# Patient Record
Sex: FEMALE | Race: BLACK OR AFRICAN AMERICAN | NOT HISPANIC OR LATINO | Employment: FULL TIME | URBAN - METROPOLITAN AREA
[De-identification: names, ages, dates, MRNs, and addresses within clinical notes are randomized per-mention and may not be internally consistent; named-entity substitution may affect disease eponyms.]

---

## 2017-02-07 ENCOUNTER — PATIENT MESSAGE (OUTPATIENT)
Dept: PLASTIC SURGERY | Facility: CLINIC | Age: 27
End: 2017-02-07

## 2017-02-07 ENCOUNTER — PATIENT MESSAGE (OUTPATIENT)
Dept: OBSTETRICS AND GYNECOLOGY | Facility: CLINIC | Age: 27
End: 2017-02-07

## 2017-02-08 ENCOUNTER — TELEPHONE (OUTPATIENT)
Dept: PLASTIC SURGERY | Facility: CLINIC | Age: 27
End: 2017-02-08

## 2017-02-08 NOTE — TELEPHONE ENCOUNTER
Called pt to schedule an appt per her patient portal message. No answer. Unable to leave a voice message(mailbox is not set up).

## 2017-02-22 ENCOUNTER — OFFICE VISIT (OUTPATIENT)
Dept: PLASTIC SURGERY | Facility: CLINIC | Age: 27
End: 2017-02-22
Payer: COMMERCIAL

## 2017-02-22 VITALS
WEIGHT: 171.19 LBS | HEIGHT: 62 IN | BODY MASS INDEX: 31.5 KG/M2 | SYSTOLIC BLOOD PRESSURE: 131 MMHG | TEMPERATURE: 98 F | HEART RATE: 82 BPM | DIASTOLIC BLOOD PRESSURE: 71 MMHG

## 2017-02-22 DIAGNOSIS — L91.0 KELOID SCAR: Primary | ICD-10-CM

## 2017-02-22 PROCEDURE — 1160F RVW MEDS BY RX/DR IN RCRD: CPT | Mod: S$GLB,,, | Performed by: SURGERY

## 2017-02-22 PROCEDURE — 99999 PR PBB SHADOW E&M-EST. PATIENT-LVL III: CPT | Mod: PBBFAC,,, | Performed by: SURGERY

## 2017-02-22 PROCEDURE — 99212 OFFICE O/P EST SF 10 MIN: CPT | Mod: S$GLB,,, | Performed by: SURGERY

## 2017-02-22 NOTE — PROGRESS NOTES
Patient underwent bilateral breast reduction in 4/2012. She subsequently developed and abscess of the right breast and this was I&D by Dr. Patterson in the ED.    She has developed thickened keloidal scars of the IMF incision. She states they are painful and unsightly and would like them revised.    On Exam, she has keloidal widened scars of the IMF incision, worse on the right than left.    We will plan for excision and layered closure of the the inframammary fold incision.

## 2017-03-06 ENCOUNTER — TELEPHONE (OUTPATIENT)
Dept: PLASTIC SURGERY | Facility: CLINIC | Age: 27
End: 2017-03-06

## 2017-03-27 ENCOUNTER — HOSPITAL ENCOUNTER (OUTPATIENT)
Dept: RADIOLOGY | Facility: HOSPITAL | Age: 27
Discharge: HOME | End: 2017-03-27
Attending: OBSTETRICS & GYNECOLOGY
Payer: COMMERCIAL

## 2017-03-27 ENCOUNTER — OFFICE VISIT (OUTPATIENT)
Dept: OBSTETRICS AND GYNECOLOGY | Facility: CLINIC | Age: 27
End: 2017-03-27
Payer: COMMERCIAL

## 2017-03-27 VITALS
DIASTOLIC BLOOD PRESSURE: 78 MMHG | BODY MASS INDEX: 29.61 KG/M2 | WEIGHT: 167.13 LBS | SYSTOLIC BLOOD PRESSURE: 122 MMHG | HEIGHT: 63 IN

## 2017-03-27 DIAGNOSIS — N92.3 INTERMENSTRUAL BLEEDING: ICD-10-CM

## 2017-03-27 DIAGNOSIS — Z12.4 ENCOUNTER FOR SCREENING FOR MALIGNANT NEOPLASM OF CERVIX: ICD-10-CM

## 2017-03-27 DIAGNOSIS — Z01.419 WELL WOMAN EXAM WITH ROUTINE GYNECOLOGICAL EXAM: Primary | ICD-10-CM

## 2017-03-27 LAB
B-HCG UR QL: NEGATIVE
C TRACH DNA SPEC QL NAA+PROBE: NOT DETECTED
CTP QC/QA: YES
N GONORRHOEA DNA SPEC QL NAA+PROBE: NOT DETECTED

## 2017-03-27 PROCEDURE — 88175 CYTOPATH C/V AUTO FLUID REDO: CPT

## 2017-03-27 PROCEDURE — 76830 TRANSVAGINAL US NON-OB: CPT | Mod: 26,,, | Performed by: RADIOLOGY

## 2017-03-27 PROCEDURE — 99395 PREV VISIT EST AGE 18-39: CPT | Mod: S$GLB,,, | Performed by: OBSTETRICS & GYNECOLOGY

## 2017-03-27 PROCEDURE — 87591 N.GONORRHOEAE DNA AMP PROB: CPT

## 2017-03-27 PROCEDURE — 76856 US EXAM PELVIC COMPLETE: CPT | Mod: 26,,, | Performed by: RADIOLOGY

## 2017-03-27 PROCEDURE — 81025 URINE PREGNANCY TEST: CPT | Mod: S$GLB,,, | Performed by: OBSTETRICS & GYNECOLOGY

## 2017-03-27 PROCEDURE — 76856 US EXAM PELVIC COMPLETE: CPT | Mod: TC

## 2017-03-27 PROCEDURE — 99999 PR PBB SHADOW E&M-EST. PATIENT-LVL III: CPT | Mod: PBBFAC,,, | Performed by: OBSTETRICS & GYNECOLOGY

## 2017-03-27 NOTE — PROGRESS NOTES
Tiffany Dobbs is a 26 y.o. female  who presents for annual exam.  Patient's last menstrual period was 2017 (exact date).  Menses occur monthly, lasting 5-7 days in duration.  For the past several years, she describes having intermenstrual bleeding.  She is unsure if this spotting is midcycle.  No pelvic pain.  No fever.  She is currently not trying to become or prevent pregnancy.    UPT negative    History reviewed. No pertinent past medical history.    Past Surgical History:   Procedure Laterality Date    BREAST REDUCTION         OB History      Para Term  AB TAB SAB Ectopic Multiple Living    0                   ROS:  GENERAL: Feeling well overall.   SKIN: Denies rash or lesions.   HEAD: Denies head injury or headache.   NODES: Denies enlarged lymph nodes.   CHEST: Denies chest pain or shortness of breath.   CARDIOVASCULAR: Denies palpitations or left sided chest pain.   ABDOMEN: No abdominal pain, nausea, vomiting or rectal bleeding.   URINARY: No dysuria or hematuria.  REPRODUCTIVE: See HPI.   BREASTS: Denies pain, lumps, or nipple discharge.   HEMATOLOGIC: No easy bruisability.   MUSCULOSKELETAL: Denies joint pain or swelling.   NEUROLOGIC: Denies syncope or weakness.   PSYCHIATRIC: Denies depression.    PE:   (chaperone present during entire exam)  APPEARANCE: Well nourished, well developed, in no acute distress.  BREASTS: Symmetrical, S/P bilateral reduction with scarring. No palpable masses, nipple discharge or adenopathy bilaterally.  ABDOMEN: Soft. No tenderness or masses. No hernias. No CVA tenderness.  VULVA: No lesions. Normal female genitalia.  URETHRAL MEATUS: Normal size and location, no lesions, no prolapse.  URETHRA: No masses, tenderness, prolapse or scarring.  VAGINA: Moist and well rugated, no discharge, no significant cystocele or rectocele.  CERVIX: No lesions and discharge. PAP done.  UTERUS: Normal size, regular shape, mobile, non-tender, bladder base  nontender.  ADNEXA: No masses, tenderness or CDS nodularity.  ANUS PERINEUM: Normal.      Diagnosis:  1. Well woman exam with routine gynecological exam    2. Encounter for screening for malignant neoplasm of cervix    3. Intermenstrual bleeding          PLAN:    Orders Placed This Encounter    C. trachomatis/N. gonorrhoeae by AMP DNA Cervix    US Pelvis Comp with Transvag NON-OB (xpd    POCT Urine Pregnancy    Liquid-based pap smear, screening       Patient was counseled today on the need for annual gyn exams.  We discussed her intermenstrual bleeding and the various etiologies.  UPT today is negative.  GC/CT was performed to rule out an infectious etiology.  We will obtain a pelvic sono for further evaluation of her uterus.  She will perform a calendar to determine if the spotting is mid-cycle.  If the spotting seems consistent with ovulatory bleeding, she will begin a trial of OCPs.    Follow-up in 1 year.

## 2017-03-30 ENCOUNTER — PATIENT MESSAGE (OUTPATIENT)
Dept: OBSTETRICS AND GYNECOLOGY | Facility: CLINIC | Age: 27
End: 2017-03-30

## 2017-03-30 NOTE — TELEPHONE ENCOUNTER
Called patient:    Discussed results of pelvic sono:    US PELVIS COMP WITH TRANSVAG NON-OB (XPD).  There are no previous examinations for comparison. Transabdominal and transvaginal images demonstrate a mildly enlarged uterus, 9.5 x 4.2 x 5.5 cm.  The endometrial stripe is upper normal, 13 mm.  There is a 1.4 x 1.5 x 1.6 cm intramural fibroid along the left side of the uterine body.  Both ovaries are normal in size, 5.5 x 3.2 x 3.2 cm on the right and 4.1 x 2.9 x 1.9 cm on the left.  There are normal follicles on both ovaries, with a dominant 1.6 x 1.7 x 1.8 cm follicle in the right ovary.  There are no abnormal adnexal masses or free fluid.  Ovarian Doppler reveals a normal high resistance pattern with resistive index of 0.73 on the right and 0.65 on the left.           Impression                1.  Intramural fibroid  2.  Mildly enlarged uterus with upper normal endometrial stripe      We discussed the fibroid which is very small and not requiring any treatment at this time.  She was about one week prior to her menses when the pelvic ultrasound was performed.    Aware of normal pap and negative GC/CT.    She will monitor her intermenstrual bleeding to see if it is mid-cycle, consistent with ovulatory bleeding.  If so, we discussed a trial of OCPs.    To let us know her progress in 2-3 months.

## 2017-04-04 ENCOUNTER — TELEPHONE (OUTPATIENT)
Dept: PLASTIC SURGERY | Facility: CLINIC | Age: 27
End: 2017-04-04

## 2017-06-05 ENCOUNTER — HOSPITAL ENCOUNTER (OUTPATIENT)
Dept: RADIOLOGY | Facility: HOSPITAL | Age: 27
Discharge: HOME | End: 2017-06-05
Attending: INTERNAL MEDICINE
Payer: COMMERCIAL

## 2017-06-05 ENCOUNTER — OFFICE VISIT (OUTPATIENT)
Dept: INTERNAL MEDICINE | Facility: CLINIC | Age: 27
End: 2017-06-05
Payer: COMMERCIAL

## 2017-06-05 VITALS
WEIGHT: 168.5 LBS | DIASTOLIC BLOOD PRESSURE: 72 MMHG | HEART RATE: 88 BPM | HEIGHT: 63 IN | SYSTOLIC BLOOD PRESSURE: 116 MMHG | BODY MASS INDEX: 29.86 KG/M2

## 2017-06-05 DIAGNOSIS — M25.562 CHRONIC PAIN OF LEFT KNEE: ICD-10-CM

## 2017-06-05 DIAGNOSIS — B35.1 FUNGAL NAIL INFECTION: ICD-10-CM

## 2017-06-05 DIAGNOSIS — G89.29 CHRONIC PAIN OF LEFT KNEE: ICD-10-CM

## 2017-06-05 DIAGNOSIS — N92.1 MENORRHAGIA WITH IRREGULAR CYCLE: ICD-10-CM

## 2017-06-05 DIAGNOSIS — Z00.00 HEALTH CARE MAINTENANCE: Primary | ICD-10-CM

## 2017-06-05 PROCEDURE — 73562 X-RAY EXAM OF KNEE 3: CPT | Mod: 26,50,, | Performed by: RADIOLOGY

## 2017-06-05 PROCEDURE — 87591 N.GONORRHOEAE DNA AMP PROB: CPT

## 2017-06-05 PROCEDURE — 73562 X-RAY EXAM OF KNEE 3: CPT | Mod: TC,50

## 2017-06-05 PROCEDURE — 99385 PREV VISIT NEW AGE 18-39: CPT | Mod: S$GLB,,, | Performed by: INTERNAL MEDICINE

## 2017-06-05 PROCEDURE — 99999 PR PBB SHADOW E&M-EST. PATIENT-LVL III: CPT | Mod: PBBFAC,,, | Performed by: INTERNAL MEDICINE

## 2017-06-05 RX ORDER — NAPROXEN 500 MG/1
500 TABLET ORAL 2 TIMES DAILY WITH MEALS
Qty: 30 TABLET | Refills: 1 | Status: SHIPPED | OUTPATIENT
Start: 2017-06-05 | End: 2017-06-29 | Stop reason: SDUPTHER

## 2017-06-05 NOTE — PROGRESS NOTES
"Subjective:       Patient ID: Tiffany Dobbs is a 26 y.o. female.    Chief Complaint: Annual Exam and Establish Care    HPI   27 yo F here to establish care and have yearly preventative healthcare visit.     Knee pain intermittently for several years. Used to be only left but both bother her intermittently. Did have an injury when running when 12yo with twisting while running, lots of swelling, no medical care.   Left bothers her worse. Worse with rainy weather. No swelling recently. Hears crunching. Worse with squats but not regular exercise. Worse with heels. Never bad enough to take OTC meds.     Heavy menstrual cycles. She is currently on cycle day 9. Irregular. She saw ob/gyn on 3/27/17. Recommended ocp but did not want. Requests pain meds for cramps.     Toenails "weird color" since child.       Review of Systems   Constitutional: Negative for fever.   HENT: Negative.    Eyes: Negative.    Respiratory: Negative for shortness of breath.    Cardiovascular: Negative for chest pain and leg swelling.   Gastrointestinal: Negative for abdominal pain, diarrhea, nausea and vomiting.   Genitourinary: Negative.    Musculoskeletal: Negative for arthralgias.   Skin: Negative for rash.   Psychiatric/Behavioral: Negative.        Objective:   /72   Pulse 88   Ht 5' 3" (1.6 m)   Wt 76.4 kg (168 lb 8 oz)   LMP 05/30/2017   BMI 29.85 kg/m²      Physical Exam   Constitutional: She is oriented to person, place, and time. She appears well-developed and well-nourished.   HENT:   Head: Normocephalic and atraumatic.   Eyes: Conjunctivae and EOM are normal. Pupils are equal, round, and reactive to light.   Neck: Neck supple. No thyromegaly present.   Cardiovascular: Normal rate, regular rhythm and normal heart sounds.    No murmur heard.  Pulmonary/Chest: Effort normal and breath sounds normal. No respiratory distress. She has no wheezes.   Abdominal: Soft. Bowel sounds are normal. She exhibits no distension. There is no " tenderness.   Musculoskeletal: Normal range of motion.   Neurological: She is alert and oriented to person, place, and time.   Skin: Skin is warm and dry. No rash noted.   Psychiatric: She has a normal mood and affect. Judgment and thought content normal.   Vitals reviewed.      Crepitus left knee, slighlt  Dark discoloration distal toe nails    Assessment:       1. Health care maintenance    2. Chronic pain of left knee    3. Menorrhagia with irregular cycle    4. Fungal nail infection        Plan:       Tiffany HERNANDEZ was seen today for annual exam and establish care.    Diagnoses and all orders for this visit:    Health care maintenance  -     Comprehensive metabolic panel; Future  -     CBC auto differential; Future  -     TSH; Future  -     C. trachomatis/N. gonorrhoeae by AMP DNA Urine  -     HIV-1 and HIV-2 antibodies; Future  -     RPR; Future    Chronic pain of left knee  -     naproxen (NAPROSYN) 500 MG tablet; Take 1 tablet (500 mg total) by mouth 2 (two) times daily with meals. As needed knee pain or menstrual cramps  -     X-ray Knee Ortho Bilateral; Future    Menorrhagia with irregular cycle  -     naproxen (NAPROSYN) 500 MG tablet; Take 1 tablet (500 mg total) by mouth 2 (two) times daily with meals. As needed knee pain or menstrual cramps    Fungal nail infection   Will start terbinafine if cmp okay

## 2017-06-06 ENCOUNTER — PROCEDURE VISIT (OUTPATIENT)
Dept: PLASTIC SURGERY | Facility: CLINIC | Age: 27
End: 2017-06-06
Payer: COMMERCIAL

## 2017-06-06 VITALS — HEART RATE: 79 BPM | DIASTOLIC BLOOD PRESSURE: 62 MMHG | SYSTOLIC BLOOD PRESSURE: 118 MMHG

## 2017-06-06 DIAGNOSIS — Z98.890 S/P BILATERAL BREAST REDUCTION: Primary | ICD-10-CM

## 2017-06-06 LAB
C TRACH DNA SPEC QL NAA+PROBE: NOT DETECTED
N GONORRHOEA DNA SPEC QL NAA+PROBE: NOT DETECTED

## 2017-06-07 ENCOUNTER — PATIENT MESSAGE (OUTPATIENT)
Dept: INTERNAL MEDICINE | Facility: CLINIC | Age: 27
End: 2017-06-07

## 2017-06-07 DIAGNOSIS — B35.1 FUNGAL NAIL INFECTION: Primary | ICD-10-CM

## 2017-06-07 DIAGNOSIS — D50.9 MICROCYTIC ANEMIA: ICD-10-CM

## 2017-06-09 ENCOUNTER — TELEPHONE (OUTPATIENT)
Dept: PLASTIC SURGERY | Facility: CLINIC | Age: 27
End: 2017-06-09

## 2017-06-09 DIAGNOSIS — L91.0 KELOID SCAR: Primary | ICD-10-CM

## 2017-06-29 DIAGNOSIS — M25.562 CHRONIC PAIN OF LEFT KNEE: ICD-10-CM

## 2017-06-29 DIAGNOSIS — N92.1 MENORRHAGIA WITH IRREGULAR CYCLE: ICD-10-CM

## 2017-06-29 DIAGNOSIS — G89.29 CHRONIC PAIN OF LEFT KNEE: ICD-10-CM

## 2017-06-29 RX ORDER — NAPROXEN 500 MG/1
TABLET ORAL
Qty: 30 TABLET | Refills: 0 | Status: ON HOLD | OUTPATIENT
Start: 2017-06-29 | End: 2017-07-13 | Stop reason: HOSPADM

## 2017-07-03 ENCOUNTER — ANESTHESIA EVENT (OUTPATIENT)
Dept: SURGERY | Facility: HOSPITAL | Age: 27
End: 2017-07-03
Payer: COMMERCIAL

## 2017-07-03 NOTE — ANESTHESIA PREPROCEDURE EVALUATION
Pre Admission Screening  Katie Schulz RN      []Hide copied text  []Hover for attribution information  Anesthesia Assessment: Preoperative EQUATION     Planned Procedure: Procedure(s) (LRB):  EXCISION-KELOID Rt & Lt IMF (Bilateral)  Requested Anesthesia Type:General  Surgeon: Rom Patterson MD  Service: Plastics  Known or anticipated Date of Surgery:7/13/2017     Surgeon notes: reviewed     Electronic QUestionnaire Assessment completed via nurse interview with patient.         NO AQ        Triage considerations:      The patient has no apparent active cardiac condition (No unstable coronary Syndrome such as severe unstable angina or recent [<1 month] myocardial infarction, decompensated CHF, severe valvular   disease or significant arrhythmia)     Previous anesthesia records:GETA, No problems and Not available     Last PCP note: within 1 month , within [facility]  6/5/17  Subspecialty notes: n/a     Other important co-morbidities: mild anemia   Tests already available:  Available tests,  within 1 month . 6/5/17/CBC and CMP                                                Instructions given. (See in Nurse's note)     Optimization:  Anesthesia Preop Clinic Assessment  Indicated-not required for this procedure                          Plan:              Testing:  none                                               Patient  has previously scheduled Medical Appointment:none     Navigation:                                   Straight Line to surgery.                                    No tests, anesthesia preop clinic visit, or consult required.                                                                                    Electronically signed by Katie Schulz RN at 7/3/2017 10:41 AM        Pre-admit on 7/13/2017            Detailed Report                                                                                                                   07/03/2017  Pre-operative evaluation for Procedure(s)  (LRB):  EXCISION-KELOID Rt & Lt IMF (Bilateral)    Tiffany Dobbs is a 26 y.o. female with PMH of breast reduction surgery presenting for procedures above for bilateral keloid scar after breast reduction surgery.    LDA:   n/a    Prev airway:   Not in Epic    Drips:   n/a     Ob Status:  Urine pregnancy test ordered    Patient Active Problem List   Diagnosis    Surgical wound dehiscence    Chronic pain of left knee    Menorrhagia with irregular cycle    Microcytic anemia       Review of patient's allergies indicates:   Allergen Reactions    Bactrim [sulfamethoxazole-trimethoprim] Itching and Rash       Past Surgical History:   Procedure Laterality Date    BREAST REDUCTION  2012       Social History     Social History    Marital status: Single     Spouse name: N/A    Number of children: N/A    Years of education: N/A     Occupational History    Not on file.     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      No current outpatient prescriptions on file prior to encounter.       Vital Signs Range (Last 24H):         Labs:  CBC:   No results for input(s): WBC, RBC, HGB, HCT, PLT, MCV, MCH, MCHC in the last 72 hours.    CMP:  No results for input(s): NA, K, CL, CO2, BUN, CREATININE, GLU, MG, PHOS, CALCIUM, ALBUMIN, PROT, ALKPHOS, ALT, AST, BILITOT in the last 72 hours.    Coagulation:  No results for input(s): INR, PROTIME, APTT in the last 72 hours.    Diagnostic Studies:  n/a    EKG:  None prior    2D Echo:  None prior      Anesthesia Evaluation    I have reviewed the Patient Summary Reports.    I have  reviewed the Nursing Notes.   I have reviewed the Medications.     Review of Systems  Anesthesia Hx:  No problems with previous Anesthesia  History of prior surgery of interest to airway management or planning: Previous anesthesia: General breast reduction with general anesthesia.  Denies Family Hx of Anesthesia complications.   Denies Personal Hx of Anesthesia complications.   Social:  Non-Smoker, Social Alcohol Use    Hematology/Oncology:         -- Anemia: Iron Deficiency Anemia Hematology Comments: Pt started MVI with iron last month for mild anemia H/H 33.7 & 10.1   EENT/Dental:EENT/Dental Normal   Cardiovascular:  Cardiovascular Normal Exercise tolerance: good   Denies Angina.    Pulmonary:  Pulmonary Normal  Denies Shortness of breath.  Denies Recent URI.    Dermatological:   Keloid scar after breast reduction surgery       Physical Exam  General:  Well nourished    Airway/Jaw/Neck:  Airway Findings: Mouth Opening: Normal Tongue: Normal  General Airway Assessment: Adult  Mallampati: II  Improves to I with phonation.  TM Distance: Normal, at least 6 cm  Jaw/Neck Findings:  Neck ROM: Normal ROM     Eyes/Ears/Nose:  EYES/EARS/NOSE FINDINGS: Normal   Dental:  Dental Findings: In tact   Chest/Lungs:  Chest/Lungs Clear    Heart/Vascular:  Heart Findings: Normal Heart murmur: negative       Mental Status:  Mental Status Findings: Normal        Anesthesia Plan  Type of Anesthesia, risks & benefits discussed:  Anesthesia Type:  general  Patient's Preference:   Intra-op Monitoring Plan: standard ASA monitors  Intra-op Monitoring Plan Comments:   Post Op Pain Control Plan: IV/PO Opioids PRN and multimodal analgesia  Post Op Pain Control Plan Comments:   Induction:   IV  Beta Blocker:  Patient is not currently on a Beta-Blocker (No further documentation required).       Informed Consent: Patient understands risks and agrees with Anesthesia plan.  Questions answered. Anesthesia consent signed with patient.  ASA Score: 1      Day of Surgery Review of History & Physical: I have interviewed and examined the patient. I have reviewed the patient's H&P dated:  There are no significant changes.          Ready For Surgery From Anesthesia Perspective.

## 2017-07-03 NOTE — PRE ADMISSION SCREENING
Anesthesia Assessment: Preoperative EQUATION    Planned Procedure: Procedure(s) (LRB):  EXCISION-KELOID Rt & Lt IMF (Bilateral)  Requested Anesthesia Type:General  Surgeon: Rom Patterson MD  Service: Plastics  Known or anticipated Date of Surgery:7/13/2017    Surgeon notes: reviewed    Electronic QUestionnaire Assessment completed via nurse interview with patient.        NO AQ      Triage considerations:     The patient has no apparent active cardiac condition (No unstable coronary Syndrome such as severe unstable angina or recent [<1 month] myocardial infarction, decompensated CHF, severe valvular   disease or significant arrhythmia)    Previous anesthesia records:GETA, No problems and Not available    [facility]5/17  Subspecialty notes: n/a    Other important co-morbidities: mild anemia   Tests already available:  Available tests,  within 1 month . 6/5/17/CBC and CMP            Instructions given. (See in Nurse's note)    Optimization:  Anesthesia Preop Clinic Assessment  Indicated-not required for this procedure      Plan:    Testing:  none     Patient  has previously scheduled Medical Appointment:none    Navigation:               Straight Line to surgery.               No tests, anesthesia preop clinic visit, or consult required.

## 2017-07-12 ENCOUNTER — TELEPHONE (OUTPATIENT)
Dept: PLASTIC SURGERY | Facility: CLINIC | Age: 27
End: 2017-07-12

## 2017-07-13 ENCOUNTER — ANESTHESIA (OUTPATIENT)
Dept: SURGERY | Facility: HOSPITAL | Age: 27
End: 2017-07-13
Payer: COMMERCIAL

## 2017-07-13 ENCOUNTER — HOSPITAL ENCOUNTER (OUTPATIENT)
Facility: HOSPITAL | Age: 27
Discharge: HOME | End: 2017-07-13
Attending: SURGERY | Admitting: SURGERY
Payer: COMMERCIAL

## 2017-07-13 DIAGNOSIS — L91.0 KELOID: Primary | ICD-10-CM

## 2017-07-13 DIAGNOSIS — M25.562 CHRONIC PAIN OF LEFT KNEE: ICD-10-CM

## 2017-07-13 DIAGNOSIS — G89.29 CHRONIC PAIN OF LEFT KNEE: ICD-10-CM

## 2017-07-13 DIAGNOSIS — N92.1 MENORRHAGIA WITH IRREGULAR CYCLE: ICD-10-CM

## 2017-07-13 PROCEDURE — 25000003 PHARM REV CODE 250: Performed by: STUDENT IN AN ORGANIZED HEALTH CARE EDUCATION/TRAINING PROGRAM

## 2017-07-13 PROCEDURE — 71000039 HC RECOVERY, EACH ADD'L HOUR: Performed by: SURGERY

## 2017-07-13 PROCEDURE — 88304 TISSUE EXAM BY PATHOLOGIST: CPT | Mod: 26,,, | Performed by: PATHOLOGY

## 2017-07-13 PROCEDURE — 25000003 PHARM REV CODE 250: Performed by: PHYSICIAN ASSISTANT

## 2017-07-13 PROCEDURE — 36000707: Performed by: SURGERY

## 2017-07-13 PROCEDURE — 71000015 HC POSTOP RECOV 1ST HR: Performed by: SURGERY

## 2017-07-13 PROCEDURE — 37000009 HC ANESTHESIA EA ADD 15 MINS: Performed by: SURGERY

## 2017-07-13 PROCEDURE — D9220A PRA ANESTHESIA: Mod: ,,, | Performed by: ANESTHESIOLOGY

## 2017-07-13 PROCEDURE — 63600175 PHARM REV CODE 636 W HCPCS: Performed by: STUDENT IN AN ORGANIZED HEALTH CARE EDUCATION/TRAINING PROGRAM

## 2017-07-13 PROCEDURE — 14301 TIS TRNFR ANY 30.1-60 SQ CM: CPT | Mod: RT,,, | Performed by: SURGERY

## 2017-07-13 PROCEDURE — 37000008 HC ANESTHESIA 1ST 15 MINUTES: Performed by: SURGERY

## 2017-07-13 PROCEDURE — 36000706: Performed by: SURGERY

## 2017-07-13 PROCEDURE — 88304 TISSUE EXAM BY PATHOLOGIST: CPT | Performed by: PATHOLOGY

## 2017-07-13 PROCEDURE — 71000033 HC RECOVERY, INTIAL HOUR: Performed by: SURGERY

## 2017-07-13 RX ORDER — LIDOCAINE HCL/PF 100 MG/5ML
SYRINGE (ML) INTRAVENOUS
Status: DISCONTINUED | OUTPATIENT
Start: 2017-07-13 | End: 2017-07-13

## 2017-07-13 RX ORDER — ONDANSETRON 2 MG/ML
INJECTION INTRAMUSCULAR; INTRAVENOUS
Status: DISCONTINUED | OUTPATIENT
Start: 2017-07-13 | End: 2017-07-13

## 2017-07-13 RX ORDER — ACETAMINOPHEN 10 MG/ML
INJECTION, SOLUTION INTRAVENOUS
Status: DISCONTINUED | OUTPATIENT
Start: 2017-07-13 | End: 2017-07-13

## 2017-07-13 RX ORDER — LIDOCAINE HYDROCHLORIDE 10 MG/ML
1 INJECTION, SOLUTION EPIDURAL; INFILTRATION; INTRACAUDAL; PERINEURAL ONCE
Status: DISCONTINUED | OUTPATIENT
Start: 2017-07-13 | End: 2017-07-13 | Stop reason: HOSPADM

## 2017-07-13 RX ORDER — HYDROCODONE BITARTRATE AND ACETAMINOPHEN 5; 325 MG/1; MG/1
1 TABLET ORAL EVERY 4 HOURS PRN
Status: DISCONTINUED | OUTPATIENT
Start: 2017-07-13 | End: 2017-07-13 | Stop reason: HOSPADM

## 2017-07-13 RX ORDER — PROPOFOL 10 MG/ML
VIAL (ML) INTRAVENOUS
Status: DISCONTINUED | OUTPATIENT
Start: 2017-07-13 | End: 2017-07-13

## 2017-07-13 RX ORDER — SUCCINYLCHOLINE CHLORIDE 20 MG/ML
INJECTION INTRAMUSCULAR; INTRAVENOUS
Status: DISCONTINUED | OUTPATIENT
Start: 2017-07-13 | End: 2017-07-13

## 2017-07-13 RX ORDER — KETAMINE HCL IN 0.9 % NACL 50 MG/5 ML
SYRINGE (ML) INTRAVENOUS
Status: DISCONTINUED | OUTPATIENT
Start: 2017-07-13 | End: 2017-07-13

## 2017-07-13 RX ORDER — ROCURONIUM BROMIDE 10 MG/ML
INJECTION, SOLUTION INTRAVENOUS
Status: DISCONTINUED | OUTPATIENT
Start: 2017-07-13 | End: 2017-07-13

## 2017-07-13 RX ORDER — HYDROCODONE BITARTRATE AND ACETAMINOPHEN 10; 325 MG/1; MG/1
1 TABLET ORAL EVERY 4 HOURS PRN
Status: DISCONTINUED | OUTPATIENT
Start: 2017-07-13 | End: 2017-07-13 | Stop reason: HOSPADM

## 2017-07-13 RX ORDER — SODIUM CHLORIDE 9 MG/ML
INJECTION, SOLUTION INTRAVENOUS CONTINUOUS PRN
Status: DISCONTINUED | OUTPATIENT
Start: 2017-07-13 | End: 2017-07-13

## 2017-07-13 RX ORDER — ONDANSETRON 8 MG/1
8 TABLET, ORALLY DISINTEGRATING ORAL EVERY 8 HOURS PRN
Status: DISCONTINUED | OUTPATIENT
Start: 2017-07-13 | End: 2017-07-13 | Stop reason: HOSPADM

## 2017-07-13 RX ORDER — FENTANYL CITRATE 50 UG/ML
INJECTION, SOLUTION INTRAMUSCULAR; INTRAVENOUS
Status: DISCONTINUED | OUTPATIENT
Start: 2017-07-13 | End: 2017-07-13

## 2017-07-13 RX ORDER — CEPHALEXIN 500 MG/1
500 CAPSULE ORAL EVERY 6 HOURS
Qty: 28 CAPSULE | Refills: 0 | Status: SHIPPED | OUTPATIENT
Start: 2017-07-13 | End: 2017-07-20

## 2017-07-13 RX ORDER — HYDROCODONE BITARTRATE AND ACETAMINOPHEN 10; 325 MG/1; MG/1
TABLET ORAL
Status: DISCONTINUED
Start: 2017-07-13 | End: 2017-07-13 | Stop reason: HOSPADM

## 2017-07-13 RX ORDER — ONDANSETRON 2 MG/ML
4 INJECTION INTRAMUSCULAR; INTRAVENOUS EVERY 12 HOURS PRN
Status: DISCONTINUED | OUTPATIENT
Start: 2017-07-13 | End: 2017-07-13 | Stop reason: HOSPADM

## 2017-07-13 RX ORDER — SODIUM CHLORIDE 0.9 % (FLUSH) 0.9 %
3 SYRINGE (ML) INJECTION
Status: DISCONTINUED | OUTPATIENT
Start: 2017-07-13 | End: 2017-07-13 | Stop reason: HOSPADM

## 2017-07-13 RX ORDER — MIDAZOLAM HYDROCHLORIDE 5 MG/ML
INJECTION INTRAMUSCULAR; INTRAVENOUS
Status: DISCONTINUED | OUTPATIENT
Start: 2017-07-13 | End: 2017-07-13

## 2017-07-13 RX ORDER — HYDROMORPHONE HYDROCHLORIDE 1 MG/ML
0.2 INJECTION, SOLUTION INTRAMUSCULAR; INTRAVENOUS; SUBCUTANEOUS EVERY 5 MIN PRN
Status: DISCONTINUED | OUTPATIENT
Start: 2017-07-13 | End: 2017-07-13 | Stop reason: HOSPADM

## 2017-07-13 RX ORDER — DEXAMETHASONE SODIUM PHOSPHATE 4 MG/ML
INJECTION, SOLUTION INTRA-ARTICULAR; INTRALESIONAL; INTRAMUSCULAR; INTRAVENOUS; SOFT TISSUE
Status: DISCONTINUED | OUTPATIENT
Start: 2017-07-13 | End: 2017-07-13

## 2017-07-13 RX ORDER — HYDROCODONE BITARTRATE AND ACETAMINOPHEN 5; 325 MG/1; MG/1
1 TABLET ORAL EVERY 6 HOURS PRN
Qty: 31 TABLET | Refills: 0 | Status: SHIPPED | OUTPATIENT
Start: 2017-07-13 | End: 2018-06-13

## 2017-07-13 RX ADMIN — HYDROCODONE BITARTRATE AND ACETAMINOPHEN 1 TABLET: 10; 325 TABLET ORAL at 10:07

## 2017-07-13 RX ADMIN — HYDROMORPHONE HYDROCHLORIDE 0.2 MG: 1 INJECTION, SOLUTION INTRAMUSCULAR; INTRAVENOUS; SUBCUTANEOUS at 10:07

## 2017-07-13 RX ADMIN — SUCCINYLCHOLINE CHLORIDE 120 MG: 20 INJECTION, SOLUTION INTRAMUSCULAR; INTRAVENOUS at 08:07

## 2017-07-13 RX ADMIN — Medication 20 MG: at 08:07

## 2017-07-13 RX ADMIN — ACETAMINOPHEN 1000 MG: 10 INJECTION, SOLUTION INTRAVENOUS at 09:07

## 2017-07-13 RX ADMIN — DEXAMETHASONE SODIUM PHOSPHATE 4 MG: 4 INJECTION, SOLUTION INTRAMUSCULAR; INTRAVENOUS at 09:07

## 2017-07-13 RX ADMIN — ONDANSETRON 4 MG: 2 INJECTION INTRAMUSCULAR; INTRAVENOUS at 09:07

## 2017-07-13 RX ADMIN — HYDROMORPHONE HYDROCHLORIDE 0.2 MG: 1 INJECTION, SOLUTION INTRAMUSCULAR; INTRAVENOUS; SUBCUTANEOUS at 11:07

## 2017-07-13 RX ADMIN — FENTANYL CITRATE 50 MCG: 50 INJECTION, SOLUTION INTRAMUSCULAR; INTRAVENOUS at 08:07

## 2017-07-13 RX ADMIN — SODIUM CHLORIDE: 0.9 INJECTION, SOLUTION INTRAVENOUS at 08:07

## 2017-07-13 RX ADMIN — Medication 2 G: at 08:07

## 2017-07-13 RX ADMIN — PROPOFOL 50 MG: 10 INJECTION, EMULSION INTRAVENOUS at 08:07

## 2017-07-13 RX ADMIN — PROPOFOL 150 MG: 10 INJECTION, EMULSION INTRAVENOUS at 08:07

## 2017-07-13 RX ADMIN — ROCURONIUM BROMIDE 4 MG: 10 INJECTION, SOLUTION INTRAVENOUS at 08:07

## 2017-07-13 RX ADMIN — SODIUM CHLORIDE, SODIUM GLUCONATE, SODIUM ACETATE, POTASSIUM CHLORIDE, MAGNESIUM CHLORIDE, SODIUM PHOSPHATE, DIBASIC, AND POTASSIUM PHOSPHATE: .53; .5; .37; .037; .03; .012; .00082 INJECTION, SOLUTION INTRAVENOUS at 09:07

## 2017-07-13 RX ADMIN — FENTANYL CITRATE 100 MCG: 50 INJECTION, SOLUTION INTRAMUSCULAR; INTRAVENOUS at 09:07

## 2017-07-13 RX ADMIN — LIDOCAINE HYDROCHLORIDE 50 MG: 20 INJECTION, SOLUTION INTRAVENOUS at 08:07

## 2017-07-13 RX ADMIN — MIDAZOLAM 2 MG: 5 INJECTION INTRAMUSCULAR; INTRAVENOUS at 08:07

## 2017-07-13 NOTE — OP NOTE
"DATE OF PROCEDURE:  07/13/2017    PREOPERATIVE DIAGNOSIS:  Hypertrophic scars of the right and left inframammary   fold status post bilateral breast reduction.    POSTOPERATIVE DIAGNOSIS:  Hypertrophic scars of the right and left inframammary   fold status post bilateral breast reduction.    PROCEDURES PERFORMED:  1.  Excision of hypertrophic scar of the right breast measuring approximately 25   cm x 2.5 cm with advancement flap closure, advancement flaps 25 cm x 2.5 cm.  2.  Excision of hypertrophic scar of the left breast approximately 25 cm x 2.5   cm with advancement flap closure; advancement flap 25 cm x 3.5 cm.    SURGEON:  Rom Patterson M.D., [facility]    ANESTHESIA:  General.    DESCRIPTION OF PROCEDURE:  The patient was placed in the supine position, and   after adequate general anesthesia, was prepped and draped in normal sterile   fashion.  The hypertrophic scars were along the inframammary fold incision on   both breasts.  They were the full length of the bilateral breast reduction   incisions.  They were then marked.  There were areas that were wide especially   at the "T".  The hypertrophic scars were then completely excised on both sides   using a #15 blade.  Hemostasis was achieved using the Bovie.  The flaps were   then elevated superiorly and then advanced down to the inframammary fold and   closed in layers using interrupted 3-0 Monocryl followed by running 4-0 Monocryl   subcuticular suture.  A similar advancement and closure was performed on the   opposite side.  Again, it was closed using interrupted 3-0 Monocryl followed by   running 4-0 Monocryl subcuticular suture.  There were no complications with the   procedure.  Blood loss was minimal.  Specimens were sent; right breast and left   breast.  The patient was transported to the Recovery Room in stable condition.      JOSELIN/ALEJANDRA  dd: 07/13/2017 17:39:05 (CDT)  td: 07/15/2017 02:26:05 (CDT)  Doc ID   #[id number]  Job ID #[id number]    CC:       "

## 2017-07-13 NOTE — DISCHARGE SUMMARY
[facility]  Discharge Note  Short Stay    Admit Date: 7/13/2017    Discharge Date and Time: No discharge date for patient encounter.     Attending Physician: Rom Patterson, *     Discharge Provider: Chapis Smith    Diagnoses:  Active Hospital Problems    Diagnosis  POA    Keloid [L91.0]  Yes      Resolved Hospital Problems    Diagnosis Date Resolved POA    Keloid [L91.0] 07/13/2017 Yes       Discharged Condition: good    Hospital Course: Patient was admitted for an outpatient procedure and tolerated the procedure well with no complications.    Final Diagnoses: Same as principal problem.    Disposition: Home or Self Care    Follow up/Patient Instructions:    Medications:  Reconciled Home Medications:   Current Discharge Medication List      START taking these medications    Details   cephALEXin (KEFLEX) 500 MG capsule Take 1 capsule (500 mg total) by mouth every 6 (six) hours.  Qty: 28 capsule, Refills: 0      hydrocodone-acetaminophen 5-325mg (NORCO) 5-325 mg per tablet Take 1 tablet by mouth every 6 (six) hours as needed for Pain.  Qty: 31 tablet, Refills: 0         CONTINUE these medications which have NOT CHANGED    Details   multivitamin with minerals tablet Take 1 tablet by mouth once daily.         STOP taking these medications       naproxen (NAPROSYN) 500 MG tablet Comments:   Reason for Stopping:               Discharge Procedure Orders  Diet general     Activity as tolerated   Order Comments: No sweating x 2 weeks     Lifting restrictions   Order Comments: No lifting >5 pounds x 2 weeks     Call MD for:  extreme fatigue     Call MD for:  hives     Call MD for:  redness, tenderness, or signs of infection (pain, swelling, redness, odor or green/yellow discharge around incision site)     Call MD for:  persistent dizziness or light-headedness     Call MD for:  severe uncontrolled pain     Call MD for:  persistent nausea and vomiting     Call MD for:  temperature >100.4     Call MD for:   difficulty breathing, headache or visual disturbances     No dressing needed   Order Comments: Patient with incisional DermaBond tape, this stays on and does not need to be removed/changed. Okay to wear Abd pads for comfort       Follow-up Information     Rom Patterson MD In 1 week.    Specialty:  Plastic Surgery  Contact information:  [address]  [facility] [address]  [phone]                   Discharge Procedure Orders (must include Diet, Follow-up, Activity):    Discharge Procedure Orders (must include Diet, Follow-up, Activity)  Diet general     Activity as tolerated   Order Comments: No sweating x 2 weeks     Lifting restrictions   Order Comments: No lifting >5 pounds x 2 weeks     Call MD for:  extreme fatigue     Call MD for:  hives     Call MD for:  redness, tenderness, or signs of infection (pain, swelling, redness, odor or green/yellow discharge around incision site)     Call MD for:  persistent dizziness or light-headedness     Call MD for:  severe uncontrolled pain     Call MD for:  persistent nausea and vomiting     Call MD for:  temperature >100.4     Call MD for:  difficulty breathing, headache or visual disturbances     No dressing needed   Order Comments: Patient with incisional DermaBond tape, this stays on and does not need to be removed/changed. Okay to wear Abd pads for comfort

## 2017-07-13 NOTE — H&P
CC: IMF scar contracture    HPI: 27yo woman s/p bilateral breast reduction now with IMF scars/keloids.  She would like these removed.    History reviewed. No pertinent past medical history.    Past Surgical History:   Procedure Laterality Date    BREAST REDUCTION  2012     No current facility-administered medications on file prior to encounter.      No current outpatient prescriptions on file prior to encounter.     Review of patient's allergies indicates:   Allergen Reactions    Bactrim [sulfamethoxazole-trimethoprim] Itching and Rash     Family History   Problem Relation Age of Onset    Breast cancer Maternal Aunt     Breast cancer Paternal Aunt     Ovarian cancer Maternal Aunt     Gestational diabetes Mother     Heart disease Father      arrythmia, has device - ICD?    Diabetes Maternal Grandmother     Stomach cancer Maternal Grandfather     Diabetes Maternal Aunt     Colon cancer Neg Hx     Heart attack Neg Hx      Social History     Social History    Marital status: Single     Spouse name: N/A    Number of children: N/A    Years of education: N/A     Occupational History    Not on file.     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 Patterson.

## 2017-07-13 NOTE — TRANSFER OF CARE
"Anesthesia Transfer of Care Note    Patient: Tiffany Dobbs    Procedure(s) Performed: Procedure(s) (LRB):  EXCISION-KELOID Rt & Lt IMF (Bilateral)    Patient location: PACU    Anesthesia Type: general    Transport from OR: Transported from OR on 6-10 L/min O2 by face mask with adequate spontaneous ventilation    Post pain: adequate analgesia    Post assessment: no apparent anesthetic complications    Post vital signs: stable    Level of consciousness: awake and alert    Nausea/Vomiting: no nausea/vomiting    Complications: none    Transfer of care protocol was followed      Last vitals:   Visit Vitals  /73 (BP Location: Right arm, Patient Position: Lying, BP Method: Automatic)   Pulse 94   Temp 36.8 °C (98.3 °F) (Oral)   Resp 16   Ht 5' 3" (1.6 m)   Wt 77.6 kg (171 lb)   SpO2 100%   Breastfeeding? No   BMI 30.29 kg/m²     "

## 2017-07-13 NOTE — DISCHARGE INSTRUCTIONS
Discharge Instructions: Caring for Your Incision  You are going home with stitches (sutures), surgical staples, special strips of surgical tape called Steri-Strips, or surgical skin glue. One of these items was used to close your incision, help stop bleeding, and speed healing. Follow the tips on this sheet to help your incision heal.  Home care  · Always wash your hands before touching your incision.  · Keep your incision clean and dry.  · Avoid doing things that could cause dirt or sweat to get on your incision.  · Dont pick at scabs. They help protect the wound.  · Keep your incision out of water.  · Take a sponge bath to avoid getting your incision wet, unless your healthcare provider tells you otherwise.  · Ask your provider when can you take a shower or bathe.  · Ask your provider about the best way to keep your incision dry when bathing or showering.  · Pat sutures dry if they get wet. Dont rub.  · Leave the dressing (bandage) in place until you are told to remove it or change it. Change it only as directed, using clean hands.  · After the first 12 hours, change your dressing every 24 hours, or as directed by your healthcare provider.  · Change your dressing if it gets wet or soiled.  Care for specific closures  Follow these guidelines unless your child's healthcare provider tells you otherwise:  · Sutures or staples. Once you no longer need to keep these dry, clean the incision or wound daily. First remove the bandage using clean hands. Then wash the area gently with soap and warm water. Use a wet cotton swab to loosen and remove any blood or crust that forms. After cleaning, put a thin layer of antibiotic ointment on. Then put on a new bandage.  · Skin glue. Dont put liquid, ointment, or cream on your incision or wound while the glue is in place. Avoid activities that cause heavy sweating. Protect the incision or wound from sunlight. Do not scratch, rub, or pick at the glue. Do not put tape directly  over the glue. The glue should peel off within 5 to 10 days.  · Surgical tape. Keep your incision or wound dry. If it gets wet, blot the area dry with a clean towel. Surgical tape usually falls off within 7 to 10 days. If it has not fallen off after 10 days, contact your healthcare provider before taking it off yourself. If you are told to remove the tape, put mineral oil or petroleum jelly on a cotton ball. Gently rub the tape until it is removed.  Follow-up care  Follow up with your healthcare provider to ask how long sutures or staples should be left in place. Be sure to return for suture or staple removal as directed. If dissolving stitches were used in your mouth, these will not need to be removed. They should fall out or dissolve on their own.  If tape closures were used, remove them yourself when your provider recommends if they have not fallen off on their own. If skin glue was used, the glue will wear off by itself.      When to seek medical care  Call your healthcare provider right away if you have any of the following:  · More pain, redness, swelling, bleeding, or foul-smelling discharge around the incision area  · Fever of 100.4°F (38°C) or higher, or as directed by your healthcare provider  · Shaking chills  · Vomiting or nausea that doesnt go away  · Numbness, coldness, or tingling around the incision area, or changes in skin color  · Opening of the sutures or wound  · Stitches or staples come apart or fall out or surgical tape falls off before 7 days, or as directed by your provider   Date Last Reviewed: 10/22/2014  © 4636-1449 [employer]. [address] [place] [address]. All rights reserved. This information is not intended as a substitute for professional medical care. Always follow your healthcare professional's instructions.      Procedural Sedation (Adult)  You have been given medicine by vein to make you sleep during your surgery. This may have included both a pain  medicine and sleeping medicine. Most of the effects have worn off. But you may still have some drowsiness for the next 6 to 8 hours.  Home care  Follow these guidelines when you get home:  · For the next 8 hours, you should be watched by a responsible adult. This person should make sure your condition is not getting worse.  · Don't take any medicine by mouth for pain or for sleep during the next 4 hours. These might react with the medicines you were given in the hospital. This could cause a much stronger response than usual.  · Don't drink any alcohol for the next 24 hours.  · Don't drive, operate dangerous machinery, or make important business or personal decisions during the next 24 hours.  Follow-up care  Follow up with your healthcare provider if you are not alert and back to your usual level of activity within 12 hours.  When to seek medical advice  Call your healthcare provider right away if any of these occur:  · Drowsiness gets worse  · Weakness or dizziness gets worse  · Repeated vomiting  · You cannot be awakened   Date Last Reviewed: 10/18/2016  © 1677-9871 [employer]. [address] [place] [address], [place] [address]. All rights reserved. This information is not intended as a substitute for professional medical care. Always follow your healthcare professional's instructions.

## 2017-07-13 NOTE — ANESTHESIA POSTPROCEDURE EVALUATION
"Anesthesia Post Evaluation    Patient: Tiffany Dobbs    Procedure(s) Performed: Procedure(s) (LRB):  EXCISION-KELOID Rt & Lt IMF (Bilateral)    Final Anesthesia Type: general  Patient location during evaluation: PACU  Patient participation: Yes- Able to Participate  Level of consciousness: awake and alert and oriented  Post-procedure vital signs: reviewed and stable  Pain management: adequate  Airway patency: patent  PONV status at discharge: No PONV  Anesthetic complications: no      Cardiovascular status: hemodynamically stable  Respiratory status: unassisted  Hydration status: euvolemic  Follow-up not needed.        Visit Vitals  /70   Pulse 72   Temp 36.8 °C (98.3 °F) (Skin)   Resp 20   Ht 5' 3" (1.6 m)   Wt 77.6 kg (171 lb)   SpO2 100%   Breastfeeding? No   BMI 30.29 kg/m²       Pain/Austyn Score: Pain Assessment Performed: Yes (7/13/2017 12:15 PM)  Presence of Pain: denies (7/13/2017 12:15 PM)  Pain Rating Prior to Med Admin: 6 (7/13/2017 11:05 AM)  Pain Rating Post Med Admin: 3 (7/13/2017 11:15 AM)  Austyn Score: 10 (7/13/2017 11:40 AM)      "

## 2017-07-13 NOTE — BRIEF OP NOTE
[facility]  Brief Operative Note     SUMMARY     Surgery Date: 7/13/2017     Surgeon(s) and Role:     * Rom Patterson MD - Primary    Assisting Surgeon: None    Pre-op Diagnosis:  Keloid scar [L91.0]    Post-op Diagnosis:  Post-Op Diagnosis Codes:     * Keloid scar [L91.0]    Procedure(s) (LRB):  EXCISION-KELOID Rt & Lt IMF (Bilateral)    Anesthesia: General    Description of the findings of the procedure: Excision of hypertrophic scars of B breast s/p bilateral breast reduction      Findings/Key Components: Procedure as planned., no complications. Please see Op note for procedure in detail    Estimated Blood Loss: 50cc total  Specimens:   Specimen (12h ago through future)    Start     Ordered    07/13/17 0926  Specimen to Pathology - Surgery  Once     Comments:  1) keloid left breast  For perm in formalin in refrigerator2) keloid right breast  For perm in formalin  In refrigerator      07/13/17 0926          Discharge Note    SUMMARY     Admit Date: 7/13/2017    Discharge Date and Time:  07/13/2017 10:06 AM    Hospital Course (synopsis of major diagnoses, care, treatment, and services provided during the course of the hospital stay): Admitted for planned outpatient surgery. Tolerated procedure well. Transferred to PACU in stable condition with plans to D/C home once anesthesia criteria met.      Final Diagnosis: Post-Op Diagnosis Codes:     * Keloid scar [L91.0]    Disposition: Home or Self Care    Follow Up/Patient Instructions:     Medications:  Reconciled Home Medications:   Current Discharge Medication List      START taking these medications    Details   cephALEXin (KEFLEX) 500 MG capsule Take 1 capsule (500 mg total) by mouth every 6 (six) hours.  Qty: 28 capsule, Refills: 0      hydrocodone-acetaminophen 5-325mg (NORCO) 5-325 mg per tablet Take 1 tablet by mouth every 6 (six) hours as needed for Pain.  Qty: 31 tablet, Refills: 0         CONTINUE these medications which have NOT  CHANGED    Details   multivitamin with minerals tablet Take 1 tablet by mouth once daily.         STOP taking these medications       naproxen (NAPROSYN) 500 MG tablet Comments:   Reason for Stopping:               Discharge Procedure Orders  Diet general     Activity as tolerated   Order Comments: No sweating x 2 weeks     Lifting restrictions   Order Comments: No lifting >5 pounds x 2 weeks     Call MD for:  extreme fatigue     Call MD for:  hives     Call MD for:  redness, tenderness, or signs of infection (pain, swelling, redness, odor or green/yellow discharge around incision site)     Call MD for:  persistent dizziness or light-headedness     Call MD for:  severe uncontrolled pain     Call MD for:  persistent nausea and vomiting     Call MD for:  temperature >100.4     Call MD for:  difficulty breathing, headache or visual disturbances     No dressing needed   Order Comments: Patient with incisional DermaBond tape, this stays on and does not need to be removed/changed. Okay to wear Abd pads for comfort       Follow-up Information     Rom Patterson MD In 1 week.    Specialty:  Plastic Surgery  Contact information:  [facility] [address]  [facility] [id number] [phone]

## 2017-07-13 NOTE — PLAN OF CARE
Patient and mother state they are ready to be discharged. Instructions and prescriptions given to patient and family. Both verbalize understanding. Patient tolerating po liquids with no difficulty. Patient states pain is at a tolerable level for them. Anesthesia consent and surgical consent in chart upon patient's discharge from [facility].

## 2017-07-14 VITALS
DIASTOLIC BLOOD PRESSURE: 70 MMHG | RESPIRATION RATE: 20 BRPM | TEMPERATURE: 98 F | HEART RATE: 72 BPM | BODY MASS INDEX: 30.3 KG/M2 | HEIGHT: 63 IN | SYSTOLIC BLOOD PRESSURE: 115 MMHG | WEIGHT: 171 LBS | OXYGEN SATURATION: 100 %

## 2017-07-17 ENCOUNTER — PATIENT MESSAGE (OUTPATIENT)
Dept: PLASTIC SURGERY | Facility: CLINIC | Age: 27
End: 2017-07-17

## 2017-07-19 ENCOUNTER — OFFICE VISIT (OUTPATIENT)
Dept: PLASTIC SURGERY | Facility: CLINIC | Age: 27
End: 2017-07-19
Payer: COMMERCIAL

## 2017-07-19 VITALS
BODY MASS INDEX: 30.03 KG/M2 | TEMPERATURE: 98 F | WEIGHT: 169.56 LBS | DIASTOLIC BLOOD PRESSURE: 70 MMHG | SYSTOLIC BLOOD PRESSURE: 109 MMHG | HEART RATE: 64 BPM

## 2017-07-19 DIAGNOSIS — Z09 SURGERY FOLLOW-UP EXAMINATION: Primary | ICD-10-CM

## 2017-07-19 PROCEDURE — 99024 POSTOP FOLLOW-UP VISIT: CPT | Mod: S$GLB,,, | Performed by: PHYSICIAN ASSISTANT

## 2017-07-19 PROCEDURE — 99999 PR PBB SHADOW E&M-EST. PATIENT-LVL III: CPT | Mod: PBBFAC,,, | Performed by: PHYSICIAN ASSISTANT

## 2017-07-19 NOTE — PROGRESS NOTES
Tiffany Dobbs presents to [facility] on 7/19/2017 for a follow up visit status post excision of hypertrophic scars of B breast on 07/13/2017. Doing well today with no issues    Review of patient's allergies indicates:   Allergen Reactions    Bactrim [sulfamethoxazole-trimethoprim] Itching and Rash     Current Outpatient Prescriptions on File Prior to Visit   Medication Sig Dispense Refill    cephALEXin (KEFLEX) 500 MG capsule Take 1 capsule (500 mg total) by mouth every 6 (six) hours. 28 capsule 0    hydrocodone-acetaminophen 5-325mg (NORCO) 5-325 mg per tablet Take 1 tablet by mouth every 6 (six) hours as needed for Pain. 31 tablet 0    multivitamin with minerals tablet Take 1 tablet by mouth once daily.       No current facility-administered medications on file prior to visit.      Patient Active Problem List   Diagnosis    Surgical wound dehiscence    Chronic pain of left knee    Menorrhagia with irregular cycle    Microcytic anemia    Keloid     Past Surgical History:   Procedure Laterality Date    BREAST REDUCTION  2012       DATE OF PROCEDURE:  07/13/2017     PREOPERATIVE DIAGNOSIS:  Hypertrophic scars of the right and left inframammary   fold status post bilateral breast reduction.     POSTOPERATIVE DIAGNOSIS:  Hypertrophic scars of the right and left inframammary   fold status post bilateral breast reduction.     PROCEDURES PERFORMED:  1.  Excision of hypertrophic scar of the right breast measuring approximately 25   cm x 2.5 cm with advancement flap closure, advancement flaps 25 cm x 2.5 cm.  2.  Excision of hypertrophic scar of the left breast approximately 25 cm x 2.5   cm with advancement flap closure; advancement flap 25 cm x 3.5 cm.     SURGEON:  Rom Patterson M.D., [facility]     ANESTHESIA:  General.    PHYSICAL EXAMINATION    Vitals:    07/19/17 1201   BP: 109/70   Pulse: 64   Temp: 98.2 °F (36.8 °C)     WD WN NAD  VSS  Lungs - normal effort  Breast - incisional tape intact,  no erythema or drainage  Skin - warm/dry, no rash    ASSESSMENT/PLAN    26 y.o. F s/p excision of hypertrophic scars  - doing well, no issues  - incisional tape intact, to be left on for 2-3 weeks  - RTC on 2 weeks, appt scheduled    All questions were answered. The patient was advised to call the clinic with any questions or concerns prior to their next visit.       ASSESSMENT/PLAN

## 2017-07-28 ENCOUNTER — PATIENT MESSAGE (OUTPATIENT)
Dept: PLASTIC SURGERY | Facility: CLINIC | Age: 27
End: 2017-07-28

## 2017-08-02 ENCOUNTER — OFFICE VISIT (OUTPATIENT)
Dept: PLASTIC SURGERY | Facility: CLINIC | Age: 27
End: 2017-08-02
Payer: COMMERCIAL

## 2017-08-02 VITALS
TEMPERATURE: 98 F | BODY MASS INDEX: 30.28 KG/M2 | HEART RATE: 69 BPM | WEIGHT: 170.88 LBS | HEIGHT: 63 IN | SYSTOLIC BLOOD PRESSURE: 107 MMHG | DIASTOLIC BLOOD PRESSURE: 70 MMHG

## 2017-08-02 DIAGNOSIS — Z09 SURGERY FOLLOW-UP EXAMINATION: Primary | ICD-10-CM

## 2017-08-02 PROCEDURE — 99999 PR PBB SHADOW E&M-EST. PATIENT-LVL III: CPT | Mod: PBBFAC,,, | Performed by: SURGERY

## 2017-08-02 PROCEDURE — 99024 POSTOP FOLLOW-UP VISIT: CPT | Mod: S$GLB,,, | Performed by: SURGERY

## 2017-08-02 NOTE — PROGRESS NOTES
S/p bilateral breast keloid excision on 7-13-17    Doing well without complaint    Vitals:    08/02/17 0836   BP: 107/70   Pulse: 69   Temp: 98.4 °F (36.9 °C)     Incisions well healed without evidence of infection    A/P: s/p above  Tape removed  Suture loops cut  F/u in 4 weeks

## 2017-08-12 ENCOUNTER — PATIENT MESSAGE (OUTPATIENT)
Dept: OBSTETRICS AND GYNECOLOGY | Facility: CLINIC | Age: 27
End: 2017-08-12

## 2017-10-19 ENCOUNTER — PATIENT MESSAGE (OUTPATIENT)
Dept: PLASTIC SURGERY | Facility: CLINIC | Age: 27
End: 2017-10-19

## 2017-11-29 ENCOUNTER — OFFICE VISIT (OUTPATIENT)
Dept: INTERNAL MEDICINE | Facility: CLINIC | Age: 27
End: 2017-11-29
Payer: COMMERCIAL

## 2017-11-29 VITALS
SYSTOLIC BLOOD PRESSURE: 118 MMHG | WEIGHT: 170 LBS | HEIGHT: 63 IN | OXYGEN SATURATION: 97 % | BODY MASS INDEX: 30.12 KG/M2 | HEART RATE: 77 BPM | DIASTOLIC BLOOD PRESSURE: 78 MMHG

## 2017-11-29 DIAGNOSIS — B37.31 YEAST VAGINITIS: Primary | ICD-10-CM

## 2017-11-29 LAB
B-HCG UR QL: NEGATIVE
CTP QC/QA: YES

## 2017-11-29 PROCEDURE — 81003 URINALYSIS AUTO W/O SCOPE: CPT

## 2017-11-29 PROCEDURE — 87147 CULTURE TYPE IMMUNOLOGIC: CPT

## 2017-11-29 PROCEDURE — 81025 URINE PREGNANCY TEST: CPT | Mod: S$GLB,,, | Performed by: INTERNAL MEDICINE

## 2017-11-29 PROCEDURE — 87088 URINE BACTERIA CULTURE: CPT

## 2017-11-29 PROCEDURE — 87086 URINE CULTURE/COLONY COUNT: CPT

## 2017-11-29 PROCEDURE — 99999 PR PBB SHADOW E&M-EST. PATIENT-LVL IV: CPT | Mod: PBBFAC,,, | Performed by: INTERNAL MEDICINE

## 2017-11-29 PROCEDURE — 99214 OFFICE O/P EST MOD 30 MIN: CPT | Mod: S$GLB,,, | Performed by: INTERNAL MEDICINE

## 2017-11-29 RX ORDER — FLUCONAZOLE 150 MG/1
150 TABLET ORAL DAILY
Qty: 3 TABLET | Refills: 0 | Status: SHIPPED | OUTPATIENT
Start: 2017-11-29 | End: 2017-12-02

## 2017-11-29 NOTE — PROGRESS NOTES
"Subjective:       Patient ID: Tiffany Dobbs is a 27 y.o. female.    Chief Complaint: Vaginitis (pt complains of having reoccuring yeast infections. no blood in stool, no discoloration in discharge/ burning while urinating.  )    HPI   28 yo F here for evaluation of recurrent vaginal yeast infections and white tongue.     Over last 3 months she has had 3 yeast infections. Treated with [facility] brand monistat suppository 1 day. Gone for close to a month before recurring.  Recurred right before period each month. Slight itching currently. She used tea tree oil and it helped some.   Tongue also appeared white at one point.   Irregular periods.   Review of Systems   Constitutional: Negative for fever.   HENT: Negative.    Eyes: Negative.    Respiratory: Negative for shortness of breath.    Cardiovascular: Negative for chest pain and leg swelling.   Gastrointestinal: Negative for abdominal pain, diarrhea, nausea and vomiting.   Genitourinary: Positive for vaginal discharge.   Musculoskeletal: Negative for arthralgias.   Skin: Negative for rash.   Psychiatric/Behavioral: Negative.        Objective:   /78 (BP Location: Left arm, Patient Position: Sitting, BP Method: Medium (Manual))   Pulse 77   Ht 5' 3" (1.6 m)   Wt 77.1 kg (170 lb)   LMP  (LMP Unknown)   SpO2 97%   BMI 30.11 kg/m²      Physical Exam   Constitutional: She is oriented to person, place, and time. She appears well-developed and well-nourished. No distress.   HENT:   Head: Normocephalic and atraumatic.   No overt thrush   Cardiovascular: Normal rate and regular rhythm.    Pulmonary/Chest: Effort normal. No respiratory distress. She has no wheezes. She has no rales.   Neurological: She is alert and oriented to person, place, and time.   Skin: Skin is warm and dry. She is not diaphoretic.   Psychiatric: She has a normal mood and affect. Her behavior is normal.       Assessment:       1. Yeast vaginitis        Plan:       Tiffany HERNANDEZ was seen today for " vaginitis.    Diagnoses and all orders for this visit:    Yeast vaginitis  -     Comprehensive metabolic panel; Future  -     Hemoglobin A1c; Future  -     fluconazole (DIFLUCAN) 150 MG Tab; Take 1 tablet (150 mg total) by mouth once daily. Extended course due to recurrence  -     POCT Urinalysis  -     POCT Urine Pregnancy negative  -     Urinalysis  -     Urine culture

## 2017-11-30 LAB
BACTERIA UR CULT: NORMAL
BILIRUB UR QL STRIP: NEGATIVE
CLARITY UR REFRACT.AUTO: CLEAR
COLOR UR AUTO: YELLOW
GLUCOSE UR QL STRIP: NEGATIVE
HGB UR QL STRIP: NEGATIVE
KETONES UR QL STRIP: NEGATIVE
LEUKOCYTE ESTERASE UR QL STRIP: NEGATIVE
NITRITE UR QL STRIP: NEGATIVE
PH UR STRIP: 6 [PH] (ref 5–8)
PROT UR QL STRIP: NEGATIVE
SP GR UR STRIP: 1.02 (ref 1–1.03)
URN SPEC COLLECT METH UR: NORMAL
UROBILINOGEN UR STRIP-ACNC: NEGATIVE EU/DL

## 2018-06-13 ENCOUNTER — OFFICE VISIT (OUTPATIENT)
Dept: OBSTETRICS AND GYNECOLOGY | Facility: CLINIC | Age: 28
End: 2018-06-13
Attending: OBSTETRICS & GYNECOLOGY
Payer: COMMERCIAL

## 2018-06-13 VITALS
BODY MASS INDEX: 30.31 KG/M2 | SYSTOLIC BLOOD PRESSURE: 120 MMHG | WEIGHT: 171.06 LBS | HEIGHT: 63 IN | DIASTOLIC BLOOD PRESSURE: 82 MMHG

## 2018-06-13 DIAGNOSIS — Z01.419 WELL WOMAN EXAM WITH ROUTINE GYNECOLOGICAL EXAM: Primary | ICD-10-CM

## 2018-06-13 PROCEDURE — 99999 PR PBB SHADOW E&M-EST. PATIENT-LVL III: CPT | Mod: PBBFAC,,, | Performed by: OBSTETRICS & GYNECOLOGY

## 2018-06-13 PROCEDURE — 99395 PREV VISIT EST AGE 18-39: CPT | Mod: S$GLB,,, | Performed by: OBSTETRICS & GYNECOLOGY

## 2018-06-13 NOTE — PROGRESS NOTES
Tiffany Dobbs is a 27 y.o. female  who presents for annual exam.  Patient's last menstrual period was 2018 (exact date).  Menses occur monthly, lasting 5-7 days in duration.  She has been tracking her period and notes that she has mild spotting midcycle at the time of ovulation.  She had been evaluated last year with pelvic ultrasound that revealed a small (1.6 cm fibroid).  Cervical culture was negative.  Denies changes in her medical / surgical history.  Denies GYN complaints.    Pap 3/27/17: Negative    Pelvic sono 3/27/17:  US PELVIS COMP WITH TRANSVAG NON-OB (XPD).  There are no previous examinations for comparison. Transabdominal and transvaginal images demonstrate a mildly enlarged uterus, 9.5 x 4.2 x 5.5 cm.  The endometrial stripe is upper normal, 13 mm.  There is a 1.4 x 1.5 x 1.6 cm intramural fibroid along the left side of the uterine body.  Both ovaries are normal in size, 5.5 x 3.2 x 3.2 cm on the right and 4.1 x 2.9 x 1.9 cm on the left.  There are normal follicles on both ovaries, with a dominant 1.6 x 1.7 x 1.8 cm follicle in the right ovary.  There are no abnormal adnexal masses or free fluid.  Ovarian Doppler reveals a normal high resistance pattern with resistive index of 0.73 on the right and 0.65 on the left.   Impression    1.  Intramural fibroid  2.  Mildly enlarged uterus with upper normal endometrial stripe     GC/CT 17: Negative    History reviewed. No pertinent past medical history.    Past Surgical History:   Procedure Laterality Date    BREAST REDUCTION         OB History      Para Term  AB Living    0              SAB TAB Ectopic Multiple Live Births                       ROS:  GENERAL: Feeling well overall.   SKIN: Denies rash or lesions.   HEAD: Denies head injury or headache.   NODES: Denies enlarged lymph nodes.   CHEST: Denies chest pain or shortness of breath.   CARDIOVASCULAR: Denies palpitations or left sided chest pain.   ABDOMEN: No abdominal  pain, nausea, vomiting or rectal bleeding.   URINARY: No dysuria or hematuria.  REPRODUCTIVE: See HPI.   BREASTS: Denies pain, lumps, or nipple discharge.   HEMATOLOGIC: No easy bruisability or excessive bleeding.   MUSCULOSKELETAL: Denies joint pain or swelling.   NEUROLOGIC: Denies syncope or weakness.   PSYCHIATRIC: Denies depression.    PE:   (chaperone present during entire exam)  APPEARANCE: Well nourished, well developed, in no acute distress.  BREASTS: Symmetrical.  S/P bilateral reduction.  No palpable masses, nipple discharge or adenopathy bilaterally.  ABDOMEN: Soft. No tenderness or masses. No hernias. No CVA tenderness.  VULVA: No lesions. Normal female genitalia.  URETHRAL MEATUS: Normal size and location, no lesions, no prolapse.  URETHRA: No masses, tenderness, prolapse or scarring.  VAGINA: Moist and well rugated, no discharge, no significant cystocele or rectocele.  CERVIX: No lesions and discharge. No CMT.  UTERUS: Normal size, regular shape, mobile, non-tender, bladder base nontender.  ADNEXA: No masses, tenderness or CDS nodularity.  ANUS PERINEUM: Normal.      Diagnosis:  1. Well woman exam with routine gynecological exam          PLAN:         Patient was counseled today on the need for annual gyn exams.  We discussed her ovulatory spotting.  At this time, contraception is not an issue and she declines OCPs / hormone manipulation to prevent ovulation.    Follow-up in 1 year.

## 2018-06-14 ENCOUNTER — PATIENT MESSAGE (OUTPATIENT)
Dept: OBSTETRICS AND GYNECOLOGY | Facility: CLINIC | Age: 28
End: 2018-06-14

## 2018-07-30 ENCOUNTER — PATIENT MESSAGE (OUTPATIENT)
Dept: INTERNAL MEDICINE | Facility: CLINIC | Age: 28
End: 2018-07-30

## 2018-08-03 ENCOUNTER — OFFICE VISIT (OUTPATIENT)
Dept: INTERNAL MEDICINE | Facility: CLINIC | Age: 28
End: 2018-08-03
Payer: COMMERCIAL

## 2018-08-03 VITALS
WEIGHT: 177 LBS | DIASTOLIC BLOOD PRESSURE: 68 MMHG | SYSTOLIC BLOOD PRESSURE: 120 MMHG | OXYGEN SATURATION: 98 % | BODY MASS INDEX: 31.36 KG/M2 | HEART RATE: 68 BPM | HEIGHT: 63 IN

## 2018-08-03 DIAGNOSIS — M54.50 CHRONIC BILATERAL LOW BACK PAIN WITHOUT SCIATICA: Primary | ICD-10-CM

## 2018-08-03 DIAGNOSIS — G89.29 CHRONIC PAIN OF BOTH KNEES: ICD-10-CM

## 2018-08-03 DIAGNOSIS — M25.562 CHRONIC PAIN OF BOTH KNEES: ICD-10-CM

## 2018-08-03 DIAGNOSIS — M25.561 CHRONIC PAIN OF BOTH KNEES: ICD-10-CM

## 2018-08-03 DIAGNOSIS — G89.29 CHRONIC BILATERAL LOW BACK PAIN WITHOUT SCIATICA: Primary | ICD-10-CM

## 2018-08-03 PROCEDURE — 3008F BODY MASS INDEX DOCD: CPT | Mod: CPTII,S$GLB,, | Performed by: INTERNAL MEDICINE

## 2018-08-03 PROCEDURE — 99214 OFFICE O/P EST MOD 30 MIN: CPT | Mod: S$GLB,,, | Performed by: INTERNAL MEDICINE

## 2018-08-03 PROCEDURE — 99999 PR PBB SHADOW E&M-EST. PATIENT-LVL IV: CPT | Mod: PBBFAC,,, | Performed by: INTERNAL MEDICINE

## 2018-08-03 NOTE — PROGRESS NOTES
Subjective:       Patient ID: Tiffany Dobbs is a 28 y.o. female.    Chief Complaint: Low-back Pain (lower back pain, reoccurring at times, radiated around MACKENZIE hips. ) and Knee Pain (pt complains of constant knee pain, pt had xray done previously and mentioned that joints were narrowing.)    Low-back Pain   Pertinent negatives include no abdominal pain, chest pain, fever, headaches, numbness or weakness.   Knee Pain    Pertinent negatives include no numbness or tingling.   Back Pain   This is a recurrent problem. The current episode started more than 1 year ago. The problem occurs 2 to 4 times per day. The problem has been gradually worsening since onset. The pain is present in the sacro-iliac. The quality of the pain is described as aching. The pain radiates to the left knee and right knee. The pain is at a severity of 7/10. The pain is moderate. The pain is worse during the day. The symptoms are aggravated by position and sitting. Stiffness is present in the morning. Associated symptoms include leg pain. Pertinent negatives include no abdominal pain, bladder incontinence, bowel incontinence, chest pain, dysuria, fever, headaches, numbness, paresis, paresthesias, pelvic pain, perianal numbness, tingling, weakness or weight loss. She has tried home exercises and walking for the symptoms. The treatment provided mild relief.      28 you F here for urgent eval of low back pain radiating to bilateral hips and knee pain.     Knee xray bilateral  6/5/17  Minimal medial compartment narrowing and sclerosis bilaterally    US pelvis 3/2017  Intramural fibroid    Back has been bothering her every day. Really started bothering her last few days. Radiates to hip. No heavy lifting. Has been standing for  work. No pain down her legs.   Knees still bothering her. Left knee and hip are worse.  Not taking anything.   Wears flats or chunky heels at work.     Review of Systems   Constitutional: Negative for fever and weight  "loss.   Cardiovascular: Negative for chest pain.   Gastrointestinal: Negative for abdominal pain and bowel incontinence.   Genitourinary: Negative for bladder incontinence, dysuria, hematuria and pelvic pain.   Musculoskeletal: Positive for back pain.   Neurological: Negative for tingling, weakness, numbness, headaches and paresthesias.       Objective:   /68 (BP Location: Left arm, Patient Position: Sitting, BP Method: Large (Manual))   Pulse 68   Ht 5' 3" (1.6 m)   Wt 80.3 kg (177 lb)   SpO2 98%   BMI 31.35 kg/m²      Physical Exam   Constitutional: She is oriented to person, place, and time. She appears well-developed and well-nourished. No distress.   HENT:   Head: Normocephalic and atraumatic.   Cardiovascular: Normal rate.    Pulmonary/Chest: Effort normal. No respiratory distress.   Neurological: She is alert and oriented to person, place, and time.   Skin: Skin is warm and dry. She is not diaphoretic.   Psychiatric: She has a normal mood and affect. Her behavior is normal.     intact ROM bilateral hips and knees, nl external rotation, 5/5 bilateral lower ext strength, no ttp over lumbar spine, no joint swelling  Assessment:       1. Chronic bilateral low back pain without sciatica    2. Chronic pain of both knees        Plan:       Tiffany HERNANDEZ was seen today for low-back pain and knee pain.    Diagnoses and all orders for this visit:    Chronic bilateral low back pain without sciatica  Chronic pain of both knees  -     Ambulatory consult to [facility]   Discussed footwear - try [employer] clogs for work        "

## 2018-08-03 NOTE — LETTER
August 3, 2018    Tiffany Dobbs  [address]  [place] [address]             [facility]  [address]  [place] [address]  Phone: [phone]  Fax: [phone] To Whom It May Concern:    Tiffany Dobbs was seen by me today 8/3/18. Please excuse her from work due to this appointment.         Sincerely,            Chitra Chen MD

## 2018-08-08 ENCOUNTER — TELEPHONE (OUTPATIENT)
Dept: INTERNAL MEDICINE | Facility: CLINIC | Age: 28
End: 2018-08-08

## 2019-04-07 NOTE — PROGRESS NOTES
Chief Complaint   Patient presents with    STD CHECK       HPI:  Tiffany Dobbs is a 28 y.o. female patient  who presents today requesting screening for STDs.  Reports having unprotected IC yesterday - plans to use Plan B.  Denies abnormal discharge or pelvic pain.  Menses occur monthly.    Patient's last menstrual period was 2019 (exact date).     Pap 3/27/17: Negative    Pelvic sono 3/27/17:  US PELVIS COMP WITH TRANSVAG NON-OB (XPD).  There are no previous examinations for comparison. Transabdominal and transvaginal images demonstrate a mildly enlarged uterus, 9.5 x 4.2 x 5.5 cm.  The endometrial stripe is upper normal, 13 mm.  There is a 1.4 x 1.5 x 1.6 cm intramural fibroid along the left side of the uterine body.  Both ovaries are normal in size, 5.5 x 3.2 x 3.2 cm on the right and 4.1 x 2.9 x 1.9 cm on the left.  There are normal follicles on both ovaries, with a dominant 1.6 x 1.7 x 1.8 cm follicle in the right ovary.  There are no abnormal adnexal masses or free fluid.  Ovarian Doppler reveals a normal high resistance pattern with resistive index of 0.73 on the right and 0.65 on the left.   Impression    1.  Intramural fibroid  2.  Mildly enlarged uterus with upper normal endometrial stripe         History reviewed. No pertinent past medical history.    Past Surgical History:   Procedure Laterality Date    BREAST REDUCTION      EXCISION-KELOID Rt & Lt IMF Bilateral 2017    Performed by Rom Patterson MD at [facility] OR [address] [place] [address]         ROS:  GENERAL: Feeling well overall.   SKIN: Denies rash or lesions.   HEAD: Denies head injury or headache.   NODES: Denies enlarged lymph nodes.   CHEST: Denies chest pain or shortness of breath.   CARDIOVASCULAR: Denies palpitations or left sided chest pain.   ABDOMEN: No abdominal pain, nausea, vomiting or rectal bleeding.   URINARY: No dysuria or hematuria.  REPRODUCTIVE: See HPI.   BREASTS: Denies pain, lumps, or nipple discharge.   HEMATOLOGIC:  No easy bruisability or excessive bleeding.   MUSCULOSKELETAL: Denies joint pain or swelling.   NEUROLOGIC: Denies syncope or weakness.   PSYCHIATRIC: Denies depression.    PE:   (chaperone present during entire exam)  APPEARANCE: Well nourished, well developed, in no acute distress.  ABDOMEN: Soft. No tenderness or masses. No CVA tenderness.  VULVA: No lesions. Normal female genitalia.  URETHRAL MEATUS: Normal size and location, no lesions, no prolapse.  VAGINA: Moist and well rugated, no abnormal discharge.  CERVIX: No lesions and discharge.       Diagnosis:  1. Screen for STD (sexually transmitted disease)          PLAN:    Orders Placed This Encounter    C. trachomatis/N. gonorrhoeae by AMP DNA    HIV 1/2 Ag/Ab (4th Gen)    RPR    Hepatitis panel, acute       Patient was counseled today on STD screening and prevention.  We discussed contraceptive options and she declines at this time.  She plans to use Plan B secondary to her unprotected IC yesterday.    Follow-up for annual exam    Total time of visit: 15 minutes (counseling >50% of time)

## 2019-04-08 ENCOUNTER — CLINICAL SUPPORT (OUTPATIENT)
Dept: INTERNAL MEDICINE | Facility: CLINIC | Age: 29
End: 2019-04-08
Attending: OBSTETRICS & GYNECOLOGY
Payer: COMMERCIAL

## 2019-04-08 ENCOUNTER — OFFICE VISIT (OUTPATIENT)
Dept: OBSTETRICS AND GYNECOLOGY | Facility: CLINIC | Age: 29
End: 2019-04-08
Attending: OBSTETRICS & GYNECOLOGY
Payer: COMMERCIAL

## 2019-04-08 VITALS
SYSTOLIC BLOOD PRESSURE: 132 MMHG | HEIGHT: 63 IN | BODY MASS INDEX: 31.45 KG/M2 | DIASTOLIC BLOOD PRESSURE: 78 MMHG | WEIGHT: 177.5 LBS

## 2019-04-08 DIAGNOSIS — Z11.3 SCREEN FOR STD (SEXUALLY TRANSMITTED DISEASE): Primary | ICD-10-CM

## 2019-04-08 DIAGNOSIS — Z11.3 SCREEN FOR STD (SEXUALLY TRANSMITTED DISEASE): ICD-10-CM

## 2019-04-08 LAB
HAV IGM SERPL QL IA: NEGATIVE
HBV CORE IGM SERPL QL IA: NEGATIVE
HBV SURFACE AG SERPL QL IA: NEGATIVE
HCV AB SERPL QL IA: NEGATIVE
HIV 1+2 AB+HIV1 P24 AG SERPL QL IA: NEGATIVE

## 2019-04-08 PROCEDURE — 80074 ACUTE HEPATITIS PANEL: CPT

## 2019-04-08 PROCEDURE — 99213 PR OFFICE/OUTPT VISIT, EST, LEVL III, 20-29 MIN: ICD-10-PCS | Mod: S$GLB,,, | Performed by: OBSTETRICS & GYNECOLOGY

## 2019-04-08 PROCEDURE — 99999 PR PBB SHADOW E&M-EST. PATIENT-LVL III: CPT | Mod: PBBFAC,,, | Performed by: OBSTETRICS & GYNECOLOGY

## 2019-04-08 PROCEDURE — 3008F PR BODY MASS INDEX (BMI) DOCUMENTED: ICD-10-PCS | Mod: CPTII,S$GLB,, | Performed by: OBSTETRICS & GYNECOLOGY

## 2019-04-08 PROCEDURE — 3008F BODY MASS INDEX DOCD: CPT | Mod: CPTII,S$GLB,, | Performed by: OBSTETRICS & GYNECOLOGY

## 2019-04-08 PROCEDURE — 87491 CHLMYD TRACH DNA AMP PROBE: CPT

## 2019-04-08 PROCEDURE — 99999 PR PBB SHADOW E&M-EST. PATIENT-LVL III: ICD-10-PCS | Mod: PBBFAC,,, | Performed by: OBSTETRICS & GYNECOLOGY

## 2019-04-08 PROCEDURE — 86703 HIV-1/HIV-2 1 RESULT ANTBDY: CPT

## 2019-04-08 PROCEDURE — 86592 SYPHILIS TEST NON-TREP QUAL: CPT

## 2019-04-08 PROCEDURE — 99213 OFFICE O/P EST LOW 20 MIN: CPT | Mod: S$GLB,,, | Performed by: OBSTETRICS & GYNECOLOGY

## 2019-04-09 ENCOUNTER — PATIENT MESSAGE (OUTPATIENT)
Dept: OBSTETRICS AND GYNECOLOGY | Facility: CLINIC | Age: 29
End: 2019-04-09

## 2019-04-09 LAB — RPR SER QL: NORMAL

## 2019-04-10 ENCOUNTER — PATIENT MESSAGE (OUTPATIENT)
Dept: OBSTETRICS AND GYNECOLOGY | Facility: CLINIC | Age: 29
End: 2019-04-10

## 2019-04-10 LAB
C TRACH DNA SPEC QL NAA+PROBE: NOT DETECTED
N GONORRHOEA DNA SPEC QL NAA+PROBE: NOT DETECTED

## 2019-04-11 ENCOUNTER — PATIENT MESSAGE (OUTPATIENT)
Dept: OBSTETRICS AND GYNECOLOGY | Facility: CLINIC | Age: 29
End: 2019-04-11

## 2019-04-12 ENCOUNTER — LAB VISIT (OUTPATIENT)
Dept: LAB | Facility: HOSPITAL | Age: 29
End: 2019-04-12
Attending: INTERNAL MEDICINE
Payer: COMMERCIAL

## 2019-04-12 ENCOUNTER — OFFICE VISIT (OUTPATIENT)
Dept: INTERNAL MEDICINE | Facility: CLINIC | Age: 29
End: 2019-04-12
Payer: COMMERCIAL

## 2019-04-12 VITALS
WEIGHT: 178 LBS | HEIGHT: 63 IN | SYSTOLIC BLOOD PRESSURE: 126 MMHG | BODY MASS INDEX: 31.54 KG/M2 | DIASTOLIC BLOOD PRESSURE: 82 MMHG

## 2019-04-12 DIAGNOSIS — D50.9 MICROCYTIC ANEMIA: ICD-10-CM

## 2019-04-12 DIAGNOSIS — M25.561 BILATERAL CHRONIC KNEE PAIN: ICD-10-CM

## 2019-04-12 DIAGNOSIS — M25.552 CHRONIC HIP PAIN, BILATERAL: ICD-10-CM

## 2019-04-12 DIAGNOSIS — G89.29 CHRONIC HIP PAIN, BILATERAL: ICD-10-CM

## 2019-04-12 DIAGNOSIS — M25.551 CHRONIC HIP PAIN, BILATERAL: ICD-10-CM

## 2019-04-12 DIAGNOSIS — K29.50 CHRONIC GASTRITIS WITHOUT BLEEDING, UNSPECIFIED GASTRITIS TYPE: ICD-10-CM

## 2019-04-12 DIAGNOSIS — G89.29 BILATERAL CHRONIC KNEE PAIN: ICD-10-CM

## 2019-04-12 DIAGNOSIS — M25.562 BILATERAL CHRONIC KNEE PAIN: ICD-10-CM

## 2019-04-12 DIAGNOSIS — B37.31 YEAST VAGINITIS: ICD-10-CM

## 2019-04-12 DIAGNOSIS — K21.9 GASTROESOPHAGEAL REFLUX DISEASE, ESOPHAGITIS PRESENCE NOT SPECIFIED: ICD-10-CM

## 2019-04-12 DIAGNOSIS — Z00.00 HEALTH CARE MAINTENANCE: Primary | ICD-10-CM

## 2019-04-12 DIAGNOSIS — B35.1 FUNGAL NAIL INFECTION: ICD-10-CM

## 2019-04-12 LAB
ALBUMIN SERPL BCP-MCNC: 3.9 G/DL (ref 3.5–5.2)
ALBUMIN SERPL BCP-MCNC: 3.9 G/DL (ref 3.5–5.2)
ALP SERPL-CCNC: 73 U/L (ref 55–135)
ALP SERPL-CCNC: 73 U/L (ref 55–135)
ALT SERPL W/O P-5'-P-CCNC: 12 U/L (ref 10–44)
ALT SERPL W/O P-5'-P-CCNC: 12 U/L (ref 10–44)
ANION GAP SERPL CALC-SCNC: 7 MMOL/L (ref 8–16)
ANION GAP SERPL CALC-SCNC: 7 MMOL/L (ref 8–16)
AST SERPL-CCNC: 17 U/L (ref 10–40)
AST SERPL-CCNC: 17 U/L (ref 10–40)
BASOPHILS # BLD AUTO: 0.05 K/UL (ref 0–0.2)
BASOPHILS NFR BLD: 0.7 % (ref 0–1.9)
BILIRUB SERPL-MCNC: 0.5 MG/DL (ref 0.1–1)
BILIRUB SERPL-MCNC: 0.5 MG/DL (ref 0.1–1)
BUN SERPL-MCNC: 7 MG/DL (ref 6–20)
BUN SERPL-MCNC: 7 MG/DL (ref 6–20)
CALCIUM SERPL-MCNC: 9.7 MG/DL (ref 8.7–10.5)
CALCIUM SERPL-MCNC: 9.7 MG/DL (ref 8.7–10.5)
CHLORIDE SERPL-SCNC: 108 MMOL/L (ref 95–110)
CHLORIDE SERPL-SCNC: 108 MMOL/L (ref 95–110)
CO2 SERPL-SCNC: 23 MMOL/L (ref 23–29)
CO2 SERPL-SCNC: 23 MMOL/L (ref 23–29)
CREAT SERPL-MCNC: 0.8 MG/DL (ref 0.5–1.4)
CREAT SERPL-MCNC: 0.8 MG/DL (ref 0.5–1.4)
DIFFERENTIAL METHOD: ABNORMAL
EOSINOPHIL # BLD AUTO: 0.5 K/UL (ref 0–0.5)
EOSINOPHIL NFR BLD: 7 % (ref 0–8)
ERYTHROCYTE [DISTWIDTH] IN BLOOD BY AUTOMATED COUNT: 15.8 % (ref 11.5–14.5)
EST. GFR  (AFRICAN AMERICAN): >60 ML/MIN/1.73 M^2
EST. GFR  (AFRICAN AMERICAN): >60 ML/MIN/1.73 M^2
EST. GFR  (NON AFRICAN AMERICAN): >60 ML/MIN/1.73 M^2
EST. GFR  (NON AFRICAN AMERICAN): >60 ML/MIN/1.73 M^2
ESTIMATED AVG GLUCOSE: 94 MG/DL (ref 68–131)
GLUCOSE SERPL-MCNC: 80 MG/DL (ref 70–110)
GLUCOSE SERPL-MCNC: 80 MG/DL (ref 70–110)
HBA1C MFR BLD HPLC: 4.9 % (ref 4–5.6)
HCT VFR BLD AUTO: 35.2 % (ref 37–48.5)
HGB BLD-MCNC: 11 G/DL (ref 12–16)
IRON SERPL-MCNC: 20 UG/DL (ref 30–160)
LYMPHOCYTES # BLD AUTO: 2.7 K/UL (ref 1–4.8)
LYMPHOCYTES NFR BLD: 38 % (ref 18–48)
MCH RBC QN AUTO: 25.2 PG (ref 27–31)
MCHC RBC AUTO-ENTMCNC: 31.3 G/DL (ref 32–36)
MCV RBC AUTO: 81 FL (ref 82–98)
MONOCYTES # BLD AUTO: 0.5 K/UL (ref 0.3–1)
MONOCYTES NFR BLD: 7.7 % (ref 4–15)
NEUTROPHILS # BLD AUTO: 3.3 K/UL (ref 1.8–7.7)
NEUTROPHILS NFR BLD: 46.5 % (ref 38–73)
PLATELET # BLD AUTO: 370 K/UL (ref 150–350)
PMV BLD AUTO: 10.9 FL (ref 9.2–12.9)
POTASSIUM SERPL-SCNC: 3.9 MMOL/L (ref 3.5–5.1)
POTASSIUM SERPL-SCNC: 3.9 MMOL/L (ref 3.5–5.1)
PROT SERPL-MCNC: 8 G/DL (ref 6–8.4)
PROT SERPL-MCNC: 8 G/DL (ref 6–8.4)
RBC # BLD AUTO: 4.37 M/UL (ref 4–5.4)
SATURATED IRON: 4 % (ref 20–50)
SODIUM SERPL-SCNC: 138 MMOL/L (ref 136–145)
SODIUM SERPL-SCNC: 138 MMOL/L (ref 136–145)
TOTAL IRON BINDING CAPACITY: 500 UG/DL (ref 250–450)
TRANSFERRIN SERPL-MCNC: 338 MG/DL (ref 200–375)
WBC # BLD AUTO: 7.05 K/UL (ref 3.9–12.7)

## 2019-04-12 PROCEDURE — 80053 COMPREHEN METABOLIC PANEL: CPT

## 2019-04-12 PROCEDURE — 3008F PR BODY MASS INDEX (BMI) DOCUMENTED: ICD-10-PCS | Mod: CPTII,S$GLB,, | Performed by: INTERNAL MEDICINE

## 2019-04-12 PROCEDURE — 99999 PR PBB SHADOW E&M-EST. PATIENT-LVL IV: ICD-10-PCS | Mod: PBBFAC,,, | Performed by: INTERNAL MEDICINE

## 2019-04-12 PROCEDURE — 99214 OFFICE O/P EST MOD 30 MIN: CPT | Mod: S$GLB,,, | Performed by: INTERNAL MEDICINE

## 2019-04-12 PROCEDURE — 82728 ASSAY OF FERRITIN: CPT

## 2019-04-12 PROCEDURE — 36415 COLL VENOUS BLD VENIPUNCTURE: CPT

## 2019-04-12 PROCEDURE — 83540 ASSAY OF IRON: CPT

## 2019-04-12 PROCEDURE — 99999 PR PBB SHADOW E&M-EST. PATIENT-LVL IV: CPT | Mod: PBBFAC,,, | Performed by: INTERNAL MEDICINE

## 2019-04-12 PROCEDURE — 85025 COMPLETE CBC W/AUTO DIFF WBC: CPT

## 2019-04-12 PROCEDURE — 83036 HEMOGLOBIN GLYCOSYLATED A1C: CPT

## 2019-04-12 PROCEDURE — 99214 PR OFFICE/OUTPT VISIT, EST, LEVL IV, 30-39 MIN: ICD-10-PCS | Mod: S$GLB,,, | Performed by: INTERNAL MEDICINE

## 2019-04-12 PROCEDURE — 3008F BODY MASS INDEX DOCD: CPT | Mod: CPTII,S$GLB,, | Performed by: INTERNAL MEDICINE

## 2019-04-12 RX ORDER — HYDROGEN PEROXIDE 3 %
20 SOLUTION, NON-ORAL MISCELLANEOUS
Qty: 30 CAPSULE | Refills: 1 | Status: SHIPPED | OUTPATIENT
Start: 2019-04-12 | End: 2019-07-24 | Stop reason: ALTCHOICE

## 2019-04-12 NOTE — PATIENT INSTRUCTIONS
Tips to Control Acid Reflux    To control acid reflux, youll need to make some basic diet and lifestyle changes. The simple steps outlined below may be all youll need to ease discomfort.  Watch what you eat  · Avoid fatty foods and spicy foods.  · Eat fewer acidic foods, such as citrus and tomato-based foods. These can increase symptoms.  · Limit drinking alcohol, caffeine, and fizzy beverages. All increase acid reflux.  · Try limiting chocolate, peppermint, and spearmint. These can worsen acid reflux in some people.  Watch when you eat  · Avoid lying down for 3 hours after eating.  · Do not snack before going to bed.  Raise your head  Raising your head and upper body by 4 to 6 inches helps limit reflux when youre lying down. Put blocks under the head of your bed frame to raise it.  Other changes  · Lose weight, if you need to  · Dont exercise near bedtime  · Avoid tight-fitting clothes  · Limit aspirin and ibuprofen  · Stop smoking   Date Last Reviewed: 7/1/2016 © 2000-2017 [employer]. [address] [place] [address]. All rights reserved. This information is not intended as a substitute for professional medical care. Always follow your healthcare professional's instructions.        Treating Gastritis     Take your medicines as directed, even if your stomach pain goes away.    A medical evaluation will be done to find out the cause of your symptoms. The evaluation may include your health history, a physical exam, and some tests. Once your evaluation is done, treatment can begin. It may include taking certain medicines and making some lifestyle changes. Follow your healthcare providers advice.  Taking medicines  Your healthcare providers may prescribe some medicines to neutralize or reduce excess stomach acids. If tests show that H. pylori are in your stomach lining, antibiotics may be prescribed. H.pylori are a type of bacteria that can cause gastritis.  Avoiding certain things  Be  sure to avoid:  · Aspirin. Avoid taking aspirin and other anti-inflammatory medicines, such as ibuprofen. They can irritate your stomach lining. Also, check with your healthcare provider before taking or stopping any medicines.  · Spicy foods and caffeine. Stay away from foods prepared with spices, especially black pepper. Caffeine can also make your symptoms worse. So, avoid coffee, tea, cola drinks, and chocolate. Be sure to tell your healthcare provider about any other foods or liquids that bother your stomach.  · Tobacco and alcohol. Dont use tobacco or drink alcohol. Tobacco and alcohol can increase stomach acids and worsen your gastritis symptoms.  Reducing your stress  Stress may make your gastritis symptoms worse. Whenever you can, reduce the stress in your life. One way to do this is to start an exercise program--talk to your healthcare provider first. Also, try to get enough sleep, at least 8 hours a night.  Date Last Reviewed: 7/1/2016  © 2625-0491 [employer]. [address] [place] [address], [place] [address]. All rights reserved. This information is not intended as a substitute for professional medical care. Always follow your healthcare professional's instructions.

## 2019-04-12 NOTE — PROGRESS NOTES
Subjective:       Patient ID: Tiffany Dobbs is a 28 y.o. female.    Chief Complaint: Annual Exam (general check up.) and Abdominal Pain (burning sensations in the abdomen after eating certain foods along with lots of bloating, possible acid reflux. )    Abdominal Pain   This is a chronic problem. The current episode started more than 1 month ago. The onset quality is gradual. The problem occurs every several days. The most recent episode lasted 3 hours. The problem has been gradually worsening. The pain is located in the generalized abdominal region. The pain is at a severity of 4/10. The pain is moderate. The quality of the pain is burning, dull and a sensation of fullness. Associated symptoms include arthralgias and headaches. Pertinent negatives include no anorexia, belching, constipation, diarrhea, dysuria, fever, flatus, frequency, hematochezia, hematuria, melena, myalgias, nausea, vomiting or weight loss. The pain is aggravated by eating. The pain is relieved by nothing. She has tried nothing for the symptoms. There is no history of abdominal surgery, colon cancer, Crohn's disease, gallstones, GERD, irritable bowel syndrome, pancreatitis, PUD or ulcerative colitis. Patient's medical history includes UTI. Patient's medical history does not include kidney stones.      Tiffany Dobbs is a 28 y.o. female here for a yearly preventative healthcare visit.     Burning dull sensation while eating and bloating.   Within 3 hours of eating.   Seems to be bread or greasy foods.   Last episode fried crab claws, hushpuppies and fried shrimp.    Now mild sx - omelet, smoked turkey, cheese, toast.    No n/v. No acid brash. Occurring at almost every meal.     She takes OTC iron on occasion.   Review of Systems   Constitutional: Negative for fever and weight loss.   Gastrointestinal: Positive for abdominal pain. Negative for anorexia, constipation, diarrhea, flatus, hematochezia, melena, nausea and vomiting.   Genitourinary:  "Negative for dysuria, frequency and hematuria.   Musculoskeletal: Positive for arthralgias. Negative for myalgias.   Neurological: Positive for headaches.       Objective:   /82 (BP Location: Left arm, Patient Position: Sitting, BP Method: Large (Manual))   Ht 5' 3" (1.6 m)   Wt 80.7 kg (178 lb)   LMP 03/27/2019 (Exact Date)   BMI 31.53 kg/m²      Physical Exam   Constitutional: She is oriented to person, place, and time. She appears well-developed and well-nourished.   HENT:   Head: Normocephalic and atraumatic.   Eyes: Pupils are equal, round, and reactive to light. Conjunctivae and EOM are normal.   Neck: Neck supple. No thyromegaly present.   Cardiovascular: Normal rate, regular rhythm and normal heart sounds.   No murmur heard.  Pulmonary/Chest: Effort normal and breath sounds normal. No respiratory distress. She has no wheezes.   Abdominal: Soft. Bowel sounds are normal. She exhibits no distension. There is no tenderness.   Musculoskeletal: Normal range of motion.   Neurological: She is alert and oriented to person, place, and time.   Skin: Skin is warm and dry. No rash noted.   Psychiatric: She has a normal mood and affect. Judgment and thought content normal.   Vitals reviewed.      Assessment:       1. Health care maintenance    2. Chronic gastritis without bleeding, unspecified gastritis type    3. Gastroesophageal reflux disease, esophagitis presence not specified    4. Bilateral chronic knee pain    5. Chronic hip pain, bilateral        Plan:       Tiffany HERNANDEZ was seen today for annual exam and abdominal pain.    Diagnoses and all orders for this visit:    Health care maintenance  -     Ambulatory referral to Optometry    Chronic gastritis; Gastroesophageal reflux disease  -     H. pylori antigen, stool; Future  -     esomeprazole (NEXIUM) 20 MG capsule; Take 1 capsule (20 mg total) by mouth before breakfast. 8 week course    Bilateral chronic knee pain  Chronic hip pain, bilateral  -     " Ambulatory consult to Physical Therapy

## 2019-04-13 LAB — FERRITIN SERPL-MCNC: 5 NG/ML (ref 20–300)

## 2019-04-22 ENCOUNTER — PATIENT MESSAGE (OUTPATIENT)
Dept: INTERNAL MEDICINE | Facility: CLINIC | Age: 29
End: 2019-04-22

## 2019-04-22 ENCOUNTER — PATIENT MESSAGE (OUTPATIENT)
Dept: OPTOMETRY | Facility: CLINIC | Age: 29
End: 2019-04-22

## 2019-05-08 ENCOUNTER — CLINICAL SUPPORT (OUTPATIENT)
Dept: OPHTHALMOLOGY | Facility: CLINIC | Age: 29
End: 2019-05-08
Payer: COMMERCIAL

## 2019-05-08 ENCOUNTER — OFFICE VISIT (OUTPATIENT)
Dept: OPTOMETRY | Facility: CLINIC | Age: 29
End: 2019-05-08
Payer: COMMERCIAL

## 2019-05-08 DIAGNOSIS — H40.013 OAG (OPEN ANGLE GLAUCOMA) SUSPECT, LOW RISK, BILATERAL: Primary | ICD-10-CM

## 2019-05-08 DIAGNOSIS — H40.013 OAG (OPEN ANGLE GLAUCOMA) SUSPECT, LOW RISK, BILATERAL: ICD-10-CM

## 2019-05-08 PROCEDURE — 99999 PR PBB SHADOW E&M-EST. PATIENT-LVL I: CPT | Mod: PBBFAC,,,

## 2019-05-08 PROCEDURE — 92083 HUMPHREY VISUAL FIELD - OU - BOTH EYES: ICD-10-PCS | Mod: S$GLB,,, | Performed by: OPTOMETRIST

## 2019-05-08 PROCEDURE — 99999 PR PBB SHADOW E&M-EST. PATIENT-LVL I: ICD-10-PCS | Mod: PBBFAC,,,

## 2019-05-08 PROCEDURE — 99999 PR PBB SHADOW E&M-EST. PATIENT-LVL III: ICD-10-PCS | Mod: PBBFAC,,, | Performed by: OPTOMETRIST

## 2019-05-08 PROCEDURE — 92004 PR EYE EXAM, NEW PATIENT,COMPREHESV: ICD-10-PCS | Mod: S$GLB,,, | Performed by: OPTOMETRIST

## 2019-05-08 PROCEDURE — 92133 OCT, OPTIC NERVE - OU - BOTH EYES: ICD-10-PCS | Mod: S$GLB,,, | Performed by: OPTOMETRIST

## 2019-05-08 PROCEDURE — 92083 EXTENDED VISUAL FIELD XM: CPT | Mod: S$GLB,,, | Performed by: OPTOMETRIST

## 2019-05-08 PROCEDURE — 92133 CPTRZD OPH DX IMG PST SGM ON: CPT | Mod: S$GLB,,, | Performed by: OPTOMETRIST

## 2019-05-08 PROCEDURE — 92004 COMPRE OPH EXAM NEW PT 1/>: CPT | Mod: S$GLB,,, | Performed by: OPTOMETRIST

## 2019-05-08 PROCEDURE — 99999 PR PBB SHADOW E&M-EST. PATIENT-LVL III: CPT | Mod: PBBFAC,,, | Performed by: OPTOMETRIST

## 2019-05-08 NOTE — LETTER
May 8, 2019      Chitra Chen MD  [address]  [facility] [address]           [facility]  [address]  [facility] [address]  Phone: [phone]  Fax: [phone]          Patient: Tiffany Dobbs   MR Number: [id number]   YOB: 1990   Date of Visit: 5/8/2019       Dear Dr. Chitra Chen:    Thank you for referring Tiffany Dobbs to me for evaluation. Attached you will find relevant portions of my assessment and plan of care.    If you have questions, please do not hesitate to call me. I look forward to following Tiffany Dobbs along with you.    Sincerely,    Rodolfo Bolanos, OD    Enclosure  CC:  No Recipients    If you would like to receive this communication electronically, please contact [email or web address] or [phone] to request more information on [employer] access.    For providers and/or their staff who would like to refer a patient to [facility], please contact us through our one-stop-shop provider referral line, [facility], at [phone].    If you feel you have received this communication in error or would no longer like to receive these types of communications, please e-mail [email or web address]

## 2019-05-08 NOTE — PROGRESS NOTES
HPI     Ref by lens crafters increased iop, and large optic nerves fhx glaucoma,   mother and aunt Wears contact lenses not interested today  Last eye exam 7/2017  No flashes or floaters  No itching burning or tearing  No drops      Last edited by Fany Long on 5/8/2019  8:27 AM. (History)            Assessment /Plan     For exam results, see Encounter Report.    OAG (open angle glaucoma) suspect, low risk, bilateral  -     OCT, Optic Nerve - OU - Both Eyes  -Physio enlarged CD's w/o GLC  -FHx Mom/Aunt  -OCT, HVF WNL    RTC annual CL fit primary optometrist

## 2019-05-15 ENCOUNTER — CLINICAL SUPPORT (OUTPATIENT)
Dept: REHABILITATION | Facility: HOSPITAL | Age: 29
End: 2019-05-15
Attending: INTERNAL MEDICINE
Payer: COMMERCIAL

## 2019-05-15 DIAGNOSIS — M22.2X1 PATELLOFEMORAL PAIN SYNDROME OF BOTH KNEES: ICD-10-CM

## 2019-05-15 DIAGNOSIS — M25.551 CHRONIC HIP PAIN, BILATERAL: ICD-10-CM

## 2019-05-15 DIAGNOSIS — M22.2X2 PATELLOFEMORAL PAIN SYNDROME OF BOTH KNEES: ICD-10-CM

## 2019-05-15 DIAGNOSIS — M54.50 ACUTE MIDLINE LOW BACK PAIN WITHOUT SCIATICA: ICD-10-CM

## 2019-05-15 DIAGNOSIS — G89.29 CHRONIC HIP PAIN, BILATERAL: ICD-10-CM

## 2019-05-15 DIAGNOSIS — M25.552 CHRONIC HIP PAIN, BILATERAL: ICD-10-CM

## 2019-05-15 PROCEDURE — 97110 THERAPEUTIC EXERCISES: CPT | Mod: PO

## 2019-05-15 PROCEDURE — 97161 PT EVAL LOW COMPLEX 20 MIN: CPT | Mod: PO

## 2019-05-15 NOTE — PROGRESS NOTES
"                                                    Physical Therapy Initial Evaluation     Name: Tiffany Dobbs  Clinic Number: [id number]    Diagnosis:   Encounter Diagnoses   Name Primary?    Patellofemoral pain syndrome of both knees     Chronic hip pain, bilateral     Acute midline low back pain without sciatica      Physician: Chitra Chen MD  Treatment Orders: PT Eval and Treat  No past medical history on file.   Current Outpatient Medications   Medication Sig    esomeprazole (NEXIUM) 20 MG capsule Take 1 capsule (20 mg total) by mouth before breakfast. 8 week course    multivitamin with minerals tablet Take 1 tablet by mouth once daily.     No current facility-administered medications for this visit.      Review of patient's allergies indicates:   Allergen Reactions    Bactrim [sulfamethoxazole-trimethoprim] Itching and Rash       Evaluation Date: 5-15-19  Visit # authorized: 20  Authorization period: 4-12-19 to 4-11-20  Plan of care Expiration: 7-15-19   Evaluation Time in/Out: see POC    Subjective     History of condition/Onset Date:  pt presenting with complaints of B knee, hip and now back pain interfering with her everyday life.   Knee pain started about 5 yrs ago, insidious onset but knows that she had a L knee injury when she was a teenager. Describes running with her friend, cutting and making sharp turns, one time her knee swelled up a lot and it really hurt to walk. She didn't go to the doctor and it healed on its own.  Describes current knee pain as achy and "tight like pressure". Notes having B knee tissue crepitus. Had xrays which showed "joint narrowing."  Started noticing B hip pain about 2 years ago. Reports this is worse when wearing heels. Describes pain as achy. Notes a couple weeks ago she tried to sit down real quick and cross her legs and she ended up having really sharp quick pain in her R hip area. It hasnt happened again and did not last for more than a few " mins.    PRECAUTIONS: none  Primary complaint: B knee pain and B hip pain  Pain Scale 0-10:  Current: 2/10 knees. 4-5/10 for hips     Best: 3/10 Knees.      Worst: 6-7/10 for knees. 8-9/10 for B hips    Current Functional Limitations: squatting, sit<>stand, wearing, wakes up with stiff knees in the morning, avoids stairs.  Prior Functional Status: unlimited for all ADLs being painfree.  Easing factors:  massage  Prior Treatment: none  Home Environment (Steps/Adaptations): n/a  Occupation:  . Sitting all day at work                       Pts goals:  Relieve knee pain    Objective     Observation/Posture: slouched, able to self correct    GAIT: B valgus knees, B toeing out R>L    Range of Motion:   Knee Left active Left Passive Right Active R passive   Flexion WNL  WNL    Extension WNL  WNL    Ankle DF 13  25    Hip extension in S/L 6  6    Hip rotation WFL  Limited in ER      Lower Extremity Strength  Right LE  Left LE    Knee extension: 4/5 Knee extension: 4/5   Knee flexion: 4-/5 Knee flexion: 4-/5   Hip flexion: 4-/5 Hip flexion: 4-/5   Hip extension:  3+/5 Hip extension: 3+/5   Hip abduction: 4-/5 Hip abduction: 3+/5   Hip adduction: 4-/5 Hip adduction 4-/5   Ankle dorsiflexion: 4/5 Ankle dorsiflexion: 4/5   Ankle plantarflexion: 4/5 Ankle plantarflexion: 4/5     Special Tests:   Left Right   Valgus Stress Test pn pn   Varus Stress test pn with pressure to medial knee Same as L   Lachman's test     Posterior Lachman     Janette's Test     Thessaly's Test     Patellar Grind Test pos pos     Function:    - Squat: valgus knees, B pronation, increased back hip and knee pain with increased reps   - Sit <--> Stand:12 in 30 seconds, B valgus knees, does not need to use armrests    Joint Mobility: hypomobility of B hips  B knees are WNL  Palpation: TTP of B VMO, piriformis, ITB    Sensation: WNL    Flexibility:    SLR: R= 51, L=58    Pt/family was provided educational information, including: role of PT,  goals for PT, scheduling - pt verbalized understanding. Discussed insurance limitations with pt.     History  Co-morbidities and personal factors that may impact the plan of care Co-morbidities:   difficulty sleeping, high BMI and young age    Personal Factors:   no deficits     low   Examination  Body Structures and Functions, activity limitations and participation restrictions that may impact the plan of care Body Regions:   back  lower extremities    Body Systems:    gross symmetry  ROM  strength  gross coordinated movement  gait    Participation Restrictions:   none    Activity limitations:   Learning and applying knowledge  no deficits    General Tasks and Commands  no deficits    Communication  no deficits    Mobility  walking    Self care  no deficits     Domestic Life  no deficits    Interactions/Relationships  no deficits    Life Areas  no deficits    Community and Social Life  no deficits         low   Clinical Presentation stable and uncomplicated low   Decision Making/ Complexity Score: low         TREATMENT     Time In: 0830  Time Out: 0900    PT Evaluation Completed? Yes  Educated pt on treatment goals and plan of care. Pt demo good understanding along with good return demonstration of home exercise program.      Tiffany HERNANDEZ received 15 minutes of therapeutic exercise & instruction to improve LOF per flowsheet.   HEP completed and pt educated regarding rationale behind PT POC.    NEXT VISIT:  - progress B LE alignment strength training    Tiffany HERNANDEZ received 0 minutes of manual therapy including:   - implement FR to ITB, ant thighs , TPR to piriformis as indicated    Written Home Exercises Provided: Yes  Access Code: [email or web address]   Seated Piriformis Stretch - 3 reps - 30 hold - 2x daily  Seated Hamstring Stretch - 3 reps - 30 hold - 1x daily  Clamshell - 10 reps - 2 sets - 1x daily   Tiffany HERNANDEZ verbalized good understanding of the education provided.   Patient demo good return demo with skill during  exercises.    Assessment     Patient presenting with generalized hip tightness and complaints of knee pain without specific aggravating activities. Pt is sedentary and sits for work all day, denies specific trauma and her pain was not reproducible at evaluation. Tolerated HEP given today and performed with good posture and technique. Will progress at next session as able.  Pt prognosis is Excellent.  Pt will benefit from skilled outpatient physical therapy to address the above stated deficits, provide pt/family education and to maximize pt's level of independence.     Medical necessity is demonstrated by the following IMPAIRMENTS/PROBLEMS:  - Increased Pain  - Decreased Segmental Mobility & Decreased ROM  - Decreased Core & BLE strength  - Decreased Flexibility BLE  - Decreased Tolerance to Functional Activities  - Fall Risk  - Continued inability to participate in vocational pursuits  - Pain limits function of effected part for some activities  - Requires skilled supervision to complete and progress HEP    Pt's spiritual, cultural and educational needs considered and pt agreeable to plan of care and goals as stated below:     Anticipated Barriers for physical therapy: none    Short Term GOALS:  1. Pt to report average pain <4/10 with regular ADLs.   2. Pt to display >65 deg SLR, >12 deg hip ext in sidelying  3. Pt to begin HEP including specific stretching and strengthening as per their diagnosis to decrease pain and improve flexibility.  4. Pt to display good awareness and maintenance of proper alignment throughout basic exercises and fxl transfers.     Long Term GOALS:   1. Pt to report <1/10, no longer limiting functional activities.  2. Pt to report return to >75% of regular ADLs, work and recreational tasks.   3. Pt to be compliant in advanced HEP to maintain progress gained in therapy thus far.   4. Pt to display >4+/5 gross MMT for B LE in order to support pelvis and spine throughout ADLs.       PLAN      Outpatient physical therapy 1-2 times per week x 8 weeks to include: patient education, therapeutic exercises, neuromuscular re-education/ balance exercises, joint mobilizations, modalities prn, and regular update of pt's home exercise program.  Pt may be seen by PTA as part of the rehabilitation team.     Kathi Martinez DPT

## 2019-05-15 NOTE — PLAN OF CARE
"                                                    Physical Therapy Initial Evaluation     Name: Tiffany Dobbs  Clinic Number: [id number]    Diagnosis:   Encounter Diagnoses   Name Primary?    Patellofemoral pain syndrome of both knees     Chronic hip pain, bilateral     Acute midline low back pain without sciatica      Physician: Chitra Chen MD  Treatment Orders: PT Eval and Treat  No past medical history on file.   Current Outpatient Medications   Medication Sig    esomeprazole (NEXIUM) 20 MG capsule Take 1 capsule (20 mg total) by mouth before breakfast. 8 week course    multivitamin with minerals tablet Take 1 tablet by mouth once daily.     No current facility-administered medications for this visit.      Review of patient's allergies indicates:   Allergen Reactions    Bactrim [sulfamethoxazole-trimethoprim] Itching and Rash       Evaluation Date: 5-15-19  Visit # authorized: 20  Authorization period: 4-12-19 to 4-11-20  Plan of care Expiration: 7-15-19   Evaluation Time in/Out: see POC    Subjective     History of condition/Onset Date:  pt presenting with complaints of B knee, hip and now back pain interfering with her everyday life.   Knee pain started about 5 yrs ago, insidious onset but knows that she had a L knee injury when she was a teenager. Describes running with her friend, cutting and making sharp turns, one time her knee swelled up a lot and it really hurt to walk. She didn't go to the doctor and it healed on its own.  Describes current knee pain as achy and "tight like pressure". Notes having B knee tissue crepitus. Had xrays which showed "joint narrowing."  Started noticing B hip pain about 2 years ago. Reports this is worse when wearing heels. Describes pain as achy. Notes a couple weeks ago she tried to sit down real quick and cross her legs and she ended up having really sharp quick pain in her R hip area. It hasnt happened again and did not last for more than a few " mins.    PRECAUTIONS: none  Primary complaint: B knee pain and B hip pain  Pain Scale 0-10:  Current: 2/10 knees. 4-5/10 for hips     Best: 3/10 Knees.      Worst: 6-7/10 for knees. 8-9/10 for B hips    Current Functional Limitations: squatting, sit<>stand, wearing, wakes up with stiff knees in the morning, avoids stairs.  Prior Functional Status: unlimited for all ADLs being painfree.  Easing factors:  massage  Prior Treatment: none  Home Environment (Steps/Adaptations): n/a  Occupation:  . Sitting all day at work                       Pts goals:  Relieve knee pain    Objective     Observation/Posture: slouched, able to self correct    GAIT: B valgus knees, B toeing out R>L    Range of Motion:   Knee Left active Left Passive Right Active R passive   Flexion WNL  WNL    Extension WNL  WNL    Ankle DF 13  25    Hip extension in S/L 6  6    Hip rotation WFL  Limited in ER      Lower Extremity Strength  Right LE  Left LE    Knee extension: 4/5 Knee extension: 4/5   Knee flexion: 4-/5 Knee flexion: 4-/5   Hip flexion: 4-/5 Hip flexion: 4-/5   Hip extension:  3+/5 Hip extension: 3+/5   Hip abduction: 4-/5 Hip abduction: 3+/5   Hip adduction: 4-/5 Hip adduction 4-/5   Ankle dorsiflexion: 4/5 Ankle dorsiflexion: 4/5   Ankle plantarflexion: 4/5 Ankle plantarflexion: 4/5     Special Tests:   Left Right   Valgus Stress Test pn pn   Varus Stress test pn with pressure to medial knee Same as L   Lachman's test     Posterior Lachman     Janette's Test     Thessaly's Test     Patellar Grind Test pos pos     Function:    - Squat: valgus knees, B pronation, increased back hip and knee pain with increased reps   - Sit <--> Stand:12 in 30 seconds, B valgus knees, does not need to use armrests    Joint Mobility: hypomobility of B hips  B knees are WNL  Palpation: TTP of B VMO, piriformis, ITB    Sensation: WNL    Flexibility:    SLR: R= 51, L=58    Pt/family was provided educational information, including: role of PT,  goals for PT, scheduling - pt verbalized understanding. Discussed insurance limitations with pt.     History  Co-morbidities and personal factors that may impact the plan of care Co-morbidities:   difficulty sleeping, high BMI and young age    Personal Factors:   no deficits     low   Examination  Body Structures and Functions, activity limitations and participation restrictions that may impact the plan of care Body Regions:   back  lower extremities    Body Systems:    gross symmetry  ROM  strength  gross coordinated movement  gait    Participation Restrictions:   none    Activity limitations:   Learning and applying knowledge  no deficits    General Tasks and Commands  no deficits    Communication  no deficits    Mobility  walking    Self care  no deficits     Domestic Life  no deficits    Interactions/Relationships  no deficits    Life Areas  no deficits    Community and Social Life  no deficits         low   Clinical Presentation stable and uncomplicated low   Decision Making/ Complexity Score: low         TREATMENT     Time In: 0830  Time Out: 0900    PT Evaluation Completed? Yes  Educated pt on treatment goals and plan of care. Pt demo good understanding along with good return demonstration of home exercise program.      Tiffany HERNANDEZ received 15 minutes of therapeutic exercise & instruction to improve LOF per flowsheet.   HEP completed and pt educated regarding rationale behind PT POC.    NEXT VISIT:  - progress B LE alignment strength training    Tiffany HERNANDEZ received 0 minutes of manual therapy including:   - implement FR to ITB, ant thighs , TPR to piriformis as indicated    Written Home Exercises Provided: Yes  Access Code: [email or web address]   Seated Piriformis Stretch - 3 reps - 30 hold - 2x daily  Seated Hamstring Stretch - 3 reps - 30 hold - 1x daily  Clamshell - 10 reps - 2 sets - 1x daily   Tiffany HERNANDEZ verbalized good understanding of the education provided.   Patient demo good return demo with skill during  exercises.    Assessment     Patient presenting with generalized hip tightness and complaints of knee pain without specific aggravating activities. Pt is sedentary and sits for work all day, denies specific trauma and her pain was not reproducible at evaluation. Tolerated HEP given today and performed with good posture and technique. Will progress at next session as able.  Pt prognosis is Excellent.  Pt will benefit from skilled outpatient physical therapy to address the above stated deficits, provide pt/family education and to maximize pt's level of independence.     Medical necessity is demonstrated by the following IMPAIRMENTS/PROBLEMS:  - Increased Pain  - Decreased Segmental Mobility & Decreased ROM  - Decreased Core & BLE strength  - Decreased Flexibility BLE  - Decreased Tolerance to Functional Activities  - Fall Risk  - Continued inability to participate in vocational pursuits  - Pain limits function of effected part for some activities  - Requires skilled supervision to complete and progress HEP    Pt's spiritual, cultural and educational needs considered and pt agreeable to plan of care and goals as stated below:     Anticipated Barriers for physical therapy: none    Short Term GOALS:  1. Pt to report average pain <4/10 with regular ADLs.   2. Pt to display >65 deg SLR, >12 deg hip ext in sidelying  3. Pt to begin HEP including specific stretching and strengthening as per their diagnosis to decrease pain and improve flexibility.  4. Pt to display good awareness and maintenance of proper alignment throughout basic exercises and fxl transfers.     Long Term GOALS:   1. Pt to report <1/10, no longer limiting functional activities.  2. Pt to report return to >75% of regular ADLs, work and recreational tasks.   3. Pt to be compliant in advanced HEP to maintain progress gained in therapy thus far.   4. Pt to display >4+/5 gross MMT for B LE in order to support pelvis and spine throughout ADLs.       PLAN      Outpatient physical therapy 1-2 times per week x 8 weeks to include: patient education, therapeutic exercises, neuromuscular re-education/ balance exercises, joint mobilizations, modalities prn, and regular update of pt's home exercise program.  Pt may be seen by PTA as part of the rehabilitation team.     Kathi Martinez DPT

## 2019-05-27 ENCOUNTER — HOSPITAL ENCOUNTER (EMERGENCY)
Facility: HOSPITAL | Age: 29
Discharge: HOME | End: 2019-05-27
Attending: EMERGENCY MEDICINE
Payer: COMMERCIAL

## 2019-05-27 VITALS
SYSTOLIC BLOOD PRESSURE: 129 MMHG | HEIGHT: 63 IN | DIASTOLIC BLOOD PRESSURE: 76 MMHG | RESPIRATION RATE: 18 BRPM | OXYGEN SATURATION: 100 % | BODY MASS INDEX: 31.36 KG/M2 | WEIGHT: 177 LBS | HEART RATE: 100 BPM

## 2019-05-27 DIAGNOSIS — L03.317 CELLULITIS OF BUTTOCK: ICD-10-CM

## 2019-05-27 DIAGNOSIS — N76.0 ABSCESS, VAGINA: Primary | ICD-10-CM

## 2019-05-27 LAB
B-HCG UR QL: NEGATIVE
CTP QC/QA: YES

## 2019-05-27 PROCEDURE — 25000003 PHARM REV CODE 250

## 2019-05-27 PROCEDURE — 10061 I&D ABSCESS COMP/MULTIPLE: CPT

## 2019-05-27 PROCEDURE — 10061 I&D ABSCESS COMP/MULTIPLE: CPT | Mod: ,,, | Performed by: EMERGENCY MEDICINE

## 2019-05-27 PROCEDURE — 99284 EMERGENCY DEPT VISIT MOD MDM: CPT | Mod: 25

## 2019-05-27 PROCEDURE — 25000003 PHARM REV CODE 250: Performed by: PHYSICIAN ASSISTANT

## 2019-05-27 PROCEDURE — 99284 PR EMERGENCY DEPT VISIT,LEVEL IV: ICD-10-PCS | Mod: 25,,, | Performed by: EMERGENCY MEDICINE

## 2019-05-27 PROCEDURE — 56405 I&D VULVA/PERINEAL ABSCESS: CPT

## 2019-05-27 PROCEDURE — 81025 URINE PREGNANCY TEST: CPT | Performed by: PHYSICIAN ASSISTANT

## 2019-05-27 PROCEDURE — 10061 PR DRAIN SKIN ABSCESS COMPLIC: ICD-10-PCS | Mod: ,,, | Performed by: EMERGENCY MEDICINE

## 2019-05-27 PROCEDURE — 99284 EMERGENCY DEPT VISIT MOD MDM: CPT | Mod: 25,,, | Performed by: EMERGENCY MEDICINE

## 2019-05-27 RX ORDER — IBUPROFEN 600 MG/1
600 TABLET ORAL EVERY 6 HOURS PRN
Qty: 15 TABLET | Refills: 0 | Status: SHIPPED | OUTPATIENT
Start: 2019-05-27 | End: 2023-04-17

## 2019-05-27 RX ORDER — CLINDAMYCIN HYDROCHLORIDE 150 MG/1
300 CAPSULE ORAL
Status: COMPLETED | OUTPATIENT
Start: 2019-05-27 | End: 2019-05-27

## 2019-05-27 RX ORDER — CLINDAMYCIN HYDROCHLORIDE 150 MG/1
300 CAPSULE ORAL EVERY 6 HOURS
Qty: 56 CAPSULE | Refills: 0 | Status: SHIPPED | OUTPATIENT
Start: 2019-05-27 | End: 2019-06-03

## 2019-05-27 RX ORDER — LIDOCAINE HYDROCHLORIDE 10 MG/ML
10 INJECTION INFILTRATION; PERINEURAL
Status: COMPLETED | OUTPATIENT
Start: 2019-05-27 | End: 2019-05-27

## 2019-05-27 RX ORDER — IBUPROFEN 600 MG/1
600 TABLET ORAL
Status: COMPLETED | OUTPATIENT
Start: 2019-05-27 | End: 2019-05-27

## 2019-05-27 RX ORDER — LIDOCAINE HYDROCHLORIDE 10 MG/ML
INJECTION INFILTRATION; PERINEURAL
Status: COMPLETED
Start: 2019-05-27 | End: 2019-05-27

## 2019-05-27 RX ADMIN — LIDOCAINE HYDROCHLORIDE 200 MG: 10 INJECTION, SOLUTION INFILTRATION; PERINEURAL at 01:05

## 2019-05-27 RX ADMIN — LIDOCAINE HYDROCHLORIDE 200 MG: 10 INJECTION INFILTRATION; PERINEURAL at 01:05

## 2019-05-27 RX ADMIN — CLINDAMYCIN HYDROCHLORIDE 300 MG: 150 CAPSULE ORAL at 02:05

## 2019-05-27 RX ADMIN — IBUPROFEN 600 MG: 600 TABLET, FILM COATED ORAL at 02:05

## 2019-05-27 NOTE — ED NOTES
Tiffany Dobbs, a 28 y.o. female presents to the ED via pmv with CC abscess       Patient identifiers verified verbally with armband and correct for Tiffany Dobbs.    LOC/ APPEARANCE: The patient is AAOx4. Pt is speaking appropriately, no slurred speech. Pt changed into hospital gown. Continuous cardiac monitor, cont pulse ox, and auto BP cuff applied to patient. Pt is clean and well groomed. No JVD visible. Pt reports pain level of 0. Pt updated on POC. Bed low and locked with side rails up x2, call bell in pt reach.  SKIN: Skin is warm dry and intact, and color is consistent with ethnicity. Capillary refill <3 seconds. No breakdown or brusing visible. Mucus membranes moist, acyanotic.  RESPIRATORY: Airway is open and patent. Respirations-spontaneous, unlabored, regular rate, equal bilaterally on inspiration and expiration. No accessory muscle use noted. Lungs clear to auscultation in all fields bilaterally anterior and posterior.   CARDIAC: Patient has regular heart rate.  No peripheral edema noted, and patient has no c/o chest pain. Peripheral pulses present equal and strong throughout.  ABDOMEN: Soft and non-tender to palpation with no distention noted. Normoactive bowel sounds x4 quadrants. Pt has no complaints of abnormal bowel movements, denies blood in stool. Pt reports normal appetite.   NEUROLOGIC: Eyes open spontaneously and facial expression symmetrical. Pt behavior appropriate to situation, and pt follows commands. Pt reports sensation present in all extremities when touched with a finger, denies any numbness or tingling. PERRLA  MUSCULOSKELETAL: Spontaneous movement noted to all extremities. Hand  equal and leg strength strong +5 bilaterally.   : No complaints of frequency, burning, urgency or blood in the urine. No complaints of incontinence.

## 2019-05-27 NOTE — ED PROVIDER NOTES
Encounter Date: 5/27/2019       History     Chief Complaint   Patient presents with    Abscess     bet vagina and rectum,      28-year-old female presents to the ER with chief complaint of abscess to the right vaginal area.  Patient says she noticed an ingrown hair in this area 1 week ago.  She reports increased swelling and pain over the last 2-3 days.  She rates her pain 7/10, but her pain is increased to 10/10 with sitting.  She has not taken any medications for pain or recent antibiotics.  She reports subjective fever.  She denies nausea, vomiting, abdominal pain, back pain, or additional complaints at this time.        Review of patient's allergies indicates:   Allergen Reactions    Bactrim [sulfamethoxazole-trimethoprim] Itching and Rash     No past medical history on file.  Past Surgical History:   Procedure Laterality Date    BREAST REDUCTION  2012    EXCISION-KELOID Rt & Lt IMF Bilateral 7/13/2017    Performed by Rom Patterson MD at [facility] OR [address] [place] [address]     Family History   Problem Relation Age of Onset    Breast cancer Maternal Aunt     Glaucoma Maternal Aunt     Breast cancer Paternal Aunt     Ovarian cancer Maternal Aunt     Gestational diabetes Mother     Glaucoma Mother     Heart disease Father         arrythmia, has device - ICD?    Diverticulitis Father     Diabetes Maternal Grandmother     Dementia Maternal Grandmother     Arthritis Maternal Grandmother         spinal    Hydrocephalus Maternal Grandmother     Stomach cancer Maternal Grandfather     Diabetes Maternal Aunt     Colon cancer Neg Hx     Heart attack Neg Hx      Social History     Tobacco Use    Smoking status: Never Smoker    Smokeless tobacco: Never Used   Substance Use Topics    Alcohol use: Yes     Frequency: Monthly or less     Drinks per session: 1 or 2     Comment: socially, twice per month    Drug use: No     Review of Systems   Constitutional: Negative for chills and fever.   Respiratory: Negative for  shortness of breath.    Cardiovascular: Negative for chest pain.   Gastrointestinal: Negative for nausea and vomiting.   Genitourinary: Negative for dysuria.   Musculoskeletal: Negative for back pain.   Skin: Positive for color change. Negative for rash.        Positive for abscess     Neurological: Negative for weakness.   Hematological: Does not bruise/bleed easily.       Physical Exam     Initial Vitals [05/27/19 1316]   BP Pulse Resp Temp SpO2   129/76 100 18 -- 100 %      MAP       --         Physical Exam    Nursing note and vitals reviewed.  Constitutional: She appears well-developed and well-nourished.   HENT:   Head: Atraumatic.   Mouth/Throat: Oropharynx is clear and moist.   Eyes: Conjunctivae and EOM are normal. Pupils are equal, round, and reactive to light.   Neck: Normal range of motion. Neck supple.   Cardiovascular: Normal rate, regular rhythm and intact distal pulses.   Pulmonary/Chest: Breath sounds normal. No respiratory distress. She has no wheezes. She has no rhonchi. She has no rales.   Abdominal: Soft. Bowel sounds are normal. There is no tenderness.   Genitourinary:         Genitourinary Comments: No perirectal abscess, no induration or cellulitis of perineal area.      Neurological: She is alert and oriented to person, place, and time. She has normal strength.   Skin: Capillary refill takes less than 2 seconds. No rash noted.   Psychiatric: She has a normal mood and affect.         ED Course   I & D - Incision and Drainage  Date/Time: 5/27/2019 2:30 PM  Performed by: JAMAL Pascual  Authorized by: Kimberly Connor MD   Type: abscess  Body area: anogenital  Location details: vulva  Anesthesia: local infiltration    Anesthesia:  Local Anesthetic: lidocaine 1% without epinephrine  Risk factor: underlying major vessel  Scalpel size: 11  Incision type: single straight  Complexity: complex  Drainage: purulent  Drainage amount: copious  Wound treatment: incision,  drainage,  deloculation,   expression of material and  wound packed (wound irrigated with saline)  Packing material: 1/4 in gauze  Patient tolerance: Patient tolerated the procedure well with no immediate complications        Labs Reviewed   POCT URINE PREGNANCY          Imaging Results    None                APC / Resident Notes:   Patient presents to the ER with chief complaint of abscess in the vaginal area.  On exam she has swelling, tenderness and fluctuance just below the right outer labia.  Location of abscess does not appear consistent with a Bartholin's gland cyst.  Exam is not concerning for perianal abscess.    The patient's abscess has been incised and drained with good results.  The patient will be placed on antibiotics and is given first dose of clindamycin in the ER.  She has no signs of systemic symptoms or significant cellulitis to warrant admission at this time.  The patient has been instructed to follow up with her OB/GYN in 2  days for repeat exam and packing removal.  I've given the patient specific return precautions.    I discussed the care of this pt with my supervising MD, and the patient was also seen by her.             Attending Attestation:     Physician Attestation Statement for NP/PA:   I have conducted a face to face encounter with this patient in addition to the NP/PA, due to Medical Complexity    Other NP/PA Attestation Additions:    History of Present Illness: 28F with several days of swelling over right vaginal labia, after ingrown hair 1 wk ago, endorses subjective fever and mild nausea.   Physical Exam: On exam well-appearing, VSS, abd s/nt/nd, large fluctuant skin abscess over lower right labia without perineal involvement, no vaginal discharge   Medical Decision Making: Pt with large skin abscess over right lower labia, no DM, immunocompromise, or other risk factors for Eusebio's. Plan I&D with abx and Gyn f/u.     Attending Note:  Physician Attestation Statement: I have personally seen and examined  this patient. As the supervising MD I agree with the above history. As the supervising MD I agree with the above PE. As the supervising MD I agree with the above treatment, course, plan, and disposition.                       Clinical Impression:       ICD-10-CM ICD-9-CM   1. Abscess, vagina N76.0 616.10   2. Cellulitis of buttock L03.317 682.5                                JAMAL Pascual  05/27/19 1707       Kimberly Connor MD  05/29/19 1045

## 2019-05-27 NOTE — ED TRIAGE NOTES
Abscess (bet vagina and rectum. First noticed one week ago. Pt denies bleeding or puss coming from the abscess. Pt stated she has has an abscess in the same area 7 years ago.  Pain level when sitting 8/10

## 2019-05-29 ENCOUNTER — OFFICE VISIT (OUTPATIENT)
Dept: OBSTETRICS AND GYNECOLOGY | Facility: CLINIC | Age: 29
End: 2019-05-29
Attending: OBSTETRICS & GYNECOLOGY
Payer: COMMERCIAL

## 2019-05-29 VITALS
BODY MASS INDEX: 31.92 KG/M2 | DIASTOLIC BLOOD PRESSURE: 58 MMHG | WEIGHT: 180.13 LBS | SYSTOLIC BLOOD PRESSURE: 106 MMHG | HEIGHT: 63 IN

## 2019-05-29 DIAGNOSIS — L02.215 PERINEAL ABSCESS: Primary | ICD-10-CM

## 2019-05-29 PROCEDURE — 99999 PR PBB SHADOW E&M-EST. PATIENT-LVL III: CPT | Mod: PBBFAC,,, | Performed by: OBSTETRICS & GYNECOLOGY

## 2019-05-29 PROCEDURE — 3008F PR BODY MASS INDEX (BMI) DOCUMENTED: ICD-10-PCS | Mod: CPTII,S$GLB,, | Performed by: OBSTETRICS & GYNECOLOGY

## 2019-05-29 PROCEDURE — 3008F BODY MASS INDEX DOCD: CPT | Mod: CPTII,S$GLB,, | Performed by: OBSTETRICS & GYNECOLOGY

## 2019-05-29 PROCEDURE — 99213 OFFICE O/P EST LOW 20 MIN: CPT | Mod: S$GLB,,, | Performed by: OBSTETRICS & GYNECOLOGY

## 2019-05-29 PROCEDURE — 99213 PR OFFICE/OUTPT VISIT, EST, LEVL III, 20-29 MIN: ICD-10-PCS | Mod: S$GLB,,, | Performed by: OBSTETRICS & GYNECOLOGY

## 2019-05-29 PROCEDURE — 99999 PR PBB SHADOW E&M-EST. PATIENT-LVL III: ICD-10-PCS | Mod: PBBFAC,,, | Performed by: OBSTETRICS & GYNECOLOGY

## 2019-05-29 NOTE — PROGRESS NOTES
Chief Complaint   Patient presents with    Follow-up       HPI:  Tiffany Dobbs is a 28 y.o. female patient  who presents today for follow-up from an ER visit.  She describes the development of mild perineal discomfort on 2019 which progressively worsened.  Several days later, she describes having difficulty walking because of severe pain.  She was seen in the ER on 19 at which time a large perineal abscess was drained and packed.  She was placed on clindamycin.  Several days later the packing fell out.  Today, she feels that she is 90% better with only mild soreness.  No fever.  Patient's last menstrual period was 2019.    Past Medical History:   Diagnosis Date    Anemia        Past Surgical History:   Procedure Laterality Date    BREAST REDUCTION      EXCISION-KELOID Rt & Lt IMF Bilateral 2017    Performed by Rom Patterson MD at [facility] OR [address] [place] [address]         ROS:  GENERAL: Feeling well overall.   SKIN: Reports perineal abscess has significantly improved.   HEAD: Denies head injury or headache.   NODES: Denies enlarged lymph nodes.   CHEST: Denies chest pain or shortness of breath.   CARDIOVASCULAR: Denies palpitations or left sided chest pain.   ABDOMEN: No abdominal pain, nausea, vomiting or rectal bleeding.   URINARY: No dysuria or hematuria.  REPRODUCTIVE: See HPI.   BREASTS: Denies pain, lumps, or nipple discharge.   HEMATOLOGIC: No easy bruisability or excessive bleeding.   MUSCULOSKELETAL: Denies joint pain or swelling.   NEUROLOGIC: Denies syncope or weakness.   PSYCHIATRIC: Denies depression.    PE:   (chaperone present during entire exam)  APPEARANCE: Well nourished, well developed, in no acute distress.  ABDOMEN: Soft. No tenderness or masses.   VULVA: S/P I&D of abscess on patients right perineum.  Mild surrounding induration.  No erythema.  Nothing requiring additional drainage.    Diagnosis:  1. Perineal abscess          PLAN:         Patient was counseled today  on her recent perineal abscess which was drained in the emergency room 2 days ago.  She is now considerably better with much less discomfort.  On exam, there are no areas requiring additional drainage. She will continue on antibiotics and follow up in the office in 1-2 weeks for re-evaluation.  Precautions and warning signs were reviewed.    Follow-up 1-2 weeks for recheck    Total time of visit: 20 minutes (counseling >75% of time)

## 2019-05-30 ENCOUNTER — TELEPHONE (OUTPATIENT)
Dept: OBSTETRICS AND GYNECOLOGY | Facility: CLINIC | Age: 29
End: 2019-05-30

## 2019-05-30 NOTE — TELEPHONE ENCOUNTER
----- Message from Josiane Alfaro sent at 5/30/2019  8:30 AM CDT -----  Contact: Smartsy request  Message     Appointment Request From: Tiffany Dobbs    With Provider: Farshad Chung MD [[facility]]    Preferred Date Range: 6/12/2019 - 6/13/2019    Preferred Times: Any time    Reason for visit: Follow up/abscess/ER    Comments:  Abscess follow up

## 2019-06-06 NOTE — PROGRESS NOTES
"  Physical Therapy Daily Treatment Note     Name: Tiffany Dobbs  Clinic Number: [id number]    Therapy Diagnosis:   Encounter Diagnoses   Name Primary?    Patellofemoral pain syndrome of both knees     Chronic hip pain, bilateral     Acute midline low back pain without sciatica      Physician: Chitra Chen MD    Visit Date: 6/7/2019  Medical Diagnosis: B PFPS  Evaluation Date: 5-15-19  [place] due: 6-15-19  Authorization Period Expiration: 4-11-20  Plan of Care Certification Period: 7-15-19      Visit #/Visits authorized: 2/      Time In: 0700  Time Out: 0800  Total Billable Time: 30 minutes  TE-2  Precautions: Standard    Subjective     Pt reports: she is feeling better than at the eval, had to miss [facility] appts bc she was out of town.  She was compliant with home exercise program.    Patient reports their pain to be 0/10 on a 0-10 scale with 0 being no pain and 10 being the worst pain imaginable.    Functional change: none mentioned except HEP feels good      Objective     TIFFANY received therapeutic exercises per flowsheet to develop strength, endurance, ROM, flexibility and posture for 30 minutes.    Upright bike warm up x10 mins L2  Stool scoots 2x20ft  Resisted side steps OTB loop at ankles 2x20ft  Leg press 20# 3x10  slantboard 3x30"    Supine:  Ball squeeze hip ADD 10" x30  HS/hip ADD stretch using strap 3x30"  SLR 3x10  Bridges 3x10        NEXT VISIT:  Wall slide squats  - progress B LE alignment strength training          Written Home Exercises Provided: Yes  Access Code: [email or web address]       Seated Piriformis Stretch - 3 reps - 30 hold - 2x daily  Seated Hamstring Stretch - 3 reps - 30 hold - 1x daily  Clamshell - 10 reps - 2 sets - 1x daily     TIFFANY received the following manual therapy techniques: ____ were applied to the: B LE for 0 minutes, including:  Not performed d/t pt not having any pain today:  - implement FR to ITB, ant thighs , TPR to piriformis as indicated      Written Home Exercises " Provided: Patient instructed to cont prior HEP.  Exercises were reviewed and YANG was able to demonstrate them prior to the end of the session.  YANG demonstrated good  understanding of the education provided.       Assessment     Pt tolerated exercise prescription well today. She reported fatigue in legs and hips but no pain.  YANG is progressing well towards her goals.   Pt prognosis is Excellent.     Pt will continue to benefit from skilled outpatient physical therapy to address the deficits listed in the problem list box on initial evaluation, provide pt/family education and to maximize pt's level of independence in the home and community environment.     Pt's spiritual, cultural and educational needs considered and pt agreeable to plan of care and goals.    Anticipated barriers to physical therapy: none  Goals:   Short Term GOALS:  1. Pt to report average pain <4/10 with regular ADLs.   2. Pt to display >65 deg SLR, >12 deg hip ext in sidelying  3. Pt to begin HEP including specific stretching and strengthening as per their diagnosis to decrease pain and improve flexibility.  4. Pt to display good awareness and maintenance of proper alignment throughout basic exercises and fxl transfers.      Long Term GOALS:   1. Pt to report <1/10, no longer limiting functional activities.  2. Pt to report return to >75% of regular ADLs, work and recreational tasks.   3. Pt to be compliant in advanced HEP to maintain progress gained in therapy thus far.   4. Pt to display >4+/5 gross MMT for B LE in order to support pelvis and spine throughout ADLs.     Plan   Cont per POC.    Kathi Martinez, PT

## 2019-06-07 ENCOUNTER — CLINICAL SUPPORT (OUTPATIENT)
Dept: REHABILITATION | Facility: HOSPITAL | Age: 29
End: 2019-06-07
Attending: INTERNAL MEDICINE
Payer: COMMERCIAL

## 2019-06-07 DIAGNOSIS — M22.2X1 PATELLOFEMORAL PAIN SYNDROME OF BOTH KNEES: ICD-10-CM

## 2019-06-07 DIAGNOSIS — M22.2X2 PATELLOFEMORAL PAIN SYNDROME OF BOTH KNEES: ICD-10-CM

## 2019-06-07 DIAGNOSIS — M54.50 ACUTE MIDLINE LOW BACK PAIN WITHOUT SCIATICA: ICD-10-CM

## 2019-06-07 DIAGNOSIS — G89.29 CHRONIC HIP PAIN, BILATERAL: ICD-10-CM

## 2019-06-07 DIAGNOSIS — M25.552 CHRONIC HIP PAIN, BILATERAL: ICD-10-CM

## 2019-06-07 DIAGNOSIS — M25.551 CHRONIC HIP PAIN, BILATERAL: ICD-10-CM

## 2019-06-07 PROCEDURE — 97110 THERAPEUTIC EXERCISES: CPT | Mod: PO

## 2019-06-11 ENCOUNTER — CLINICAL SUPPORT (OUTPATIENT)
Dept: REHABILITATION | Facility: HOSPITAL | Age: 29
End: 2019-06-11
Attending: INTERNAL MEDICINE
Payer: COMMERCIAL

## 2019-06-11 DIAGNOSIS — M22.2X2 PATELLOFEMORAL PAIN SYNDROME OF BOTH KNEES: ICD-10-CM

## 2019-06-11 DIAGNOSIS — M54.50 ACUTE MIDLINE LOW BACK PAIN WITHOUT SCIATICA: ICD-10-CM

## 2019-06-11 DIAGNOSIS — M25.552 CHRONIC HIP PAIN, BILATERAL: ICD-10-CM

## 2019-06-11 DIAGNOSIS — M25.551 CHRONIC HIP PAIN, BILATERAL: ICD-10-CM

## 2019-06-11 DIAGNOSIS — M22.2X1 PATELLOFEMORAL PAIN SYNDROME OF BOTH KNEES: ICD-10-CM

## 2019-06-11 DIAGNOSIS — G89.29 CHRONIC HIP PAIN, BILATERAL: ICD-10-CM

## 2019-06-11 PROCEDURE — 97110 THERAPEUTIC EXERCISES: CPT | Mod: PO

## 2019-06-11 NOTE — PROGRESS NOTES
"  Physical Therapy Daily Treatment Note     Name: Tiffany Dobbs  Clinic Number: [id number]    Therapy Diagnosis:   Encounter Diagnoses   Name Primary?    Patellofemoral pain syndrome of both knees     Chronic hip pain, bilateral     Acute midline low back pain without sciatica      Physician: Chitra Chen MD    Visit Date: 6/11/2019  Medical Diagnosis: B PFPS  Evaluation Date: 5-15-19  MS due: 6-15-19  Authorization Period Expiration: 12/31/2019  Plan of Care Certification Period: 7-15-19      Visit #/Visits authorized: 3/ 20    Time In: 0700  Time Out: 0752  Total Billable Time: 30 minutes  TE-2  Precautions: Standard    Subjective     Pt reports: she is feeling pretty good. Reports just mild stiffness.   She was compliant with home exercise program.    Patient reports their pain to be 2/10 on a 0-10 scale with 0 being no pain and 10 being the worst pain imaginable.    Functional change: none mentioned except HEP feels good      Objective     TIFFANY received therapeutic exercises per flowsheet to develop strength, endurance, ROM, flexibility and posture for 30 minutes.    Upright bike warm up x10 mins L2 - pain 3-4/10   Stool scoots 2x20ft  Resisted side steps OTB loop at ankles 2x20ft  Leg press 20# 3x10  slantboard 3x30"  Wall ball squats x 10     Supine:  Ball squeeze hip ADD 10" x30  HS/hip ADD stretch using strap 3x30"  SLR 3x10  Bridges 3x10     Written Home Exercises Provided: Yes  Access Code: [email or web address]       Seated Piriformis Stretch - 3 reps - 30 hold - 2x daily  Seated Hamstring Stretch - 3 reps - 30 hold - 1x daily  Clamshell - 10 reps - 2 sets - 1x daily     TIFFANY received the following manual therapy techniques: ____ were applied to the: B LE for 0 minutes, including:    - implement FR to ITB, ant thighs , TPR to piriformis as indicated      Written Home Exercises Provided: Patient instructed to cont prior HEP.  Exercises were reviewed and TIFFANY was able to demonstrate them prior to the end of " the session.  YANG demonstrated good  understanding of the education provided.       Assessment     Pt with good tolerance to today's treatment. Reports pain on the bike today and with no other reports of pain throughout treatment. Patient tolerated additional wall ball squats well today with no onset of adverse effects.   YANG is progressing well towards her goals.   Pt prognosis is Excellent.     Pt will continue to benefit from skilled outpatient physical therapy to address the deficits listed in the problem list box on initial evaluation, provide pt/family education and to maximize pt's level of independence in the home and community environment.     Pt's spiritual, cultural and educational needs considered and pt agreeable to plan of care and goals.    Anticipated barriers to physical therapy: none  Goals:   Short Term GOALS:  1. Pt to report average pain <4/10 with regular ADLs.   2. Pt to display >65 deg SLR, >12 deg hip ext in sidelying  3. Pt to begin HEP including specific stretching and strengthening as per their diagnosis to decrease pain and improve flexibility.  4. Pt to display good awareness and maintenance of proper alignment throughout basic exercises and fxl transfers.      Long Term GOALS:   1. Pt to report <1/10, no longer limiting functional activities.  2. Pt to report return to >75% of regular ADLs, work and recreational tasks.   3. Pt to be compliant in advanced HEP to maintain progress gained in therapy thus far.   4. Pt to display >4+/5 gross MMT for B LE in order to support pelvis and spine throughout ADLs.     Plan   Cont per POC.    Aida Barber, PTA

## 2019-06-13 NOTE — PROGRESS NOTES
"  Physical Therapy Daily Treatment Note     Name: Tiffany Dobbs  Clinic Number: [id number]    Therapy Diagnosis:   Encounter Diagnoses   Name Primary?    Patellofemoral pain syndrome of both knees     Chronic hip pain, bilateral     Acute midline low back pain without sciatica      Physician: Chitra Chen MD    Visit Date: 6/14/2019  Medical Diagnosis: B PFPS  Evaluation Date: 5-15-19  [place] due: 6-15-19  Authorization Period Expiration: 12/31/2019  Plan of Care Certification Period: 7-15-19      Visit #/Visits authorized: 4/ 20    Time In: 0700  Time Out: 0800  Total Billable Time: 30 minutes  TE-2  Precautions: Standard    Subjective     Pt reports: she is feeling a little more stiff bc she did the clams twice yesterday she thinks.  She was compliant with home exercise program.    Patient reports their pain to be 3 (soreness)/10 on a 0-10 scale with 0 being no pain and 10 being the worst pain imaginable.    Functional change: none mentioned      Objective     TIFFANY received therapeutic exercises per flowsheet to develop strength, endurance, ROM, flexibility and posture for 30 minutes.    Recumbent bike warm up x10 mins L2  Stool scoots 3x20ft  Resisted side steps OTB loop at ankles 2x20ft-- given OTB loop for home use  --> LINSEY ABD 25# 3x10  Leg press 20# 3x10  slantboard 3x30"  Wall orange ball squats 3x 10     Supine:  Ball squeeze hip ADD 10" x30  HS/hip ADD stretch using strap 3x30"  SLR 3x10  NP- Bridges 3x10     Written Home Exercises Provided: Yes  Access Code: [email or web address]       Seated Piriformis Stretch - 3 reps - 30 hold - 2x daily  Seated Hamstring Stretch - 3 reps - 30 hold - 1x daily  Clamshell - 10 reps - 2 sets - 1x daily     Written Home Exercises Provided: Patient instructed to cont prior HEP.  Exercises were reviewed and TIFFANY was able to demonstrate them prior to the end of the session.  TIFFANY demonstrated good  understanding of the education provided.       Assessment     Pt with good " tolerance to today's treatment however she continues to require cueing to perform exercises with proper knee/LE alignment. She Is motivated to cont with PT POC.   YANG is progressing well towards her goals.   Pt prognosis is Excellent.     Pt will continue to benefit from skilled outpatient physical therapy to address the deficits listed in the problem list box on initial evaluation, provide pt/family education and to maximize pt's level of independence in the home and community environment.     Pt's spiritual, cultural and educational needs considered and pt agreeable to plan of care and goals.    Anticipated barriers to physical therapy: none  Goals:   Short Term GOALS:  1. Pt to report average pain <4/10 with regular ADLs.   2. Pt to display >65 deg SLR, >12 deg hip ext in sidelying  3. Pt to begin HEP including specific stretching and strengthening as per their diagnosis to decrease pain and improve flexibility.  4. Pt to display good awareness and maintenance of proper alignment throughout basic exercises and fxl transfers.      Long Term GOALS:   1. Pt to report <1/10, no longer limiting functional activities.  2. Pt to report return to >75% of regular ADLs, work and recreational tasks.   3. Pt to be compliant in advanced HEP to maintain progress gained in therapy thus far.   4. Pt to display >4+/5 gross MMT for B LE in order to support pelvis and spine throughout ADLs.     Plan   Cont per POC.    Kathi Martinez, PT

## 2019-06-14 ENCOUNTER — CLINICAL SUPPORT (OUTPATIENT)
Dept: REHABILITATION | Facility: HOSPITAL | Age: 29
End: 2019-06-14
Attending: INTERNAL MEDICINE
Payer: COMMERCIAL

## 2019-06-14 DIAGNOSIS — M54.50 ACUTE MIDLINE LOW BACK PAIN WITHOUT SCIATICA: ICD-10-CM

## 2019-06-14 DIAGNOSIS — M25.552 CHRONIC HIP PAIN, BILATERAL: ICD-10-CM

## 2019-06-14 DIAGNOSIS — G89.29 CHRONIC HIP PAIN, BILATERAL: ICD-10-CM

## 2019-06-14 DIAGNOSIS — M25.551 CHRONIC HIP PAIN, BILATERAL: ICD-10-CM

## 2019-06-14 DIAGNOSIS — M22.2X2 PATELLOFEMORAL PAIN SYNDROME OF BOTH KNEES: ICD-10-CM

## 2019-06-14 DIAGNOSIS — M22.2X1 PATELLOFEMORAL PAIN SYNDROME OF BOTH KNEES: ICD-10-CM

## 2019-06-14 PROCEDURE — 97110 THERAPEUTIC EXERCISES: CPT | Mod: PO

## 2019-06-20 NOTE — PROGRESS NOTES
Physical Therapy Daily Treatment Note/PROGRESS REPORT     Name: Tiffany Dobbs  Clinic Number: [id number]    Therapy Diagnosis:   Encounter Diagnoses   Name Primary?    Patellofemoral pain syndrome of both knees     Chronic hip pain, bilateral     Acute midline low back pain without sciatica      Physician: Chitra Chen MD    Visit Date: 6/21/2019  Medical Diagnosis: B PFPS  Evaluation Date: 5-15-19  [place] due: 6-15-19. Completed on 6-21-19  Authorization Period Expiration: 12/31/2019  Plan of Care Certification Period: 7-15-19      Visit #/Visits authorized: 5/20    Time In: 0700  Time Out: 0800  Total Billable Time: 30 minutes  TE-2  Precautions: Standard    Subjective     Pt reports: her knees feel fine today but yesterday she was having a lot of pain. .  She was not as compliant with home exercise program as he thinks she should be.    Patient reports their pain to be 0/10 on a 0-10 scale with 0 being no pain and 10 being the worst pain imaginable.  Pain at worst: 7/10 yesterday, says this was in her back and knees when she first woke up. Says this isnt usual and she doesn't know if it was just the weather.     Functional change: stairs are easier, HEP feels good when she does it.  Objective   GAIT: B valgus knees, B toeing out R>L     Range of Motion:   Knee Left active Left Passive Right Active R passive   Flexion WNL   WNL     Extension WNL   WNL     Ankle DF 13     23   25    25     Hip extension in S/L 6   13   6   6     Hip rotation WFL   Limited in ER, WNL        Function:   - Squat: valgus knees, B pronation, increased back hip and knee pain with increased reps. This has gotten slightly better since starting PT    - Sit <--> Stand:12 in 30 seconds, B valgus knees, does not need to use armrests. Completed 15 today, better alignment for her knees.      Joint Mobility: hypomobility of B hips. Improved as far as exercise performance and tolerance in clinic  Palpation: TTP of B VMO, piriformis, ITB.  "same     Flexibility:               SLR: R= 74, L=72    YANG received therapeutic exercises per flowsheet to develop strength, endurance, ROM, flexibility and posture for 30 minutes.    Recumbent bike warm up x10 mins L2  Stool scoots 3x20ft  NP- Resisted side steps OTB loop at ankles 2x20ft-- given OTB loop for home use  --> LINSEY ABD 25# 3x10  Leg press 20# 3x10  slantboard 3x30"  NP- Wall orange ball squats 3x10     Supine:  Ball squeeze hip ADD 10" x30  HS/hip ADD stretch using strap 3x30"  SLR 3x10  - Bridges 3x10     Written Home Exercises Provided: Yes.  Added hip flexor lunge stretch this date. Pt given new printoff.   Access Code: [email or web address]       Seated Piriformis Stretch - 3 reps - 30 hold - 2x daily  Seated Hamstring Stretch - 3 reps - 30 hold - 1x daily  Clamshell - 10 reps - 2 sets - 1x daily     Written Home Exercises Provided: Patient instructed to cont prior HEP.  Exercises were reviewed and YANG was able to demonstrate them prior to the end of the session.  YANG demonstrated good  understanding of the education provided.       Assessment     Pt with good tolerance to today's treatment however she continues to require cueing to perform exercises with proper knee/LE alignment. She Is motivated to cont with PT POC.   YANG is progressing well towards her goals.   Pt prognosis is Excellent.     Pt will continue to benefit from skilled outpatient physical therapy to address the deficits listed in the problem list box on initial evaluation, provide pt/family education and to maximize pt's level of independence in the home and community environment.     Pt's spiritual, cultural and educational needs considered and pt agreeable to plan of care and goals.    Anticipated barriers to physical therapy: none  Goals:   Short Term GOALS:  1. Pt to report average pain <4/10 with regular ADLs. Met 6-21-19  2. Pt to display >65 deg SLR, >12 deg hip ext in sidelying partially Met 6-21-19  3. Pt to begin HEP including " specific stretching and strengthening as per their diagnosis to decrease pain and improve flexibility. Met 6-21-19  4. Pt to display good awareness and maintenance of proper alignment throughout basic exercises and fxl transfers. Met 6-21-19     Long Term GOALS:   1. Pt to report <1/10, no longer limiting functional activities.  2. Pt to report return to >75% of regular ADLs, work and recreational tasks.   3. Pt to be compliant in advanced HEP to maintain progress gained in therapy thus far.   4. Pt to display >4+/5 gross MMT for B LE in order to support pelvis and spine throughout ADLs.     Plan   Cont per POC.    Kathi Martinez, PT

## 2019-06-21 ENCOUNTER — CLINICAL SUPPORT (OUTPATIENT)
Dept: REHABILITATION | Facility: HOSPITAL | Age: 29
End: 2019-06-21
Attending: INTERNAL MEDICINE
Payer: COMMERCIAL

## 2019-06-21 DIAGNOSIS — M22.2X1 PATELLOFEMORAL PAIN SYNDROME OF BOTH KNEES: ICD-10-CM

## 2019-06-21 DIAGNOSIS — M25.552 CHRONIC HIP PAIN, BILATERAL: ICD-10-CM

## 2019-06-21 DIAGNOSIS — M25.551 CHRONIC HIP PAIN, BILATERAL: ICD-10-CM

## 2019-06-21 DIAGNOSIS — G89.29 CHRONIC HIP PAIN, BILATERAL: ICD-10-CM

## 2019-06-21 DIAGNOSIS — M54.50 ACUTE MIDLINE LOW BACK PAIN WITHOUT SCIATICA: ICD-10-CM

## 2019-06-21 DIAGNOSIS — M22.2X2 PATELLOFEMORAL PAIN SYNDROME OF BOTH KNEES: ICD-10-CM

## 2019-06-21 PROCEDURE — 97110 THERAPEUTIC EXERCISES: CPT | Mod: PO

## 2019-06-21 NOTE — PLAN OF CARE
Physical Therapy Daily Treatment Note/PROGRESS REPORT     Name: Tiffany Dobbs  Clinic Number: [id number]    Therapy Diagnosis:   Encounter Diagnoses   Name Primary?    Patellofemoral pain syndrome of both knees     Chronic hip pain, bilateral     Acute midline low back pain without sciatica      Physician: Chitra Chen MD    Visit Date: 6/21/2019  Medical Diagnosis: B PFPS  Evaluation Date: 5-15-19  [place] due: 6-15-19. Completed on 6-21-19  Authorization Period Expiration: 12/31/2019  Plan of Care Certification Period: 7-15-19      Visit #/Visits authorized: 5/20    Time In: 0700  Time Out: 0800  Total Billable Time: 30 minutes  TE-2  Precautions: Standard    Subjective     Pt reports: her knees feel fine today but yesterday she was having a lot of pain. .  She was not as compliant with home exercise program as he thinks she should be.    Patient reports their pain to be 0/10 on a 0-10 scale with 0 being no pain and 10 being the worst pain imaginable.  Pain at worst: 7/10 yesterday, says this was in her back and knees when she first woke up. Says this isnt usual and she doesn't know if it was just the weather.     Functional change: stairs are easier, HEP feels good when she does it.  Objective   GAIT: B valgus knees, B toeing out R>L     Range of Motion:   Knee Left active Left Passive Right Active R passive   Flexion WNL   WNL     Extension WNL   WNL     Ankle DF 13     23   25    25     Hip extension in S/L 6   13   6   6     Hip rotation WFL   Limited in ER, WNL        Function:   - Squat: valgus knees, B pronation, increased back hip and knee pain with increased reps. This has gotten slightly better since starting PT    - Sit <--> Stand:12 in 30 seconds, B valgus knees, does not need to use armrests. Completed 15 today, better alignment for her knees.      Joint Mobility: hypomobility of B hips. Improved as far as exercise performance and tolerance in clinic  Palpation: TTP of B VMO, piriformis, ITB.  "same     Flexibility:               SLR: R= 74, L=72    YANG received therapeutic exercises per flowsheet to develop strength, endurance, ROM, flexibility and posture for 30 minutes.    Recumbent bike warm up x10 mins L2  Stool scoots 3x20ft  NP- Resisted side steps OTB loop at ankles 2x20ft-- given OTB loop for home use  --> LINSEY ABD 25# 3x10  Leg press 20# 3x10  slantboard 3x30"  NP- Wall orange ball squats 3x10     Supine:  Ball squeeze hip ADD 10" x30  HS/hip ADD stretch using strap 3x30"  SLR 3x10  - Bridges 3x10     Written Home Exercises Provided: Yes.  Added hip flexor lunge stretch this date. Pt given new printoff.   Access Code: [email or web address]       Seated Piriformis Stretch - 3 reps - 30 hold - 2x daily  Seated Hamstring Stretch - 3 reps - 30 hold - 1x daily  Clamshell - 10 reps - 2 sets - 1x daily     Written Home Exercises Provided: Patient instructed to cont prior HEP.  Exercises were reviewed and YANG was able to demonstrate them prior to the end of the session.  YANG demonstrated good  understanding of the education provided.       Assessment     Pt with good tolerance to today's treatment however she continues to require cueing to perform exercises with proper knee/LE alignment. She Is motivated to cont with PT POC.   YANG is progressing well towards her goals.   Pt prognosis is Excellent.     Pt will continue to benefit from skilled outpatient physical therapy to address the deficits listed in the problem list box on initial evaluation, provide pt/family education and to maximize pt's level of independence in the home and community environment.     Pt's spiritual, cultural and educational needs considered and pt agreeable to plan of care and goals.    Anticipated barriers to physical therapy: none  Goals:   Short Term GOALS:  1. Pt to report average pain <4/10 with regular ADLs. Met 6-21-19  2. Pt to display >65 deg SLR, >12 deg hip ext in sidelying partially Met 6-21-19  3. Pt to begin HEP including " specific stretching and strengthening as per their diagnosis to decrease pain and improve flexibility. Met 6-21-19  4. Pt to display good awareness and maintenance of proper alignment throughout basic exercises and fxl transfers. Met 6-21-19     Long Term GOALS:   1. Pt to report <1/10, no longer limiting functional activities.  2. Pt to report return to >75% of regular ADLs, work and recreational tasks.   3. Pt to be compliant in advanced HEP to maintain progress gained in therapy thus far.   4. Pt to display >4+/5 gross MMT for B LE in order to support pelvis and spine throughout ADLs.     Plan   Cont per POC.    Kathi Martinez, PT

## 2019-07-24 ENCOUNTER — OFFICE VISIT (OUTPATIENT)
Dept: INTERNAL MEDICINE | Facility: CLINIC | Age: 29
End: 2019-07-24
Payer: COMMERCIAL

## 2019-07-24 VITALS
OXYGEN SATURATION: 99 % | DIASTOLIC BLOOD PRESSURE: 78 MMHG | HEART RATE: 67 BPM | HEIGHT: 63 IN | WEIGHT: 176.56 LBS | SYSTOLIC BLOOD PRESSURE: 118 MMHG | BODY MASS INDEX: 31.29 KG/M2

## 2019-07-24 DIAGNOSIS — D50.9 MICROCYTIC ANEMIA: ICD-10-CM

## 2019-07-24 DIAGNOSIS — Z00.00 ENCOUNTER FOR PREVENTIVE HEALTH EXAMINATION: Primary | ICD-10-CM

## 2019-07-24 PROCEDURE — 99999 PR PBB SHADOW E&M-EST. PATIENT-LVL III: CPT | Mod: PBBFAC,,, | Performed by: NURSE PRACTITIONER

## 2019-07-24 PROCEDURE — G0439 PR MEDICARE ANNUAL WELLNESS SUBSEQUENT VISIT: ICD-10-PCS | Mod: S$GLB,,, | Performed by: NURSE PRACTITIONER

## 2019-07-24 PROCEDURE — 99999 PR PBB SHADOW E&M-EST. PATIENT-LVL III: ICD-10-PCS | Mod: PBBFAC,,, | Performed by: NURSE PRACTITIONER

## 2019-07-24 PROCEDURE — G0439 PPPS, SUBSEQ VISIT: HCPCS | Mod: S$GLB,,, | Performed by: NURSE PRACTITIONER

## 2019-07-24 NOTE — PATIENT INSTRUCTIONS
Counseling and Referral of Other Preventative  (Italic type indicates deductible and co-insurance are waived)    Patient Name: Tiffany Dobbs  Today's Date: 7/24/2019    Health Maintenance       Date Due Completion Date    Influenza Vaccine 09/01/2019 (Originally 8/1/2019) ---    Pap Smear with HPV Cotest 03/27/2020 3/27/2017    TETANUS VACCINE 06/04/2023 6/4/2013        No orders of the defined types were placed in this encounter.    The following information is provided to all patients.  This information is to help you find resources for any of the problems found today that may be affecting your health:                Living healthy guide: www.[facility].[place].[facility]      Understanding Diabetes: www.diabetes.org      Eating healthy: www.cdc.gov/healthyweight      [facility] home safety checklist: www.cdc.gov/steadi/patient.html      Agency on Aging: www.goea.[place].[facility]      Alcoholics anonymous (AA): www.aa.org      Physical Activity: www.roberto.nih.gov/rt7gtwa      Tobacco use: www.quitwithusla.org

## 2019-07-25 NOTE — PROGRESS NOTES
"Tiffany Dobbs presented for a  Medicare AWV and comprehensive Health Risk Assessment today. The following components were reviewed and updated:    · Medical history  · Family History  · Social history  · Allergies and Current Medications  · Health Risk Assessment  · Health Maintenance  · Care Team     ** See Completed Assessments for Annual Wellness Visit within the encounter summary.**       The following assessments were completed:  · Living Situation  · CAGE  · Depression Screening  · Timed Get Up and Go  · Whisper Test  · Cognitive Function Screening  ·   ·   · Nutrition Screening  · ADL Screening  · PAQ Screening    Vitals:    07/24/19 1114   BP: 118/78   Pulse: 67   SpO2: 99%   Weight: 80.1 kg (176 lb 9.4 oz)   Height: 5' 3" (1.6 m)     Body mass index is 31.28 kg/m².  Physical Exam   Constitutional: She is oriented to person, place, and time. She appears well-developed and well-nourished.   HENT:   Head: Normocephalic and atraumatic.   Nose: Nose normal.   Eyes: Conjunctivae and EOM are normal.   Cardiovascular: Normal rate, regular rhythm, normal heart sounds and intact distal pulses.   No murmur heard.  Pulmonary/Chest: Effort normal and breath sounds normal.   Musculoskeletal: Normal range of motion.   Neurological: She is alert and oriented to person, place, and time.   Skin: Skin is warm and dry.   Psychiatric: She has a normal mood and affect. Her behavior is normal. Judgment and thought content normal.   Nursing note and vitals reviewed.        Diagnoses and health risks identified today and associated recommendations/orders:    1. Encounter for preventive health examination  Assessment performed. Health maintenance updated. Chart review completed.    2. Microcytic anemia  Chronic. Stable with supplement. Followed by PCP.      Provided Tiffany HERNANDEZ with a 5-10 year written screening schedule and personal prevention plan. Recommendations were developed using the USPSTF age appropriate recommendations. " Education, counseling, and referrals were provided as needed. After Visit Summary printed and given to patient which includes a list of additional screenings\tests needed.    Follow up for Annual Wellness Visit in 1 year, follow up with PCP as instructed, ;sooner if problems.    NEVILLE French

## 2019-08-06 ENCOUNTER — OFFICE VISIT (OUTPATIENT)
Dept: OBSTETRICS AND GYNECOLOGY | Facility: CLINIC | Age: 29
End: 2019-08-06
Attending: OBSTETRICS & GYNECOLOGY
Payer: COMMERCIAL

## 2019-08-06 VITALS — BODY MASS INDEX: 34.01 KG/M2 | SYSTOLIC BLOOD PRESSURE: 122 MMHG | DIASTOLIC BLOOD PRESSURE: 60 MMHG | WEIGHT: 192 LBS

## 2019-08-06 DIAGNOSIS — Z12.4 PAP SMEAR FOR CERVICAL CANCER SCREENING: ICD-10-CM

## 2019-08-06 DIAGNOSIS — N76.0 ACUTE VAGINITIS: ICD-10-CM

## 2019-08-06 DIAGNOSIS — Z01.419 WELL WOMAN EXAM WITH ROUTINE GYNECOLOGICAL EXAM: Primary | ICD-10-CM

## 2019-08-06 DIAGNOSIS — Z11.3 SCREEN FOR STD (SEXUALLY TRANSMITTED DISEASE): ICD-10-CM

## 2019-08-06 PROCEDURE — 87801 DETECT AGNT MULT DNA AMPLI: CPT

## 2019-08-06 PROCEDURE — 99999 PR PBB SHADOW E&M-EST. PATIENT-LVL III: CPT | Mod: PBBFAC,,, | Performed by: OBSTETRICS & GYNECOLOGY

## 2019-08-06 PROCEDURE — 99999 PR PBB SHADOW E&M-EST. PATIENT-LVL III: ICD-10-PCS | Mod: PBBFAC,,, | Performed by: OBSTETRICS & GYNECOLOGY

## 2019-08-06 PROCEDURE — 88175 CYTOPATH C/V AUTO FLUID REDO: CPT

## 2019-08-06 PROCEDURE — 87481 CANDIDA DNA AMP PROBE: CPT | Mod: 59

## 2019-08-06 PROCEDURE — 87624 HPV HI-RISK TYP POOLED RSLT: CPT

## 2019-08-06 PROCEDURE — 99395 PR PREVENTIVE VISIT,EST,18-39: ICD-10-PCS | Mod: S$GLB,,, | Performed by: OBSTETRICS & GYNECOLOGY

## 2019-08-06 PROCEDURE — 99395 PREV VISIT EST AGE 18-39: CPT | Mod: S$GLB,,, | Performed by: OBSTETRICS & GYNECOLOGY

## 2019-08-06 PROCEDURE — 87491 CHLMYD TRACH DNA AMP PROBE: CPT

## 2019-08-06 NOTE — PROGRESS NOTES
Tiffany Dobbs is a 29 y.o. female  who presents for annual exam.  Menses occur monthly, lasting 7 days in duration.  She describes occasionally having midcycle / ovulatory spotting.  Last week, she reported having vulvar / vaginal itching and irritation for which she used boric acid suppositories with some improvement.  Requests STD screening.  She had been seen 2019 for a perineal abscess which resolved with I&D and antibiotics.  Patient's last menstrual period was 2019 (approximate).     UPT: Negative    Past Medical History:   Diagnosis Date    Anemia        Past Surgical History:   Procedure Laterality Date    BREAST REDUCTION      EXCISION-KELOID Rt & Lt IMF Bilateral 2017    Performed by Rom Patterson MD at [facility] OR [address] [place] [address]       OB History        0    Para        Term                AB        Living           SAB        TAB        Ectopic        Multiple        Live Births                     ROS:  GENERAL: Feeling well overall.   SKIN: Reports some vulva itching.   HEAD: Denies head injury or headache.   NODES: Denies enlarged lymph nodes.   CHEST: Denies chest pain or shortness of breath.   CARDIOVASCULAR: Denies palpitations or left sided chest pain.   ABDOMEN: No abdominal pain, nausea, vomiting or rectal bleeding.   URINARY: No dysuria or hematuria.  REPRODUCTIVE: See HPI.   BREASTS: Denies pain, lumps, or nipple discharge.   HEMATOLOGIC: No easy bruisability or excessive bleeding.   MUSCULOSKELETAL: Denies joint pain or swelling.   NEUROLOGIC: Denies syncope or weakness.   PSYCHIATRIC: Denies depression.    PE:   (chaperone present during entire exam)  APPEARANCE: Well nourished, well developed, in no acute distress.  BREASTS: Symmetrical.  S/P bilateral reduction. No palpable masses, nipple discharge or adenopathy bilaterally.  ABDOMEN: Soft. No tenderness or masses. No hernias. No CVA tenderness.  VULVA: No lesions. Normal female genitalia.  URETHRAL  MEATUS: Normal size and location, no lesions, no prolapse.  URETHRA: No masses, tenderness, prolapse or scarring.  VAGINA: Moist and well rugated, mild amount of white discharge, no significant cystocele or rectocele.  CERVIX: No lesions and discharge. No CMT.  PAP done.  UTERUS: Normal size, regular shape, mobile, non-tender, bladder base nontender.  ADNEXA: No masses, tenderness or CDS nodularity.  ANUS PERINEUM: Normal.      Diagnosis:  1. Well woman exam with routine gynecological exam    2. Pap smear for cervical cancer screening    3. Acute vaginitis    4. Screen for STD (sexually transmitted disease)          PLAN:    Orders Placed This Encounter    Vaginosis Screen by DNA Probe    C. trachomatis/N. gonorrhoeae by AMP DNA [facility]; Cervix    HPV High Risk Genotypes, PCR    Liquid-based pap smear, screening       Patient was counseled today on the need for annual gyn exams.  We discusse discussed vaginitis: etiologies, diagnosis, and treatment.  We will contact her in several days for results of the Affirm and GC/CT.      Follow-up in 1 year.

## 2019-08-07 LAB
C TRACH DNA SPEC QL NAA+PROBE: NOT DETECTED
N GONORRHOEA DNA SPEC QL NAA+PROBE: NOT DETECTED

## 2019-08-08 ENCOUNTER — TELEPHONE (OUTPATIENT)
Dept: OBSTETRICS AND GYNECOLOGY | Facility: CLINIC | Age: 29
End: 2019-08-08

## 2019-08-08 LAB
BACTERIAL VAGINOSIS DNA: POSITIVE
CANDIDA GLABRATA DNA: NEGATIVE
CANDIDA KRUSEI DNA: NEGATIVE
CANDIDA RRNA VAG QL PROBE: NEGATIVE
T VAGINALIS RRNA GENITAL QL PROBE: NEGATIVE

## 2019-08-08 RX ORDER — METRONIDAZOLE 7.5 MG/G
1 GEL VAGINAL DAILY
Qty: 70 G | Refills: 1 | Status: SHIPPED | OUTPATIENT
Start: 2019-08-08 | End: 2019-08-13

## 2019-08-08 NOTE — TELEPHONE ENCOUNTER
Called patient:    Discussed results of Affirm: +BV.    Rx Metrogel sent to pharmacy.    To let us know if not resolved.

## 2019-08-12 LAB
HPV HR 12 DNA CVX QL NAA+PROBE: NEGATIVE
HPV16 AG SPEC QL: NEGATIVE
HPV18 DNA SPEC QL NAA+PROBE: NEGATIVE

## 2019-08-15 ENCOUNTER — PATIENT MESSAGE (OUTPATIENT)
Dept: OBSTETRICS AND GYNECOLOGY | Facility: CLINIC | Age: 29
End: 2019-08-15

## 2019-11-06 NOTE — TELEPHONE ENCOUNTER
Just excused for 8/3/18 the day of appointment.  
Spoke with Kennedy with [employer] in regards to pt. He would like clarification on when pt can return back to work?    Please adv.  
Spoke with pt, notified of excused day  
[id number] Detailed

## 2020-01-13 ENCOUNTER — HOSPITAL ENCOUNTER (OUTPATIENT)
Dept: RADIOLOGY | Facility: HOSPITAL | Age: 30
Discharge: HOME | End: 2020-01-13
Attending: INTERNAL MEDICINE
Payer: MEDICAID

## 2020-01-13 DIAGNOSIS — R10.11 RIGHT UPPER QUADRANT PAIN: ICD-10-CM

## 2020-01-13 PROCEDURE — 76705 US ABDOMEN LIMITED: ICD-10-PCS | Mod: 26,,, | Performed by: RADIOLOGY

## 2020-01-13 PROCEDURE — 76705 ECHO EXAM OF ABDOMEN: CPT | Mod: TC

## 2020-01-13 PROCEDURE — 76705 ECHO EXAM OF ABDOMEN: CPT | Mod: 26,,, | Performed by: RADIOLOGY

## 2021-02-24 ENCOUNTER — LAB VISIT (OUTPATIENT)
Dept: INTERNAL MEDICINE | Facility: CLINIC | Age: 31
End: 2021-02-24
Attending: OBSTETRICS & GYNECOLOGY
Payer: MEDICAID

## 2021-02-24 ENCOUNTER — OFFICE VISIT (OUTPATIENT)
Dept: OBSTETRICS AND GYNECOLOGY | Facility: CLINIC | Age: 31
End: 2021-02-24
Attending: OBSTETRICS & GYNECOLOGY
Payer: MEDICAID

## 2021-02-24 VITALS
DIASTOLIC BLOOD PRESSURE: 74 MMHG | BODY MASS INDEX: 34.06 KG/M2 | SYSTOLIC BLOOD PRESSURE: 120 MMHG | HEIGHT: 63 IN | WEIGHT: 192.25 LBS

## 2021-02-24 DIAGNOSIS — Z11.3 SCREEN FOR STD (SEXUALLY TRANSMITTED DISEASE): Primary | ICD-10-CM

## 2021-02-24 DIAGNOSIS — Z11.3 SCREEN FOR STD (SEXUALLY TRANSMITTED DISEASE): ICD-10-CM

## 2021-02-24 PROCEDURE — 86592 SYPHILIS TEST NON-TREP QUAL: CPT

## 2021-02-24 PROCEDURE — 99999 PR PBB SHADOW E&M-EST. PATIENT-LVL III: ICD-10-PCS | Mod: PBBFAC,,, | Performed by: OBSTETRICS & GYNECOLOGY

## 2021-02-24 PROCEDURE — 99213 OFFICE O/P EST LOW 20 MIN: CPT | Mod: PBBFAC,PN | Performed by: OBSTETRICS & GYNECOLOGY

## 2021-02-24 PROCEDURE — 99999 PR PBB SHADOW E&M-EST. PATIENT-LVL III: CPT | Mod: PBBFAC,,, | Performed by: OBSTETRICS & GYNECOLOGY

## 2021-02-24 PROCEDURE — 99212 OFFICE O/P EST SF 10 MIN: CPT | Mod: S$PBB,,, | Performed by: OBSTETRICS & GYNECOLOGY

## 2021-02-24 PROCEDURE — 99212 PR OFFICE/OUTPT VISIT, EST, LEVL II, 10-19 MIN: ICD-10-PCS | Mod: S$PBB,,, | Performed by: OBSTETRICS & GYNECOLOGY

## 2021-02-24 PROCEDURE — 86703 HIV-1/HIV-2 1 RESULT ANTBDY: CPT

## 2021-02-24 PROCEDURE — 80074 ACUTE HEPATITIS PANEL: CPT

## 2021-02-25 ENCOUNTER — PATIENT MESSAGE (OUTPATIENT)
Dept: OBSTETRICS AND GYNECOLOGY | Facility: CLINIC | Age: 31
End: 2021-02-25

## 2021-02-25 LAB
HAV IGM SERPL QL IA: NEGATIVE
HBV CORE IGM SERPL QL IA: NEGATIVE
HBV SURFACE AG SERPL QL IA: NEGATIVE
HCV AB SERPL QL IA: NEGATIVE
HIV 1+2 AB+HIV1 P24 AG SERPL QL IA: NEGATIVE
RPR SER QL: NORMAL

## 2021-02-26 ENCOUNTER — PATIENT MESSAGE (OUTPATIENT)
Dept: OBSTETRICS AND GYNECOLOGY | Facility: CLINIC | Age: 31
End: 2021-02-26

## 2021-02-26 LAB
C TRACH RRNA SPEC QL NAA+PROBE: NEGATIVE
N GONORRHOEA RRNA SPEC QL NAA+PROBE: NEGATIVE

## 2021-03-22 ENCOUNTER — TELEPHONE (OUTPATIENT)
Dept: RESEARCH | Facility: CLINIC | Age: 31
End: 2021-03-22

## 2021-04-07 ENCOUNTER — TELEPHONE (OUTPATIENT)
Dept: OBSTETRICS AND GYNECOLOGY | Facility: CLINIC | Age: 31
End: 2021-04-07

## 2021-04-07 ENCOUNTER — HOSPITAL ENCOUNTER (OUTPATIENT)
Dept: RADIOLOGY | Facility: HOSPITAL | Age: 31
Discharge: HOME | End: 2021-04-07
Attending: OBSTETRICS & GYNECOLOGY
Payer: MEDICAID

## 2021-04-07 ENCOUNTER — OFFICE VISIT (OUTPATIENT)
Dept: OBSTETRICS AND GYNECOLOGY | Facility: CLINIC | Age: 31
End: 2021-04-07
Attending: OBSTETRICS & GYNECOLOGY
Payer: MEDICAID

## 2021-04-07 VITALS
SYSTOLIC BLOOD PRESSURE: 116 MMHG | HEIGHT: 63 IN | WEIGHT: 188.63 LBS | DIASTOLIC BLOOD PRESSURE: 70 MMHG | BODY MASS INDEX: 33.42 KG/M2

## 2021-04-07 DIAGNOSIS — N64.4 BREAST PAIN, RIGHT: ICD-10-CM

## 2021-04-07 DIAGNOSIS — N64.52 DISCHARGE FROM RIGHT NIPPLE: Primary | ICD-10-CM

## 2021-04-07 DIAGNOSIS — N64.52 DISCHARGE FROM RIGHT NIPPLE: ICD-10-CM

## 2021-04-07 PROCEDURE — 77066 DX MAMMO INCL CAD BI: CPT | Mod: TC

## 2021-04-07 PROCEDURE — 99999 PR PBB SHADOW E&M-EST. PATIENT-LVL III: CPT | Mod: PBBFAC,,, | Performed by: OBSTETRICS & GYNECOLOGY

## 2021-04-07 PROCEDURE — 99999 PR PBB SHADOW E&M-EST. PATIENT-LVL III: ICD-10-PCS | Mod: PBBFAC,,, | Performed by: OBSTETRICS & GYNECOLOGY

## 2021-04-07 PROCEDURE — 76642 US BREAST RIGHT LIMITED: ICD-10-PCS | Mod: 26,RT,, | Performed by: RADIOLOGY

## 2021-04-07 PROCEDURE — 99213 OFFICE O/P EST LOW 20 MIN: CPT | Mod: PBBFAC,PN,25 | Performed by: OBSTETRICS & GYNECOLOGY

## 2021-04-07 PROCEDURE — 77066 DX MAMMO INCL CAD BI: CPT | Mod: 26,,, | Performed by: RADIOLOGY

## 2021-04-07 PROCEDURE — 77062 BREAST TOMOSYNTHESIS BI: CPT | Mod: 26,,, | Performed by: RADIOLOGY

## 2021-04-07 PROCEDURE — 76642 ULTRASOUND BREAST LIMITED: CPT | Mod: 26,RT,, | Performed by: RADIOLOGY

## 2021-04-07 PROCEDURE — 99213 OFFICE O/P EST LOW 20 MIN: CPT | Mod: S$PBB,,, | Performed by: OBSTETRICS & GYNECOLOGY

## 2021-04-07 PROCEDURE — 77062 MAMMO DIGITAL DIAGNOSTIC BILAT WITH TOMO: ICD-10-PCS | Mod: 26,,, | Performed by: RADIOLOGY

## 2021-04-07 PROCEDURE — 99213 PR OFFICE/OUTPT VISIT, EST, LEVL III, 20-29 MIN: ICD-10-PCS | Mod: S$PBB,,, | Performed by: OBSTETRICS & GYNECOLOGY

## 2021-04-07 PROCEDURE — 76642 ULTRASOUND BREAST LIMITED: CPT | Mod: TC,RT

## 2021-04-07 PROCEDURE — 77066 MAMMO DIGITAL DIAGNOSTIC BILAT WITH TOMO: ICD-10-PCS | Mod: 26,,, | Performed by: RADIOLOGY

## 2021-04-09 ENCOUNTER — PATIENT MESSAGE (OUTPATIENT)
Dept: OBSTETRICS AND GYNECOLOGY | Facility: CLINIC | Age: 31
End: 2021-04-09

## 2021-04-16 ENCOUNTER — PATIENT MESSAGE (OUTPATIENT)
Dept: RESEARCH | Facility: HOSPITAL | Age: 31
End: 2021-04-16

## 2021-05-03 ENCOUNTER — LAB VISIT (OUTPATIENT)
Dept: LAB | Facility: HOSPITAL | Age: 31
End: 2021-05-03
Attending: INTERNAL MEDICINE
Payer: MEDICAID

## 2021-05-03 DIAGNOSIS — E78.2 MIXED HYPERLIPIDEMIA: ICD-10-CM

## 2021-05-03 DIAGNOSIS — D64.9 ANEMIA, UNSPECIFIED: Primary | ICD-10-CM

## 2021-05-03 DIAGNOSIS — Z11.59 SCREENING EXAMINATION FOR POLIOMYELITIS: ICD-10-CM

## 2021-05-03 DIAGNOSIS — R73.9 BLOOD GLUCOSE ELEVATED: ICD-10-CM

## 2021-05-03 LAB
BASOPHILS # BLD AUTO: 0.05 K/UL (ref 0–0.2)
BASOPHILS NFR BLD: 0.7 % (ref 0–1.9)
DIFFERENTIAL METHOD: ABNORMAL
EOSINOPHIL # BLD AUTO: 0.3 K/UL (ref 0–0.5)
EOSINOPHIL NFR BLD: 4.3 % (ref 0–8)
ERYTHROCYTE [DISTWIDTH] IN BLOOD BY AUTOMATED COUNT: 13.9 % (ref 11.5–14.5)
ESTIMATED AVG GLUCOSE: 94 MG/DL (ref 68–131)
FERRITIN SERPL-MCNC: 8 NG/ML (ref 20–300)
HBA1C MFR BLD: 4.9 % (ref 4–5.6)
HCT VFR BLD AUTO: 35.6 % (ref 37–48.5)
HGB BLD-MCNC: 11 G/DL (ref 12–16)
IMM GRANULOCYTES # BLD AUTO: 0.02 K/UL (ref 0–0.04)
IMM GRANULOCYTES NFR BLD AUTO: 0.3 % (ref 0–0.5)
LYMPHOCYTES # BLD AUTO: 2.1 K/UL (ref 1–4.8)
LYMPHOCYTES NFR BLD: 31.1 % (ref 18–48)
MCH RBC QN AUTO: 25.7 PG (ref 27–31)
MCHC RBC AUTO-ENTMCNC: 30.9 G/DL (ref 32–36)
MCV RBC AUTO: 83 FL (ref 82–98)
MONOCYTES # BLD AUTO: 0.5 K/UL (ref 0.3–1)
MONOCYTES NFR BLD: 7 % (ref 4–15)
NEUTROPHILS # BLD AUTO: 3.8 K/UL (ref 1.8–7.7)
NEUTROPHILS NFR BLD: 56.6 % (ref 38–73)
NRBC BLD-RTO: 0 /100 WBC
PLATELET # BLD AUTO: 367 K/UL (ref 150–450)
PMV BLD AUTO: 10.8 FL (ref 9.2–12.9)
RBC # BLD AUTO: 4.28 M/UL (ref 4–5.4)
T4 FREE SERPL-MCNC: 0.85 NG/DL (ref 0.71–1.51)
TSH SERPL DL<=0.005 MIU/L-ACNC: 2.6 UIU/ML (ref 0.4–4)
VIT B12 SERPL-MCNC: 338 PG/ML (ref 210–950)
WBC # BLD AUTO: 6.76 K/UL (ref 3.9–12.7)

## 2021-05-03 PROCEDURE — 85025 COMPLETE CBC W/AUTO DIFF WBC: CPT | Performed by: INTERNAL MEDICINE

## 2021-05-03 PROCEDURE — 87340 HEPATITIS B SURFACE AG IA: CPT | Performed by: INTERNAL MEDICINE

## 2021-05-03 PROCEDURE — 84439 ASSAY OF FREE THYROXINE: CPT | Performed by: INTERNAL MEDICINE

## 2021-05-03 PROCEDURE — 83036 HEMOGLOBIN GLYCOSYLATED A1C: CPT | Performed by: INTERNAL MEDICINE

## 2021-05-03 PROCEDURE — 82607 VITAMIN B-12: CPT | Performed by: INTERNAL MEDICINE

## 2021-05-03 PROCEDURE — 84443 ASSAY THYROID STIM HORMONE: CPT | Performed by: INTERNAL MEDICINE

## 2021-05-03 PROCEDURE — 83020 HEMOGLOBIN ELECTROPHORESIS: CPT | Performed by: INTERNAL MEDICINE

## 2021-05-03 PROCEDURE — 36415 COLL VENOUS BLD VENIPUNCTURE: CPT | Performed by: INTERNAL MEDICINE

## 2021-05-03 PROCEDURE — 86803 HEPATITIS C AB TEST: CPT | Performed by: INTERNAL MEDICINE

## 2021-05-03 PROCEDURE — 82728 ASSAY OF FERRITIN: CPT | Performed by: INTERNAL MEDICINE

## 2021-05-04 LAB
HBV SURFACE AG SERPL QL IA: NEGATIVE
HCV AB SERPL QL IA: NEGATIVE

## 2021-05-07 LAB
HGB FRACT BLD ELPH-IMP: NORMAL
HGB S MFR BLD ELPH: 0 %

## 2021-08-06 ENCOUNTER — LAB VISIT (OUTPATIENT)
Dept: FAMILY MEDICINE | Facility: CLINIC | Age: 31
End: 2021-08-06
Payer: MEDICAID

## 2021-08-06 DIAGNOSIS — R05.9 COUGH: ICD-10-CM

## 2021-08-06 PROCEDURE — U0005 INFEC AGEN DETEC AMPLI PROBE: HCPCS | Performed by: INTERNAL MEDICINE

## 2021-08-06 PROCEDURE — U0003 INFECTIOUS AGENT DETECTION BY NUCLEIC ACID (DNA OR RNA); SEVERE ACUTE RESPIRATORY SYNDROME CORONAVIRUS 2 (SARS-COV-2) (CORONAVIRUS DISEASE [COVID-19]), AMPLIFIED PROBE TECHNIQUE, MAKING USE OF HIGH THROUGHPUT TECHNOLOGIES AS DESCRIBED BY CMS-2020-01-R: HCPCS | Performed by: INTERNAL MEDICINE

## 2021-08-07 DIAGNOSIS — U07.1 COVID-19 VIRUS DETECTED: ICD-10-CM

## 2021-08-07 LAB
SARS-COV-2 RNA RESP QL NAA+PROBE: DETECTED
SARS-COV-2- CYCLE NUMBER: 14.78

## 2021-08-09 ENCOUNTER — PATIENT MESSAGE (OUTPATIENT)
Dept: UROLOGY | Facility: CLINIC | Age: 31
End: 2021-08-09

## 2021-08-09 ENCOUNTER — TELEPHONE (OUTPATIENT)
Dept: UROLOGY | Facility: CLINIC | Age: 31
End: 2021-08-09

## 2021-08-09 DIAGNOSIS — U07.1 COVID-19: Primary | ICD-10-CM

## 2021-08-10 ENCOUNTER — NURSE TRIAGE (OUTPATIENT)
Dept: ADMINISTRATIVE | Facility: CLINIC | Age: 31
End: 2021-08-10

## 2021-08-11 ENCOUNTER — NURSE TRIAGE (OUTPATIENT)
Dept: ADMINISTRATIVE | Facility: CLINIC | Age: 31
End: 2021-08-11

## 2022-01-14 ENCOUNTER — IMMUNIZATION (OUTPATIENT)
Dept: INTERNAL MEDICINE | Facility: CLINIC | Age: 32
End: 2022-01-14
Payer: MEDICAID

## 2022-01-14 DIAGNOSIS — Z23 NEED FOR VACCINATION: Primary | ICD-10-CM

## 2022-01-14 PROCEDURE — 0002A COVID-19, MRNA, LNP-S, PF, 30 MCG/0.3 ML DOSE VACCINE: CPT | Mod: PBBFAC,CV19

## 2022-03-15 DIAGNOSIS — H47.393 OPTIC NERVE CUPPING OF BOTH EYES: Primary | ICD-10-CM

## 2022-03-15 NOTE — PROGRESS NOTES
Assessment /Plan     For exam results, see Encounter Report.    Optic nerve cupping of both eyes  -     Posterior Segment OCT Optic Nerve- Both eyes; Future  -     Drake Visual Field - OU - Extended - Both Eyes; Future

## 2022-04-18 ENCOUNTER — OFFICE VISIT (OUTPATIENT)
Dept: OBSTETRICS AND GYNECOLOGY | Facility: CLINIC | Age: 32
End: 2022-04-18
Payer: MEDICAID

## 2022-04-18 VITALS
BODY MASS INDEX: 35.66 KG/M2 | WEIGHT: 201.25 LBS | DIASTOLIC BLOOD PRESSURE: 74 MMHG | SYSTOLIC BLOOD PRESSURE: 116 MMHG

## 2022-04-18 DIAGNOSIS — Z01.419 ENCOUNTER FOR GYNECOLOGICAL EXAMINATION WITHOUT ABNORMAL FINDING: Primary | ICD-10-CM

## 2022-04-18 PROCEDURE — 87481 CANDIDA DNA AMP PROBE: CPT | Mod: 59 | Performed by: OBSTETRICS & GYNECOLOGY

## 2022-04-18 PROCEDURE — 3074F SYST BP LT 130 MM HG: CPT | Mod: CPTII,,, | Performed by: OBSTETRICS & GYNECOLOGY

## 2022-04-18 PROCEDURE — 99395 PREV VISIT EST AGE 18-39: CPT | Mod: S$PBB,,, | Performed by: OBSTETRICS & GYNECOLOGY

## 2022-04-18 PROCEDURE — 88142 CYTOPATH C/V THIN LAYER: CPT | Performed by: OBSTETRICS & GYNECOLOGY

## 2022-04-18 PROCEDURE — 99212 OFFICE O/P EST SF 10 MIN: CPT | Mod: PBBFAC,PN | Performed by: OBSTETRICS & GYNECOLOGY

## 2022-04-18 PROCEDURE — 3074F PR MOST RECENT SYSTOLIC BLOOD PRESSURE < 130 MM HG: ICD-10-PCS | Mod: CPTII,,, | Performed by: OBSTETRICS & GYNECOLOGY

## 2022-04-18 PROCEDURE — 3008F BODY MASS INDEX DOCD: CPT | Mod: CPTII,,, | Performed by: OBSTETRICS & GYNECOLOGY

## 2022-04-18 PROCEDURE — 87591 N.GONORRHOEAE DNA AMP PROB: CPT | Performed by: OBSTETRICS & GYNECOLOGY

## 2022-04-18 PROCEDURE — 1160F PR REVIEW ALL MEDS BY PRESCRIBER/CLIN PHARMACIST DOCUMENTED: ICD-10-PCS | Mod: CPTII,,, | Performed by: OBSTETRICS & GYNECOLOGY

## 2022-04-18 PROCEDURE — 1159F PR MEDICATION LIST DOCUMENTED IN MEDICAL RECORD: ICD-10-PCS | Mod: CPTII,,, | Performed by: OBSTETRICS & GYNECOLOGY

## 2022-04-18 PROCEDURE — 87491 CHLMYD TRACH DNA AMP PROBE: CPT | Performed by: OBSTETRICS & GYNECOLOGY

## 2022-04-18 PROCEDURE — 99395 PR PREVENTIVE VISIT,EST,18-39: ICD-10-PCS | Mod: S$PBB,,, | Performed by: OBSTETRICS & GYNECOLOGY

## 2022-04-18 PROCEDURE — 1159F MED LIST DOCD IN RCRD: CPT | Mod: CPTII,,, | Performed by: OBSTETRICS & GYNECOLOGY

## 2022-04-18 PROCEDURE — 87624 HPV HI-RISK TYP POOLED RSLT: CPT | Performed by: OBSTETRICS & GYNECOLOGY

## 2022-04-18 PROCEDURE — 1160F RVW MEDS BY RX/DR IN RCRD: CPT | Mod: CPTII,,, | Performed by: OBSTETRICS & GYNECOLOGY

## 2022-04-18 PROCEDURE — 3008F PR BODY MASS INDEX (BMI) DOCUMENTED: ICD-10-PCS | Mod: CPTII,,, | Performed by: OBSTETRICS & GYNECOLOGY

## 2022-04-18 PROCEDURE — 3078F DIAST BP <80 MM HG: CPT | Mod: CPTII,,, | Performed by: OBSTETRICS & GYNECOLOGY

## 2022-04-18 PROCEDURE — 99999 PR PBB SHADOW E&M-EST. PATIENT-LVL II: CPT | Mod: PBBFAC,,, | Performed by: OBSTETRICS & GYNECOLOGY

## 2022-04-18 PROCEDURE — 3078F PR MOST RECENT DIASTOLIC BLOOD PRESSURE < 80 MM HG: ICD-10-PCS | Mod: CPTII,,, | Performed by: OBSTETRICS & GYNECOLOGY

## 2022-04-18 PROCEDURE — 99999 PR PBB SHADOW E&M-EST. PATIENT-LVL II: ICD-10-PCS | Mod: PBBFAC,,, | Performed by: OBSTETRICS & GYNECOLOGY

## 2022-04-18 NOTE — PROGRESS NOTES
HISTORY OF PRESENT ILLNESS:    Tiffany Dobbs is a 31 y.o. female, , Patient's last menstrual period was 2022.,  presents for a routine exam and has no complaints.    Past Medical History:   Diagnosis Date    Anemia     PCOS (polycystic ovarian syndrome) 2020       Past Surgical History:   Procedure Laterality Date    BREAST REDUCTION      TOTAL REDUCTION MAMMOPLASTY         MEDICATIONS AND ALLERGIES:      Current Outpatient Medications:     ibuprofen (ADVIL,MOTRIN) 600 MG tablet, Take 1 tablet (600 mg total) by mouth every 6 (six) hours as needed for Pain. (Patient not taking: Reported on 2021), Disp: 15 tablet, Rfl: 0    NON FORMULARY MEDICATION, once daily. Blood builder Multivitamin with iron supplement, Disp: , Rfl:     Review of patient's allergies indicates:   Allergen Reactions    Bactrim [sulfamethoxazole-trimethoprim] Itching and Rash       Family History   Problem Relation Age of Onset    Breast cancer Maternal Aunt     Glaucoma Maternal Aunt     Breast cancer Paternal Aunt     Ovarian cancer Maternal Aunt     Gestational diabetes Mother     Glaucoma Mother     Heart disease Father         arrythmia, has device - ICD?    Diverticulitis Father     Diabetes Maternal Grandmother     Dementia Maternal Grandmother     Arthritis Maternal Grandmother         spinal    Hydrocephalus Maternal Grandmother     Stomach cancer Maternal Grandfather     Diabetes Maternal Aunt     No Known Problems Brother     Colon cancer Neg Hx     Heart attack Neg Hx        Social History     Socioeconomic History    Marital status: Single   Tobacco Use    Smoking status: Never Smoker    Smokeless tobacco: Never Used   Substance and Sexual Activity    Alcohol use: Yes     Comment: socially, twice per month    Drug use: No    Sexual activity: Yes     Partners: Male     Birth control/protection: Condom   Social History Narrative    Admin coordinator at [employer]  GYN HISTORY:  PAP History: Denies abnormal Paps.  Infection History: Denies STDs. Denies PID.  Benign History: Denies uterine fibroids. Denies ovarian cysts. Denies endometriosis. Denies other conditions.  Cancer History: Denies cervical cancer. Denies uterine cancer or hyperplasia. Denies ovarian cancer. Denies vulvar cancer or pre-cancer. Denies vaginal cancer or pre-cancer. Denies breast cancer. Denies colon cancer.  Sexual Activity History: Reports currently being sexually active  Menstrual History: Monthly. Mod then light flow.   Dysmenorrhea History: Reports mild dysmenorrhea.       ROS:  GENERAL: No weight changes. No swelling. No fatigue. No fever.  CARDIOVASCULAR: No chest pain. No shortness of breath. No leg cramps.   NEUROLOGICAL: No headaches. No vision changes.  BREASTS: No pain. No lumps. No discharge.  ABDOMEN: No pain. No nausea. No vomiting. No diarrhea. No constipation.  REPRODUCTIVE: No abnormal bleeding.   VULVA: No pain. No lesions. No itching.  VAGINA: No relaxation. No itching. No odor. No discharge. No lesions.  URINARY: No incontinence. No nocturia. No frequency. No dysuria.    /74   Wt 91.3 kg (201 lb 4.5 oz)   LMP 04/02/2022   BMI 35.66 kg/m²     PE:  APPEARANCE: Well nourished, well developed, in no acute distress.  AFFECT: WNL, alert and oriented x 3.  SKIN: No acne or hirsutism.  NECK: Neck symmetric, without masses or thyromegaly.  NODES: No inguinal, cervical, axillary or femoral lymph node enlargement.  CHEST: Good respiratory effort.   ABDOMEN: Soft. No tenderness or masses. No hepatosplenomegaly. No hernias.  BREASTS: Symmetrical, no skin changes, visible lesions, palpable masses or nipple discharge bilaterally.  PELVIC: External female genitalia without lesions.  Female hair distribution. Adequate perineal body, Normal urethral meatus. Vagina moist and well rugated without lesions or discharge.  No significant cystocele or rectocele present. Cervix pink without lesions,  discharge or tenderness. Uterus is 4-6 week size, regular, mobile and nontender. Adnexa without masses or tenderness.  EXTREMITIES: No edema    DIAGNOSIS:  1. Encounter for gynecological examination without abnormal finding        PLAN:    Orders Placed This Encounter    Vaginosis Screen by DNA Probe    C. trachomatis/N. gonorrhoeae by AMP DNA    HPV High Risk Genotypes, PCR    Liquid-Based Pap Smear, Screening       COUNSELING:  The patient was counseled today on:  -A.C.S. Pap and pelvic exam guidelines (pap every 3 years), recomendations for yearly mammogram;  -to follow up with her PCP for other health maintenance.    FOLLOW-UP with me annually.

## 2022-04-20 LAB
C TRACH DNA SPEC QL NAA+PROBE: NOT DETECTED
N GONORRHOEA DNA SPEC QL NAA+PROBE: NOT DETECTED

## 2022-04-25 LAB
FINAL PATHOLOGIC DIAGNOSIS: NORMAL
HPV HR 12 DNA SPEC QL NAA+PROBE: NEGATIVE
HPV16 AG SPEC QL: NEGATIVE
HPV18 DNA SPEC QL NAA+PROBE: NEGATIVE
Lab: NORMAL

## 2022-05-02 ENCOUNTER — TELEPHONE (OUTPATIENT)
Dept: OBSTETRICS AND GYNECOLOGY | Facility: CLINIC | Age: 32
End: 2022-05-02
Payer: MEDICAID

## 2022-05-02 DIAGNOSIS — N76.0 BV (BACTERIAL VAGINOSIS): Primary | ICD-10-CM

## 2022-05-02 DIAGNOSIS — B96.89 BV (BACTERIAL VAGINOSIS): Primary | ICD-10-CM

## 2022-05-02 RX ORDER — METRONIDAZOLE 500 MG/1
500 TABLET ORAL 2 TIMES DAILY
Qty: 14 TABLET | Refills: 0 | Status: SHIPPED | OUTPATIENT
Start: 2022-05-02 | End: 2022-05-09

## 2022-05-02 NOTE — TELEPHONE ENCOUNTER
Patient has Bacterial vaginosis.  Plan:  Flagyl     Please  patient on vaginitis prevention including :  a. avoiding feminine products such as deoderant soaps, body wash, bubble bath, douches, scented toilet paper, deoderant tampons or pads, feminine wipes, chronic pad use, etc.  b. avoiding other vulvovaginal irritants such as long hot baths, humidity, tight, synthetic clothing, chlorine and sitting around in wet bathing suits  c. wearing cotton underwear, avoiding thong underwear and no underwear to bed  d. taking showers instead of baths and use a hair dryer on cool setting afterwards to dry  e. wearing cotton to exercise and shower immediately after exercise and change clothes  f. using polyurethane condoms without spermicide if sexually active and symptoms are triggered by intercourse

## 2022-05-13 ENCOUNTER — TELEPHONE (OUTPATIENT)
Dept: OBSTETRICS AND GYNECOLOGY | Facility: CLINIC | Age: 32
End: 2022-05-13
Payer: MEDICAID

## 2022-05-13 DIAGNOSIS — Z12.31 VISIT FOR SCREENING MAMMOGRAM: Primary | ICD-10-CM

## 2022-05-13 NOTE — TELEPHONE ENCOUNTER
----- Message from Clover Mchugh sent at 5/13/2022 12:41 PM CDT -----    Please send order for patients mammogram  Please notify patient when order is sent so she can then call and get scheduled    Patient can be contacted @# [phone]

## 2022-07-03 ENCOUNTER — HOSPITAL ENCOUNTER (EMERGENCY)
Facility: HOSPITAL | Age: 32
Discharge: HOME | End: 2022-07-03
Attending: EMERGENCY MEDICINE
Payer: MEDICAID

## 2022-07-03 VITALS
HEART RATE: 76 BPM | WEIGHT: 200 LBS | OXYGEN SATURATION: 98 % | BODY MASS INDEX: 35.44 KG/M2 | HEIGHT: 63 IN | RESPIRATION RATE: 18 BRPM | DIASTOLIC BLOOD PRESSURE: 86 MMHG | TEMPERATURE: 98 F | SYSTOLIC BLOOD PRESSURE: 124 MMHG

## 2022-07-03 DIAGNOSIS — R51.9 FACIAL PAIN: ICD-10-CM

## 2022-07-03 DIAGNOSIS — S89.90XA KNEE INJURY, INITIAL ENCOUNTER: Primary | ICD-10-CM
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
## 2022-07-03 DIAGNOSIS — S83.511A RUPTURE OF ANTERIOR CRUCIATE LIGAMENT OF RIGHT KNEE, INITIAL ENCOUNTER: ICD-10-CM

## 2022-07-03 DIAGNOSIS — T14.90XA TRAUMA: ICD-10-CM

## 2022-07-03 LAB
B-HCG UR QL: NEGATIVE
CTP QC/QA: YES

## 2022-07-03 PROCEDURE — 99284 EMERGENCY DEPT VISIT MOD MDM: CPT | Mod: ,,, | Performed by: EMERGENCY MEDICINE

## 2022-07-03 PROCEDURE — 99285 EMERGENCY DEPT VISIT HI MDM: CPT | Mod: 25

## 2022-07-03 PROCEDURE — 99284 PR EMERGENCY DEPT VISIT,LEVEL IV: ICD-10-PCS | Mod: ,,, | Performed by: EMERGENCY MEDICINE

## 2022-07-03 PROCEDURE — 29505 APPLICATION LONG LEG SPLINT: CPT | Mod: RT

## 2022-07-03 PROCEDURE — 96372 THER/PROPH/DIAG INJ SC/IM: CPT | Mod: 59 | Performed by: STUDENT IN AN ORGANIZED HEALTH CARE EDUCATION/TRAINING PROGRAM

## 2022-07-03 PROCEDURE — 81025 URINE PREGNANCY TEST: CPT | Performed by: STUDENT IN AN ORGANIZED HEALTH CARE EDUCATION/TRAINING PROGRAM

## 2022-07-03 PROCEDURE — 63600175 PHARM REV CODE 636 W HCPCS: Performed by: STUDENT IN AN ORGANIZED HEALTH CARE EDUCATION/TRAINING PROGRAM

## 2022-07-03 RX ORDER — KETOROLAC TROMETHAMINE 30 MG/ML
10 INJECTION, SOLUTION INTRAMUSCULAR; INTRAVENOUS
Status: COMPLETED | OUTPATIENT
Start: 2022-07-03 | End: 2022-07-03

## 2022-07-03 RX ORDER — HYDROCODONE BITARTRATE AND ACETAMINOPHEN 5; 325 MG/1; MG/1
1 TABLET ORAL EVERY 6 HOURS PRN
Qty: 18 TABLET | Refills: 0 | Status: SHIPPED | OUTPATIENT
Start: 2022-07-03 | End: 2022-07-13

## 2022-07-03 RX ADMIN — KETOROLAC TROMETHAMINE 10 MG: 30 INJECTION, SOLUTION INTRAMUSCULAR; INTRAVENOUS at 01:07

## 2022-07-03 NOTE — PROGRESS NOTES
Pt signed out to my care pending MRI knee to evaluation for avulsion fracture of the tibial plateau. Ortho has been consulted.     MRI with complete ACL tear. Discussed with ortho who said patient is ok for outpatient f/u. She can be dc home with knee immobilizer and crutches. Non weight bearing until f/u. [place] for breakthrough pain. Discussed results and plan for f/u. Return precautions reviewed.     Imaging Results           MRI Knee Without Contrast Right (Final result)  Result time 07/03/22 15:56:47    Final result by Alan Reese MD (07/03/22 15:56:47)                 Impression:      1. Complete ACL tear.  2. Posterolateral corner injury as follows:  *Grade 1 fibular collateral ligament.  Intact conjoined insertion of the fibular collateral ligament and distal biceps femoris tendon.  *Grade 3/complete tear popliteofibular ligament.  *Grade 3/complete tear arcuate ligament.  3.  Anterolateral ligament sprain with distal avulsion fracture (Segond fracture).    4.  Grade 1 sprain MCL/posteromedial capsule sprain.    5.  Grade 1/2 sprain posterior capsule/oblique popliteal ligament.    6.  Grade 2 strain distal fibers adductor anna tendon.    7.  Disrupted popliteomeniscal fascicles of the posterior horn of the lateral meniscus.  No displaced meniscal tear.    8.  Impacted fracture of the posterior lateral tibial plateau.  Nondisplaced subchondral fracture of the posterior medial tibial plateau.    9.  Low-grade strain lateral aspect of the proximal soleus.    10. Joint effusion.    11: Fluid and in between the vastus intermedius and conjoined tendon of the vastus medialis/lateralis, possibly a small hematoma or related to the adjacent joint effusion.  No distal quadriceps tendon tear.    This report was flagged in Epic as abnormal.      Electronically signed by: Alan Reese MD  Date:    07/03/2022  Time:    15:56             Narrative:    EXAMINATION:  MRI KNEE WITHOUT CONTRAST RIGHT    CLINICAL  HISTORY:  Knee trauma, occult fracture suspected, xray done;    TECHNIQUE:  Routine MRI evaluation of the right knee without contrast using the following sequences: Coronal PDFS, PD; sagittal T2FS, T1; axial PDFS.    COMPARISON:  Radiograph 07/03/2022.    FINDINGS:  Menisci: There is nonvisualization of the popliteomeniscal fascicles of the posterior horn of the lateral meniscus.  Medial meniscus is intact.  Anterior and posterior root ligaments are normal.    Ligaments: The following ligamentous/tendinous injuries are identified:    *Complete tear at the junction of the proximal and middle thirds of the ACL.  *Edema and fluid surrounding the superficial MCL and posteromedial capsule/posterior oblique ligament without associated partial or full-thickness disruption.  *Edema surrounding the fibular collateral ligament without associated partial or full-thickness disruption.  Intact conjoined tendon of the fibular collateral ligament and biceps femoris tendon.  *Complete tear of the popliteofibular ligament with prominent surrounding edema/hemorrhage.  *Prominent edema/hemorrhage at the expected location of the arcuate ligament concerning for partial tear.  *Edema throughout the posterior capsule/oblique popliteal ligament with possible partial tear.  *Thickened and edematous anterolateral ligament with a distal avulsion fracture.  *Thickening and intrasubstance edema of the distal fibers of the adductor anna tendon.  Popliteus tendon intact.  PCL is unremarkable.    Extensor Mechanism: There is fluid signal intensity tracking in between the distal fibers of the vastus intermedius and conjoined tendon of the vastus medialis/lateralis, incompletely evaluated on this exam.  Patellar tendon is intact.  Patellofemoral alignment is maintained.  MPFL and medial and lateral retinacula are normal.    Cartilage:    *Patellofemoral: Intact without partial or full-thickness defects.  No subchondral edema.  *Medial  tibiofemoral: Intact without partial or full-thickness defects.  No subchondral edema.  *Lateral tibiofemoral: Intact without partial or full-thickness defects.  No subchondral edema.  Bones: There is an avulsion fracture at the tibial insertion of the anterolateral ligament corresponding with abnormality identified on same date radiograph.  There is an impaction fracture of the posterior cortical margin of the lateral tibial plateau.  There is a nondisplaced subchondral fracture of the posterior aspect of the medial tibial plateau.    Miscellaneous: There is a moderate joint effusion.  No popliteal cyst.  There is edema within the proximal muscle belly of the lateral aspect of the soleus consistent with a low-grade strain.  Distal iliotibial band is intact.  Neurovascular structures demonstrate no significant abnormalities.                                X-Ray Knee 3 View Right (Final result)  Result time 07/03/22 13:26:54   Procedure changed from X-Ray Knee 3 View Left     Final result by Giovani Rodriguez MD (07/03/22 13:26:54)                 Impression:      New small ossific density adjacent to the lateral tibial plateau suspicious for an avulsion fracture (Segond fracture).  Fracture can be associated with other ligamentous and meniscal injury.  Further evaluation with MRI if/when warranted clinically.    This report was flagged in Epic as abnormal.      Electronically signed by: Giovani Rodriguez MD  Date:    07/03/2022  Time:    13:26             Narrative:    EXAMINATION:  XR KNEE 3 VIEW RIGHT; XR TIBIA FIBULA 2 VIEW RIGHT    CLINICAL HISTORY:  trauma;; fall; Injury, unspecified, initial encounter; Unspecified fall, initial encounter    TECHNIQUE:  Three views of the right knee.    AP and lateral radiographs of the right tibia and fibula.    COMPARISON:  Knee radiographs 06/05/2017    FINDINGS:  0.8 cm ossific density just lateral to the to the lateral tibial plateau, which appears new from the prior  examination.  Parents suspicious for an avulsion type fracture    No additional fracture identified elsewhere.  Remainder of osseous structures appear intact.  No evidence of a dislocation.    No advanced joint space narrowing. Question small suprapatellar joint effusion.                                X-Ray Tibia Fibula 2 View Right (Final result)  Result time 07/03/22 13:26:54   Procedure changed from X-Ray Tibia Fibula 2 View Left     Final result by Giovani Rodriguez MD (07/03/22 13:26:54)                 Impression:      New small ossific density adjacent to the lateral tibial plateau suspicious for an avulsion fracture (Segond fracture).  Fracture can be associated with other ligamentous and meniscal injury.  Further evaluation with MRI if/when warranted clinically.    This report was flagged in Epic as abnormal.      Electronically signed by: Giovani Rodriguez MD  Date:    07/03/2022  Time:    13:26             Narrative:    EXAMINATION:  XR KNEE 3 VIEW RIGHT; XR TIBIA FIBULA 2 VIEW RIGHT    CLINICAL HISTORY:  trauma;; fall; Injury, unspecified, initial encounter; Unspecified fall, initial encounter    TECHNIQUE:  Three views of the right knee.    AP and lateral radiographs of the right tibia and fibula.    COMPARISON:  Knee radiographs 06/05/2017    FINDINGS:  0.8 cm ossific density just lateral to the to the lateral tibial plateau, which appears new from the prior examination.  Parents suspicious for an avulsion type fracture    No additional fracture identified elsewhere.  Remainder of osseous structures appear intact.  No evidence of a dislocation.    No advanced joint space narrowing. Question small suprapatellar joint effusion.                               X-Ray Hand 3 View Left (Final result)  Result time 07/03/22 13:17:42   Procedure changed from X-Ray Hand 3 view Right     Final result by Tano Allen MD (07/03/22 13:17:42)                 Impression:      No acute displaced fracture-dislocation  identified.      Electronically signed by: Tano Allen MD  Date:    07/03/2022  Time:    13:17             Narrative:    EXAMINATION:  XR HAND COMPLETE 3 VIEW LEFT    CLINICAL HISTORY:  Trauma;.    TECHNIQUE:  PA, lateral, and oblique views of the left hand were performed.    COMPARISON:  None    FINDINGS:  Bones are well mineralized. Overall alignment is within normal limits. No displaced fracture, dislocation or destructive osseous process. Joint spaces appear relatively maintained. No subcutaneous emphysema or radiodense retained foreign body.                               CT Maxillofacial Without Contrast (Final result)  Result time 07/03/22 12:45:12    Final result by Amor Conner MD (07/03/22 12:45:12)                 Impression:      No acute facial bone fractures.      Electronically signed by: Amor Conner  Date:    07/03/2022  Time:    12:45             Narrative:    EXAMINATION:  CT MAXILLOFACIAL WITHOUT CONTRAST    CLINICAL HISTORY:  Facial trauma, blunt;    TECHNIQUE:  Low dose axial images, sagittal and coronal reformations were obtained through the face.  Contrast was not administered.    COMPARISON:  None    FINDINGS:  Acute facial fractures: There are no acute facial bone fractures.    Pterygoid plates, Zygomatic arches, Sphenotemporal buttresses: Intact.    Nasal Bones: No acute nasal bone fracture.    Mandible: The mandible is intact.    Dentition: No tooth fracture. No periapical lucencies.    Sinuses: There are no fluid levels in the sinuses.    Imaged mastoids and middle ears: The imaged mastoid air cells and middle ear cavities are clear.    Imaged upper cervical spine: Relatively minor degenerative change.    Facial soft tissues: No discrete facial hematoma or foreign body is identified.    Orbits: The bony orbits and orbital contents are atraumatic.    Imaged intracranial structures and upper aerodigestive tract: Unremarkable.
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
## 2022-07-03 NOTE — Clinical Note
"Tiffany LEMONTARAN Dobbs was seen and treated in our emergency department on 7/3/2022.  She may return to work on 07/06/2022.       If you have any questions or concerns, please don't hesitate to call.      Sonia Serrato MD"

## 2022-07-03 NOTE — DISCHARGE INSTRUCTIONS
Use the crutches and avoid bearing any weight until follow up with orthopedics.     You have a tear in your ACL and a bone in your knee is chipped or broken.   Take ibuprofen (also called Advil, Motrin) for your pain. This medicine is available over-the-counter in 200 mg tablets.  - You may take 600 mg every 6 hours, or 800 mg every 8 hours as needed   - Do not take more than this amount, as it can cause kidney problems, bleeding in your stomach, and other serious problems.   - Do not also take naproxen (Aleve) at the same time or on the same day  - If you have heart problems or uncontrolled high blood pressure, you should not take ibuprofen for more than 3 days without discussing with your doctor    If your pain is not controlled with ibuprofen, you may also take Norco (acetaminophen-hydrocodone).  - Take this medication only when needed for breakthrough pain.   - Do not take more than the prescribed amount to control your pain.  - Do not operate machinery, drive, exercise, perform caregiving, make important decisions or perform important tasks while taking Norco. It can make you drowsy or forgetful.  - Norco is addictive in as little as 3 days, so take only as needed.  - Norco can dangerously slow or stop your breathing if you take too much, or if you combine it with alcohol or sedating medicines (including Xanex, Ativan) or illegal drugs.   - Norco can make you constipated (hard to poop), so take fiber supplements and a stool softener while taking this medicine.   - This medication contains acetaminophen (Tylenol). Each pill has 325 mg of acetaminophen. Do not take more than 4,000 mg of acetaminophen within 24 hours as this may lead to liver damage.
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
## 2022-07-05 ENCOUNTER — TELEPHONE (OUTPATIENT)
Dept: ORTHOPEDICS | Facility: CLINIC | Age: 32
End: 2022-07-05
Payer: MEDICAID

## 2022-07-05 NOTE — TELEPHONE ENCOUNTER
----- Message from Elizabeth Ramirez sent at 7/5/2022  2:43 PM CDT -----  Regarding: call back  Contact: [phone]  Type:  Sooner Apoointment Request    Caller is requesting a sooner appointment.  Caller declined first available appointment listed below.  Caller will not accept being placed on the waitlist and is requesting a message be sent to doctor.  Name of Caller: PT   When is the first available appointment? Medicaid asked for me to send a message   Symptoms: Rupture of anterior cruciate ligament of right knee, initial encounter  Would the patient rather a call back or a response via [facility]? Call back or send message  Best Call Back Number:   Additional Information: patient was seen in the ER on 7/3/22 patient also has a referral

## 2022-07-06 ENCOUNTER — OFFICE VISIT (OUTPATIENT)
Dept: ORTHOPEDICS | Facility: CLINIC | Age: 32
End: 2022-07-06
Payer: MEDICAID

## 2022-07-06 ENCOUNTER — TELEPHONE (OUTPATIENT)
Dept: ORTHOPEDICS | Facility: CLINIC | Age: 32
End: 2022-07-06
Payer: MEDICAID

## 2022-07-06 VITALS
SYSTOLIC BLOOD PRESSURE: 103 MMHG | DIASTOLIC BLOOD PRESSURE: 71 MMHG | HEART RATE: 91 BPM | HEIGHT: 63 IN | BODY MASS INDEX: 35.43 KG/M2 | WEIGHT: 199.94 LBS

## 2022-07-06 DIAGNOSIS — S83.411A SPRAIN OF MEDIAL COLLATERAL LIGAMENT OF RIGHT KNEE, INITIAL ENCOUNTER: ICD-10-CM

## 2022-07-06 DIAGNOSIS — M25.561 RIGHT KNEE PAIN, UNSPECIFIED CHRONICITY: Primary | ICD-10-CM

## 2022-07-06 DIAGNOSIS — S83.511A RUPTURE OF ANTERIOR CRUCIATE LIGAMENT OF RIGHT KNEE, INITIAL ENCOUNTER: Primary | ICD-10-CM

## 2022-07-06 DIAGNOSIS — Z01.818 PRE-OP TESTING: ICD-10-CM

## 2022-07-06 DIAGNOSIS — S89.91XA INJURY OF POSTEROLATERAL CORNER OF KNEE, RIGHT, INITIAL ENCOUNTER: ICD-10-CM

## 2022-07-06 PROCEDURE — 99215 OFFICE O/P EST HI 40 MIN: CPT | Mod: PBBFAC,PN | Performed by: ORTHOPAEDIC SURGERY

## 2022-07-06 PROCEDURE — 1159F PR MEDICATION LIST DOCUMENTED IN MEDICAL RECORD: ICD-10-PCS | Mod: CPTII,,, | Performed by: ORTHOPAEDIC SURGERY

## 2022-07-06 PROCEDURE — 1159F MED LIST DOCD IN RCRD: CPT | Mod: CPTII,,, | Performed by: ORTHOPAEDIC SURGERY

## 2022-07-06 PROCEDURE — 99204 PR OFFICE/OUTPT VISIT, NEW, LEVL IV, 45-59 MIN: ICD-10-PCS | Mod: S$PBB,,, | Performed by: ORTHOPAEDIC SURGERY

## 2022-07-06 PROCEDURE — 3078F PR MOST RECENT DIASTOLIC BLOOD PRESSURE < 80 MM HG: ICD-10-PCS | Mod: CPTII,,, | Performed by: ORTHOPAEDIC SURGERY

## 2022-07-06 PROCEDURE — 99204 OFFICE O/P NEW MOD 45 MIN: CPT | Mod: S$PBB,,, | Performed by: ORTHOPAEDIC SURGERY

## 2022-07-06 PROCEDURE — 99999 PR PBB SHADOW E&M-EST. PATIENT-LVL V: ICD-10-PCS | Mod: PBBFAC,,, | Performed by: ORTHOPAEDIC SURGERY

## 2022-07-06 PROCEDURE — 3008F PR BODY MASS INDEX (BMI) DOCUMENTED: ICD-10-PCS | Mod: CPTII,,, | Performed by: ORTHOPAEDIC SURGERY

## 2022-07-06 PROCEDURE — 3074F PR MOST RECENT SYSTOLIC BLOOD PRESSURE < 130 MM HG: ICD-10-PCS | Mod: CPTII,,, | Performed by: ORTHOPAEDIC SURGERY

## 2022-07-06 PROCEDURE — 3078F DIAST BP <80 MM HG: CPT | Mod: CPTII,,, | Performed by: ORTHOPAEDIC SURGERY

## 2022-07-06 PROCEDURE — 99999 PR PBB SHADOW E&M-EST. PATIENT-LVL V: CPT | Mod: PBBFAC,,, | Performed by: ORTHOPAEDIC SURGERY

## 2022-07-06 PROCEDURE — 3008F BODY MASS INDEX DOCD: CPT | Mod: CPTII,,, | Performed by: ORTHOPAEDIC SURGERY

## 2022-07-06 PROCEDURE — 3074F SYST BP LT 130 MM HG: CPT | Mod: CPTII,,, | Performed by: ORTHOPAEDIC SURGERY

## 2022-07-06 RX ORDER — ERGOCALCIFEROL 1.25 MG/1
50000 CAPSULE ORAL
COMMUNITY
End: 2023-04-17

## 2022-07-06 NOTE — PROGRESS NOTES
[facility], Orthopedics and Sports Medicine  [facility]    New Patient Knee Office Visit  07/06/2022       Subjective:      Tiffany Dobbs is a 31 y.o. female referred by Dr. Sonia Serrato for evaluation and treatment of right knee pain. This is evaluated as a personal injury.  The patient injured her right knee while twisting it walking outside.  She felt a pop in the knee. Date of injury 7/3/2022.  She has pain in the knee.      The patient is a student.    Outside reports reviewed: historical medical records.    Past Medical History:   Diagnosis Date    Anemia     PCOS (polycystic ovarian syndrome) 04/2020       Patient Active Problem List   Diagnosis    Surgical wound dehiscence    Chronic pain of left knee    Menorrhagia with irregular cycle    Microcytic anemia    Keloid    Patellofemoral pain syndrome of both knees    Chronic hip pain, bilateral    Acute midline low back pain without sciatica    Rupture of anterior cruciate ligament of right knee       Past Surgical History:   Procedure Laterality Date    BREAST REDUCTION  2012    TOTAL REDUCTION MAMMOPLASTY          Current Outpatient Medications   Medication Instructions    ergocalciferol (VITAMIN D2) 50,000 Units, Oral, Every 7 days    HYDROcodone-acetaminophen (NORCO) 5-325 mg per tablet 1 tablet, Oral, Every 6 hours PRN    ibuprofen (ADVIL,MOTRIN) 600 mg, Oral, Every 6 hours PRN    NON FORMULARY MEDICATION Daily, Blood builderMultivitamin with iron supplement         Review of patient's allergies indicates:   Allergen Reactions    Bactrim [sulfamethoxazole-trimethoprim] Itching and Rash       Social History     Socioeconomic History    Marital status: Single   Tobacco Use    Smoking status: Never Smoker    Smokeless tobacco: Never Used   Substance and Sexual Activity    Alcohol use: Yes     Comment: socially, twice per month    Drug use: No    Sexual activity: Yes     Partners: Male     Birth  control/protection: Condom   Social History Narrative    Admin coordinator at [employer]       Family History   Problem Relation Age of Onset    Breast cancer Maternal Aunt     Glaucoma Maternal Aunt     Breast cancer Paternal Aunt     Ovarian cancer Maternal Aunt     Gestational diabetes Mother     Glaucoma Mother     Heart disease Father         arrythmia, has device - ICD?    Diverticulitis Father     Diabetes Maternal Grandmother     Dementia Maternal Grandmother     Arthritis Maternal Grandmother         spinal    Hydrocephalus Maternal Grandmother     Stomach cancer Maternal Grandfather     Diabetes Maternal Aunt     No Known Problems Brother     Colon cancer Neg Hx     Heart attack Neg Hx          Review of Systems   Constitutional: Negative for chills and fever.   HENT: Negative for hearing loss.    Eyes: Negative for blurred vision.   Cardiovascular: Negative for chest pain.   Respiratory: Negative for shortness of breath.    Gastrointestinal: Negative for abdominal pain.   Neurological: Negative for light-headedness.            Objective:      General    Constitutional: She is oriented to person, place, and time. She appears well-developed and well-nourished.   HENT:   Head: Normocephalic and atraumatic.   Eyes: EOM are normal.   Cardiovascular: Normal rate.    Pulmonary/Chest: Effort normal.   Neurological: She is alert and oriented to person, place, and time.   Psychiatric: She has a normal mood and affect.           Right Knee Exam     Inspection   Erythema: absent  Swelling: absent  Effusion: present    Tenderness   The patient is tender to palpation of the lateral joint line and lateral retinaculum.    Range of Motion   Extension: 5   Flexion: 80     Tests   Meniscus   Janette:  Medial - negative Lateral - positive  Ligament Examination Lachman: abnormal PCL-Posterior Drawer: normal (0 to 2mm)     MCL - Valgus: normal (0 to 2mm)  LCL - Varus: abnormal  Posterolateral Corner:  stable  Patella   Patellar apprehension: negative    Other   Sensation: normal    Comments:  Dial test equivocal     Left Knee Exam     Inspection   Erythema: absent  Swelling: absent  Effusion: absent    Tenderness   The patient is experiencing no tenderness.     Range of Motion   Extension: 0   Flexion: 130     Tests   Meniscus   Janette:  Medial - negative Lateral - negative  Stability Lachman: normal (-1 to 2mm) PCL-Posterior Drawer: normal (0 to 2mm)  MCL - Valgus: normal (0 to 2mm)  LCL - Varus: normal (0 to 2mm)  Patella   Patellar apprehension: negative    Other   Sensation: normal    Muscle Strength   Right Lower Extremity   Quadriceps:  5/5   Hamstrin/5   Left Lower Extremity   Quadriceps:  5/5   Hamstrin/5     Vascular Exam     Right Pulses    Posterior Tibial:      2+        Left Pulses    Posterior Tibial:      2+            Imaging:  Radiographs of the right knee taken taken 2022 were personally reviewed from the [facility] EMR.  Multiple views of the knee are available today for review, including a standing AP, a standing notch view, lateral view, and a merchant view.  The tibiofemoral compartment demonstrates minimal degenerative changes.  The patellofemoral compartment demonstrates minimal degenerative changes.  There is a proximal lateral tibial avulsion fracture noted.    MRI of the right knee taken taken 7/3/2022 was personally reviewed from the [facility] EMR.  Multiple T1 and T2 sequences including axial, coronal, and sagittal views were reviewed.  No acute fractures or dislocations are noted in these images.  There is a large effusion noted.  There is a tear of the ACL.  There is edema surrounding the medial collateral ligament at the superficial proximal attachment.  There is disruption of the posteriorlateral corner structures.  There is bone bruising of the posterolateral proximal tibia.    Procedures          Assessment:       Tiffany Dobbs is a 31 y.o. female seen in  the office today. The primary encounter diagnosis was Rupture of anterior cruciate ligament of right knee, initial encounter. Diagnoses of Injury of posterolateral corner of knee, right, initial encounter and Sprain of medial collateral ligament of right knee, initial encounter were also pertinent to this visit.  Operative treatment with right knee surgery is recommended at this time.  The patient essentially had a knee dislocation with multiple torn or injured structures inside and around the knee.  I discussed the nature of the injury with the patient and her mother today.  I discussed the recommended operation.  We will perform an ACL reconstruction with Achilles allograft.  I will need to perform an exam under anesthesia to fully assess the posterior lateral corner as today she was guarding.  If we do need to proceed with the posterior lateral corner reconstruction with allograft tendon then we will also perform during this operation.  MCL rupture will be treated nonsurgically given its proximal location.. The natural history and expected course discussed with patient. Various treatment options were discussed, including their risks and benefits. All of the patient's questions were answered.     Plan:      1. Tylenol 650mg TID, PRN pain.  2. Surgical treatment right ACL reconstruction, possible meniscus surgery, possible PLC repair/reconstruction.  3. Surgical consent for surgery obtained during office visit.  4. Expected date of surgery: 8/2/2022.  5. Patient will NOT require preoperative medical evaluation prior to surgery. Preoperative labs/chest xray ordered.  6. Post operative physical therapy ordered.   7. Patient placed in a hinged knee brace to be worn when ambulating at all times.         Tevin Tubbs IV, MD   of Clinical Orthopedics  Department of Orthopedic Surgery  [facility]  Office: [phone]  Website: [email or web address]      Orders Placed This  Encounter    KNEE BRACE FOR HOME USE    X-Ray Chest 1 View Pre-OP    CBC Auto Differential    Basic Metabolic Panel    Protime-INR    APTT    Ambulatory referral/consult to Physical/Occupational Therapy    Case Request Operating Room: RECONSTRUCTION, KNEE, ACL, USING Allograft Achilles GRAFT, possible meniscus repair, possible PLC repair, knee exam under anesthesia

## 2022-07-20 ENCOUNTER — OFFICE VISIT (OUTPATIENT)
Dept: FAMILY MEDICINE | Facility: HOSPITAL | Age: 32
End: 2022-07-20
Attending: FAMILY MEDICINE
Payer: MEDICAID

## 2022-07-20 VITALS
SYSTOLIC BLOOD PRESSURE: 118 MMHG | HEART RATE: 80 BPM | BODY MASS INDEX: 35.66 KG/M2 | DIASTOLIC BLOOD PRESSURE: 75 MMHG | HEIGHT: 63 IN | WEIGHT: 201.25 LBS

## 2022-07-20 DIAGNOSIS — Z01.818 PRE-OP EVALUATION: Primary | ICD-10-CM

## 2022-07-20 PROCEDURE — 99213 OFFICE O/P EST LOW 20 MIN: CPT

## 2022-07-20 RX ORDER — TURMERIC 400 MG
CAPSULE ORAL DAILY
COMMUNITY
End: 2023-04-17

## 2022-07-20 NOTE — PROGRESS NOTES
"Progress Note  \[facility]\"" Family Medicine      Subjective:     Tiffany Dobbs is a 31 y.o. year old female with PMHx of mild anemia who presented to clinic today for a pre-op evaluation. Patient is scheduled to undergo Right knee reconstruction with Dr. Tubbs on 8/2/22.    Patient stated she has been able to care for herself after injury. Patient states it was "freak accident" which caused her injury to occur but she is ready to have repair completed and work towards her recovery.     Patient does not take any medications. Patient states she is healthy and was at her baseline prior to her injury. Patient denies recent fevers, pain out of proportion, or increased erythema.     Review of Systems   Constitutional: Negative for chills, diaphoresis, fever and weight loss.   HENT: Negative for ear discharge, ear pain and sinus pain.    Eyes: Negative for blurred vision and double vision.   Respiratory: Negative for cough, sputum production, shortness of breath and wheezing.    Cardiovascular: Negative for chest pain, palpitations and leg swelling.   Gastrointestinal: Negative for abdominal pain, constipation, diarrhea, nausea and vomiting.   Genitourinary: Negative for dysuria and flank pain.   Musculoskeletal: Negative for back pain and myalgias.   Skin: Negative for rash.   Neurological: Negative for dizziness, sensory change, speech change, weakness and headaches.   Psychiatric/Behavioral: Negative.        Objective:     Vitals:    07/20/22 1008   BP: 118/75   Pulse: 80       Wt Readings from Last 1 Encounters:   07/20/22 91.3 kg (201 lb 4.5 oz)       Physical Exam  Constitutional:       General: She is not in acute distress.     Appearance: Normal appearance.   HENT:      Head: Normocephalic and atraumatic.      Right Ear: External ear normal.      Left Ear: External ear normal.      Mouth/Throat:      Mouth: Mucous membranes are moist.      Pharynx: No oropharyngeal exudate or posterior oropharyngeal erythema. "   Cardiovascular:      Rate and Rhythm: Normal rate.      Pulses: Normal pulses.      Heart sounds: Normal heart sounds. No murmur heard.  Pulmonary:      Effort: Pulmonary effort is normal. No respiratory distress.      Breath sounds: No wheezing.   Abdominal:      General: Abdomen is flat. There is no distension.      Palpations: Abdomen is soft.      Tenderness: There is no abdominal tenderness.   Musculoskeletal:      Cervical back: Normal range of motion and neck supple.   Skin:     General: Skin is warm.      Capillary Refill: Capillary refill takes less than 2 seconds.   Neurological:      General: No focal deficit present.      Mental Status: She is alert.   Psychiatric:         Mood and Affect: Mood normal.         Assessment/Plan:     Pre-operative evaluation with risk assessment              -           Scheduled for R knee construction on 8/2/22 with Dr. Tubbs  -           DASI score: w/ Functional Capacity in METS: 9.8 (>4) with a revised cardiac risk index of 3.9%.    - she  is not on antiplatelets/anticoagulation.   - she does not have a history of blood dyscrasias or previous reaction to anesthesia   - she  is a NEVER SMOKER.  - she does not have a prior h/o HTN. BP: 118/75.   - she does not have a prior h/o HLD/CAD w/ either MI or CVA.  - she does not have a prior h/o DM.  - she does not have a prior h/o thyroid disease.  - she does not have a prior h/o COPD/Asthma/STEPHANIE/OHS. does not use CPAP. does not have changes from baseline function.   - she does not have a prior h/o HF.  - BMI: Body mass index is 35.66 kg/m².               -           The patient is medically optimized with a low to moderate risk to proceed with (per ACC/AHA sandy-operative guidelines) a moderate risk surgery.   - Please call our office for any additional questions or concerns.    The ASCVD Risk score ([place] ALISA Jr., et al., 2013) failed to calculate for the following reasons:    The 2013 ASCVD risk score is only valid for ages  40 to 79  Lab Results   Component Value Date    HGBA1C 4.9 05/03/2021      Lab Results   Component Value Date    TSH 2.600 05/03/2021              Case discussed with staff: Dr. Pauline Elizabeth MD PGY-2  \[facility]\"" Family Medicine   07/20/2022 11:10 AM    This note was partially created using [employer] Voice Recognition software. Typographical and content errors may occur with this process. While efforts are made to detect and correct such errors, in some cases errors will persist. For this reason, wording in this document should be considered in the proper context and not strictly verbatim.

## 2022-07-25 ENCOUNTER — HOSPITAL ENCOUNTER (OUTPATIENT)
Dept: RADIOLOGY | Facility: HOSPITAL | Age: 32
Discharge: HOME | End: 2022-07-25
Attending: ORTHOPAEDIC SURGERY
Payer: MEDICAID

## 2022-07-25 ENCOUNTER — HOSPITAL ENCOUNTER (OUTPATIENT)
Dept: PREADMISSION TESTING | Facility: HOSPITAL | Age: 32
Discharge: HOME | End: 2022-07-25
Attending: ORTHOPAEDIC SURGERY
Payer: MEDICAID

## 2022-07-25 ENCOUNTER — ANESTHESIA EVENT (OUTPATIENT)
Dept: SURGERY | Facility: HOSPITAL | Age: 32
End: 2022-07-25
Payer: MEDICAID

## 2022-07-25 VITALS
HEIGHT: 63 IN | SYSTOLIC BLOOD PRESSURE: 123 MMHG | WEIGHT: 201.25 LBS | DIASTOLIC BLOOD PRESSURE: 71 MMHG | OXYGEN SATURATION: 99 % | TEMPERATURE: 98 F | BODY MASS INDEX: 35.66 KG/M2 | RESPIRATION RATE: 16 BRPM | HEART RATE: 85 BPM

## 2022-07-25 DIAGNOSIS — S83.511A RUPTURE OF ANTERIOR CRUCIATE LIGAMENT OF RIGHT KNEE, INITIAL ENCOUNTER: ICD-10-CM

## 2022-07-25 PROCEDURE — 71045 XR CHEST 1 VIEW PRE-OP: ICD-10-PCS | Mod: 26,,, | Performed by: RADIOLOGY

## 2022-07-25 PROCEDURE — 71045 X-RAY EXAM CHEST 1 VIEW: CPT | Mod: TC,FY

## 2022-07-25 PROCEDURE — 71045 X-RAY EXAM CHEST 1 VIEW: CPT | Mod: 26,,, | Performed by: RADIOLOGY

## 2022-07-25 RX ORDER — LIDOCAINE HYDROCHLORIDE 10 MG/ML
1 INJECTION, SOLUTION EPIDURAL; INFILTRATION; INTRACAUDAL; PERINEURAL ONCE
Status: CANCELLED | OUTPATIENT
Start: 2022-07-25 | End: 2022-07-25

## 2022-07-25 RX ORDER — SCOLOPAMINE TRANSDERMAL SYSTEM 1 MG/1
1 PATCH, EXTENDED RELEASE TRANSDERMAL
Status: CANCELLED | OUTPATIENT
Start: 2022-07-25

## 2022-07-25 RX ORDER — SODIUM CHLORIDE, SODIUM LACTATE, POTASSIUM CHLORIDE, CALCIUM CHLORIDE 600; 310; 30; 20 MG/100ML; MG/100ML; MG/100ML; MG/100ML
INJECTION, SOLUTION INTRAVENOUS CONTINUOUS
Status: CANCELLED | OUTPATIENT
Start: 2022-07-25

## 2022-07-25 NOTE — ANESTHESIA PREPROCEDURE EVALUATION
"                                                                                                             07/25/2022  Tiffany Dobbs is a 31 y.o., female scheduled for RECONSTRUCTION, KNEE, ACL, USING Allograft Achilles GRAFT, possible meniscus repair, possible PLC repair, knee exam under anesthesia (Right ) 8/2/22.    Per family medicine Dr. Elizabeth, " .    Past Medical History:   Diagnosis Date    Anemia     PCOS (polycystic ovarian syndrome) 04/2020     Past Surgical History:   Procedure Laterality Date    BREAST REDUCTION  2012    TOTAL REDUCTION MAMMOPLASTY         Pre-op Assessment    I have reviewed the Patient Summary Reports.     I have reviewed the Nursing Notes. I have reviewed the NPO Status.   I have reviewed the Medications.     Review of Systems  Anesthesia Hx:  No problems with previous Anesthesia Denies Hx of Anesthetic complications  History of prior surgery of interest to airway management or planning:  Denies Personal Hx of Anesthesia complications.   Social:  Non-Smoker, No Alcohol Use    Hematology/Oncology:         -- Anemia:   Cardiovascular:  Cardiovascular Normal Exercise tolerance: good     Pulmonary:   Denies Shortness of breath.    Renal/:  Renal/ Normal     Hepatic/GI:  Hepatic/GI Normal    Neurological:   Denies TIA. Denies CVA. Denies Seizures.   Chronic Pain Syndrome   Endocrine:  Endocrine Normal    Psych:  Psychiatric Normal           Physical Exam  General: Well nourished and Cooperative    Airway:  Mallampati: II   Mouth Opening: Normal  TM Distance: Normal  Tongue: Normal  Neck ROM: Normal ROM    Dental:  Intact    Chest/Lungs:  Clear to auscultation, Normal Respiratory Rate    Heart:  Rate: Normal  Rhythm: Regular Rhythm  Sounds: Normal        Anesthesia Plan  Type of Anesthesia, risks & benefits discussed:    Anesthesia Type: Regional, Gen ETT  Intra-op Monitoring Plan: Standard ASA Monitors  Post Op Pain Control Plan: multimodal analgesia  Induction:  IV  Airway " Plan: Direct  Informed Consent: Informed consent signed with the Patient and all parties understand the risks and agree with anesthesia plan.  All questions answered.   ASA Score: 2  Anesthesia Plan Notes: Anesthesia consent to be signed prior to surgery 8/2/22      Ready For Surgery From Anesthesia Perspective.     .

## 2022-07-25 NOTE — DISCHARGE INSTRUCTIONS
Your surgery is scheduled for 8/2/22.    Please report to [facility] on the 1st Floor at 0700 a.m.    THIS TIME IS SUBJECT TO CHANGE.  YOU WILL RECEIVE A PHONE CALL THE DAY BEFORE SURGERY BY 3:30 PM TO CONFIRM YOUR TIME OF ARRIVAL.  IF YOU HAVE NOT RECEIVED A PHONE CALL BY 3:30 PM THE DAY BEFORE YOUR SURGERY PLEASE CALL [phone]     INSTRUCTIONS IMPORTANT!!!  ¨ Do not eat or drink after 12 midnight-including water, candy, gum, & mints. OK to brush teeth.      ____  Proceed to [facility] on *** for additional testing.        ____  Do not wear makeup, including mascara.  ____  No powder, lotions or creams to surgical area.  ____  Please remove all jewelry, including piercings and leave at home.  ____  No money or valuables needed. Please leave at home.  ____  Please bring any documents given by your doctor.  ____  If going home the same day, arrange for a ride home. You will not be able to             drive if Anesthesia was used.  ____  Children under 18 years require a parent / guardian present the entire time             they are in surgery / recovery.  ____  Wear loose fitting clothing. Allow for dressings, bandages.  ____  Stop Aspirin, Ibuprofen, Motrin, Aleve, Goody's/BC powders, Excedrine and Naproxen (NSAIDS) at least 3-5 days before surgery, unless otherwise instructed by your doctor, or the nurse.   You MAY use Tylenol/acetaminophen until day of surgery.  ____  Wash the surgical area with Hibiclens the night before surgery, and again the             morning of surgery.  Be sure to rinse hibiclens off completely (if instructed by   nurse).  ____  If you take diabetic medication, do not take am of surgery unless instructed by Doctor.  ____  Call MD for temperature above 101 degrees or any other signs of infection such as Urinary (bladder) infection, Upper respiratory infection, skin boils, etc.  ____ Stop taking any Fish Oil supplement or any Vitamins that contain Vitamin E at  least 5 days prior to surgery.  ____ Do Not wear your contact lenses the day of your procedure.  You may wear your glasses.      ____Do not shave surgical site for 3 days prior to surgery.  ____ Practice Good hand washing before, during, and after procedure.      I have read or had read and explained to me, and understand the above information.  Additional comments or instructions:  For additional questions call [phone]      ANESTHESIA SIDE EFFECTS  -For the first 24 hours after surgery:  Do not drive, use heavy equipment, make important decisions, or drink alcohol  -It is normal to feel sleepy for several hours.  Rest until you are more awake.  -Have someone stay with you, if needed.  They can watch for problems and help keep you safe.  -Some possible post anesthesia side effects include: nausea and vomiting, sore throat and hoarseness, sleepiness, and dizziness.        Pre-Op Bathing Instructions    Before surgery, you can play an important role in your own health.    Because skin is not sterile, we need to be sure that your skin is as free of germs as possible. By following the instructions below, you can reduce the number of germs on your skin before surgery.    IMPORTANT: You will need to shower with a special soap called Hibiclens*, available at any pharmacy.  If you are allergic to Chlorhexidine (the antiseptic in Hibiclens), use an antibacterial soap such as Dial Soap for your preoperative shower.  You will shower with Hibiclens both the night before your surgery and the morning of your surgery.  Do not use Hibiclens on the head, face or genitals to avoid injury to those areas.    STEP #1: THE NIGHT BEFORE YOUR SURGERY     Do not shave the area of your body where your surgery will be performed.  Shower and wash your hair and body as usual with your normal soap and shampoo.  Rinse your hair and body thoroughly after you shower to remove all soap residue.  With your hand, apply one packet of Hibiclens soap to  the surgical site.   Wash the site gently for five (5) minutes. Do not scrub your skin too hard.   Do not wash with your regular soap after Hibiclens is used.  Rinse your body thoroughly.  Pat yourself dry with a clean, soft towel.  Do not use lotion, cream, or powder.  Wear clean clothes.    STEP #2: THE MORNING OF YOUR SURGERY     Repeat Step #1.    * Not to be used by people allergic to Chlorhexidine.

## 2022-07-25 NOTE — PRE-PROCEDURE INSTRUCTIONS
Toya Velasquez [phone]    Allergies, medical, surgical, family and psychosocial histories reviewed with patient. Periop plan of care reviewed. Preop instructions given, including medications to take and to hold. Hibiclens soap and instructions on use given. Time allotted for questions to be addressed.  Patient verbalized understanding.\

## 2022-07-26 ENCOUNTER — CLINICAL SUPPORT (OUTPATIENT)
Dept: REHABILITATION | Facility: HOSPITAL | Age: 32
End: 2022-07-26
Payer: MEDICAID

## 2022-07-26 DIAGNOSIS — S89.91XA INJURY OF POSTEROLATERAL CORNER OF KNEE, RIGHT, INITIAL ENCOUNTER: ICD-10-CM

## 2022-07-26 DIAGNOSIS — M62.81 MUSCLE WEAKNESS OF LOWER EXTREMITY: ICD-10-CM

## 2022-07-26 DIAGNOSIS — S83.411A SPRAIN OF MEDIAL COLLATERAL LIGAMENT OF RIGHT KNEE, INITIAL ENCOUNTER: ICD-10-CM

## 2022-07-26 DIAGNOSIS — M25.60 RANGE OF MOTION DEFICIT: ICD-10-CM

## 2022-07-26 DIAGNOSIS — S83.511A RUPTURE OF ANTERIOR CRUCIATE LIGAMENT OF RIGHT KNEE, INITIAL ENCOUNTER: ICD-10-CM

## 2022-07-26 PROCEDURE — 97161 PT EVAL LOW COMPLEX 20 MIN: CPT | Mod: PN | Performed by: PHYSICAL THERAPIST

## 2022-07-26 PROCEDURE — 97110 THERAPEUTIC EXERCISES: CPT | Mod: PN | Performed by: PHYSICAL THERAPIST

## 2022-07-26 NOTE — PLAN OF CARE
[facility]   Physical Therapy Initial Evaluation     Date: 7/26/2022   Name: Tiffnay Dobbs  Clinic Number: [id number]    Therapy Diagnosis:   Encounter Diagnoses   Name Primary?    Range of motion deficit     Muscle weakness of lower extremity Yes     Referring Provider: Tevin Tubbs IV, MD    Referring Provider Orders: PT eval and treat  Medical Diagnosis from Referral: Rupture of anterior cruciate ligament of right knee, initial encounter [S83.511A], Injury of posterolateral corner of knee, right, initial encounter [S89.91XA], Sprain of medial collateral ligament of right knee, initial encounter [S83.411A]  Evaluation Date: 7/26/2022  Authorization Period Expiration: 7/6/2023  Plan of Care Expiration: 10/26/2022  Visit # / Visits authorized: 1/pending   FOTO: 1/3 (7/26/2022)    Precautions: standard    Time In: 10:15  Time Out: 11:00  Total Appointment Time (timed & untimed codes): 45 minutes    SUBJECTIVE     Date of onset: 7/3/22  History of current condition - TIFFANY reports R knee pain and R leg weakness after injuring her R ACL and MCL in a fall earlier this month. She currently has pain/difficulty with walking, stair negotiation, and transfers. She is scheduled for surgery on 8/2/22.    Falls: 1    Imaging: MRI reveals -  1. Complete ACL tear.  2. Posterolateral corner injury as follows:  *Grade 1 fibular collateral ligament.  Intact conjoined insertion of the fibular collateral ligament and distal biceps femoris tendon.  *Grade 3/complete tear popliteofibular ligament.  *Grade 3/complete tear arcuate ligament.  3.  Anterolateral ligament sprain with distal avulsion fracture (Segond fracture).  4.  Grade 1 sprain MCL/posteromedial capsule sprain.  5.  Grade 1/2 sprain posterior capsule/oblique popliteal ligament.  6.  Grade 2 strain distal fibers adductor anna tendon.  7.  Disrupted popliteomeniscal fascicles of the posterior horn of the lateral meniscus.  No displaced  meniscal tear.  8.  Impacted fracture of the posterior lateral tibial plateau.  Nondisplaced subchondral fracture of the posterior medial tibial plateau.  9.  Low-grade strain lateral aspect of the proximal soleus.  10. Joint effusion.  11: Fluid and in between the vastus intermedius and conjoined tendon of the vastus medialis/lateralis, possibly a small hematoma or related to the adjacent joint effusion.  No distal quadriceps tendon tear.    Prior Therapy: PT for knee pain several years ago  Social History: lives in an apartment with an elevator, alone  Occupation: student (political philosophy)  Prior Level of Function: no pain or difficulty with ADLs  Current Level of Function: moderate pain and difficulty with ADLs due to R knee pain    Pain  Current: 2/10, worst: 5/10, best: 0/10   Location: R knee  Description: achy, intermittently sharp  Aggravating Factors: prolonged activity, prolonged positioning  Easing Factors: rest    Patients goals: improve strength, prepare for surgery     Medical History:   Past Medical History:   Diagnosis Date    Anemia     PCOS (polycystic ovarian syndrome) 04/2020       Surgical History:   Tiffany Dobbs  has a past surgical history that includes BREAST REDUCTION (2012) and Total Reduction Mammoplasty.    Medications:   Tiffany HERNANDEZ has a current medication list which includes the following prescription(s): ergocalciferol, ibuprofen, lactobacillus rhamnosus gg, NON FORMULARY MEDICATION, and turmeric.    Allergies:   Review of patient's allergies indicates:   Allergen Reactions    Bactrim [sulfamethoxazole-trimethoprim] Itching and Rash        OBJECTIVE     Palpation: Tenderness to palpation of R quads, adductors, patellar tendon, hamstrings  Transfers: Decreased weight-bearing through R leg    Knee ROM 7/26/22   R extension lacking 3°   R flexion 112°   L extension 0°   L flexion 123°       Lower Extremity Strength 7/26/22   R hip flexion 4/5   R hip external rotation 4/5   R hip  abduction 4/5   R hip adduction 4/5   R knee flexion 4/5   R knee extension 4/5   L hip flexion 4+/5   L hip external rotation 4+/5   L hip abduction 4+/5   L hip adduction 4+/5   L knee flexion 5/5   L knee extension 5/5       Limitation/Restriction for FOTO Knee Survey    Therapist reviewed FOTO scores for Tiffany Dobbs on 7/26/2022.   FOTO documents entered into EPIC - see Media section.    Limitation Score: 74% impairment       TREATMENT     Total Treatment time (time-based codes) separate from Evaluation: 24 minutes   TIFFANY received the treatments listed below:      Therapeutic exercises to develop strength, endurance and core stabilization for 24 minutes -  [x] R quad sets, x20 with 5-second hold  [x] Bridging, x15  [x] Side-lying clam (R)  [x] SciFit stepper with bilateral arms and legs, 10 minutes at level 5  [x] Prone hamstring curls (2#, R), x20  [x] Hooklying hip adduction isometric with ball, x20 with 3-second hold  [x] Supine heel slides (R), x20  [x] HEP review, HEP building    PATIENT EDUCATION AND HOME EXERCISES     Education provided: knee anatomy, ACL reconstruction rehab protocol, pt prognosis, PT plan of care, swelling management    Written Home Exercises Provided: yes. Exercises were reviewed and TIFFANY was able to demonstrate them prior to the end of the session.  TIFFANY demonstrated good  understanding of the education provided. See EMR under Patient Instructions for exercises provided during therapy sessions.    ASSESSMENT     Tiffany HERNANDEZ is a 31 y.o. female referred to outpatient physical therapy with a medical diagnosis of Rupture of anterior cruciate ligament of right knee, initial encounter [S83.511A], Injury of posterolateral corner of knee, right, initial encounter [S89.91XA], Sprain of medial collateral ligament of right knee, initial encounter [S83.411A]  . Patient presents with deficits to R knee range of motion, R leg strength, balance, gait quality, and transfer ability. Symptoms are  consistent with recent injury to ligamentous structures of the knee. Pt would benefit from strengthening and range of motion exercises for the next week to prepare for surgery.    Patient prognosis is Good.   Patient will benefit from skilled outpatient physical therapy to address the deficits stated above and in the chart below, provide patient /family education, and to maximize patient's level of independence.     Plan of care discussed with patient: yes  Patient's spiritual, cultural and educational needs considered and patient is agreeable to the plan of care and goals as stated below:     Anticipated Barriers for therapy: none    Medical Necessity is demonstrated by the following  History  Co-morbidities and personal factors that may impact the plan of care Co-morbidities:   Anemia, PCOS    Personal Factors:   no deficits     low   Examination  Body Structures and Functions, activity limitations and participation restrictions that may impact the plan of care Body Regions:   lower extremities    Body Systems:    ROM  strength  balance  gait  transfers    Participation Restrictions:   Walking, standing, stairs, transfers, recreational exercise    Activity limitations:   Learning and applying knowledge  no deficits    General Tasks and Commands  no deficits    Communication  no deficits    Mobility  lifting and carrying objects  walking    Self care  no deficits    Domestic Life  doing house work (cleaning house, washing dishes, laundry)    Interactions/Relationships  no deficits    Life Areas  no deficits    Community and Social Life  Community life, recreation         low   Clinical Presentation stable and uncomplicated low   Decision Making/ Complexity Score: low       Short Term Goals (1 week)  Pt will be independent with home exercise program  Pt will demonstrate R knee extension range of motion of 0 degrees  Pt will demonstrate R knee flexion range of motion of at least 118 degrees    PLAN     Plan of Care  Certification: 7/26/2022 to 8/2/2022    Outpatient Physical Therapy: 2 times weekly for 1 week to include the following interventions - Therapeutic Exercise, Manual Therapy, Therapeutic Activity, Neuromuscular Re-education, Electrical Stimulation (Unattended)      Chapis Hayward, PT, DPT

## 2022-07-26 NOTE — PROGRESS NOTES
[facility]   Physical Therapy Treatment Note & Discharge Summary    Name: Tiffany Dobbs  Clinic Number: [id number]    Therapy Diagnosis:   Encounter Diagnoses   Name Primary?    Muscle weakness of lower extremity Yes    Range of motion deficit      Referring Provider: Tevin Tubbs IV, MD    Visit Date: 7/28/2022    Referring Provider Orders: PT eval and treat  Medical Diagnosis from Referral: Rupture of anterior cruciate ligament of right knee, initial encounter [S83.511A], Injury of posterolateral corner of knee, right, initial encounter [S89.91XA], Sprain of medial collateral ligament of right knee, initial encounter [S83.411A]  Evaluation Date: 7/26/2022  Authorization Period Expiration: 7/6/2023  Plan of Care Expiration: 10/26/2022  Visit # / Visits authorized: 2  FOTO: 1/3 (7/26/2022)      Time In: 8:45  Time Out: 9:30  Total Billable Time: 45 minutes    SUBJECTIVE     Tiffany had some leg soreness after last visit, but it was mild.    She was compliant with home exercise program.  Response to previous treatment: mild soreness, resolved quickly  Functional change: none    Pain: 2/10  Location: R knee    OBJECTIVE     Objective Measures updated at progress report unless specified.     Treatment     TIFFANY received the treatments listed below:      Therapeutic exercises to develop strength, endurance and core stabilization for 38 minutes -  [x]  R quad sets, x10 with 5-second hold and towel roll under knee - more comfortable with towel roll  []? Bridging, x15  [x]? Side-lying clam (R)  [x]? SciFit stepper with bilateral arms and legs, 10 minutes at level 6  [x]? Prone hamstring curls (2#, R), x20  [x]? Hooklying hip adduction isometric with ball, x20 with 5-second hold  []? Supine heel slides (R), x20  [x] Short-arc quads (2#, R), x25  [x] Standing heel raises (B), 2x15  [x] Standing marches (R), 2x15  [x] Standing hip abduction (R), 2x15  [x] Standing calf stretch, 1 minute  [x] HEP  review      Patient Education and Home Exercises     Education provided: PT plan of care, importance of HEP compliance, ACL reconstruction rehab protocol    Written Home Exercises Provided: Patient instructed to cont prior HEP. Exercises were reviewed and YANG was able to demonstrate them prior to the end of the session.  YANG demonstrated good  understanding of the education provided. See EMR under Patient Instructions for exercises provided during therapy sessions    ASSESSMENT     YANG Dobbs tolerated treatment session well, with minimal/no increased symptoms with exercise and improving exercise technique within session. Pt requires verbal and tactile cuing for correct exercise performance. Pt's functional mobility and ability to perform ADLs still limited by pain and weakness. She is capable of continuing to build leg muscle strength/coordination with HEP prior to upcoming surgery. She did not meet range of motion goals set at the initial evaluation, but she should be able to progress towards them with HEP compliance.    YANG is progressing well towards her goals.   Pt prognosis is Good.   Pt's spiritual, cultural and educational needs considered, and pt agreeable to plan of care and goals.  Anticipated barriers to physical therapy: none    Short Term Goals (1 week)  Pt will be independent with home exercise program - MET  Pt will demonstrate R knee extension range of motion of 0 degrees - NOT MET  Pt will demonstrate R knee flexion range of motion of at least 118 degrees - NOT MET      PLAN     Discharge from therapy after today's visit. Pt may resume after surgery.      Chapis Hayward, PT, DPT

## 2022-07-28 ENCOUNTER — CLINICAL SUPPORT (OUTPATIENT)
Dept: REHABILITATION | Facility: HOSPITAL | Age: 32
End: 2022-07-28
Payer: MEDICAID

## 2022-07-28 DIAGNOSIS — M25.60 RANGE OF MOTION DEFICIT: ICD-10-CM

## 2022-07-28 DIAGNOSIS — M62.81 MUSCLE WEAKNESS OF LOWER EXTREMITY: Primary | ICD-10-CM

## 2022-07-28 PROCEDURE — 97110 THERAPEUTIC EXERCISES: CPT | Mod: PN | Performed by: PHYSICAL THERAPIST

## 2022-07-28 NOTE — PROGRESS NOTES
I assume primary medical responsibility for this patient. I have reviewed the history, physical, and assessement & treatment plan with the resident and agree that the care is reasonable and necessary. This service has been performed by a resident without the presence of a teaching physician under the primary care exception. If necessary, an addendum of additional findings or evaluation beyond the resident documentation will be noted below.     Frida Carpenter MD

## 2022-08-02 ENCOUNTER — ANESTHESIA (OUTPATIENT)
Dept: SURGERY | Facility: HOSPITAL | Age: 32
End: 2022-08-02
Payer: MEDICAID

## 2022-08-02 ENCOUNTER — HOSPITAL ENCOUNTER (OUTPATIENT)
Facility: HOSPITAL | Age: 32
Discharge: HOME | End: 2022-08-03
Attending: ORTHOPAEDIC SURGERY | Admitting: ORTHOPAEDIC SURGERY
Payer: MEDICAID

## 2022-08-02 DIAGNOSIS — S89.91XA INJURY OF POSTEROLATERAL CORNER OF KNEE, RIGHT, INITIAL ENCOUNTER: ICD-10-CM

## 2022-08-02 DIAGNOSIS — S83.511A RUPTURE OF ANTERIOR CRUCIATE LIGAMENT OF RIGHT KNEE, INITIAL ENCOUNTER: Primary | ICD-10-CM

## 2022-08-02 DIAGNOSIS — S83.511A RIGHT ACL TEAR: ICD-10-CM

## 2022-08-02 LAB
B-HCG UR QL: NEGATIVE
CTP QC/QA: YES

## 2022-08-02 PROCEDURE — 27427 RECONSTRUCTION KNEE: CPT | Mod: 59,51,RT, | Performed by: ORTHOPAEDIC SURGERY

## 2022-08-02 PROCEDURE — 25000003 PHARM REV CODE 250: Performed by: STUDENT IN AN ORGANIZED HEALTH CARE EDUCATION/TRAINING PROGRAM

## 2022-08-02 PROCEDURE — 63600175 PHARM REV CODE 636 W HCPCS: Performed by: STUDENT IN AN ORGANIZED HEALTH CARE EDUCATION/TRAINING PROGRAM

## 2022-08-02 PROCEDURE — 29888 PR KNEE SCOPE,AID ANT CRUCIATE REPAIR: ICD-10-PCS | Mod: RT,,, | Performed by: ORTHOPAEDIC SURGERY

## 2022-08-02 PROCEDURE — C1713 ANCHOR/SCREW BN/BN,TIS/BN: HCPCS | Performed by: ORTHOPAEDIC SURGERY

## 2022-08-02 PROCEDURE — 37000009 HC ANESTHESIA EA ADD 15 MINS: Performed by: ORTHOPAEDIC SURGERY

## 2022-08-02 PROCEDURE — 01400 ANES OPN/ARTHRS KNEE JT NOS: CPT | Performed by: ORTHOPAEDIC SURGERY

## 2022-08-02 PROCEDURE — 63600175 PHARM REV CODE 636 W HCPCS: Performed by: NURSE PRACTITIONER

## 2022-08-02 PROCEDURE — 97161 PT EVAL LOW COMPLEX 20 MIN: CPT

## 2022-08-02 PROCEDURE — 27201423 OPTIME MED/SURG SUP & DEVICES STERILE SUPPLY: Performed by: ORTHOPAEDIC SURGERY

## 2022-08-02 PROCEDURE — 99900035 HC TECH TIME PER 15 MIN (STAT)

## 2022-08-02 PROCEDURE — 71000033 HC RECOVERY, INTIAL HOUR: Performed by: ORTHOPAEDIC SURGERY

## 2022-08-02 PROCEDURE — C1762 CONN TISS, HUMAN(INC FASCIA): HCPCS | Performed by: ORTHOPAEDIC SURGERY

## 2022-08-02 PROCEDURE — 37000008 HC ANESTHESIA 1ST 15 MINUTES: Performed by: ORTHOPAEDIC SURGERY

## 2022-08-02 PROCEDURE — C1889 IMPLANT/INSERT DEVICE, NOC: HCPCS | Performed by: ORTHOPAEDIC SURGERY

## 2022-08-02 PROCEDURE — 25000003 PHARM REV CODE 250: Performed by: NURSE PRACTITIONER

## 2022-08-02 PROCEDURE — 94761 N-INVAS EAR/PLS OXIMETRY MLT: CPT

## 2022-08-02 PROCEDURE — 25000003 PHARM REV CODE 250: Performed by: NURSE ANESTHETIST, CERTIFIED REGISTERED

## 2022-08-02 PROCEDURE — 71000039 HC RECOVERY, EACH ADD'L HOUR: Performed by: ORTHOPAEDIC SURGERY

## 2022-08-02 PROCEDURE — 63600175 PHARM REV CODE 636 W HCPCS: Performed by: NURSE ANESTHETIST, CERTIFIED REGISTERED

## 2022-08-02 PROCEDURE — 36000711: Performed by: ORTHOPAEDIC SURGERY

## 2022-08-02 PROCEDURE — 36000710: Performed by: ORTHOPAEDIC SURGERY

## 2022-08-02 PROCEDURE — 27427 PR LIGMT REVISION,KNEE,EXTRA-ARTIC: ICD-10-PCS | Mod: 59,51,RT, | Performed by: ORTHOPAEDIC SURGERY

## 2022-08-02 PROCEDURE — 97530 THERAPEUTIC ACTIVITIES: CPT

## 2022-08-02 PROCEDURE — 63600175 PHARM REV CODE 636 W HCPCS: Performed by: ORTHOPAEDIC SURGERY

## 2022-08-02 PROCEDURE — 29888 ARTHRS AID ACL RPR/AGMNTJ: CPT | Mod: RT,,, | Performed by: ORTHOPAEDIC SURGERY

## 2022-08-02 DEVICE — SYS SWIVELOCK ANCH 4.75X19.1MM: Type: IMPLANTABLE DEVICE | Site: KNEE | Status: FUNCTIONAL

## 2022-08-02 DEVICE — IMPLANTABLE DEVICE: Type: IMPLANTABLE DEVICE | Site: KNEE | Status: FUNCTIONAL

## 2022-08-02 DEVICE — SCREW FASTTHREAD INTRF 7X20MM: Type: IMPLANTABLE DEVICE | Site: KNEE | Status: FUNCTIONAL

## 2022-08-02 DEVICE — ANCHOR SUT 6.25X19.1MM: Type: IMPLANTABLE DEVICE | Site: KNEE | Status: FUNCTIONAL

## 2022-08-02 DEVICE — SCREW FASTTHREAD INTRF 8X20MM: Type: IMPLANTABLE DEVICE | Site: KNEE | Status: FUNCTIONAL

## 2022-08-02 DEVICE — SCREW CANNULATED 7MM X 20MM: Type: IMPLANTABLE DEVICE | Site: KNEE | Status: FUNCTIONAL

## 2022-08-02 DEVICE — SCREW FASTTHREAD INTFR 10X30MM: Type: IMPLANTABLE DEVICE | Site: KNEE | Status: FUNCTIONAL

## 2022-08-02 RX ORDER — PREGABALIN 75 MG/1
300 CAPSULE ORAL ONCE
Status: COMPLETED | OUTPATIENT
Start: 2022-08-02 | End: 2022-08-02

## 2022-08-02 RX ORDER — SODIUM CHLORIDE 0.9 % (FLUSH) 0.9 %
10 SYRINGE (ML) INJECTION
Status: DISCONTINUED | OUTPATIENT
Start: 2022-08-02 | End: 2022-08-03 | Stop reason: HOSPADM

## 2022-08-02 RX ORDER — ACETAMINOPHEN 500 MG
1000 TABLET ORAL EVERY 8 HOURS
Status: DISCONTINUED | OUTPATIENT
Start: 2022-08-02 | End: 2022-08-03 | Stop reason: HOSPADM

## 2022-08-02 RX ORDER — HYDROMORPHONE HYDROCHLORIDE 2 MG/ML
0.2 INJECTION, SOLUTION INTRAMUSCULAR; INTRAVENOUS; SUBCUTANEOUS EVERY 5 MIN PRN
Status: DISCONTINUED | OUTPATIENT
Start: 2022-08-02 | End: 2022-08-02 | Stop reason: HOSPADM

## 2022-08-02 RX ORDER — DEXAMETHASONE SODIUM PHOSPHATE 4 MG/ML
INJECTION, SOLUTION INTRA-ARTICULAR; INTRALESIONAL; INTRAMUSCULAR; INTRAVENOUS; SOFT TISSUE
Status: DISCONTINUED | OUTPATIENT
Start: 2022-08-02 | End: 2022-08-02

## 2022-08-02 RX ORDER — CELECOXIB 100 MG/1
200 CAPSULE ORAL 2 TIMES DAILY
Status: DISCONTINUED | OUTPATIENT
Start: 2022-08-02 | End: 2022-08-03 | Stop reason: HOSPADM

## 2022-08-02 RX ORDER — LIDOCAINE HYDROCHLORIDE 10 MG/ML
1 INJECTION, SOLUTION EPIDURAL; INFILTRATION; INTRACAUDAL; PERINEURAL ONCE
Status: DISCONTINUED | OUTPATIENT
Start: 2022-08-02 | End: 2022-08-02 | Stop reason: HOSPADM

## 2022-08-02 RX ORDER — ASPIRIN 81 MG/1
81 TABLET ORAL 2 TIMES DAILY
Status: DISCONTINUED | OUTPATIENT
Start: 2022-08-02 | End: 2022-08-03 | Stop reason: HOSPADM

## 2022-08-02 RX ORDER — AMOXICILLIN 250 MG
1 CAPSULE ORAL 2 TIMES DAILY
Status: DISCONTINUED | OUTPATIENT
Start: 2022-08-02 | End: 2022-08-03 | Stop reason: HOSPADM

## 2022-08-02 RX ORDER — ROCURONIUM BROMIDE 10 MG/ML
INJECTION, SOLUTION INTRAVENOUS
Status: DISCONTINUED | OUTPATIENT
Start: 2022-08-02 | End: 2022-08-02

## 2022-08-02 RX ORDER — GABAPENTIN 300 MG/1
300 CAPSULE ORAL NIGHTLY
Status: DISCONTINUED | OUTPATIENT
Start: 2022-08-02 | End: 2022-08-03 | Stop reason: HOSPADM

## 2022-08-02 RX ORDER — OXYCODONE HYDROCHLORIDE 5 MG/1
5 TABLET ORAL EVERY 6 HOURS PRN
Qty: 30 TABLET | Refills: 0 | Status: SHIPPED | OUTPATIENT
Start: 2022-08-02 | End: 2023-04-17

## 2022-08-02 RX ORDER — FAMOTIDINE 20 MG/1
20 TABLET, FILM COATED ORAL 2 TIMES DAILY
Status: DISCONTINUED | OUTPATIENT
Start: 2022-08-02 | End: 2022-08-03 | Stop reason: HOSPADM

## 2022-08-02 RX ORDER — ONDANSETRON 2 MG/ML
INJECTION INTRAMUSCULAR; INTRAVENOUS
Status: DISCONTINUED | OUTPATIENT
Start: 2022-08-02 | End: 2022-08-02

## 2022-08-02 RX ORDER — OXYCODONE HYDROCHLORIDE 5 MG/1
5 TABLET ORAL
Status: DISCONTINUED | OUTPATIENT
Start: 2022-08-02 | End: 2022-08-02

## 2022-08-02 RX ORDER — CEFAZOLIN SODIUM 2 G/50ML
2 SOLUTION INTRAVENOUS
Status: COMPLETED | OUTPATIENT
Start: 2022-08-03 | End: 2022-08-03

## 2022-08-02 RX ORDER — EPINEPHRINE 1 MG/ML
INJECTION, SOLUTION INTRACARDIAC; INTRAMUSCULAR; INTRAVENOUS; SUBCUTANEOUS
Status: DISCONTINUED | OUTPATIENT
Start: 2022-08-02 | End: 2022-08-02 | Stop reason: HOSPADM

## 2022-08-02 RX ORDER — PROPOFOL 10 MG/ML
VIAL (ML) INTRAVENOUS
Status: DISCONTINUED | OUTPATIENT
Start: 2022-08-02 | End: 2022-08-02

## 2022-08-02 RX ORDER — ASPIRIN 81 MG/1
81 TABLET ORAL DAILY
Qty: 30 TABLET | Refills: 0 | Status: SHIPPED | OUTPATIENT
Start: 2022-08-02 | End: 2023-04-17

## 2022-08-02 RX ORDER — OXYCODONE HYDROCHLORIDE 5 MG/1
5 TABLET ORAL EVERY 4 HOURS PRN
Status: DISCONTINUED | OUTPATIENT
Start: 2022-08-02 | End: 2022-08-03 | Stop reason: HOSPADM

## 2022-08-02 RX ORDER — NEOSTIGMINE METHYLSULFATE 1 MG/ML
INJECTION, SOLUTION INTRAVENOUS
Status: DISCONTINUED | OUTPATIENT
Start: 2022-08-02 | End: 2022-08-02

## 2022-08-02 RX ORDER — ACETAMINOPHEN 325 MG/1
325 TABLET ORAL EVERY 8 HOURS
Qty: 120 TABLET | Refills: 1 | Status: SHIPPED | OUTPATIENT
Start: 2022-08-02 | End: 2023-04-17

## 2022-08-02 RX ORDER — MIDAZOLAM HYDROCHLORIDE 1 MG/ML
INJECTION, SOLUTION INTRAMUSCULAR; INTRAVENOUS
Status: DISCONTINUED | OUTPATIENT
Start: 2022-08-02 | End: 2022-08-02

## 2022-08-02 RX ORDER — PHENYLEPHRINE HYDROCHLORIDE 10 MG/ML
INJECTION INTRAVENOUS
Status: DISCONTINUED | OUTPATIENT
Start: 2022-08-02 | End: 2022-08-02

## 2022-08-02 RX ORDER — ACETAMINOPHEN 500 MG
1000 TABLET ORAL ONCE
Status: COMPLETED | OUTPATIENT
Start: 2022-08-02 | End: 2022-08-02

## 2022-08-02 RX ORDER — KETAMINE HCL IN 0.9 % NACL 50 MG/5 ML
SYRINGE (ML) INTRAVENOUS
Status: DISCONTINUED | OUTPATIENT
Start: 2022-08-02 | End: 2022-08-02

## 2022-08-02 RX ORDER — CELECOXIB 200 MG/1
200 CAPSULE ORAL 2 TIMES DAILY
Qty: 60 CAPSULE | Refills: 0 | Status: SHIPPED | OUTPATIENT
Start: 2022-08-02 | End: 2023-04-17

## 2022-08-02 RX ORDER — ONDANSETRON 2 MG/ML
4 INJECTION INTRAMUSCULAR; INTRAVENOUS EVERY 12 HOURS PRN
Status: DISCONTINUED | OUTPATIENT
Start: 2022-08-02 | End: 2022-08-03 | Stop reason: HOSPADM

## 2022-08-02 RX ORDER — SODIUM CHLORIDE, SODIUM LACTATE, POTASSIUM CHLORIDE, CALCIUM CHLORIDE 600; 310; 30; 20 MG/100ML; MG/100ML; MG/100ML; MG/100ML
INJECTION, SOLUTION INTRAVENOUS CONTINUOUS
Status: DISCONTINUED | OUTPATIENT
Start: 2022-08-02 | End: 2022-08-03 | Stop reason: HOSPADM

## 2022-08-02 RX ORDER — CEFAZOLIN SODIUM 2 G/50ML
2 SOLUTION INTRAVENOUS ONCE
Status: COMPLETED | OUTPATIENT
Start: 2022-08-02 | End: 2022-08-02

## 2022-08-02 RX ORDER — GABAPENTIN 300 MG/1
300 CAPSULE ORAL NIGHTLY
Qty: 30 CAPSULE | Refills: 0 | Status: SHIPPED | OUTPATIENT
Start: 2022-08-02 | End: 2023-04-17

## 2022-08-02 RX ORDER — SCOLOPAMINE TRANSDERMAL SYSTEM 1 MG/1
1 PATCH, EXTENDED RELEASE TRANSDERMAL
Status: DISCONTINUED | OUTPATIENT
Start: 2022-08-02 | End: 2022-08-02 | Stop reason: HOSPADM

## 2022-08-02 RX ORDER — ONDANSETRON 2 MG/ML
4 INJECTION INTRAMUSCULAR; INTRAVENOUS DAILY PRN
Status: DISCONTINUED | OUTPATIENT
Start: 2022-08-02 | End: 2022-08-02 | Stop reason: HOSPADM

## 2022-08-02 RX ORDER — BISACODYL 10 MG
10 SUPPOSITORY, RECTAL RECTAL DAILY PRN
Status: DISCONTINUED | OUTPATIENT
Start: 2022-08-02 | End: 2022-08-03 | Stop reason: HOSPADM

## 2022-08-02 RX ORDER — FENTANYL CITRATE 50 UG/ML
INJECTION, SOLUTION INTRAMUSCULAR; INTRAVENOUS
Status: DISCONTINUED | OUTPATIENT
Start: 2022-08-02 | End: 2022-08-02

## 2022-08-02 RX ORDER — CELECOXIB 100 MG/1
200 CAPSULE ORAL ONCE
Status: COMPLETED | OUTPATIENT
Start: 2022-08-02 | End: 2022-08-02

## 2022-08-02 RX ORDER — LIDOCAINE HYDROCHLORIDE 20 MG/ML
INJECTION, SOLUTION EPIDURAL; INFILTRATION; INTRACAUDAL; PERINEURAL
Status: DISCONTINUED | OUTPATIENT
Start: 2022-08-02 | End: 2022-08-02

## 2022-08-02 RX ADMIN — FENTANYL CITRATE 50 MCG: 50 INJECTION, SOLUTION INTRAMUSCULAR; INTRAVENOUS at 03:08

## 2022-08-02 RX ADMIN — SCOPALAMINE 1 PATCH: 1 PATCH, EXTENDED RELEASE TRANSDERMAL at 06:08

## 2022-08-02 RX ADMIN — ESMOLOL HYDROCHLORIDE 4 MG: 10 INJECTION INTRAVENOUS at 01:08

## 2022-08-02 RX ADMIN — SODIUM CHLORIDE, SODIUM LACTATE, POTASSIUM CHLORIDE, AND CALCIUM CHLORIDE: .6; .31; .03; .02 INJECTION, SOLUTION INTRAVENOUS at 01:08

## 2022-08-02 RX ADMIN — Medication 10 MG: at 05:08

## 2022-08-02 RX ADMIN — PROPOFOL 130 MG: 10 INJECTION, EMULSION INTRAVENOUS at 01:08

## 2022-08-02 RX ADMIN — DEXAMETHASONE SODIUM PHOSPHATE 8 MG: 4 INJECTION, SOLUTION INTRA-ARTICULAR; INTRALESIONAL; INTRAMUSCULAR; INTRAVENOUS; SOFT TISSUE at 02:08

## 2022-08-02 RX ADMIN — SODIUM CHLORIDE, SODIUM LACTATE, POTASSIUM CHLORIDE, AND CALCIUM CHLORIDE: .6; .31; .03; .02 INJECTION, SOLUTION INTRAVENOUS at 02:08

## 2022-08-02 RX ADMIN — ROCURONIUM BROMIDE 20 MG: 10 INJECTION, SOLUTION INTRAVENOUS at 02:08

## 2022-08-02 RX ADMIN — DOCUSATE SODIUM AND SENNOSIDES 1 TABLET: 8.6; 5 TABLET, FILM COATED ORAL at 08:08

## 2022-08-02 RX ADMIN — MIDAZOLAM 2 MG: 1 INJECTION INTRAMUSCULAR; INTRAVENOUS at 01:08

## 2022-08-02 RX ADMIN — ONDANSETRON 8 MG: 2 INJECTION, SOLUTION INTRAMUSCULAR; INTRAVENOUS at 05:08

## 2022-08-02 RX ADMIN — NEOSTIGMINE METHYLSULFATE 4 MG: 1 INJECTION INTRAVENOUS at 05:08

## 2022-08-02 RX ADMIN — CELECOXIB 200 MG: 100 CAPSULE ORAL at 06:08

## 2022-08-02 RX ADMIN — OXYCODONE HYDROCHLORIDE 5 MG: 5 TABLET ORAL at 08:08

## 2022-08-02 RX ADMIN — PHENYLEPHRINE HYDROCHLORIDE 20 MCG/MIN: 10 INJECTION INTRAVENOUS at 01:08

## 2022-08-02 RX ADMIN — GABAPENTIN 300 MG: 300 CAPSULE ORAL at 08:08

## 2022-08-02 RX ADMIN — Medication 10 MG: at 04:08

## 2022-08-02 RX ADMIN — CEFAZOLIN SODIUM 2 G: 2 SOLUTION INTRAVENOUS at 05:08

## 2022-08-02 RX ADMIN — CEFAZOLIN SODIUM 2 G: 2 SOLUTION INTRAVENOUS at 01:08

## 2022-08-02 RX ADMIN — FENTANYL CITRATE 50 MCG: 50 INJECTION, SOLUTION INTRAMUSCULAR; INTRAVENOUS at 06:08

## 2022-08-02 RX ADMIN — FAMOTIDINE 20 MG: 20 TABLET ORAL at 08:08

## 2022-08-02 RX ADMIN — PREGABALIN 300 MG: 75 CAPSULE ORAL at 06:08

## 2022-08-02 RX ADMIN — GLYCOPYRROLATE 0.6 MG: 0.2 INJECTION, SOLUTION INTRAMUSCULAR; INTRAVITREAL at 05:08

## 2022-08-02 RX ADMIN — ACETAMINOPHEN 1000 MG: 500 TABLET ORAL at 06:08

## 2022-08-02 RX ADMIN — CELECOXIB 200 MG: 100 CAPSULE ORAL at 08:08

## 2022-08-02 RX ADMIN — ASPIRIN 81 MG: 81 TABLET, COATED ORAL at 08:08

## 2022-08-02 RX ADMIN — ROCURONIUM BROMIDE 40 MG: 10 INJECTION, SOLUTION INTRAVENOUS at 01:08

## 2022-08-02 RX ADMIN — FENTANYL CITRATE 25 MCG: 50 INJECTION, SOLUTION INTRAMUSCULAR; INTRAVENOUS at 02:08

## 2022-08-02 RX ADMIN — FENTANYL CITRATE 100 MCG: 50 INJECTION, SOLUTION INTRAMUSCULAR; INTRAVENOUS at 01:08

## 2022-08-02 RX ADMIN — LIDOCAINE HYDROCHLORIDE 80 MG: 20 INJECTION, SOLUTION EPIDURAL; INFILTRATION; INTRACAUDAL; PERINEURAL at 01:08

## 2022-08-02 RX ADMIN — HYDROMORPHONE HYDROCHLORIDE 0.2 MG: 2 INJECTION, SOLUTION INTRAMUSCULAR; INTRAVENOUS; SUBCUTANEOUS at 07:08

## 2022-08-02 RX ADMIN — FENTANYL CITRATE 75 MCG: 50 INJECTION, SOLUTION INTRAMUSCULAR; INTRAVENOUS at 02:08

## 2022-08-02 RX ADMIN — PHENYLEPHRINE HYDROCHLORIDE 100 MCG: 10 INJECTION INTRAVENOUS at 01:08

## 2022-08-02 NOTE — H&P
The patient has been examined and the H&P has been reviewed:     I concur with the findings and no changes have occurred since H&P was written.     Surgery risks, benefits and alternative options discussed and understood by patient/family.    Plan to proceed with Surgical treatment right ACL reconstruction, possible meniscus surgery, possible PLC repair/reconstruction.    Mike Strauss MD    [facility], Orthopedics and Sports Medicine  [facility]     Subjective:   Tiffany Dobbs is a 31 y.o. female referred by Dr. Sonia Serrato for evaluation and treatment of right knee pain. This is evaluated as a personal injury.  The patient injured her right knee while twisting it walking outside.  She felt a pop in the knee. Date of injury 7/3/2022.  She has pain in the knee.       The patient is a student.     Outside reports reviewed: historical medical records.          Past Medical History:   Diagnosis Date    Anemia      PCOS (polycystic ovarian syndrome) 04/2020             Patient Active Problem List   Diagnosis    Surgical wound dehiscence    Chronic pain of left knee    Menorrhagia with irregular cycle    Microcytic anemia    Keloid    Patellofemoral pain syndrome of both knees    Chronic hip pain, bilateral    Acute midline low back pain without sciatica    Rupture of anterior cruciate ligament of right knee               Past Surgical History:   Procedure Laterality Date    BREAST REDUCTION   2012    TOTAL REDUCTION MAMMOPLASTY                  Current Outpatient Medications   Medication Instructions    ergocalciferol (VITAMIN D2) 50,000 Units, Oral, Every 7 days    HYDROcodone-acetaminophen (NORCO) 5-325 mg per tablet 1 tablet, Oral, Every 6 hours PRN    ibuprofen (ADVIL,MOTRIN) 600 mg, Oral, Every 6 hours PRN    NON FORMULARY MEDICATION Daily, Blood builderMultivitamin with iron supplement               Review of patient's allergies indicates:   Allergen Reactions     Bactrim [sulfamethoxazole-trimethoprim] Itching and Rash         Social History               Socioeconomic History    Marital status: Single   Tobacco Use    Smoking status: Never Smoker    Smokeless tobacco: Never Used   Substance and Sexual Activity    Alcohol use: Yes       Comment: socially, twice per month    Drug use: No    Sexual activity: Yes       Partners: Male       Birth control/protection: Condom   Social History Narrative     Admin coordinator at [employer]                  Family History   Problem Relation Age of Onset    Breast cancer Maternal Aunt      Glaucoma Maternal Aunt      Breast cancer Paternal Aunt      Ovarian cancer Maternal Aunt      Gestational diabetes Mother      Glaucoma Mother      Heart disease Father           arrythmia, has device - ICD?    Diverticulitis Father      Diabetes Maternal Grandmother      Dementia Maternal Grandmother      Arthritis Maternal Grandmother           spinal    Hydrocephalus Maternal Grandmother      Stomach cancer Maternal Grandfather      Diabetes Maternal Aunt      No Known Problems Brother      Colon cancer Neg Hx      Heart attack Neg Hx            Review of Systems   Constitutional: Negative for chills and fever.   HENT: Negative for hearing loss.    Eyes: Negative for blurred vision.   Cardiovascular: Negative for chest pain.   Respiratory: Negative for shortness of breath.    Gastrointestinal: Negative for abdominal pain.   Neurological: Negative for light-headedness.            Objective:   General     Constitutional: She is oriented to person, place, and time. She appears well-developed and well-nourished.   HENT:   Head: Normocephalic and atraumatic.   Eyes: EOM are normal.   Cardiovascular: Normal rate.    Pulmonary/Chest: Effort normal.   Neurological: She is alert and oriented to person, place, and time.   Psychiatric: She has a normal mood and affect.               Right Knee Exam      Inspection   Erythema:  absent  Swelling: absent  Effusion: present     Tenderness   The patient is tender to palpation of the lateral joint line and lateral retinaculum.     Range of Motion   Extension: 5   Flexion: 80      Tests   Meniscus   Janette:  Medial - negative Lateral - positive  Ligament Examination Lachman: abnormal PCL-Posterior Drawer: normal (0 to 2mm)     MCL - Valgus: normal (0 to 2mm)  LCL - Varus: abnormal  Posterolateral Corner: stable  Patella   Patellar apprehension: negative     Other   Sensation: normal     Comments:  Dial test equivocal      Left Knee Exam      Inspection   Erythema: absent  Swelling: absent  Effusion: absent     Tenderness   The patient is experiencing no tenderness.      Range of Motion   Extension: 0   Flexion: 130      Tests   Meniscus   Janette:  Medial - negative Lateral - negative  Stability Lachman: normal (-1 to 2mm) PCL-Posterior Drawer: normal (0 to 2mm)  MCL - Valgus: normal (0 to 2mm)  LCL - Varus: normal (0 to 2mm)  Patella   Patellar apprehension: negative     Other   Sensation: normal     Muscle Strength   Right Lower Extremity   Quadriceps:  5/5   Hamstrin/5   Left Lower Extremity   Quadriceps:  5/5   Hamstrin/5      Vascular Exam      Right Pulses     Posterior Tibial:      2+           Left Pulses     Posterior Tibial:      2+                 Imaging:  Radiographs of the right knee taken taken 2022 were personally reviewed from the [facility].  Multiple views of the knee are available today for review, including a standing AP, a standing notch view, lateral view, and a merchant view.  The tibiofemoral compartment demonstrates minimal degenerative changes.  The patellofemoral compartment demonstrates minimal degenerative changes.  There is a proximal lateral tibial avulsion fracture noted.     MRI of the right knee taken taken 7/3/2022 was personally reviewed from the [facility].  Multiple T1 and T2 sequences including axial, coronal, and sagittal  views were reviewed.  No acute fractures or dislocations are noted in these images.  There is a large effusion noted.  There is a tear of the ACL.  There is edema surrounding the medial collateral ligament at the superficial proximal attachment.  There is disruption of the posteriorlateral corner structures.  There is bone bruising of the posterolateral proximal tibia.     Procedures            Assessment:       Tiffany Dobbs is a 31 y.o. female seen in the office today. The primary encounter diagnosis was Rupture of anterior cruciate ligament of right knee, initial encounter. Diagnoses of Injury of posterolateral corner of knee, right, initial encounter and Sprain of medial collateral ligament of right knee, initial encounter were also pertinent to this visit.  Operative treatment with right knee surgery is recommended at this time.  The patient essentially had a knee dislocation with multiple torn or injured structures inside and around the knee.  I discussed the nature of the injury with the patient and her mother today.  I discussed the recommended operation.  We will perform an ACL reconstruction with Achilles allograft.  I will need to perform an exam under anesthesia to fully assess the posterior lateral corner as today she was guarding.  If we do need to proceed with the posterior lateral corner reconstruction with allograft tendon then we will also perform during this operation.  MCL rupture will be treated nonsurgically given its proximal location.. The natural history and expected course discussed with patient. Various treatment options were discussed, including their risks and benefits. All of the patient's questions were answered.  Plan:   1. Tylenol 650mg TID, PRN pain.  2. Surgical treatment right ACL reconstruction, possible meniscus surgery, possible PLC repair/reconstruction.  3. Surgical consent for surgery obtained during office visit.  4. Expected date of surgery: 8/2/2022.  5. Patient will NOT  require preoperative medical evaluation prior to surgery. Preoperative labs/chest xray ordered.  6. Post operative physical therapy ordered.   7. Patient placed in a hinged knee brace to be worn when ambulating at all times.        Tevin Tubbs IV, MD   of Clinical Orthopedics  Department of Orthopedic Surgery  [facility]  Office: [phone]  Website: [email or web address]            Orders Placed This Encounter    KNEE BRACE FOR HOME USE    X-Ray Chest 1 View Pre-OP    CBC Auto Differential    Basic Metabolic Panel    Protime-INR    APTT    Ambulatory referral/consult to Physical/Occupational Therapy    Case Request Operating Room: RECONSTRUCTION, KNEE, ACL, USING Allograft Achilles GRAFT, possible meniscus repair, possible PLC repair, knee exam under anesthesia

## 2022-08-02 NOTE — PLAN OF CARE
Spoke with lashell in PACU re Dr rob wanting pt to stay overnight, lashell stated she would place order of overnight stay, also informed HOUSE sup of change.

## 2022-08-02 NOTE — PT/OT/SLP PROGRESS
Physical Therapy Crutch Evaluation/Training    Tiffany Dobbs   MRN: [id number]   Admitting Diagnosis: The primary encounter diagnosis was Rupture of anterior cruciate ligament of right knee, initial encounter. A diagnosis of Right ACL tear was also pertinent to this visit.    PT Received On: 08/02/22  PT Start Time: 0955     PT Stop Time: 1018    PT Total Time (min): 23 min       Billable Minutes:  Evaluation 10 and Therapeutic Activity 13     Order: PT eval and treat  Order Date: 8/2/2022    Precautions Weight Bearing Status: non weight bearing: right leg    Patient Active Problem List   Diagnosis    Surgical wound dehiscence    Chronic pain of left knee    Menorrhagia with irregular cycle    Microcytic anemia    Keloid    Patellofemoral pain syndrome of both knees    Chronic hip pain, bilateral    Acute midline low back pain without sciatica    Rupture of anterior cruciate ligament of right knee    Range of motion deficit    Muscle weakness of lower extremity     Past Medical History:   Diagnosis Date    Anemia     PCOS (polycystic ovarian syndrome) 04/2020     Past Surgical History:   Procedure Laterality Date    BREAST REDUCTION  2012    TOTAL REDUCTION MAMMOPLASTY         Subjective Information     Prior level of function: independent  Residence: lives alone in a 8th floor apt with elevator access and tub/shower combo      Support available: family - her friends and mother will be assisting her at d/c  Equipment owned: Crutches, Type: axillary  Mental Status: Alert  Skin: Intact  Sensation: Intact  Posture: Good  Hearing: Intact  Vision:  Intact    Pain at time of assessment: not rated    Objective findings/Assessment  Bed mobility: mod I    Patient requires crutch fitting and training.    Treatment  Gait: 12 ft with B axillary crutches with CGA with LOB during turning. Educated on RLE NWB status, gait technique, and safety with turning.    Education provided in form of: demonstration and teach  back    AM-PAC 6 CLICK MOBILITY  How much help from another person does this patient currently need?   1 = Unable, Total/Dependent Assistance  2 = A lot, Maximum/Moderate Assistance  3 = A little, Minimum/Contact Guard/Supervision  4 = None, Modified [place]/Independent    Turning over in bed (including adjusting bedclothes, sheets and blankets)?: 4  Sitting down on and standing up from a chair with arms (e.g., wheelchair, bedside commode, etc.): 3  Moving from lying on back to sitting on the side of the bed?: 4  Moving to and from a bed to a chair (including a wheelchair)?: 3  Need to walk in hospital room?: 3  Climbing 3-5 steps with a railing?: 2  Basic Mobility Total Score: 19     AM-PAC Raw Score CMS G-Code Modifier Level of Impairment Assistance   6 % Total / Unable   7 - 9 CM 80 - 100% Maximal Assist   10 - 14 CL 60 - 80% Moderate Assist   15 - 19 CK 40 - 60% Moderate Assist   20 - 22 CJ 20 - 40% Minimal Assist   23 CI 1-20% SBA / CGA   24 CH 0% Independent/ Mod I     Goals/Discharge Status  Patient safely and effectively ambulates with crutches with  contact guard,  non weight bearing: right leg on level surfaces.    Recommended Plan:  Patient to be discharged to home with family support.    08/02/2022

## 2022-08-02 NOTE — TRANSFER OF CARE
"Anesthesia Transfer of Care Note    Patient: Tiffany Dobbs    Procedure(s) Performed: Procedure(s) (LRB):  RECONSTRUCTION, KNEE, ACL, USING Allograft Achilles GRAFT, possible meniscus repair, possible PLC repair, knee exam under anesthesia (Right)    Patient location: PACU    Anesthesia Type: general    Transport from OR: Transported from OR on 2-3 L/min O2 by NC with adequate spontaneous ventilation    Post pain: adequate analgesia    Post assessment: no apparent anesthetic complications and tolerated procedure well    Post vital signs: stable    Level of consciousness: awake and alert    Nausea/Vomiting: no nausea/vomiting    Complications: none    Transfer of care protocol was followed      Last vitals:   Visit Vitals  /66 (BP Location: Right arm, Patient Position: Lying)   Pulse 73   Temp 36.4 °C (97.5 °F) (Skin)   Resp 20   Ht 5' 3" (1.6 m)   Wt 90.7 kg (200 lb)   LMP 06/28/2022 (Approximate)   SpO2 100%   Breastfeeding No   BMI 35.43 kg/m²     "

## 2022-08-02 NOTE — ANESTHESIA PROCEDURE NOTES
Intubation    Date/Time: 8/2/2022 1:33 PM  Performed by: Kennedy Murillo MD  Authorized by: Parag Hernandez MD     Intubation:     Induction:  Intravenous    Intubated:  Postinduction    Mask Ventilation:  Easy mask    Attempts:  1    Attempted By:  Resident anesthesiologist    Method of Intubation:  Direct    Blade:  Kash 3    Laryngeal View Grade: Grade I - full view of cords      Difficult Airway Encountered?: No      Complications:  None    Airway Device:  Oral endotracheal tube    Airway Device Size:  7.0    Style/Cuff Inflation:  Cuffed (inflated to minimal occlusive pressure)    Tube secured:  23    Secured at:  The lips    Placement Verified By:  Capnometry and Revisualization with laryngoscopy    Complicating Factors:  None    Findings Post-Intubation:  BS equal bilateral

## 2022-08-02 NOTE — BRIEF OP NOTE
[facility] (Hospital)  Brief Operative Note    Surgery Date: 8/2/2022     Surgeon(s) and Role:     * Tevin Tubbs IV, MD - Primary    Assisting Surgeon: None    Pre-op Diagnosis:  Rupture of anterior cruciate ligament of right knee, initial encounter [S83.511A]  Injury of posterolateral corner of knee, right, initial encounter [S89.91XA]  Sprain of medial collateral ligament of right knee, initial encounter [S83.411A]    Post-op Diagnosis:  Post-Op Diagnosis Codes:     * Rupture of anterior cruciate ligament of right knee, initial encounter [S83.511A]     * Injury of posterolateral corner of knee, right, initial encounter [S89.91XA]     * Sprain of medial collateral ligament of right knee, initial encounter [S83.411A]    Procedure(s) (LRB):  RECONSTRUCTION, KNEE, ACL, USING Allograft Achilles GRAFT, possible meniscus repair, possible PLC repair, knee exam under anesthesia (Right)    Anesthesia: General/Regional    Operative Findings: see op note    Estimated Blood Loss: * No values recorded between 8/2/2022  1:59 PM and 8/2/2022  6:38 PM *         Specimens:   Specimen (24h ago, onward)            None            Discharge Note    OUTCOME: Patient tolerated treatment/procedure well without complication and is now ready for discharge.    DISPOSITION: Home or Self Care    FINAL DIAGNOSIS:  <principal problem not specified>    FOLLOWUP: In clinic    DISCHARGE INSTRUCTIONS:  No discharge procedures on file.

## 2022-08-03 ENCOUNTER — PATIENT MESSAGE (OUTPATIENT)
Dept: ORTHOPEDICS | Facility: CLINIC | Age: 32
End: 2022-08-03
Payer: MEDICAID

## 2022-08-03 VITALS
WEIGHT: 214.75 LBS | RESPIRATION RATE: 16 BRPM | TEMPERATURE: 98 F | BODY MASS INDEX: 38.05 KG/M2 | DIASTOLIC BLOOD PRESSURE: 65 MMHG | SYSTOLIC BLOOD PRESSURE: 103 MMHG | OXYGEN SATURATION: 99 % | HEART RATE: 70 BPM | HEIGHT: 63 IN

## 2022-08-03 PROCEDURE — 94761 N-INVAS EAR/PLS OXIMETRY MLT: CPT

## 2022-08-03 PROCEDURE — 94799 UNLISTED PULMONARY SVC/PX: CPT

## 2022-08-03 PROCEDURE — 25000003 PHARM REV CODE 250: Performed by: ORTHOPAEDIC SURGERY

## 2022-08-03 PROCEDURE — 25000003 PHARM REV CODE 250: Performed by: STUDENT IN AN ORGANIZED HEALTH CARE EDUCATION/TRAINING PROGRAM

## 2022-08-03 PROCEDURE — 63600175 PHARM REV CODE 636 W HCPCS: Performed by: STUDENT IN AN ORGANIZED HEALTH CARE EDUCATION/TRAINING PROGRAM

## 2022-08-03 PROCEDURE — 99900035 HC TECH TIME PER 15 MIN (STAT)

## 2022-08-03 RX ORDER — SODIUM CHLORIDE 9 MG/ML
INJECTION, SOLUTION INTRAVENOUS CONTINUOUS
Status: DISCONTINUED | OUTPATIENT
Start: 2022-08-03 | End: 2022-08-03 | Stop reason: HOSPADM

## 2022-08-03 RX ORDER — OXYCODONE HYDROCHLORIDE 5 MG/1
10 TABLET ORAL ONCE
Status: COMPLETED | OUTPATIENT
Start: 2022-08-03 | End: 2022-08-03

## 2022-08-03 RX ADMIN — OXYCODONE 10 MG: 5 TABLET ORAL at 12:08

## 2022-08-03 RX ADMIN — ACETAMINOPHEN 1000 MG: 500 TABLET ORAL at 01:08

## 2022-08-03 RX ADMIN — CEFAZOLIN SODIUM 2 G: 2 SOLUTION INTRAVENOUS at 12:08

## 2022-08-03 RX ADMIN — ACETAMINOPHEN 1000 MG: 500 TABLET ORAL at 05:08

## 2022-08-03 RX ADMIN — FAMOTIDINE 20 MG: 20 TABLET ORAL at 08:08

## 2022-08-03 RX ADMIN — DOCUSATE SODIUM AND SENNOSIDES 1 TABLET: 8.6; 5 TABLET, FILM COATED ORAL at 08:08

## 2022-08-03 RX ADMIN — OXYCODONE HYDROCHLORIDE 5 MG: 5 TABLET ORAL at 03:08

## 2022-08-03 RX ADMIN — CEFAZOLIN SODIUM 2 G: 2 SOLUTION INTRAVENOUS at 08:08

## 2022-08-03 RX ADMIN — OXYCODONE HYDROCHLORIDE 5 MG: 5 TABLET ORAL at 07:08

## 2022-08-03 RX ADMIN — SODIUM CHLORIDE: 0.9 INJECTION, SOLUTION INTRAVENOUS at 12:08

## 2022-08-03 RX ADMIN — ASPIRIN 81 MG: 81 TABLET, COATED ORAL at 08:08

## 2022-08-03 RX ADMIN — CELECOXIB 200 MG: 100 CAPSULE ORAL at 08:08

## 2022-08-03 RX ADMIN — OXYCODONE HYDROCHLORIDE 5 MG: 5 TABLET ORAL at 11:08

## 2022-08-03 NOTE — PLAN OF CARE
Discharge orders noted. Additional clinical references attached.    Patient's discharge instructions given by bedside RN and reviewed via this VN.  Education provided on new medication, diagnosis, and follow-up appointments.  Teach back method used. Patient verbalized understanding. All questions answered.  Floor nurse notified.  Pt waiting on 2 medications from pharmacy and transport for  to home.      08/03/22 1217   AVS Confirmation   Discharge instructions and AVS given to and reviewed with patient and/or significant other. Yes

## 2022-08-03 NOTE — PLAN OF CARE
08/02/22 2319   Admission   Initial VN Admission Questions Complete   Communication Issues? None   Shift   Virtual Nurse - Rounding Complete   Pain Management Interventions relaxation techniques promoted;quiet environment facilitated;pain management plan reviewed with patient/caregiver   Virtual Nurse - Patient Verbalized Approval Of VN Rounding;Camera Use   Type of Frequent Check   Type Patient Rounds   Safety/Activity   Patient Rounds bed in low position;bed wheels locked;call light in patient/parent reach;clutter free environment maintained;ID band on;visualized patient;placement of personal items at bedside   Safety Promotion/Fall Prevention room near unit station;/camera at bedside;side rails raised x 2;nonskid shoes/socks when out of bed;Fall Risk reviewed with patient/family;assistive device/personal item within reach;bed alarm set;instructed to call staff for mobility   Safety Precautions emergency equipment at bedside   Activity Management Rolling - L1   Positioning   Body Position position maintained   Head of Bed (HOB) Positioning HOB elevated   Positioning/Transfer Devices pillows;in use    VN cued into room to complete admit assessment. VIP model introduced; VN working alongside bedside treatment team.  Plan of care reviewed with patient. Patient informed of fall risk, fall precautions, call light within reach, side rails x2 elevated. Patient notified to ask staff for assistance. Patient verbalized complete understanding. Time allowed for questions. Will continue to monitor and intervene as needed.

## 2022-08-03 NOTE — ANESTHESIA POSTPROCEDURE EVALUATION
Anesthesia Post Evaluation    Patient: Tiffany Dobbs    Procedure(s) Performed: Procedure(s) (LRB):  RECONSTRUCTION, KNEE, ACL, USING Allograft Achilles GRAFT, possible meniscus repair, possible PLC repair, knee exam under anesthesia (Right)    Final Anesthesia Type: general      Patient location during evaluation: PACU  Patient participation: Yes- Able to Participate  Level of consciousness: awake and alert  Post-procedure vital signs: reviewed and stable  Pain management: adequate  Airway patency: patent    PONV status at discharge: No PONV  Anesthetic complications: no      Cardiovascular status: blood pressure returned to baseline and hemodynamically stable  Respiratory status: unassisted  Hydration status: euvolemic  Follow-up not needed.          Vitals Value Taken Time   /69 08/03/22 0543   Temp 36.3 °C (97.4 °F) 08/03/22 0543   Pulse 68 08/03/22 0543   Resp 18 08/03/22 0543   SpO2 98 % 08/03/22 0543         Event Time   Out of Recovery 19:57:58         Pain/Austyn Score: Pain Rating Prior to Med Admin: 8 (8/3/2022  5:55 AM)  Pain Rating Post Med Admin: 0 (8/2/2022  7:15 PM)  Austyn Score: 10 (8/2/2022  8:48 PM)

## 2022-08-03 NOTE — PROGRESS NOTES
[facility] Progress Note     In brief, 32 y.o. female s/p R knee multiligamentous reconstruction    S: Tolerated procedure yesterday without complication. Received nerve block last night; pain better controlled overnight with scheduled medications. Denies numbness or tingling prior to nerve block. Denies CP, SOB. No other issues     O:      Temp:  [97.5 °F (36.4 °C)-98.3 °F (36.8 °C)] 98.1 °F (36.7 °C)  Pulse:  [70-86] 70  Resp:  [14-20] 18  SpO2:  [84 %-100 %] 96 %  BP: (108-151)/(60-79) 119/77     General: NAD,  Cardio: Regular rate by peripheral pulse   Pulm: Normal WOB on room air, symmetric chest expansion  Abd: Soft, NT, ND  Psych: normal affect/mood  Neuro: A&O    MSK:     Surgical dressing clean dry intact.  No saturation or strike through  No calf tenderness to palpation  No pain with passive stretch of toes  Able to grossly flex/extend toes  Unable to obtain reliable sensory or motor exam due to nerve block  Foot WWP, palpable DP     Labs:  n/a     Imaging:  n/a    Cultures:  n/a     Assessment/Plan:     32 y.o. female s/p R knee multiligamentous reconstruction    - Reinforce dressings prn  - Pain control  - Crutch training  - Continue appropriate postop medical care per orders     Disposition: Home today. Prescriptions in chart.     Mike Strauss MD  [facility]

## 2022-08-03 NOTE — NURSING
Patient arrived to unit safe. Awake and alert.VSS. No signs of distress. R arm AC CDI. Clear liquid diet initiated. POC reviewed with pt and pt's mother. Bed locked and in lowest position. Table at bedside. WCTM.

## 2022-08-03 NOTE — PROGRESS NOTES
[facility]                    Pharmacy       Discharge Medication Education    Patient ACCEPTED medication education. Pharmacy has provided education on the name, indication, and possible side effects of the medication(s) prescribed, using teach-back method.     The following medications have also been discussed, during this admission.        Medication List        START taking these medications      acetaminophen 325 MG tablet  Commonly known as: TYLENOL  Take 1 tablet (325 mg total) by mouth every 8 (eight) hours.     aspirin 81 MG EC tablet  Commonly known as: ECOTRIN  Take 1 tablet (81 mg total) by mouth once daily.     celecoxib 200 MG capsule  Commonly known as: CeleBREX  Take 1 capsule (200 mg total) by mouth 2 (two) times daily.     gabapentin 300 MG capsule  Commonly known as: NEURONTIN  Take 1 capsule (300 mg total) by mouth every evening.     oxyCODONE 5 MG immediate release tablet  Commonly known as: ROXICODONE  Take 1 tablet (5 mg total) by mouth every 6 (six) hours as needed.            CONTINUE taking these medications      ergocalciferol 50,000 unit Cap  Commonly known as: ERGOCALCIFEROL     ibuprofen 600 MG tablet  Commonly known as: ADVIL,MOTRIN  Take 1 tablet (600 mg total) by mouth every 6 (six) hours as needed for Pain.     Lactobacillus rhamnosus GG 10 billion cell capsule  Commonly known as: CULTURELLE     NON FORMULARY MEDICATION     turmeric 400 mg Cap               Where to Get Your Medications        These medications were sent to [facility] [address] [place] [address]      Hours: Mon-Fri, 8a-5:30p Phone: [phone]   aspirin 81 MG EC tablet  gabapentin 300 MG capsule  oxyCODONE 5 MG immediate release tablet       You can get these medications from any pharmacy    Bring a paper prescription for each of these medications  acetaminophen 325 MG tablet  celecoxib 200 MG capsule          Thank you  Raoul Cheung, CherieD

## 2022-08-03 NOTE — NURSING
Pt alert laying in bed. Pain controlled with oxycodone 5mg. Pt denies n & v at this time. Polar ice machine in place. Ice changed Q4 hours, machine turned on and off Q30 minutes per MD Arley orders. WCTM

## 2022-08-03 NOTE — NURSING
AVS given to patient.  VN went over AVS.  Patient received medications from Outpatient Pharmacy.  EMTs on floor to take patient home per stretcher.  Polar Ice to go home with patient and instructed on using the ice machine.  Stabilizer is intact.  Mother is at bedside.

## 2022-08-03 NOTE — PROGRESS NOTES
Future Appointments   Date Time Provider Department Center   8/8/2022  4:00 PM Lizzy Chery MD [facility]   8/17/2022  9:00 AM [facility] XR-B LIMIT 500 LBS [facility]   8/17/2022  9:30 AM Madison Samaniego PA-C [facility]   9/14/2022  9:30 AM Tevin Tubbs IV, MD [facility]  [facility]   11/2/2022  9:30 AM Madison Samaniego PA-C [facility]

## 2022-08-03 NOTE — PLAN OF CARE
"CM met with pt prior to d/c - per pt - she will restart Outpt Physical Therapy with [facility] in the [facility] -- she has emailed her therapist and is awaiting a reply.    Stretcher transport set up for "now" as pt's mother states she would be unable to assist pt out of wheelchair to bed ..       Future Appointments   Date Time Provider Department Center   8/8/2022  4:00 PM Lizzy Chery MD [facility] OBGYN [facility]   8/17/2022  9:00 AM [facility] ODC XR-B LIMIT 500 LBS [facility] XRAY OP [facility]   8/17/2022  9:30 AM Madison Samaniego PA-C [facility] OR [address] [facility]   9/14/2022  9:30 AM Tevin Tubbs IV, MD [facility] OR [address] [facility]   11/2/2022  9:30 AM Madison Samaniego PA-C [facility] OR [address] [facility]        08/03/22 1148   Final Note   Assessment Type Final Discharge Note   Anticipated Discharge Disposition Home   What phone number can be called within the next 1-3 days to see how you are doing after discharge? [phone]   Hospital Resources/Appts/Education Provided Appointments scheduled and added to AVS   Post-Acute Status   Post-Acute Authorization Home Health   Discharge Delays None known at this time          "

## 2022-08-03 NOTE — PLAN OF CARE
CM met with pt and her Mother Toya Velasquez   [phone]   pt lives alone but her mother will assist as she recuperates   pt has crutches, sc and a wedge pillow    pt / mother request stretcher to home as mother is unable to lift pt --   dx:   R ACL tear  - s/p repair 8/2    also - mother and pt request a hospital bed for home use as they feel it would improve pt's mobility and increase ease of getting in and out of bed - Surgeon informed - he feels this is not needed for pt - pt and Ms. Velsaquez informed -  they request to speak with Surgeon as they feel pt's bed is too high and a bed would help her mobility  -- Secure Chat sent to Surgeon asking that he call pt or her mother.         08/03/22 1135   Discharge Planning   Assessment Type Discharge Planning Assessment   Resource/Environmental Concerns environmental   Environment Concerns other (see comments)  (lives alone - mother will assist as needed, NWB R leg)   Support Systems Parent  (Mother Toya Velasquez  [phone])   Equipment Currently Used at Home crutches;shower chair   Current Living Arrangements home/apartment/condo   Patient/Family Anticipates Transition to home;home with family   Patient/Family Anticipated Services at Transition   (outpt physical therapy  -)   DME Needed Upon Discharge  none   Discharge Plan A Home;Home with family  (outpatient physical therapy)

## 2022-08-03 NOTE — PROGRESS NOTES
POST OP NOTE    Patient tolerated the procedure well. No complications. Awake from anesthesia with pain controled.     Exam:    Surgical dressing clean dry intact.  No saturation or strike through  No calf tenderness to palpation  No pain with passive stretch of toes  SILT Sa/Iraheta/DP/SP/T  EHL/FHL/TA/GSC intact  Foot WWP, palpable DP      A/P: 32F s/p R knee multi ligamentous reconstruction    - Pain control  - Crutch training  - Continue appropriate postop medical care per orders    Disposition: Home tomorrow. Prescriptions in chart.     Mike Strauss MD

## 2022-08-04 DIAGNOSIS — S83.511A RUPTURE OF ANTERIOR CRUCIATE LIGAMENT OF RIGHT KNEE, INITIAL ENCOUNTER: Primary | ICD-10-CM

## 2022-08-04 DIAGNOSIS — S89.91XA INJURY OF POSTEROLATERAL CORNER OF KNEE, RIGHT, INITIAL ENCOUNTER: ICD-10-CM

## 2022-08-04 NOTE — DISCHARGE SUMMARY
[facility] Surg  Discharge Note  Short Stay    Procedure(s) (LRB):  RECONSTRUCTION, KNEE, ACL, USING Allograft Achilles GRAFT, possible meniscus repair, possible PLC repair, knee exam under anesthesia (Right)  EXAM UNDER ANESTHESIA    OUTCOME: Patient tolerated treatment/procedure well without complication and is now ready for discharge.    DISPOSITION: Home or Self Care    FINAL DIAGNOSIS:  <principal problem not specified>    FOLLOWUP: In clinic    DISCHARGE INSTRUCTIONS:    Discharge Procedure Orders   Diet general     Ice to affected area   Order Comments: using barrier between ice and skin (specify duration&frequency)     Keep surgical extremity elevated     Call MD for:  temperature >100.4     Call MD for:  persistent nausea and vomiting     Call MD for:  severe uncontrolled pain     Call MD for:  difficulty breathing, headache or visual disturbances     Call MD for:  redness, tenderness, or signs of infection (pain, swelling, redness, odor or green/yellow discharge around incision site)     Call MD for:  hives     Call MD for:  persistent dizziness or light-headedness     Call MD for:  extreme fatigue     Leave dressing on - Keep it clean, dry, and intact until clinic visit     Non weight bearing   Order Comments: NWB RLE in extension         Clinical Reference Documents Added to Patient Instructions       Document    ACETAMINOPHEN, ADULT (ENGLISH)    ANTERIOR CRUCIATE LIGAMENT EXERCISES LYING DOWN (ENGLISH)    ANTERIOR CRUCIATE LIGAMENT EXERCISES STANDING (ENGLISH)    ANTERIOR CRUCIATE LIGAMENT TEAR DISCHARGE INSTRUCTIONS (ENGLISH)    ANTERIOR CRUCIATE LIGAMENT TEAR REPAIR DISCHARGE INSTRUCTIONS (ENGLISH)    ASPIRIN, ADULT (ENGLISH)    CELECOXIB, ADULT (ENGLISH)    GABAPENTIN, ADULT (ENGLISH)    OXYCODONE, ADULT (ENGLISH)    USING COLD FOR PAIN (ENGLISH)

## 2022-08-05 NOTE — OP NOTE
Operative Report    YANG DOBBS  : 1990  MRN: [id number]    Date of Procedure: 2022     Pre-op Diagnosis:     Right ACL Tear.  Right Posterolateral corner (PLC) Tear.    Post-op Diagnosis:     Same    Procedure:    (82563) ACL reconstruction with Achilles allograft.   (28809) Posterolateral corner reconstruction with semitendinosus allograft     Surgeon: Tevin Tubbs IV, MD     Assisting Surgeon:     Mike Strauss MD    Anesthesiologist: see record    Anesthesia:    general and regional     Estimated Blood Loss:   Minimal         Specimens: none    Findings:  Effusion: Present   Patella: Normal   Trochlea: Normal   Medial cartilage: Normal   Medial meniscus: Normal   Lateral cartilage: Normal   Lateral meniscus: Normal   ACL: Torn   PCL: Normal   PLC: Torn clinically   Other: lateral capsule knee, intact    Field of view:  7    Tourniquet:  Used during ACL portion only.    Indications for surgery:   Yang Dobbs is a 32 y.o. female who sustained a traumatic injury to the knee.  The history, examination, and imaging were consistent with instability related to an ACL tear.  Imaging and physical exam findings were consistent with injury to the lateral stabilizers of the knee at the posterolateral corner.  The risks and benefits of surgery including pain, bleeding, infection, surgery failure, need for revision surgery, rerupture of the graft, and failure to return to prior level of function.  We also discussed the risk of nerve injury.  Graft choices were discussed, and we selected allograft.  After this discussion the patient selected Achilles allograft and elected to proceed with surgery.    Description of procedure:    Prior to the surgical procedure, the patient was identified in the preoperative holding area.  We had a thorough discussion about the risks and benefits of surgery including the expected post operative protocol.  The patient signed an informed consent and the operative extremity was  marked.  A regional anesthesia block was performed by the Anesthesiology team after a neurologic examination was performed at the conclusion of the case.  Preoperative antibiotics were given prior to incision for infection prophylaxis. The patient was taken to the operating room and positioned on the table in the ACL position.  After general anesthesia was induced, a well padded thigh tourniquet was placed and used during the ACL portion of the case only.  An exam was performed, which showed a positive Lachman and a positive pivot shift with a positive dial test and increased laxity with varus stress at the knee. The patient was subsequently prepped and draped in the usual orthopaedic sterile fashion.  A surgical timeout was performed confirming the surgical site and procedure prior to proceeding.  Local anesthetic was injected into the proposed portals.      A standard anterolateral portal was then made, and the arthroscope was introduced.  Under direct vision with the use of a spinal needle an anteromedial portal was made, and a probe was placed in the knee.  A thorough diagnostic arthroscopy was performed, and the findings noted above were seen.      (93960) Attention was first turned to the stump of the ACL.  A motorized shaver was used to debride the stump. A la was used to perform a limited notchplasty to improve visualization of the posterior wall for the femoral socket.  A tunnel was then drilled to the anatomic footprint in the tibia, taking care to avoid injuring the transverse meniscal ligament.  A femoral socket was then drilled using the medial portal with the knee in hyperflexion.  The scope was place medially to ensure the posterior wall had appropriate thickness and was intact.  Attention was then turned to the back table, and the Achilles allograft was prepared in a standard sterile fashion.  A FiberWire whipstitch was placed in the tendinous portion.  The graft was then pulled through the tibial  tunnel and docked on the femoral side.  It was fixed with an Arthrex 7 x 20 mm metal interface screw.  The graft was then cycled and tensioned.  The tibial side of the graft was secured in the following manner after conclusion of the posterolateral corner reconstruction:  fixed in 20 degrees of flexion using a 10 mm BioComposite screw where the resulting Lachman was stable with appropriate tension and no impingement during range of motion.  The remainder of the graft was cut flush with the front of the tunnel, and the previously placed FiberWire sutures within the tendon were introduced into an Arthrex SwiveLock anchor for routine backup fixation.      (19435) The tourniquet was released prior to proceeding with the posterolateral corner reconstruction.  A standard approach to the lateral aspect of the knee for posterolateral corner was conducted.  An approximately 6cm incision was made over the posterolateral aspect of the knee.  Electrocautery was used to ensure appropriate hemostasis.  The peroneal nerve was then identified in the first window and protected with a small pemrose drain.  A release of the fascia was performed to the first motor branch of the peroneal nerve where the peroneal nerve entered the anterior compartment to decrease risk of nerve injury.  The middle window was developed where the FCL was identified and tagged with a suture to identify the femoral and fibular insertions. The third window was developed using an incision in the IT fascia centered over the FCL insertion and in line with the fibers directed toward Gerdy's tubercle.       A guidewire was placed from anterior lateral to postero-medial in the fibular head and the position was confirmed with fluoroscopy. A 7mm tunnel was drilled in the fibular head using a low profile reamer with a retractor protecting the posterior and lateral structures of the knee.  A semi-tendinosus graft was then prepared and passed through fibula then fixed  with a 7mm tenodesis screw.  The fibular collateral and popliteus tendon were identified at their femoral attachments. A guide wire was placed at the femoral insertion of the FCL and the position was confirmed with fluoroscopy.  An 8.5x30mm socket was drilled in the femur.  Passing sutures were used to pass the limbs of the graft into the socket.  With appropriate length of graft in the socket and and appropriate tension on the grafts, the leg was placed in 30 degrees of flexion with a valgus moment and internal rotation.  An 8mm tenodesis screw was then placed to secure the limbs of the graft into the socket.  The knee had full motion and normal varus opening with a negative dial test.  Careful hemostasis was obtained before closure of the lateral knee.  The knee was copiously irrigated with normal saline.       Finally, the ACL was fixed to the tibia as indicated above.     The IT fascia was closed with #1 Vicryl;  2-0 Vicryl was used subcutaneously followed by staples for the lateral incision.  The ACL tibial incision was closed with 2-0 vicryl then 3.0 nylon.  The arthroscopic portals were closed with 3.0 Nylon.  A dry, sterile dressing was placed. A hinged knee brace was then applied. The sponge needle and instrument counts were correct at the end of the case. The patient tolerated the procedure well, was extubated, and was taken to recovery in stable condition.    Post-Operative Management:  The patient will be NWB on the operative extremity.  After discharge, the patient will follow up in my office ([phone]) in 14 days after surgery.  The patient will be treated with DVT prophylaxis in the post operative period.  Physical therapy will start within 1 week post operatively.         Complications: No     Condition: Good     Disposition: PACU - hemodynamically stable.     Attestation: I was present and scrubbed for the entire procedure.    Implants:   Implant Name Type Inv. Item Serial No.  Lot  No. LRB No. Used Action   [id number]   [id number] MUSCULOSKELETAL TRANSPLANT FND  Right 1 Implanted   [id number]   [id number] MUSCULOSKELETAL TRANSPLANT FND  Right 1 Implanted   SCREW CANNULATED 7MM X 20MM - [id number]  SCREW CANNULATED 7MM X 20MM  ARTHREX [id number] Right 1 Implanted   ANCHOR SUT 6.25X19.1MM - [id number]  ANCHOR SUT 6.25X19.1MM  ARTHREX [id number] Right 1 Implanted   SCREW FASTTHREAD INTRF 8X20MM - [id number]  SCREW FASTTHREAD INTRF 8X20MM  ARTHREX [id number] Right 1 Implanted   SCREW FASTTHREAD INTRF 7X20MM - [id number]  SCREW FASTTHREAD INTRF 7X20MM  ARTHREX [id number] Right 1 Implanted   Tightrope ABS, button    ARTHREX [id number] Right 1 Implanted   SCREW FASTTHREAD INTFR 50D08DR - [id number]  SCREW FASTTHREAD INTFR [id number]  ARTHREX [id number] Right 1 Implanted   SYS SWIVELOCK ANCH 4.75X19.1MM - [id number]  SYS SWIVELOCK ANCH 4.75X19.1MM  ARTHREX [id number] Right 1 Implanted

## 2022-08-08 ENCOUNTER — OFFICE VISIT (OUTPATIENT)
Dept: OBSTETRICS AND GYNECOLOGY | Facility: CLINIC | Age: 32
End: 2022-08-08
Payer: MEDICAID

## 2022-08-08 ENCOUNTER — CLINICAL SUPPORT (OUTPATIENT)
Dept: REHABILITATION | Facility: HOSPITAL | Age: 32
End: 2022-08-08
Payer: MEDICAID

## 2022-08-08 ENCOUNTER — PATIENT MESSAGE (OUTPATIENT)
Dept: ORTHOPEDICS | Facility: CLINIC | Age: 32
End: 2022-08-08
Payer: MEDICAID

## 2022-08-08 DIAGNOSIS — D50.9 MICROCYTIC ANEMIA: ICD-10-CM

## 2022-08-08 DIAGNOSIS — S89.91XA INJURY OF POSTEROLATERAL CORNER OF KNEE, RIGHT, INITIAL ENCOUNTER: ICD-10-CM

## 2022-08-08 DIAGNOSIS — M62.81 MUSCLE WEAKNESS OF LOWER EXTREMITY: Primary | ICD-10-CM

## 2022-08-08 DIAGNOSIS — Z09 FOLLOW UP: Primary | ICD-10-CM

## 2022-08-08 DIAGNOSIS — M25.60 RANGE OF MOTION DEFICIT: ICD-10-CM

## 2022-08-08 DIAGNOSIS — S83.511A RUPTURE OF ANTERIOR CRUCIATE LIGAMENT OF RIGHT KNEE, INITIAL ENCOUNTER: ICD-10-CM

## 2022-08-08 DIAGNOSIS — Z98.890 STATUS POST ARTHROSCOPIC SURGERY OF RIGHT KNEE: ICD-10-CM

## 2022-08-08 DIAGNOSIS — N92.1 MENORRHAGIA WITH IRREGULAR CYCLE: ICD-10-CM

## 2022-08-08 PROCEDURE — 1160F PR REVIEW ALL MEDS BY PRESCRIBER/CLIN PHARMACIST DOCUMENTED: ICD-10-PCS | Mod: CPTII,95,, | Performed by: OBSTETRICS & GYNECOLOGY

## 2022-08-08 PROCEDURE — 97110 THERAPEUTIC EXERCISES: CPT | Mod: PN

## 2022-08-08 PROCEDURE — 99213 PR OFFICE/OUTPT VISIT, EST, LEVL III, 20-29 MIN: ICD-10-PCS | Mod: 95,,, | Performed by: OBSTETRICS & GYNECOLOGY

## 2022-08-08 PROCEDURE — 99213 OFFICE O/P EST LOW 20 MIN: CPT | Mod: 95,,, | Performed by: OBSTETRICS & GYNECOLOGY

## 2022-08-08 PROCEDURE — 1160F RVW MEDS BY RX/DR IN RCRD: CPT | Mod: CPTII,95,, | Performed by: OBSTETRICS & GYNECOLOGY

## 2022-08-08 PROCEDURE — 1159F MED LIST DOCD IN RCRD: CPT | Mod: CPTII,95,, | Performed by: OBSTETRICS & GYNECOLOGY

## 2022-08-08 PROCEDURE — 1159F PR MEDICATION LIST DOCUMENTED IN MEDICAL RECORD: ICD-10-PCS | Mod: CPTII,95,, | Performed by: OBSTETRICS & GYNECOLOGY

## 2022-08-08 NOTE — PROGRESS NOTES
HISTORY OF PRESENT ILLNESS:    Tiffany Dobbs is a 32 y.o. female  No LMP recorded. presents today complaining of  Menarche - 8, history of irregular cycles for the last year with weight gain over the last few years.   For the last years cycles every 2-3 months lasting 7 days using 5-6 pads per day.     Years ago at age 19 with extreme weight gain she missed a cycle for 4 months, with weight loss cycles became regular.     Recent surgery torn ACL - less activity.     Patient reports today with complaints of follow up for irregular menses.   The patient location is: [place], [place]   The chief complaint leading to consultation is: Follow up visit for follow up for irregular menses.   Visit type: Virtual visit with synchronous audio and video  Total time spent with patient: 20 minutes (Over 50% of time is spent in counseling and coordination of care for follow up for irregular menses.     COVID-19 precautions discussed in detail, precautions given. Patient urged to visit [email or web address] for latest health updates on COVID-19.     Each patient to whom he or she provides medical services by telemedicine is:  (1) informed of the relationship between the physician and patient and the respective role of any other health care provider with respect to management of the patient; and (2) notified that he or she may decline to receive medical services by telemedicine and may withdraw from such care at any time.      Past Medical History:   Diagnosis Date    Anemia     PCOS (polycystic ovarian syndrome) 2020       Past Surgical History:   Procedure Laterality Date    BREAST REDUCTION      EXAMINATION UNDER ANESTHESIA  2022    Procedure: EXAM UNDER ANESTHESIA;  Surgeon: Tevin Tubbs IV, MD;  Location: [facility];  Service: Orthopedics;;    RECONSTRUCTION OF ANTERIOR CRUCIATE LIGAMENT USING GRAFT Right 2022    Procedure: RECONSTRUCTION, KNEE, ACL, USING Allograft Achilles GRAFT, possible meniscus repair, possible  PLC repair, knee exam under anesthesia;  Surgeon: Tevin Tubbs IV, MD;  Location: [facility];  Service: Orthopedics;  Laterality: Right;  Achilles allograft with bone block(IN FREEZER); open/THAW at start of case  Black knee post/operative side, well leg marvin on nonsurgical side  Position: ACL P    TOTAL REDUCTION MAMMOPLASTY       MEDICATIONS AND ALLERGIES:    Current Outpatient Medications:     acetaminophen (TYLENOL) 325 MG tablet, Take 1 tablet (325 mg total) by mouth every 8 (eight) hours., Disp: 120 tablet, Rfl: 1    aspirin (ECOTRIN) 81 MG EC tablet, Take 1 tablet (81 mg total) by mouth once daily., Disp: 30 tablet, Rfl: 0    celecoxib (CELEBREX) 200 MG capsule, Take 1 capsule (200 mg total) by mouth 2 (two) times daily., Disp: 60 capsule, Rfl: 0    ergocalciferol (ERGOCALCIFEROL) 50,000 unit Cap, Take 50,000 Units by mouth every 7 days., Disp: , Rfl:     gabapentin (NEURONTIN) 300 MG capsule, Take 1 capsule (300 mg total) by mouth every evening., Disp: 30 capsule, Rfl: 0    ibuprofen (ADVIL,MOTRIN) 600 MG tablet, Take 1 tablet (600 mg total) by mouth every 6 (six) hours as needed for Pain., Disp: 15 tablet, Rfl: 0    Lactobacillus rhamnosus GG (CULTURELLE) 10 billion cell capsule, Take 1 capsule by mouth once daily., Disp: , Rfl:     NON FORMULARY MEDICATION, once daily. Blood builder Multivitamin with iron supplement, Disp: , Rfl:     oxyCODONE (ROXICODONE) 5 MG immediate release tablet, Take 1 tablet (5 mg total) by mouth every 6 (six) hours as needed., Disp: 30 tablet, Rfl: 0    turmeric 400 mg Cap, Take by mouth once daily., Disp: , Rfl:     Review of patient's allergies indicates:   Allergen Reactions    Bactrim [sulfamethoxazole-trimethoprim] Itching and Rash       COMPREHENSIVE GYN HISTORY:  PAP History: Denies abnormal Paps.  Infection History: Denies STDs. Denies PID.  Benign History: Denies uterine fibroids. Denies ovarian cysts. Denies endometriosis. Denies other conditions.  Cancer  History: Denies cervical cancer. Denies uterine cancer or hyperplasia. Denies ovarian cancer. Denies vulvar cancer or pre-cancer. Denies vaginal cancer or pre-cancer. Denies breast cancer. Denies colon cancer.  Sexual Activity History: Reports currently being sexually active  Menstrual History: Every 28 days, flows for 4 days. Light flow.  Dysmenorrhea History: Denies dysmenorrhea.       ROS:  GENERAL: No fever or chills.  BREASTS: No pain. No lumps. No discharge.  ABDOMEN: No pain. No nausea. No vomiting. No diarrhea. No constipation.  REPRODUCTIVE: No abnormal bleeding.   VULVA: No pain. No lesions. No itching.  VAGINA: No relaxation. No itching. No odor. No discharge. No lesions.  URINARY: No incontinence. No nocturia. No frequency. No dysuria.    There were no vitals taken for this visit.    PE:  APPEARANCE: Well nourished, well developed, in no acute distress.  AFFECT: WNL, alert and oriented x 3.  Deferred    1. Follow up    2. Menorrhagia with irregular cycle    3. Microcytic anemia      PLAN:    FOLLOW-UP with me pending test results.  Menstrual diary   Precautions given   Patient to call if no cycle in 90 days   Dr. Ulloa for consult regarding freezing eggs

## 2022-08-08 NOTE — PLAN OF CARE
[facility]   Physical Therapy Initial Evaluation     Date: 8/8/2022   Name: Tiffany Dobbs  Clinic Number: [id number]    Therapy Diagnosis:   Encounter Diagnoses   Name Primary?    Rupture of anterior cruciate ligament of right knee, initial encounter     Injury of posterolateral corner of knee, right, initial encounter     Muscle weakness of lower extremity Yes    Range of motion deficit     Status post arthroscopic surgery of right knee      Physician: Tevin Tubbs IV, MD    Physician Orders: PT Eval and Treat   Medical Diagnosis from Referral:   S83.511A (ICD-10-CM) - Sprain of anterior cruciate ligament of right knee, initial encounter   S89.91XA (ICD-10-CM) - Unspecified injury of right lower leg, initial encounter     S/p RECONSTRUCTION, KNEE, ACL, USING Allograft Achilles GRAFT, possible meniscus repair, possible PLC repair, knee exam under anesthesia 8/02/2022  Evaluation Date: 8/8/2022  Authorization Period Expiration: 8/07/2023  Plan of Care Expiration: 11/08/2022  Progress Note Due: 09/08/2022  Visit # / Visits authorized: 1/ 1   FOTO: 1/3    Precautions: Post Op - ACL allograft. See protocol. Knee brace. NWB through week 4.      Time In: 0945  Time Out: 1015  Total Appointment Time (timed & untimed codes): 30 minutes      SUBJECTIVE     Date of onset: 8/02/2022    History of current condition - TIFFANY reports: Pt reports R knee pain and stiffness s/p R ACL reconstruction. She denies fever or incident since surgery. She reports adhering to Surgeon's protocol. She reports intermittent N/T toward the anterior aspect of knee. She reports icing her knee several times a day.     Falls: None.     Imaging, MRI studies    Prior Therapy: Pre Op eval and treat with instructions.   Social History: Pt currently living with her mom while she recovers.   Occupation: [employer]  Prior Level of Function: Independent.  Current Level of Function: Gait: Mod I with axillary crutches and NWB RLE. Min  A for ADLs.     Pain:  Current 6/10, worst 9/10, best 6/10   Location: right knee    Description: Aching, Throbbing, Tight, Tingling, Sharp and Electric  Aggravating Factors: Standing, Bending and Walking  Easing Factors: pain medication, ice, rest and elevation    Patients goals: To return to full function, walk without pain, and start exercising regularly when appropriate.      Medical History:   Past Medical History:   Diagnosis Date    Anemia     PCOS (polycystic ovarian syndrome) 04/2020       Surgical History:   Tiffany Dobbs  has a past surgical history that includes BREAST REDUCTION (2012); Total Reduction Mammoplasty; Reconstruction of anterior cruciate ligament using graft (Right, 8/2/2022); and Examination under anesthesia (8/2/2022).    Medications:   Tiffany HERNANDEZ has a current medication list which includes the following prescription(s): acetaminophen, aspirin, celecoxib, ergocalciferol, gabapentin, ibuprofen, lactobacillus rhamnosus gg, NON FORMULARY MEDICATION, oxycodone, and turmeric.    Allergies:   Review of patient's allergies indicates:   Allergen Reactions    Bactrim [sulfamethoxazole-trimethoprim] Itching and Rash          OBJECTIVE     Observation: Pleasant 33 y/o female ambulates into the clinic mod I with axillary crutches. Knee brace locked in full extension. Pt adhering to NWB. Her bandage and wrapping are loose and falling underneath her brace. Upon inspection: adhesive post op strips in place. No present drainage. Dried blood on previous dressings, no indications of infection. Bandages were replaced. Dressing was re-wrapped and secured.     Posture: Forward flexed on crutches. Adjusted height and fitting of crutches to improve posture and efficiency with gait.     Gait: Able to demonstrate NWB RLE effectively with axillary crutches.     Range of Motion:     Knee Right active Right Passive Left Active Left passive   Flexion 50 55 WFL NT   Extension 8 ext lag 5 ext lag WFL NT     * =  increased pain when performing ROM at end-range    Lower Extremity Strength:    Right LE  Left LE    Knee extension: 3-/5 Knee extension: 5/5   Knee flexion: 3-/5 Knee flexion: 5/5   Hip flexion: 4-/5 Hip flexion: 5/5   Hip extension:  4-/5 Hip extension: 5/5   Hip abduction: 4/5 Hip abduction: 5/5   Hip adduction: 4/5 Hip adduction 5/5   Ankle dorsiflexion: 4-/5 Ankle dorsiflexion: 5/5   Ankle plantarflexion: 4-/5 Ankle plantarflexion: 5/5     * = increased pain when performing MMT movement  Did not apply manual resistance to R knee.     Special Tests:    NT    Functional tests:     5 Timed Sit to Stands: N/T  TUG: N/T    Joint Mobility: Patella mobility WFL      Palpation: No indications of infection. TTP to R knee and adjacent regions.     Sensation: distal tactile sensation intact.     Flexibility: Reduced RLE    Edema: Localized, non-pitting.       Limitation/Restriction for FOTO knee Survey    Therapist reviewed FOTO scores for Tiffany Dobbs on 8/8/2022.   FOTO documents entered into [employer] - see Media section.    Limitation Score: 18%         TREATMENT     Total Treatment time (time-based codes) separate from Evaluation: 00 minutes      TIFFANY received the treatments listed below:      therapeutic exercises to develop ROM and strength in conjunction with evaluation for 30 minutes including:  Reduced time available. Exercises were reviewed as reapplying dressing and brace. Pt arrived late for evaluation and required bandage re-wrapping.     cold pack deferred. Pt will ice knee as soon as she returns home.       PATIENT EDUCATION AND HOME EXERCISES     Education provided:   - Protocol post op ACL, precautions, goals, HEP, orthotic training, pain/edema management.     Written Home Exercises Provided: yes. Exercises were reviewed and TIFFANY was able to demonstrate them prior to the end of the session.  TIFFANY demonstrated good  understanding of the education provided. See EMR under Patient Instructions for exercises  provided during therapy sessions.    ASSESSMENT     Tiffany HERNANDEZ is a 32 y.o. female referred to outpatient Physical Therapy with a medical diagnosis of sprain of R anterior cruciate ligament with subsequent arthroscopic R ACL reconstruction. Patient presents with R knee stiffness, swelling, and weakness with WB precautions and limited independence with ADLs/iADLs.     Patient prognosis is Good.   Patient will benefit from skilled outpatient Physical Therapy to address the deficits stated above and in the chart below, provide patient /family education, and to maximize patientt's level of independence.     Plan of care discussed with patient: Yes  Patient's spiritual, cultural and educational needs considered and patient is agreeable to the plan of care and goals as stated below:     Anticipated Barriers for therapy: None.     Medical Necessity is demonstrated by the following  History  Co-morbidities and personal factors that may impact the plan of care Co-morbidities:   Post Op    Personal Factors:   no deficits     low   Examination  Body Structures and Functions, activity limitations and participation restrictions that may impact the plan of care Body Regions:   lower extremities    Body Systems:    ROM  strength  gross coordinated movement  balance  gait  transfers  edema    Participation Restrictions:   None.     Activity limitations:   Learning and applying knowledge  no deficits    General Tasks and Commands  no deficits    Communication  no deficits    Mobility  walking    Self care  washing oneself (bathing, drying, washing hands)  dressing    Domestic Life  doing house work (cleaning house, washing dishes, laundry)    Interactions/Relationships  no deficits    Life Areas  no deficits    Community and Social Life  no deficits         low   Clinical Presentation stable and uncomplicated low   Decision Making/ Complexity Score: low     GOALS: Short Term Goals:  4 weeks  1.Report decreased R knee pain  < / =  5/10   to increase tolerance for ADLs and HEP.  2. Increase knee ROM to 0-90 deg in order to be able to perform ADLs without difficulty.  3. Increase strength by 1/3 MMT grade in RLE  to increase tolerance for ADL and work activities.  4. Pt to tolerate HEP to improve ROM and independence with ADL's    Long Term Goals: 12 weeks  1.Report decreased R knee pain < / = 3/10  to increase tolerance for ADLs and work-related tasks.   2.Patient goal: Walk normally.   3.Increase strength to >/= 4+/5 in R knee  to increase tolerance for ADL and work activities.  4. Pt will report FOTO knee improvement to > 60% to demonstrate increase in LE function with every day tasks.   5. Increase knee ROM to 0-120 deg or better in order to be able to perform ADLs without difficulty.      PLAN   Plan of care Certification: 8/8/2022 to 11/08/2022.    Outpatient Physical Therapy 2-3 times weekly for 12 weeks to include the following interventions: Electrical Stimulation Neuro Re-ed or pain management, Gait Training, Manual Therapy, Moist Heat/ Ice, Neuromuscular Re-ed, Orthotic Management and Training, Patient Education, Self Care, Therapeutic Activities and Therapeutic Exercise.     Osorio Sarmiento, PT      I CERTIFY THE NEED FOR THESE SERVICES FURNISHED UNDER THIS PLAN OF TREATMENT AND WHILE UNDER MY CARE   Physician's comments:     Physician's Signature: ___________________________________________________

## 2022-08-08 NOTE — PROGRESS NOTES
[facility]   Physical Therapy Initial Evaluation     Date: 8/8/2022   Name: Tiffany Dobbs  Clinic Number: [id number]    Therapy Diagnosis:   Encounter Diagnoses   Name Primary?    Rupture of anterior cruciate ligament of right knee, initial encounter     Injury of posterolateral corner of knee, right, initial encounter     Muscle weakness of lower extremity Yes    Range of motion deficit     Status post arthroscopic surgery of right knee      Physician: Tevin Tubbs IV, MD    Physician Orders: PT Eval and Treat   Medical Diagnosis from Referral:   S83.511A (ICD-10-CM) - Sprain of anterior cruciate ligament of right knee, initial encounter   S89.91XA (ICD-10-CM) - Unspecified injury of right lower leg, initial encounter     S/p RECONSTRUCTION, KNEE, ACL, USING Allograft Achilles GRAFT, possible meniscus repair, possible PLC repair, knee exam under anesthesia 8/02/2022  Evaluation Date: 8/8/2022  Authorization Period Expiration: 8/07/2023  Plan of Care Expiration: 11/08/2022  Progress Note Due: 09/08/2022  Visit # / Visits authorized: 1/ 1   FOTO: 1/3    Precautions: Post Op - ACL allograft. See protocol. Knee brace. NWB through week 4.      Time In: 0945  Time Out: 1015  Total Appointment Time (timed & untimed codes): 30 minutes      SUBJECTIVE     Date of onset: 8/02/2022    History of current condition - TIFFANY reports: Pt reports R knee pain and stiffness s/p R ACL reconstruction. She denies fever or incident since surgery. She reports adhering to Surgeon's protocol. She reports intermittent N/T toward the anterior aspect of knee. She reports icing her knee several times a day.     Falls: None.     Imaging, MRI studies    Prior Therapy: Pre Op eval and treat with instructions.   Social History: Pt currently living with her mom while she recovers.   Occupation: [employer]  Prior Level of Function: Independent.  Current Level of Function: Gait: Mod I with axillary crutches and NWB RLE. Min  A for ADLs.     Pain:  Current 6/10, worst 9/10, best 6/10   Location: right knee    Description: Aching, Throbbing, Tight, Tingling, Sharp and Electric  Aggravating Factors: Standing, Bending and Walking  Easing Factors: pain medication, ice, rest and elevation    Patients goals: To return to full function, walk without pain, and start exercising regularly when appropriate.      Medical History:   Past Medical History:   Diagnosis Date    Anemia     PCOS (polycystic ovarian syndrome) 04/2020       Surgical History:   Tiffany Dobbs  has a past surgical history that includes BREAST REDUCTION (2012); Total Reduction Mammoplasty; Reconstruction of anterior cruciate ligament using graft (Right, 8/2/2022); and Examination under anesthesia (8/2/2022).    Medications:   Tiffany HERNANDEZ has a current medication list which includes the following prescription(s): acetaminophen, aspirin, celecoxib, ergocalciferol, gabapentin, ibuprofen, lactobacillus rhamnosus gg, NON FORMULARY MEDICATION, oxycodone, and turmeric.    Allergies:   Review of patient's allergies indicates:   Allergen Reactions    Bactrim [sulfamethoxazole-trimethoprim] Itching and Rash          OBJECTIVE     Observation: Pleasant 33 y/o female ambulates into the clinic mod I with axillary crutches. Knee brace locked in full extension. Pt adhering to NWB. Her bandage and wrapping are loose and falling underneath her brace. Upon inspection: adhesive post op strips in place. No present drainage. Dried blood on previous dressings, no indications of infection. Bandages were replaced. Dressing was re-wrapped and secured.     Posture: Forward flexed on crutches. Adjusted height and fitting of crutches to improve posture and efficiency with gait.     Gait: Able to demonstrate NWB RLE effectively with axillary crutches.     Range of Motion:     Knee Right active Right Passive Left Active Left passive   Flexion 50 55 WFL NT   Extension 8 ext lag 5 ext lag WFL NT     * =  increased pain when performing ROM at end-range    Lower Extremity Strength:    Right LE  Left LE    Knee extension: 3-/5 Knee extension: 5/5   Knee flexion: 3-/5 Knee flexion: 5/5   Hip flexion: 4-/5 Hip flexion: 5/5   Hip extension:  4-/5 Hip extension: 5/5   Hip abduction: 4/5 Hip abduction: 5/5   Hip adduction: 4/5 Hip adduction 5/5   Ankle dorsiflexion: 4-/5 Ankle dorsiflexion: 5/5   Ankle plantarflexion: 4-/5 Ankle plantarflexion: 5/5     * = increased pain when performing MMT movement  Did not apply manual resistance to R knee.     Special Tests:    NT    Functional tests:     5 Timed Sit to Stands: N/T  TUG: N/T    Joint Mobility: Patella mobility WFL      Palpation: No indications of infection. TTP to R knee and adjacent regions.     Sensation: distal tactile sensation intact.     Flexibility: Reduced RLE    Edema: Localized, non-pitting.       Limitation/Restriction for FOTO knee Survey    Therapist reviewed FOTO scores for Tiffany Dobbs on 8/8/2022.   FOTO documents entered into [employer] - see Media section.    Limitation Score: 18%         TREATMENT     Total Treatment time (time-based codes) separate from Evaluation: 00 minutes      TIFFANY received the treatments listed below:      therapeutic exercises to develop ROM and strength in conjunction with evaluation for 30 minutes including:  Reduced time available. Exercises were reviewed as reapplying dressing and brace. Pt arrived late for evaluation and required bandage re-wrapping.     cold pack deferred. Pt will ice knee as soon as she returns home.       PATIENT EDUCATION AND HOME EXERCISES     Education provided:   - Protocol post op ACL, precautions, goals, HEP, orthotic training, pain/edema management.     Written Home Exercises Provided: yes. Exercises were reviewed and TIFFANY was able to demonstrate them prior to the end of the session.  TIFFANY demonstrated good  understanding of the education provided. See EMR under Patient Instructions for exercises  provided during therapy sessions.    ASSESSMENT     Tiffany HERNANDEZ is a 32 y.o. female referred to outpatient Physical Therapy with a medical diagnosis of sprain of R anterior cruciate ligament with subsequent arthroscopic R ACL reconstruction. Patient presents with R knee stiffness, swelling, and weakness with WB precautions and limited independence with ADLs/iADLs.     Patient prognosis is Good.   Patient will benefit from skilled outpatient Physical Therapy to address the deficits stated above and in the chart below, provide patient /family education, and to maximize patientt's level of independence.     Plan of care discussed with patient: Yes  Patient's spiritual, cultural and educational needs considered and patient is agreeable to the plan of care and goals as stated below:     Anticipated Barriers for therapy: None.     Medical Necessity is demonstrated by the following  History  Co-morbidities and personal factors that may impact the plan of care Co-morbidities:   Post Op    Personal Factors:   no deficits     low   Examination  Body Structures and Functions, activity limitations and participation restrictions that may impact the plan of care Body Regions:   lower extremities    Body Systems:    ROM  strength  gross coordinated movement  balance  gait  transfers  edema    Participation Restrictions:   None.     Activity limitations:   Learning and applying knowledge  no deficits    General Tasks and Commands  no deficits    Communication  no deficits    Mobility  walking    Self care  washing oneself (bathing, drying, washing hands)  dressing    Domestic Life  doing house work (cleaning house, washing dishes, laundry)    Interactions/Relationships  no deficits    Life Areas  no deficits    Community and Social Life  no deficits         low   Clinical Presentation stable and uncomplicated low   Decision Making/ Complexity Score: low     GOALS: Short Term Goals:  4 weeks  1.Report decreased R knee pain  < / =  5/10   to increase tolerance for ADLs and HEP.  2. Increase knee ROM to 0-90 deg in order to be able to perform ADLs without difficulty.  3. Increase strength by 1/3 MMT grade in RLE  to increase tolerance for ADL and work activities.  4. Pt to tolerate HEP to improve ROM and independence with ADL's    Long Term Goals: 12 weeks  1.Report decreased R knee pain < / = 3/10  to increase tolerance for ADLs and work-related tasks.   2.Patient goal: Walk normally.   3.Increase strength to >/= 4+/5 in R knee  to increase tolerance for ADL and work activities.  4. Pt will report FOTO knee improvement to > 60% to demonstrate increase in LE function with every day tasks.   5. Increase knee ROM to 0-120 deg or better in order to be able to perform ADLs without difficulty.      PLAN   Plan of care Certification: 8/8/2022 to 11/08/2022.    Outpatient Physical Therapy 2-3 times weekly for 12 weeks to include the following interventions: Electrical Stimulation Neuro Re-ed or pain management, Gait Training, Manual Therapy, Moist Heat/ Ice, Neuromuscular Re-ed, Orthotic Management and Training, Patient Education, Self Care, Therapeutic Activities and Therapeutic Exercise.     Osorio Sarmiento, PT      I CERTIFY THE NEED FOR THESE SERVICES FURNISHED UNDER THIS PLAN OF TREATMENT AND WHILE UNDER MY CARE   Physician's comments:     Physician's Signature: ___________________________________________________

## 2022-08-10 ENCOUNTER — CLINICAL SUPPORT (OUTPATIENT)
Dept: REHABILITATION | Facility: HOSPITAL | Age: 32
End: 2022-08-10
Payer: MEDICAID

## 2022-08-10 DIAGNOSIS — M62.81 MUSCLE WEAKNESS OF LOWER EXTREMITY: Primary | ICD-10-CM

## 2022-08-10 DIAGNOSIS — M25.60 RANGE OF MOTION DEFICIT: ICD-10-CM

## 2022-08-10 PROCEDURE — 97110 THERAPEUTIC EXERCISES: CPT | Mod: PN,CQ

## 2022-08-10 NOTE — PROGRESS NOTES
"[facility]   Physical Therapy Treatment Note     Name: Tiffany Dobbs  Clinic Number: [id number]    Therapy Diagnosis:   Encounter Diagnoses   Name Primary?    Muscle weakness of lower extremity Yes    Range of motion deficit      Physician: Tevin Tubbs IV, MD    Visit Date: 8/10/2022    [copy and paste header from yanet here]    PTA Visit #: ***/5     Time In: ***  Time Out: ***  Total Billable Time: *** minutes    SUBJECTIVE     Pt reports: ***.  She {Actions; was/was not:24768} compliant with home exercise program.  Response to previous treatment: ***  Functional change: ***    Pain: {0-10:99346::"0"}/10  Location: {RIGHT/LEFT/BILATERAL:28216} {LOCATION ON BODY:77296}     OBJECTIVE     Objective Measures updated at progress report unless specified.     Treatment     TIFFANY received the treatments listed below:      therapeutic exercises to develop {AMB PT PROGRESS OBJECTIVE:54360} for *** minutes including:  ***    manual therapy techniques: {AMB PT PROGRESS MANUAL THERAPY:23079} were applied to the: *** for *** minutes, including:  ***    neuromuscular re-education activities to improve: {AMB PT PROGRESS NEURO RE-ED:35468} for *** minutes. The following activities were included:  ***    therapeutic activities to improve functional performance for ***  minutes, including:  ***    gait training to improve functional mobility and safety for ***  minutes, including:  ***    direct contact modalities after being cleared for contraindications: {AMB PT PROGRESS DIRECT CONTACT MODES:19914}    supervised modalities after being cleared for contradictions: {AMB PT SUPERVISED MODES:27069}    hot pack for *** minutes to ***.    cold pack for *** minutes to ***.        Patient Education and Home Exercises     Home Exercises Provided and Patient Education Provided     Education provided:   - ***    Written Home Exercises Provided: {Blank single:32582::"yes","Patient instructed to cont prior HEP"}. " Exercises were reviewed and YANG was able to demonstrate them prior to the end of the session.  YANG demonstrated {Desc; good/fair/poor:83977} understanding of the education provided. See EMR under Patient Instructions for exercises provided during therapy sessions    ASSESSMENT     ***    YANG {IS/IS NOT:16575} progressing well towards her goals.   Pt prognosis is {REHAB PROGNOSIS OHS:29369}.     Pt will continue to benefit from skilled outpatient physical therapy to address the deficits listed in the problem list box on initial evaluation, provide pt/family education and to maximize pt's level of independence in the home and community environment.     Pt's spiritual, cultural and educational needs considered and pt agreeable to plan of care and goals.     Anticipated barriers to physical therapy: ***    Goals: ***    PLAN     ***    Conrad Trujillo, PTA

## 2022-08-11 NOTE — PROGRESS NOTES
[facility]   Physical Therapy Treatment Note     Name: Tiffany Dobbs  Clinic Number: [id number]    Therapy Diagnosis:   Encounter Diagnoses   Name Primary?    Muscle weakness of lower extremity Yes    Range of motion deficit      Physician: Tevin Tubbs IV, MD    Visit Date: 8/10/2022    Physician Orders: PT Eval and Treat   Medical Diagnosis from Referral:   S83.511A (ICD-10-CM) - Sprain of anterior cruciate ligament of right knee, initial encounter   S89.91XA (ICD-10-CM) - Unspecified injury of right lower leg, initial encounter      S/p RECONSTRUCTION, KNEE, ACL, USING Allograft Achilles GRAFT, possible meniscus repair, possible PLC repair, knee exam under anesthesia 8/02/2022  Evaluation Date: 8/8/2022  Authorization Period Expiration: 8/07/2023  Plan of Care Expiration: 11/08/2022  Progress Note Due: 09/08/2022  Visit # / Visits authorized: 1/ 1   FOTO: 1/3     Precautions: Post Op - ACL allograft. See protocol. Knee brace. NWB through week 4.      PTA Visit #: 1/5     Time In: 1520  Time Out: 1605  Total Billable Time: 45 minutes    SUBJECTIVE     Pt reports: that she has been compliant with her HEP and that her brace and wrap are not in the correct positioning.      She was compliant with home exercise program.  Response to previous treatment: no adverse reactions  Functional change: no reported change    Pain: 5/10  Location: right knee      OBJECTIVE     Objective Measures updated at progress report unless specified.     Treatment     TIFFANY received the treatments listed below:      therapeutic exercises to develop strength, endurance and ROM for 40 minutes including:    Calf Stretch with strap 3x30  Soleus Stretch with Strap 3x30  QS 5sec 20x  SLR donned with brace 3x10  AP 3x10  GS 5sec 20x    Re-application of Ace Bandage and Re-postioning and adjustment of knee brace straps for appropriate fitting        manual therapy techniques: Joint mobilizations, Soft tissue  Mobilization and Friction Massage were applied to the: right knee for 00 minutes, including:      Not completed at today's treatment session                      Patient Education and Home Exercises     Home Exercises Provided and Patient Education Provided     Education provided:     Complete all exercise and acitivities of daily living in pain free range    patient educated on the benefit of Ankle Pumps, ice and elevation for decreased inflamation    Patient educated on the correct positioning for elevation    Written Home Exercises Provided: Patient instructed to cont prior HEP. Exercises were reviewed and YANG was able to demonstrate them prior to the end of the session.  YANG demonstrated good  understanding of the education provided. See EMR under Patient Instructions for exercises provided during therapy sessions    ASSESSMENT     Good tolerance to exercise patient able to incorporate new exercise in a pain free range.  Patient did require some verbal cueing for correct positioning and sequencing of exercise.      YANG Is progressing well towards her goals.   Pt prognosis is Good.     Pt will continue to benefit from skilled outpatient physical therapy to address the deficits listed in the problem list box on initial evaluation, provide pt/family education and to maximize pt's level of independence in the home and community environment.     Pt's spiritual, cultural and educational needs considered and pt agreeable to plan of care and goals.     Anticipated barriers to physical therapy: none    Goals:    Short Term Goals:  4 weeks  1.Report decreased R knee pain  < / =  5/10  to increase tolerance for ADLs and HEP.  2. Increase knee ROM to 0-90 deg in order to be able to perform ADLs without difficulty.  3. Increase strength by 1/3 MMT grade in RLE  to increase tolerance for ADL and work activities.  4. Pt to tolerate HEP to improve ROM and independence with acitivities of daily living's    Long Term Goals:  12 weeks  1.Report decreased R knee pain < / = 3/10  to increase tolerance for ADLs and work-related tasks.   2.Patient goal: Walk normally.   3.Increase strength to >/= 4+/5 in R knee  to increase tolerance for ADL and work activities.  4. Pt will report FOTO knee improvement to > 60% to demonstrate increase in LE function with every day tasks.   5. Increase knee ROM to 0-120 deg or better in order to be able to perform ADLs without difficulty.        PLAN     Progress as tolerated per Plan of Care      Conrad Trujillo, PTA

## 2022-08-12 ENCOUNTER — PATIENT MESSAGE (OUTPATIENT)
Dept: ORTHOPEDICS | Facility: CLINIC | Age: 32
End: 2022-08-12
Payer: MEDICAID

## 2022-08-12 ENCOUNTER — CLINICAL SUPPORT (OUTPATIENT)
Dept: REHABILITATION | Facility: HOSPITAL | Age: 32
End: 2022-08-12
Payer: MEDICAID

## 2022-08-12 ENCOUNTER — TELEPHONE (OUTPATIENT)
Dept: ORTHOPEDICS | Facility: CLINIC | Age: 32
End: 2022-08-12
Payer: MEDICAID

## 2022-08-12 DIAGNOSIS — M25.60 RANGE OF MOTION DEFICIT: ICD-10-CM

## 2022-08-12 DIAGNOSIS — M62.81 MUSCLE WEAKNESS OF LOWER EXTREMITY: Primary | ICD-10-CM

## 2022-08-12 PROCEDURE — 97110 THERAPEUTIC EXERCISES: CPT | Mod: PN,CQ

## 2022-08-12 NOTE — TELEPHONE ENCOUNTER
----- Message from Estelle Montoya sent at 8/12/2022 12:04 PM CDT -----  Contact: [phone]  Who Called: Toya   Regarding: wrap was too tight , she took off bandage. Requesting pt advise .  Would the patient rather a call back or a response via [employer]? Call back  Best Call Back Number: [phone]  Additional Information: n/a

## 2022-08-12 NOTE — TELEPHONE ENCOUNTER
Spoke w/ Ms Dobbs, Hx: RECONSTRUCTION, Rt knee. bandage rewrapped tightly by physical therapist today causing area numbness and ankle swelling. She loosened up the bandage to her comfort. Advised pt to elevate and apply ice for swelling. Pt voiced understanding.      ,

## 2022-08-12 NOTE — PROGRESS NOTES
[facility]   Physical Therapy Treatment Note     Name: Tiffany Dobbs  Clinic Number: [id number]    Therapy Diagnosis:   Encounter Diagnoses   Name Primary?    Muscle weakness of lower extremity Yes    Range of motion deficit      Physician: Tevin Tubbs IV, MD    Visit Date: 8/12/2022    Physician Orders: PT Eval and Treat   Medical Diagnosis from Referral:   S83.511A (ICD-10-CM) - Sprain of anterior cruciate ligament of right knee, initial encounter   S89.91XA (ICD-10-CM) - Unspecified injury of right lower leg, initial encounter      S/p RECONSTRUCTION, KNEE, ACL, USING Allograft Achilles GRAFT, possible meniscus repair, possible PLC repair, knee exam under anesthesia 8/02/2022  Evaluation Date: 8/8/2022  Authorization Period Expiration: 8/07/2023  Plan of Care Expiration: 11/08/2022  Progress Note Due: 09/08/2022  Visit # / Visits authorized: 3/ 1   FOTO: 1/3     Precautions: Post Op - ACL allograft. See protocol. Knee brace. NWB through week 4.      PTA Visit #: 2/5     Time In: 0920  Time Out: 1000  Total Billable Time: 40 minutes    SUBJECTIVE     Pt reports: that she has been completing her HEP and is going well.        She was compliant with home exercise program.  Response to previous treatment: no adverse reactions  Functional change: no reported change    Pain: 5/10  Location: right knee      OBJECTIVE     Objective Measures updated at progress report unless specified.     Treatment     TIFFANY received the treatments listed below:      therapeutic exercises to develop strength, endurance and ROM for 40 minutes including:    Calf Stretch with strap 3x30  Soleus Stretch with Strap 3x30  QS 5sec 20x  SLR donned with brace 3x10  AP 3x15  GS 5sec 20x  Heel Slides with Strap 2-3sec 3x10  Hip adduction in hooklying with add ball 5sec 20x  Hip abduction in hooklying 2x10      Re-application of Ace Bandage and Re-postioning and adjustment of knee brace straps for appropriate  fitting        manual therapy techniques: Joint mobilizations, Soft tissue Mobilization and Friction Massage were applied to the: right knee for 00 minutes, including:      Not completed at today's treatment session                      Patient Education and Home Exercises     Home Exercises Provided and Patient Education Provided     Education provided:     Complete all exercise and acitivities of daily living in pain free range    patient educated on the benefit of Ankle Pumps, ice and elevation for decreased inflamation    Patient educated on the correct positioning for elevation    Written Home Exercises Provided: Patient instructed to cont prior HEP. Exercises were reviewed and YANG was able to demonstrate them prior to the end of the session.  YANG demonstrated good  understanding of the education provided. See EMR under Patient Instructions for exercises provided during therapy sessions    ASSESSMENT     Good tolerance to exercise patient able to incorporate new exercise in a pain free range.  Added in hip adduction, abduction and heel slides with strap.      YANG Is progressing well towards her goals.   Pt prognosis is Good.     Pt will continue to benefit from skilled outpatient physical therapy to address the deficits listed in the problem list box on initial evaluation, provide pt/family education and to maximize pt's level of independence in the home and community environment.     Pt's spiritual, cultural and educational needs considered and pt agreeable to plan of care and goals.     Anticipated barriers to physical therapy: none    Goals:    Short Term Goals:  4 weeks  1.Report decreased R knee pain  < / =  5/10  to increase tolerance for ADLs and HEP.  2. Increase knee ROM to 0-90 deg in order to be able to perform ADLs without difficulty.  3. Increase strength by 1/3 MMT grade in RLE  to increase tolerance for ADL and work activities.  4. Pt to tolerate HEP to improve ROM and independence with  acitivities of daily living's    Long Term Goals: 12 weeks  1.Report decreased R knee pain < / = 3/10  to increase tolerance for ADLs and work-related tasks.   2.Patient goal: Walk normally.   3.Increase strength to >/= 4+/5 in R knee  to increase tolerance for ADL and work activities.  4. Pt will report FOTO knee improvement to > 60% to demonstrate increase in LE function with every day tasks.   5. Increase knee ROM to 0-120 deg or better in order to be able to perform ADLs without difficulty.        PLAN     Progress as tolerated per Plan of Care      Conrad Trujillo, SEN

## 2022-08-16 ENCOUNTER — CLINICAL SUPPORT (OUTPATIENT)
Dept: REHABILITATION | Facility: HOSPITAL | Age: 32
End: 2022-08-16
Payer: MEDICAID

## 2022-08-16 DIAGNOSIS — M62.81 MUSCLE WEAKNESS OF LOWER EXTREMITY: Primary | ICD-10-CM

## 2022-08-16 DIAGNOSIS — M25.60 RANGE OF MOTION DEFICIT: ICD-10-CM

## 2022-08-16 PROCEDURE — 97110 THERAPEUTIC EXERCISES: CPT | Mod: PN,CQ

## 2022-08-16 NOTE — PROGRESS NOTES
[facility]   Physical Therapy Treatment Note     Name: Tiffany Dobbs  Clinic Number: [id number]    Therapy Diagnosis:   Encounter Diagnoses   Name Primary?    Muscle weakness of lower extremity Yes    Range of motion deficit      Physician: Tevin Tubbs IV, MD    Visit Date: 8/16/2022    Physician Orders: PT Eval and Treat   Medical Diagnosis from Referral:   S83.511A (ICD-10-CM) - Sprain of anterior cruciate ligament of right knee, initial encounter   S89.91XA (ICD-10-CM) - Unspecified injury of right lower leg, initial encounter      S/p RECONSTRUCTION, KNEE, ACL, USING Allograft Achilles GRAFT, possible meniscus repair, possible PLC repair, knee exam under anesthesia 8/02/2022  Evaluation Date: 8/8/2022  Authorization Period Expiration: 8/07/2023  Plan of Care Expiration: 11/08/2022  Progress Note Due: 09/08/2022  Visit # / Visits authorized: 3/ 1   FOTO: 1/3     Precautions: Post Op - ACL allograft. See protocol. Knee brace. NWB through week 4.      PTA Visit #: 2/5     Time In: 1710  Time Out: 1745  Total Billable Time: 35 minutes    SUBJECTIVE     Pt reports: that she has been completing her HEP and is going well.  Patient states that she is eager to return to ambulating without AD.        She was compliant with home exercise program.  Response to previous treatment: no adverse reactions  Functional change: no reported change    Pain: 4/10  Location: right knee      OBJECTIVE     Objective Measures updated at progress report unless specified.     Treatment     TIFFANY received the treatments listed below:      therapeutic exercises to develop strength, endurance and ROM for 40 minutes including:    Calf Stretch with strap 3x30  Soleus Stretch with Strap 3x30    SLR with 2sec QS donned with brace 3x15  AP 3x15  GS 5sec 20x  Heel Slides with Strap 2-3sec 3x10  Hip adduction in hooklying with add ball 5sec 20x  Hip abduction in hooklying 3x10              manual therapy techniques:  Joint mobilizations, Soft tissue Mobilization and Friction Massage were applied to the: right knee for 00 minutes, including:      Not completed at today's treatment session                      Patient Education and Home Exercises     Home Exercises Provided and Patient Education Provided     Education provided:     Complete all exercise and acitivities of daily living in pain free range    patient educated on the benefit of Ankle Pumps, ice and elevation for decreased inflamation    Patient educated on the correct positioning for elevation    Written Home Exercises Provided: Patient instructed to cont prior HEP. Exercises were reviewed and YANG was able to demonstrate them prior to the end of the session.  YANG demonstrated good  understanding of the education provided. See EMR under Patient Instructions for exercises provided during therapy sessions    ASSESSMENT     Good tolerance to exercise.  Patient able to progress well with reps for exercise.  Patient improving with passive range of motion for knee flexion.  Patient improving with lag for SLR.      YANG Is progressing well towards her goals.   Pt prognosis is Good.     Pt will continue to benefit from skilled outpatient physical therapy to address the deficits listed in the problem list box on initial evaluation, provide pt/family education and to maximize pt's level of independence in the home and community environment.     Pt's spiritual, cultural and educational needs considered and pt agreeable to plan of care and goals.     Anticipated barriers to physical therapy: none    Goals:    Short Term Goals:  4 weeks  1.Report decreased R knee pain  < / =  5/10  to increase tolerance for ADLs and HEP.  2. Increase knee ROM to 0-90 deg in order to be able to perform ADLs without difficulty.  3. Increase strength by 1/3 MMT grade in RLE  to increase tolerance for ADL and work activities.  4. Pt to tolerate HEP to improve ROM and independence with acitivities  of daily living's    Long Term Goals: 12 weeks  1.Report decreased R knee pain < / = 3/10  to increase tolerance for ADLs and work-related tasks.   2.Patient goal: Walk normally.   3.Increase strength to >/= 4+/5 in R knee  to increase tolerance for ADL and work activities.  4. Pt will report FOTO knee improvement to > 60% to demonstrate increase in LE function with every day tasks.   5. Increase knee ROM to 0-120 deg or better in order to be able to perform ADLs without difficulty.        PLAN     Progress as tolerated per Plan of Care      Conrad Trujillo, SEN

## 2022-08-17 ENCOUNTER — CLINICAL SUPPORT (OUTPATIENT)
Dept: REHABILITATION | Facility: HOSPITAL | Age: 32
End: 2022-08-17
Payer: MEDICAID

## 2022-08-17 ENCOUNTER — OFFICE VISIT (OUTPATIENT)
Dept: ORTHOPEDICS | Facility: CLINIC | Age: 32
End: 2022-08-17
Payer: MEDICAID

## 2022-08-17 ENCOUNTER — HOSPITAL ENCOUNTER (OUTPATIENT)
Dept: RADIOLOGY | Facility: HOSPITAL | Age: 32
Discharge: HOME | End: 2022-08-17
Attending: ORTHOPAEDIC SURGERY
Payer: MEDICAID

## 2022-08-17 VITALS
BODY MASS INDEX: 38.05 KG/M2 | DIASTOLIC BLOOD PRESSURE: 74 MMHG | WEIGHT: 214.75 LBS | SYSTOLIC BLOOD PRESSURE: 108 MMHG | HEIGHT: 63 IN | HEART RATE: 53 BPM

## 2022-08-17 DIAGNOSIS — M25.561 RIGHT KNEE PAIN, UNSPECIFIED CHRONICITY: ICD-10-CM

## 2022-08-17 DIAGNOSIS — S83.511A RUPTURE OF ANTERIOR CRUCIATE LIGAMENT OF RIGHT KNEE, INITIAL ENCOUNTER: Primary | ICD-10-CM

## 2022-08-17 DIAGNOSIS — M62.81 MUSCLE WEAKNESS OF LOWER EXTREMITY: Primary | ICD-10-CM

## 2022-08-17 DIAGNOSIS — S89.91XA INJURY OF POSTEROLATERAL CORNER OF KNEE, RIGHT, INITIAL ENCOUNTER: ICD-10-CM

## 2022-08-17 DIAGNOSIS — M25.60 RANGE OF MOTION DEFICIT: ICD-10-CM

## 2022-08-17 PROCEDURE — 73564 X-RAY EXAM KNEE 4 OR MORE: CPT | Mod: 26,RT,, | Performed by: RADIOLOGY

## 2022-08-17 PROCEDURE — 3008F BODY MASS INDEX DOCD: CPT | Mod: CPTII,,, | Performed by: PHYSICIAN ASSISTANT

## 2022-08-17 PROCEDURE — 3008F PR BODY MASS INDEX (BMI) DOCUMENTED: ICD-10-PCS | Mod: CPTII,,, | Performed by: PHYSICIAN ASSISTANT

## 2022-08-17 PROCEDURE — 73564 X-RAY EXAM KNEE 4 OR MORE: CPT | Mod: TC,FY,RT

## 2022-08-17 PROCEDURE — 3074F PR MOST RECENT SYSTOLIC BLOOD PRESSURE < 130 MM HG: ICD-10-PCS | Mod: CPTII,,, | Performed by: PHYSICIAN ASSISTANT

## 2022-08-17 PROCEDURE — 99024 POSTOP FOLLOW-UP VISIT: CPT | Mod: ,,, | Performed by: PHYSICIAN ASSISTANT

## 2022-08-17 PROCEDURE — 99999 PR PBB SHADOW E&M-EST. PATIENT-LVL III: ICD-10-PCS | Mod: PBBFAC,,, | Performed by: PHYSICIAN ASSISTANT

## 2022-08-17 PROCEDURE — 3074F SYST BP LT 130 MM HG: CPT | Mod: CPTII,,, | Performed by: PHYSICIAN ASSISTANT

## 2022-08-17 PROCEDURE — 1159F PR MEDICATION LIST DOCUMENTED IN MEDICAL RECORD: ICD-10-PCS | Mod: CPTII,,, | Performed by: PHYSICIAN ASSISTANT

## 2022-08-17 PROCEDURE — 3078F PR MOST RECENT DIASTOLIC BLOOD PRESSURE < 80 MM HG: ICD-10-PCS | Mod: CPTII,,, | Performed by: PHYSICIAN ASSISTANT

## 2022-08-17 PROCEDURE — 99024 PR POST-OP FOLLOW-UP VISIT: ICD-10-PCS | Mod: ,,, | Performed by: PHYSICIAN ASSISTANT

## 2022-08-17 PROCEDURE — 99213 OFFICE O/P EST LOW 20 MIN: CPT | Mod: PBBFAC,PN | Performed by: PHYSICIAN ASSISTANT

## 2022-08-17 PROCEDURE — 73564 XR KNEE COMP 4 OR MORE VIEWS RIGHT: ICD-10-PCS | Mod: 26,RT,, | Performed by: RADIOLOGY

## 2022-08-17 PROCEDURE — 1160F RVW MEDS BY RX/DR IN RCRD: CPT | Mod: CPTII,,, | Performed by: PHYSICIAN ASSISTANT

## 2022-08-17 PROCEDURE — 1159F MED LIST DOCD IN RCRD: CPT | Mod: CPTII,,, | Performed by: PHYSICIAN ASSISTANT

## 2022-08-17 PROCEDURE — 97110 THERAPEUTIC EXERCISES: CPT | Mod: PN

## 2022-08-17 PROCEDURE — 99999 PR PBB SHADOW E&M-EST. PATIENT-LVL III: CPT | Mod: PBBFAC,,, | Performed by: PHYSICIAN ASSISTANT

## 2022-08-17 PROCEDURE — 1160F PR REVIEW ALL MEDS BY PRESCRIBER/CLIN PHARMACIST DOCUMENTED: ICD-10-PCS | Mod: CPTII,,, | Performed by: PHYSICIAN ASSISTANT

## 2022-08-17 PROCEDURE — 3078F DIAST BP <80 MM HG: CPT | Mod: CPTII,,, | Performed by: PHYSICIAN ASSISTANT

## 2022-08-17 NOTE — PROGRESS NOTES
[facility]   Physical Therapy Treatment Note     Name: Tiffany Dobbs  Clinic Number: [id number]    Therapy Diagnosis:   Encounter Diagnoses   Name Primary?    Muscle weakness of lower extremity Yes    Range of motion deficit      Physician: Tevin Tubbs IV, MD    Visit Date: 8/17/2022    Physician Orders: PT Eval and Treat   Medical Diagnosis from Referral:   S83.511A (ICD-10-CM) - Sprain of anterior cruciate ligament of right knee, initial encounter   S89.91XA (ICD-10-CM) - Unspecified injury of right lower leg, initial encounter      S/p RECONSTRUCTION, KNEE, ACL, USING Allograft Achilles GRAFT, possible meniscus repair, possible PLC repair, knee exam under anesthesia 8/02/2022  Evaluation Date: 8/8/2022  Authorization Period Expiration: 8/07/2023  Plan of Care Expiration: 11/08/2022  Progress Note Due: 09/08/2022  Visit # / Visits authorized: 4/24   FOTO: 1/3     Precautions: Post Op - ACL allograft. See protocol. Knee brace. Progress to TTWB at 4 weeks.      PTA Visit #: 0/5     Time In: 1515  Time Out: 1600  Total Billable Time: 45 minutes    SUBJECTIVE     Pt reports: mild to moderate R knee pain today. She had her 2 week post op f/u this morning. She states that it went well. She had her sutures removed.     She was compliant with home exercise program.  Response to previous treatment: no adverse reactions  Functional change: no reported change    Pain: 4/10  Location: right knee      OBJECTIVE     R knee ROM 0-60 deg.    Treatment     TIFFANY received the treatments listed below:      therapeutic exercises to develop strength, endurance and ROM for 40 minutes including:    Calf Stretch with strap 3x30  Soleus Stretch with Strap 3x30    SLR with 2sec QS donned with brace 3x15  AP 3x15  GS 5sec 20x  QS 3x15  Supine Heel Slides with Strap 2-3sec 3x10  Hip adduction in hooklying with add ball 5sec 3x15  Hip abduction isometric with belt seated 3x15  Seated heel slide AAROM with  towel, assisting with L foot. Cueing to keep RLE passive as possible.    manual therapy techniques: Joint mobilizations, Soft tissue Mobilization and Friction Massage were applied to the: right knee for 00 minutes, including:    Not completed at today's treatment session      Patient Education and Home Exercises     Home Exercises Provided and Patient Education Provided     Education provided:     Complete all exercise and acitivities of daily living in pain free range    patient educated on the benefit of Ankle Pumps, ice and elevation for decreased inflamation    Patient educated on the correct positioning for elevation    Written Home Exercises Provided: Patient instructed to cont prior HEP. Exercises were reviewed and YANG was able to demonstrate them prior to the end of the session.  YANG demonstrated good  understanding of the education provided. See EMR under Patient Instructions for exercises provided during therapy sessions    ASSESSMENT     Good tolerance to exercise.  Patient able to progress well with reps for exercise.  Patient improving with passive range of motion for knee flexion.  Patient improving with lag for SLR.      YANG Is progressing well towards her goals.   Pt prognosis is Good.     Pt will continue to benefit from skilled outpatient physical therapy to address the deficits listed in the problem list box on initial evaluation, provide pt/family education and to maximize pt's level of independence in the home and community environment.     Pt's spiritual, cultural and educational needs considered and pt agreeable to plan of care and goals.     Anticipated barriers to physical therapy: none    Goals:    Short Term Goals:  4 weeks  1.Report decreased R knee pain  < / =  5/10  to increase tolerance for ADLs and HEP.  2. Increase knee ROM to 0-90 deg in order to be able to perform ADLs without difficulty.  3. Increase strength by 1/3 MMT grade in RLE  to increase tolerance for ADL and work  activities.  4. Pt to tolerate HEP to improve ROM and independence with acitivities of daily living's    Long Term Goals: 12 weeks  1.Report decreased R knee pain < / = 3/10  to increase tolerance for ADLs and work-related tasks.   2.Patient goal: Walk normally.   3.Increase strength to >/= 4+/5 in R knee  to increase tolerance for ADL and work activities.  4. Pt will report FOTO knee improvement to > 60% to demonstrate increase in LE function with every day tasks.   5. Increase knee ROM to 0-120 deg or better in order to be able to perform ADLs without difficulty.      PLAN     Progress as tolerated per Plan of Care. PT/PTA conference was conducted today for this patient.       Osorio Sarmiento, PT

## 2022-08-17 NOTE — PROGRESS NOTES
[facility], Orthopedics and Sports Medicine  [facility]    Knee Post-op Visit #1   08/17/2022       Diagnosis:   Right ACL Tear.  Right Posterolateral corner (PLC) Tear.    Procedure: 8/5/22   ACL reconstruction with Achilles allograft.  Posterolateral corner reconstruction with semitendinosus allograft    Subjective:      Tiffany Dobbs is a 32 y.o. female who is now 2 weeks status post right knee surgery. Pain is controlled with current analgesics. Medications being used: acetaminophen. The patient denies none, fever, wound drainage, increasing redness, pus, increasing pain, increasing swelling. Post op problems reported: none.    Doing well in physical therapy. Currently nonWB with crutches. Brace locked in extension.     Objective:      Ortho/SPM Exam  General: alert, appears stated age and cooperative   Gait: unable to weightbear  Sutures: Sutures/staples in place and will be removed today.  Incision: healing well, no significant drainage, no dehiscence, no significant erythema. Area of paresthesias anterior knee.   Tenderness: mild  Range of Motion: Extension 0 degrees  Flexion 60 degrees  Stability: Normal  Strength: Improving  Vascular: CR<2s. Palpable radial pulse    Imaging:  X-Ray Knee Complete 4 Or More Views Right  Narrative: EXAMINATION:  XR KNEE COMP 4 OR MORE VIEWS RIGHT    TECHNIQUE:  Four views of the right knee were obtained, with AP, lateral, tunnel, and patellar projections submitted.    COMPARISON:  Comparison is made to 07/03/2022.    FINDINGS:  Postoperative changes are now identified relating to an interval right anterior cruciate ligament reconstruction procedure.  No unusual postoperative findings or significant detrimental interval change since the preoperative examination of 07/03/2022 appreciated.  Impression: As above    Electronically signed by: Govind Hernandes MD  Date:    08/17/2022  Time:    13:38      I have reviewed the above radiograph and agree with the  findings stated by the radiologist.         Assessment:       The patient is status post right knee surgery.  The primary encounter diagnosis was Rupture of anterior cruciate ligament of right knee, initial encounter. A diagnosis of Injury of posterolateral corner of knee, right, initial encounter was also pertinent to this visit. Doing well postoperatively. Continuation of post-op rehab course is recommended at this time. All of the patient's questions were answered.    Continue PT as directed. Continue nonWB. May start toe touch WB at 4 weeks. Continue brace locked in extension.      Plan:      1. Tylenol 650mg TID PRN for pain.  2. Continue physical therapy.  3. Nonweightbearing on the operative extremity until 6 weeks after surgery.  4. Follow up at 6 weeks after surgery.   5. Post-op protocol sheet given to patient today.        Madison Samaniego PA-C  Department of Orthopedic Surgery  [facility]  Office: [phone]

## 2022-08-19 ENCOUNTER — CLINICAL SUPPORT (OUTPATIENT)
Dept: REHABILITATION | Facility: HOSPITAL | Age: 32
End: 2022-08-19
Payer: MEDICAID

## 2022-08-19 DIAGNOSIS — M62.81 MUSCLE WEAKNESS OF LOWER EXTREMITY: Primary | ICD-10-CM

## 2022-08-19 DIAGNOSIS — M25.60 RANGE OF MOTION DEFICIT: ICD-10-CM

## 2022-08-19 PROCEDURE — 97110 THERAPEUTIC EXERCISES: CPT | Mod: PN,CQ

## 2022-08-19 NOTE — PROGRESS NOTES
[facility]   Physical Therapy Treatment Note     Name: Tiffany Dobbs  Clinic Number: [id number]    Therapy Diagnosis:   Encounter Diagnoses   Name Primary?    Muscle weakness of lower extremity Yes    Range of motion deficit      Physician: Tevin Tubbs IV, MD    Visit Date: 8/19/2022    Physician Orders: PT Eval and Treat   Medical Diagnosis from Referral:   S83.511A (ICD-10-CM) - Sprain of anterior cruciate ligament of right knee, initial encounter   S89.91XA (ICD-10-CM) - Unspecified injury of right lower leg, initial encounter      S/p RECONSTRUCTION, KNEE, ACL, USING Allograft Achilles GRAFT, possible meniscus repair, possible PLC repair, knee exam under anesthesia 8/02/2022  Evaluation Date: 8/8/2022  Authorization Period Expiration: 8/07/2023  Plan of Care Expiration: 11/08/2022  Progress Note Due: 09/08/2022  Visit # / Visits authorized: 4/24   FOTO: 1/3     Precautions: Post Op - ACL allograft. See protocol. Knee brace. Progress to TTWB at 4 weeks.      PTA Visit #: 0/5     Time In: 1515  Time Out: 1600  Total Billable Time: 45 minutes    SUBJECTIVE     Pt reports: that she is feeling better today.  Luis states that she had a harder day for the previous treatment session due to additional movements for xray and post-op follow up.     She was compliant with home exercise program.  Response to previous treatment: no adverse reactions  Functional change: no reported change    Pain: 4/10  Location: right knee      OBJECTIVE     Not completed at today's treatment session      Treatment     TIFFANY received the treatments listed below:      therapeutic exercises to develop strength, endurance and ROM for 40 minutes including:    Calf Stretch with strap 10sec 2min  Soleus Stretch with Strap 10sec 2min    SLR with 2sec QS donned with brace 3x15  AP 3x15  GS 5sec 2min    Supine Heel Slides with Strap 3-5sec 3min  Hip adduction in hooklying with add ball 5sec 3min  Hip abduction  hooklying donned with brace 3x15  Seated heel slide AAROM with towel, assisting with L foot. Cueing to keep RLE passive as possible.    manual therapy techniques: Joint mobilizations, Soft tissue Mobilization and Friction Massage were applied to the: right knee for 00 minutes, including:    Not completed at today's treatment session      Patient Education and Home Exercises     Home Exercises Provided and Patient Education Provided     Education provided:     Complete all exercise and acitivities of daily living in pain free range    patient educated on the benefit of Ankle Pumps, ice and elevation for decreased inflamation    Patient educated on the correct positioning for elevation    Written Home Exercises Provided: Patient instructed to cont prior HEP. Exercises were reviewed and YANG was able to demonstrate them prior to the end of the session.  YANG demonstrated good  understanding of the education provided. See EMR under Patient Instructions for exercises provided during therapy sessions    ASSESSMENT     Good tolerance to exercise.  Patient able to progress well with reps for exercise.  Patient improving with passive range of motion for knee flexion.  Patient improving with lag for SLR.      YANG Is progressing well towards her goals.   Pt prognosis is Good.     Pt will continue to benefit from skilled outpatient physical therapy to address the deficits listed in the problem list box on initial evaluation, provide pt/family education and to maximize pt's level of independence in the home and community environment.     Pt's spiritual, cultural and educational needs considered and pt agreeable to plan of care and goals.     Anticipated barriers to physical therapy: none    Goals:    Short Term Goals:  4 weeks  1.Report decreased R knee pain  < / =  5/10  to increase tolerance for ADLs and HEP.  2. Increase knee ROM to 0-90 deg in order to be able to perform ADLs without difficulty.  3. Increase strength by  1/3 MMT grade in RLE  to increase tolerance for ADL and work activities.  4. Pt to tolerate HEP to improve ROM and independence with acitivities of daily living's    Long Term Goals: 12 weeks  1.Report decreased R knee pain < / = 3/10  to increase tolerance for ADLs and work-related tasks.   2.Patient goal: Walk normally.   3.Increase strength to >/= 4+/5 in R knee  to increase tolerance for ADL and work activities.  4. Pt will report FOTO knee improvement to > 60% to demonstrate increase in LE function with every day tasks.   5. Increase knee ROM to 0-120 deg or better in order to be able to perform ADLs without difficulty.      PLAN     Progress as tolerated per Plan of Care.       Conrad Trujillo, PTA

## 2022-08-22 ENCOUNTER — CLINICAL SUPPORT (OUTPATIENT)
Dept: REHABILITATION | Facility: HOSPITAL | Age: 32
End: 2022-08-22
Payer: MEDICAID

## 2022-08-22 DIAGNOSIS — M62.81 MUSCLE WEAKNESS OF LOWER EXTREMITY: Primary | ICD-10-CM

## 2022-08-22 DIAGNOSIS — M25.60 RANGE OF MOTION DEFICIT: ICD-10-CM

## 2022-08-22 PROCEDURE — 97110 THERAPEUTIC EXERCISES: CPT | Mod: PN

## 2022-08-22 NOTE — PROGRESS NOTES
"[facility]   Physical Therapy Treatment Note     Name: Tiffany Dobbs  Clinic Number: [id number]    Therapy Diagnosis:   Encounter Diagnoses   Name Primary?    Muscle weakness of lower extremity Yes    Range of motion deficit      Physician: Tevin Tubbs IV, MD    Visit Date: 8/22/2022    Physician Orders: PT Eval and Treat   Medical Diagnosis from Referral:   S83.511A (ICD-10-CM) - Sprain of anterior cruciate ligament of right knee, initial encounter   S89.91XA (ICD-10-CM) - Unspecified injury of right lower leg, initial encounter      S/p RECONSTRUCTION, KNEE, ACL, USING Allograft Achilles GRAFT, possible meniscus repair, possible PLC repair, knee exam under anesthesia 8/02/2022  Evaluation Date: 8/8/2022  Authorization Period Expiration: 8/07/2023  Plan of Care Expiration: 11/08/2022  Progress Note Due: 09/08/2022  Visit # / Visits authorized: 5/24   FOTO: 1/3     Precautions: Post Op - ACL allograft. See protocol. Knee brace. Progress to TTWB at 4 weeks.      PTA Visit #: 0/5     Time In: 0925  Time Out: 1015  Total Billable Time: 45 minutes    SUBJECTIVE     Pt reports: mild R knee pain. She took pain medication prior to treatment.     She was compliant with home exercise program.  Response to previous treatment: no adverse reactions  Functional change: no reported change    Pain: 3/10  Location: right knee      OBJECTIVE     R knee ROM 0-67 deg flexion with treatment.     Treatment     TIFFANY received the treatments listed below:      therapeutic exercises to develop strength, endurance and ROM for 40 minutes including:    Calf Stretch with strap 5x20"  Soleus Stretch with Strap 10sec 2min  HS stretch seated x20" between heel slide sets.    SLR with 2sec QS donned with brace 3x15  AP 3x15  GS 5sec 2min    Supine Heel Slides with Strap 3-5sec 3min, added SKTC with unaffected LE in order to address R hip flexor group during extension.   Hip adduction in hooklying with add ball 5sec " 3min  Hip abduction hooklying donned with brace 3x15 green tband above knee  Seated heel slide AAROM with towel, assisting with L foot. Cueing to keep RLE passive as possible.  Seated core stabilization with cueing to engage lats with press down. 2'    manual therapy techniques: Joint mobilizations, Soft tissue Mobilization and Friction Massage were applied to the: right knee for 00 minutes, including:    Not completed at today's treatment session      Patient Education and Home Exercises     Home Exercises Provided and Patient Education Provided     Education provided:     Complete all exercise and acitivities of daily living in pain free range    patient educated on the benefit of Ankle Pumps, ice and elevation for decreased inflamation    Patient educated on the correct positioning for elevation    Written Home Exercises Provided: Patient instructed to cont prior HEP. Exercises were reviewed and YANG was able to demonstrate them prior to the end of the session.  YANG demonstrated good  understanding of the education provided. See EMR under Patient Instructions for exercises provided during therapy sessions    ASSESSMENT     Good tolerance to exercise.  Patient able to progress well with reps for exercise.  Patient improving with passive range of motion for knee flexion.  Patient continues to improve with lag for SLR.    YANG Is progressing well towards her goals.   Pt prognosis is Good.     Pt will continue to benefit from skilled outpatient physical therapy to address the deficits listed in the problem list box on initial evaluation, provide pt/family education and to maximize pt's level of independence in the home and community environment.     Pt's spiritual, cultural and educational needs considered and pt agreeable to plan of care and goals.     Anticipated barriers to physical therapy: none    Goals:    Short Term Goals:  4 weeks  1.Report decreased R knee pain  < / =  5/10  to increase tolerance for  ADLs and HEP.  2. Increase knee ROM to 0-90 deg in order to be able to perform ADLs without difficulty.  3. Increase strength by 1/3 MMT grade in RLE  to increase tolerance for ADL and work activities.  4. Pt to tolerate HEP to improve ROM and independence with acitivities of daily living's    Long Term Goals: 12 weeks  1.Report decreased R knee pain < / = 3/10  to increase tolerance for ADLs and work-related tasks.   2.Patient goal: Walk normally.   3.Increase strength to >/= 4+/5 in R knee  to increase tolerance for ADL and work activities.  4. Pt will report FOTO knee improvement to > 60% to demonstrate increase in LE function with every day tasks.   5. Increase knee ROM to 0-120 deg or better in order to be able to perform ADLs without difficulty.      PLAN     Progress as tolerated per Plan of Care.       Osorio Sarmiento, PT

## 2022-08-23 ENCOUNTER — CLINICAL SUPPORT (OUTPATIENT)
Dept: REHABILITATION | Facility: HOSPITAL | Age: 32
End: 2022-08-23
Payer: MEDICAID

## 2022-08-23 DIAGNOSIS — M62.81 MUSCLE WEAKNESS OF LOWER EXTREMITY: Primary | ICD-10-CM

## 2022-08-23 DIAGNOSIS — M25.60 RANGE OF MOTION DEFICIT: ICD-10-CM

## 2022-08-23 PROCEDURE — 97110 THERAPEUTIC EXERCISES: CPT | Mod: PN,CQ

## 2022-08-23 NOTE — PROGRESS NOTES
"[facility]   Physical Therapy Treatment Note     Name: Tiffany Dobbs  Clinic Number: [id number]    Therapy Diagnosis:   Encounter Diagnoses   Name Primary?    Muscle weakness of lower extremity Yes    Range of motion deficit      Physician: Tevin Tubbs IV, MD    Visit Date: 8/23/2022    Physician Orders: PT Eval and Treat   Medical Diagnosis from Referral:   S83.511A (ICD-10-CM) - Sprain of anterior cruciate ligament of right knee, initial encounter   S89.91XA (ICD-10-CM) - Unspecified injury of right lower leg, initial encounter      S/p RECONSTRUCTION, KNEE, ACL, USING Allograft Achilles GRAFT, possible meniscus repair, possible PLC repair, knee exam under anesthesia 8/02/2022  Evaluation Date: 8/8/2022  Authorization Period Expiration: 8/07/2023  Plan of Care Expiration: 11/08/2022  Progress Note Due: 09/08/2022  Visit # / Visits authorized: 5/24   FOTO: 1/3     Precautions: Post Op - ACL allograft. See protocol. Knee brace. Progress to TTWB at 4 weeks.      PTA Visit #: 0/5     Time In: 1100  Time Out: 1145  Total Billable Time: 45 minutes    SUBJECTIVE     Pt reports: patient has no new complaints/reports.     She was compliant with home exercise program.  Response to previous treatment: no adverse reactions  Functional change: no reported change    Pain: 3/10  Location: right knee      OBJECTIVE     R knee ROM 0-67 deg flexion with treatment.     Treatment     TIFFANY received the treatments listed below:      therapeutic exercises to develop strength, endurance and ROM for 40 minutes including:    Calf Stretch with strap 5x20"  Soleus Stretch with Strap 10sec 2min  HS stretch seated x30" between heel slide sets.    SLR with 2sec QS donned with brace 3x15  AP 3x15  GS 5sec 2min  hooklying knee over bolster knee flexion passive range of motion stretch with therapist assist 2-3sec 2min    Supine Heel Slides with Strap 3-5sec 3min, added SKTC with unaffected LE in order to address R " hip flexor group during extension.   Hip adduction in hooklying with add ball 5sec 3min  Hip abduction hooklying donned with brace 3x15 green tband above knee  Seated heel slide AAROM with towel, assisting with L foot. Cueing to keep RLE passive as possible. 5x  Seated core stabilization with cueing to engage lats with press down. 2'    manual therapy techniques: Joint mobilizations, Soft tissue Mobilization and Friction Massage were applied to the: right knee for 00 minutes, including:    Not completed at today's treatment session      Patient Education and Home Exercises     Home Exercises Provided and Patient Education Provided     Education provided:     Complete all exercise and acitivities of daily living in pain free range    patient educated on the benefit of Ankle Pumps, ice and elevation for decreased inflamation    Patient educated on the correct positioning for elevation    Written Home Exercises Provided: Patient instructed to cont prior HEP. Exercises were reviewed and YANG was able to demonstrate them prior to the end of the session.  YANG demonstrated good  understanding of the education provided. See EMR under Patient Instructions for exercises provided during therapy sessions    ASSESSMENT     Good tolerance to exercise.  Patient able to progress well with reps for exercise.  Patient improving with passive range of motion for knee flexion.      YANG Is progressing well towards her goals.   Pt prognosis is Good.     Pt will continue to benefit from skilled outpatient physical therapy to address the deficits listed in the problem list box on initial evaluation, provide pt/family education and to maximize pt's level of independence in the home and community environment.     Pt's spiritual, cultural and educational needs considered and pt agreeable to plan of care and goals.     Anticipated barriers to physical therapy: none    Goals:    Short Term Goals:  4 weeks  1.Report decreased R knee pain  <  / =  5/10  to increase tolerance for ADLs and HEP.  2. Increase knee ROM to 0-90 deg in order to be able to perform ADLs without difficulty.  3. Increase strength by 1/3 MMT grade in RLE  to increase tolerance for ADL and work activities.  4. Pt to tolerate HEP to improve ROM and independence with acitivities of daily living's    Long Term Goals: 12 weeks  1.Report decreased R knee pain < / = 3/10  to increase tolerance for ADLs and work-related tasks.   2.Patient goal: Walk normally.   3.Increase strength to >/= 4+/5 in R knee  to increase tolerance for ADL and work activities.  4. Pt will report FOTO knee improvement to > 60% to demonstrate increase in LE function with every day tasks.   5. Increase knee ROM to 0-120 deg or better in order to be able to perform ADLs without difficulty.      PLAN     Progress as tolerated per Plan of Care.       Conrad Trujillo, PTA

## 2022-08-24 ENCOUNTER — CLINICAL SUPPORT (OUTPATIENT)
Dept: REHABILITATION | Facility: HOSPITAL | Age: 32
End: 2022-08-24
Payer: MEDICAID

## 2022-08-24 DIAGNOSIS — M25.60 RANGE OF MOTION DEFICIT: ICD-10-CM

## 2022-08-24 DIAGNOSIS — M62.81 MUSCLE WEAKNESS OF LOWER EXTREMITY: Primary | ICD-10-CM

## 2022-08-24 PROCEDURE — 97110 THERAPEUTIC EXERCISES: CPT | Mod: PN

## 2022-08-24 NOTE — PROGRESS NOTES
"[facility]   Physical Therapy Treatment Note     Name: Tiffany Dobbs  Clinic Number: [id number]    Therapy Diagnosis:   Encounter Diagnoses   Name Primary?    Muscle weakness of lower extremity Yes    Range of motion deficit      Physician: Tevin Tubbs IV, MD    Visit Date: 8/24/2022    Physician Orders: PT Eval and Treat   Medical Diagnosis from Referral:   S83.511A (ICD-10-CM) - Sprain of anterior cruciate ligament of right knee, initial encounter   S89.91XA (ICD-10-CM) - Unspecified injury of right lower leg, initial encounter      S/p RECONSTRUCTION, KNEE, ACL, USING Allograft Achilles GRAFT, possible meniscus repair, possible PLC repair, knee exam under anesthesia 8/02/2022  Evaluation Date: 8/8/2022  Authorization Period Expiration: 8/07/2023  Plan of Care Expiration: 11/08/2022  Progress Note Due: 09/08/2022  Visit # / Visits authorized: 6/24   FOTO: 1/3     Precautions: Post Op - ACL allograft. See protocol. Knee brace. Progress to TTWB at 4 weeks.      PTA Visit #: 0/5     Time In: 1300  Time Out: 1343  Total Billable Time: 41 minutes    SUBJECTIVE     Pt reports: some increased R knee pain today that she attributes to a near slip on wet pavement. She did not fall directly on knee. She was having very minimal pain prior to that. She is experiencing intermittent N/T to R foot.     She was compliant with home exercise program.  Response to previous treatment: no adverse reactions  Functional change: no reported change    Pain: 3/10  Location: right knee      OBJECTIVE     R knee ROM 0-70 deg flexion with treatment.     Treatment     TIFFANY received the treatments listed below:      therapeutic exercises to develop strength, endurance and ROM for 40 minutes including:    Calf Stretch with strap 5x20"  Soleus Stretch with Strap 10sec 2min  HS stretch seated x30" between heel slide sets.    SLR with 2sec QS donned with brace 3x15  AP 3x15  QS/GS 5sec 3x15  hooklying knee over " bolster knee flexion passive range of motion stretch with therapist assist 2-3sec 2min  Seated trunk rotation x10  Supine Heel Slides with Strap 3-5sec 3min  Hip adduction in hooklying with add ball 5sec 2x15  Hip abduction hooklying donned with brace 2x15 green tband above knee  Seated heel slide AAROM with towel, assisting with L foot. Cueing to keep RLE passive as possible x20  Seated core stabilization with cueing to engage lats with press down. 2'  L SKTC x15, to get some stretch of R hip flexor.    manual therapy techniques: Joint mobilizations, Soft tissue Mobilization and Friction Massage were applied to the: right knee for 00 minutes, including:    Not completed at today's treatment session      Patient Education and Home Exercises     Home Exercises Provided and Patient Education Provided     Education provided:     Complete all exercise and acitivities of daily living in pain free range    patient educated on the benefit of Ankle Pumps, ice and elevation for decreased inflamation    Patient educated on the correct positioning for elevation    Written Home Exercises Provided: Patient instructed to cont prior HEP. Exercises were reviewed and YANG was able to demonstrate them prior to the end of the session.  YANG demonstrated good  understanding of the education provided. See EMR under Patient Instructions for exercises provided during therapy sessions    ASSESSMENT     Good tolerance to exercise.  Patient able to progress well with reps for exercise.  Patient improving with passive range of motion for knee flexion. She is experiencing distal HS discomfort with extending knee during heel slides. Extra cueing was provided to ensure passive movement into and out of knee flexion. Pt was able to demonstrate properly with less symptoms reported.     YANG Is progressing well towards her goals.   Pt prognosis is Good.     Pt will continue to benefit from skilled outpatient physical therapy to address the  deficits listed in the problem list box on initial evaluation, provide pt/family education and to maximize pt's level of independence in the home and community environment.     Pt's spiritual, cultural and educational needs considered and pt agreeable to plan of care and goals.     Anticipated barriers to physical therapy: none    Goals:    Short Term Goals:  4 weeks  1.Report decreased R knee pain  < / =  5/10  to increase tolerance for ADLs and HEP.  2. Increase knee ROM to 0-90 deg in order to be able to perform ADLs without difficulty.  3. Increase strength by 1/3 MMT grade in RLE  to increase tolerance for ADL and work activities.  4. Pt to tolerate HEP to improve ROM and independence with acitivities of daily living's    Long Term Goals: 12 weeks  1.Report decreased R knee pain < / = 3/10  to increase tolerance for ADLs and work-related tasks.   2.Patient goal: Walk normally.   3.Increase strength to >/= 4+/5 in R knee  to increase tolerance for ADL and work activities.  4. Pt will report FOTO knee improvement to > 60% to demonstrate increase in LE function with every day tasks.   5. Increase knee ROM to 0-120 deg or better in order to be able to perform ADLs without difficulty.      PLAN     Progress as tolerated per Plan of Care.       Osorio Sarmiento, PT

## 2022-08-29 ENCOUNTER — CLINICAL SUPPORT (OUTPATIENT)
Dept: REHABILITATION | Facility: HOSPITAL | Age: 32
End: 2022-08-29
Payer: MEDICAID

## 2022-08-29 DIAGNOSIS — M25.60 RANGE OF MOTION DEFICIT: ICD-10-CM

## 2022-08-29 DIAGNOSIS — M62.81 MUSCLE WEAKNESS OF LOWER EXTREMITY: Primary | ICD-10-CM

## 2022-08-29 PROCEDURE — 97110 THERAPEUTIC EXERCISES: CPT | Mod: PN,CQ

## 2022-08-29 NOTE — PROGRESS NOTES
"[facility]   Physical Therapy Treatment Note     Name: Tiffany Dobbs  Clinic Number: [id number]    Therapy Diagnosis:   Encounter Diagnoses   Name Primary?    Muscle weakness of lower extremity Yes    Range of motion deficit      Physician: Tevin Tubbs IV, MD    Visit Date: 8/29/2022    Physician Orders: PT Eval and Treat   Medical Diagnosis from Referral:   S83.511A (ICD-10-CM) - Sprain of anterior cruciate ligament of right knee, initial encounter   S89.91XA (ICD-10-CM) - Unspecified injury of right lower leg, initial encounter      S/p RECONSTRUCTION, KNEE, ACL, USING Allograft Achilles GRAFT, possible meniscus repair, possible PLC repair, knee exam under anesthesia 8/02/2022  Evaluation Date: 8/8/2022  Authorization Period Expiration: 8/07/2023  Plan of Care Expiration: 11/08/2022  Progress Note Due: 09/08/2022  Visit # / Visits authorized: 7/24   FOTO: 1/3     Precautions: Post Op - ACL allograft. See protocol. Knee brace. Progress to TTWB at 4 weeks.      PTA Visit #: 1/5     Time In: 933  Time Out: 1015  Total Billable Time: 40 minutes    SUBJECTIVE     Pt reports: that she has been completing her HEP and is going well.  Patient states that she has been getting some intermittent lateral knee discomfort/pain.     She was compliant with home exercise program.  Response to previous treatment: no adverse reactions  Functional change: no reported change    Pain: 2/10   Location: right knee      OBJECTIVE     Not completed at today's treatment session       Treatment     TIFFANY received the treatments listed below:      therapeutic exercises to develop strength, endurance and ROM for 40 minutes including:    Calf Stretch with strap 5x20"  Soleus Stretch with Strap 10sec 2min  HS stretch seated x30" between heel slide sets.    SLR with 2sec QS donned with brace 3x15  AP 3x15  QS/GS 5sec 3x15  hooklying knee over bolster knee flexion passive range of motion stretch with therapist assist " 2-3sec 2min  Seated trunk rotation x10  Supine Heel Slides with Strap 3-5sec 3min  Hip adduction in hooklying with add ball 5sec 2min  Hip abduction hooklying donned with brace 3x12 green tband above knee  Seated heel slide AAROM with towel, assisting with L foot. Cueing to keep RLE passive as possible x20  Seated core stabilization with cueing to engage lats with press down. 2'  L SKTC x15, to get some stretch of R hip flexor.    manual therapy techniques: Joint mobilizations, Soft tissue Mobilization and Friction Massage were applied to the: right knee for 00 minutes, including:    Not completed at today's treatment session      Patient Education and Home Exercises     Home Exercises Provided and Patient Education Provided     Education provided:     Complete all exercise and acitivities of daily living in pain free range    patient educated on the benefit of Ankle Pumps, ice and elevation for decreased inflamation    Patient educated on the correct positioning for elevation    Written Home Exercises Provided: Patient instructed to cont prior HEP. Exercises were reviewed and YANG was able to demonstrate them prior to the end of the session.  YANG demonstrated good  understanding of the education provided. See EMR under Patient Instructions for exercises provided during therapy sessions    ASSESSMENT     Good tolerance to exercise.  Patient able to progress well with reps for exercise.  Patient improving with passive range of motion for knee flexion. Patient did not need additional cueing to ensure passive movement into and out of knee flexion.      YANG Is progressing well towards her goals.   Pt prognosis is Good.     Pt will continue to benefit from skilled outpatient physical therapy to address the deficits listed in the problem list box on initial evaluation, provide pt/family education and to maximize pt's level of independence in the home and community environment.     Pt's spiritual, cultural and  educational needs considered and pt agreeable to plan of care and goals.     Anticipated barriers to physical therapy: none    Goals:    Short Term Goals:  4 weeks  1.Report decreased R knee pain  < / =  5/10  to increase tolerance for ADLs and HEP.  2. Increase knee ROM to 0-90 deg in order to be able to perform ADLs without difficulty.  3. Increase strength by 1/3 MMT grade in RLE  to increase tolerance for ADL and work activities.  4. Pt to tolerate HEP to improve ROM and independence with acitivities of daily living's    Long Term Goals: 12 weeks  1.Report decreased R knee pain < / = 3/10  to increase tolerance for ADLs and work-related tasks.   2.Patient goal: Walk normally.   3.Increase strength to >/= 4+/5 in R knee  to increase tolerance for ADL and work activities.  4. Pt will report FOTO knee improvement to > 60% to demonstrate increase in LE function with every day tasks.   5. Increase knee ROM to 0-120 deg or better in order to be able to perform ADLs without difficulty.      PLAN     Progress as tolerated per Plan of Care.       Conrad Trujillo, PTA

## 2022-08-31 ENCOUNTER — CLINICAL SUPPORT (OUTPATIENT)
Dept: REHABILITATION | Facility: HOSPITAL | Age: 32
End: 2022-08-31
Payer: MEDICAID

## 2022-08-31 DIAGNOSIS — M62.81 MUSCLE WEAKNESS OF LOWER EXTREMITY: Primary | ICD-10-CM

## 2022-08-31 DIAGNOSIS — M25.60 RANGE OF MOTION DEFICIT: ICD-10-CM

## 2022-08-31 PROCEDURE — 97110 THERAPEUTIC EXERCISES: CPT | Mod: PN

## 2022-08-31 NOTE — PROGRESS NOTES
[facility]   Physical Therapy Treatment Note     Name: Tiffany Dobbs  Clinic Number: [id number]    Therapy Diagnosis:   Encounter Diagnoses   Name Primary?    Muscle weakness of lower extremity Yes    Range of motion deficit      Physician: Tevin Tubbs IV, MD    Visit Date: 8/31/2022    Physician Orders: PT Eval and Treat   Medical Diagnosis from Referral:   S83.511A (ICD-10-CM) - Sprain of anterior cruciate ligament of right knee, initial encounter   S89.91XA (ICD-10-CM) - Unspecified injury of right lower leg, initial encounter      S/p RECONSTRUCTION, KNEE, ACL, USING Allograft Achilles GRAFT, possible meniscus repair, possible PLC repair, knee exam under anesthesia 8/02/2022  Evaluation Date: 8/8/2022  Authorization Period Expiration: 8/07/2023  Plan of Care Expiration: 11/08/2022  Progress Note Due: 09/08/2022  Visit # / Visits authorized: 8/24   FOTO: 1/3     Precautions: Post Op - ACL allograft. See protocol. Knee brace. Progress to TTWB at 4 weeks. Progress to 50% WB at week 6 x1 week until FWB tolerated and wean off brace.     PTA Visit #: 0/5     Time In: 0920  Time Out: 1010  Total Billable Time: 47 minutes    SUBJECTIVE     Pt reports: that she has been completing her HEP and is going well. She reports reduction of tingling sensation. She voices concern about her R foot beginning to rotate outward.     She was compliant with home exercise program.  Response to previous treatment: no adverse reactions  Functional change: no reported change    Pain: 2/10   Location: right knee      OBJECTIVE     R knee ROM: 0-78 deg. Continues to have mild extension lag with SLR so increased focus provided today for quad activation and brace was, again, locked in extension.   Good tolerance observed with TTWB.        Treatment     TIFFANY received the treatments listed below:      therapeutic exercises to develop strength, endurance and ROM for 47 minutes including:    Calf Stretch with strap  "3x20"  Soleus Stretch with Strap 3x20"  HS stretch seated x30" between heel slide sets.  Weightshifts for TTWB 3x10  SLR with 2sec QS donned with brace 3x15  AP 3x15  QS/GS 5sec 3x15  hooklying knee over bolster knee flexion passive range of motion stretch with therapist assist 2-3sec 2min  Seated trunk rotation x10  Supine Heel Slides with Strap 3-5sec 3min  Hip adduction seated ball squeeze with VMO 2x10  Hip abduction hooklying donned with brace 3x12 green tband above knee  Seated heel slide AAROM with towel, assisting with L foot. Cueing to keep RLE passive as possible x20  Seated core stabilization with cueing to engage lats with press down. 2'  L SKTC x15, to get some stretch of R hip flexor.    manual therapy techniques: Joint mobilizations, Soft tissue Mobilization and Friction Massage were applied to the: right knee for 00 minutes, including:    Not completed at today's treatment session      Patient Education and Home Exercises     Home Exercises Provided and Patient Education Provided     Education provided:     Complete all exercise and acitivities of daily living in pain free range    patient educated on the benefit of Ankle Pumps, ice and elevation for decreased inflamation    Patient educated on the correct positioning for elevation    Written Home Exercises Provided: Patient instructed to cont prior HEP. Exercises were reviewed and YANG was able to demonstrate them prior to the end of the session.  YANG demonstrated good  understanding of the education provided. See EMR under Patient Instructions for exercises provided during therapy sessions    ASSESSMENT     Good tolerance to exercise.  Patient able to progress well with reps for exercise.  Patient improving with passive range of motion for knee flexion. She continues to demonstrate extension lag with SLR so additional cueing was provided.     YANG Is progressing well towards her goals.   Pt prognosis is Good.     Pt will continue to benefit " from skilled outpatient physical therapy to address the deficits listed in the problem list box on initial evaluation, provide pt/family education and to maximize pt's level of independence in the home and community environment.     Pt's spiritual, cultural and educational needs considered and pt agreeable to plan of care and goals.     Anticipated barriers to physical therapy: none    Goals:    Short Term Goals:  4 weeks  1.Report decreased R knee pain  < / =  5/10  to increase tolerance for ADLs and HEP.  2. Increase knee ROM to 0-90 deg in order to be able to perform ADLs without difficulty.  3. Increase strength by 1/3 MMT grade in RLE  to increase tolerance for ADL and work activities.  4. Pt to tolerate HEP to improve ROM and independence with acitivities of daily living's    Long Term Goals: 12 weeks  1.Report decreased R knee pain < / = 3/10  to increase tolerance for ADLs and work-related tasks.   2.Patient goal: Walk normally.   3.Increase strength to >/= 4+/5 in R knee  to increase tolerance for ADL and work activities.  4. Pt will report FOTO knee improvement to > 60% to demonstrate increase in LE function with every day tasks.   5. Increase knee ROM to 0-120 deg or better in order to be able to perform ADLs without difficulty.      PLAN     Progress as tolerated per Plan of Care. PT and PTA conference was held this date for the continued care of this patient. May change flexion block on brace to 70 deg next visit.       Osorio Sarmiento, PT

## 2022-09-01 ENCOUNTER — CLINICAL SUPPORT (OUTPATIENT)
Dept: REHABILITATION | Facility: HOSPITAL | Age: 32
End: 2022-09-01
Payer: MEDICAID

## 2022-09-01 DIAGNOSIS — M62.81 MUSCLE WEAKNESS OF LOWER EXTREMITY: Primary | ICD-10-CM

## 2022-09-01 DIAGNOSIS — M25.60 RANGE OF MOTION DEFICIT: ICD-10-CM

## 2022-09-01 PROCEDURE — 97110 THERAPEUTIC EXERCISES: CPT | Mod: PN | Performed by: PHYSICAL THERAPIST

## 2022-09-01 NOTE — PROGRESS NOTES
"[facility]   Physical Therapy Treatment Note     Name: Tiffany Dobbs  Clinic Number: [id number]    Therapy Diagnosis:   Encounter Diagnoses   Name Primary?    Muscle weakness of lower extremity Yes    Range of motion deficit      Physician: Tevin Tubbs IV, MD    Visit Date: 9/1/2022    Physician Orders: PT Eval and Treat   Medical Diagnosis from Referral:   S83.511A (ICD-10-CM) - Sprain of anterior cruciate ligament of right knee, initial encounter   S89.91XA (ICD-10-CM) - Unspecified injury of right lower leg, initial encounter      S/p RECONSTRUCTION, KNEE, ACL, USING Allograft Achilles GRAFT, possible meniscus repair, possible PLC repair, knee exam under anesthesia 8/02/2022  Evaluation Date: 8/8/2022  Last Reassessment: 9/1/2022, visit #12  Authorization Period Expiration: 8/07/2023  Plan of Care Expiration: 12/1/2022  Progress Note Due: 11/1/2022  Visit # / Visits authorized: 12/24   FOTO: 2/3     Precautions: Post Op - ACL allograft. See protocol. Knee brace. Progress to TTWB at 4 weeks. Progress to 50% WB at week 6 x1 week until FWB tolerated and wean off brace.     PTA Visit #: 0/5     Time In: 11:00  Time Out: 11:45  Total Billable Time: 45 minutes    SUBJECTIVE     Tiffany reports that she has been completing her HEP, and it is going well. She reports pain in the R lateral knee, over her incision.    She was compliant with home exercise program.  Response to previous treatment: no adverse reactions  Functional change: no reported change    Pain: 2/10 (increases to 4/10 at night with repositioning in bed)  Location: right knee      OBJECTIVE      See Progress Report (9/1/22)     Treatment     TIFFANY received the treatments listed below:      therapeutic exercises to develop strength, endurance and ROM for 42 minutes including:    Calf Stretch with strap 3x30"  Soleus Stretch with Strap 3x20"  HS stretch seated x30" between heel slide sets.  Weightshifts for TTWB 3x10  SLR with " 2sec QS donned with brace 3x15  SLR without brace, 2x4 - only able to perform a few reps with good control of extensor lag  AP 3x15  QS/GS 8sec, x30  hooklying knee over bolster knee flexion passive range of motion stretch with therapist assist 2-3sec 2min  Seated trunk rotation x10  Supine Heel Slides with Strap 3-5sec 3min  Hip adduction seated ball squeeze with VMO, 2x30  Hip abduction seated donned with brace + green tband above knee, 2x30  Seated heel slide AAROM with towel, assisting with L foot. Cueing to keep RLE passive as possible x20  Seated core stabilization with cueing to engage lats with press down. 2'  L SKTC x15, to get some stretch of R hip flexor  Standing hip abduction with brace locked in extension (R), 3x10  Hooklying shoulder extension with green band + core activation, x20  HEP review         Patient Education and Home Exercises     Home Exercises Provided and Patient Education Provided     Education provided:     Post-op protocol    Complete all exercise and acitivities of daily living in pain free range    patient educated on the benefit of Ankle Pumps, ice and elevation for decreased inflamation    Patient educated on the correct positioning for elevation    Written Home Exercises Provided: Patient instructed to cont prior HEP. Exercises were reviewed and YANG was able to demonstrate them prior to the end of the session.  YANG demonstrated good  understanding of the education provided. See EMR under Patient Instructions for exercises provided during therapy sessions    ASSESSMENT     Good tolerance to exercise. Patient able to progress well with reps for exercise. Patient improving with passive range of motion for knee flexion. She continues to demonstrate extension lag with SLR so additional cueing was provided, but her quad control is improving.    YANG is progressing well towards her goals.   Pt prognosis is Good.     Pt will continue to benefit from skilled outpatient physical  therapy to address the deficits listed in the problem list box on initial evaluation, provide pt/family education and to maximize pt's level of independence in the home and community environment.     Pt's spiritual, cultural and educational needs considered and pt agreeable to plan of care and goals.     Anticipated barriers to physical therapy: none    Goals:    Short Term Goals:  4 weeks  1.Report decreased R knee pain  < / =  5/10  to increase tolerance for ADLs and HEP - MET  2. Increase knee ROM to 0-90 deg in order to be able to perform ADLs without difficulty - NOT MET  3. Increase strength by 1/3 MMT grade in RLE  to increase tolerance for ADL and work activities - NOT MET  4. Pt to tolerate HEP to improve ROM and independence with acitivities of daily living - MET    Long Term Goals: 12 weeks  1.Report decreased R knee pain < / = 3/10  to increase tolerance for ADLs and work-related tasks - NOT MET  2.Patient goal: Walk normally - NOT MET  3.Increase strength to >/= 4+/5 in R knee  to increase tolerance for ADL and work activities - NOT MET  4. Pt will report FOTO knee improvement to > 60% to demonstrate increase in LE function with every day tasks - NOT MET  5. Increase knee ROM to 0-120 deg or better in order to be able to perform ADLs without difficulty - NOT MET      PLAN     Progress as tolerated per Plan of Care. PT and PTA conference was held this date for the continued care of this patient. May change flexion block on brace to 70 deg next visit      Chapis Hayward, PT

## 2022-09-01 NOTE — PLAN OF CARE
[facility]   Physical Therapy Re-Evaluation    Date: 9/1/2022   Name: Tiffany Dobbs  Clinic Number: [id number]    Therapy Diagnosis:   Encounter Diagnoses   Name Primary?    Muscle weakness of lower extremity Yes    Range of motion deficit      Physician: Tevin Tubbs IV, MD    Physician Orders: PT Eval and Treat   Medical Diagnosis from Referral:   S83.511A (ICD-10-CM) - Sprain of anterior cruciate ligament of right knee, initial encounter   S89.91XA (ICD-10-CM) - Unspecified injury of right lower leg, initial encounter      S/p RECONSTRUCTION, KNEE, ACL, USING Allograft Achilles GRAFT, possible meniscus repair, possible PLC repair, knee exam under anesthesia 8/02/2022  Evaluation Date: 8/8/2022  Last Reassessment: 9/1/2022, visit #12  Authorization Period Expiration: 8/07/2023  Plan of Care Expiration: 12/1/2022  Progress Note Due: 11/1/2022  Visit # / Visits authorized: 12/24   FOTO: 2/3      SUBJECTIVE     Tiffany reports that she has been completing her HEP, and it is going well. She reports pain in the R lateral knee, over her incision.    Pain  8/8/22: Current 6/10, worst 9/10, best 6/10   9/1/22: 2/10 (increases to 4/10 at night with repositioning in bed)    OBJECTIVE     Gait  8/8/22: Able to demonstrate NWB RLE effectively with axillary crutches.   9/1/22: Able to demonstrate TTWB on RLE appropriately with axillary crutches    Edema  8/8/22: Localized, non-pitting  9/1/22: mild edema, non-pitting    Knee range of motion  8/2/22 9/1/22   R extension (passive) lacking 5° 0°   R flexion (passive 55° 80°   R extension (active) lacking 8° 0°   R flexion (active) 50° 78°     Lower Extremity Strength 8/8/22 9/1/22   R hip flexion 4-/5 4/5   R hip external rotation 4-/5 4-/5   R hip abduction 4/5 4/5   R hip adduction 4/5 4/5   R knee flexion 3-/5 3-/5   R knee extension 3-/5 3-/5   R ankle dorsiflexion 4-/5 4-/5   R ankle plantarflexion 4-/5 4-/5   L hip flexion 5/5    L hip external  rotation 5/5    L hip abduction 5/5    L hip adduction 5/5    L knee flexion 5/5    L knee extension 5/5    L ankle dorsiflexion 5/5    L ankle plantarflexion 5/5    Did not apply manual resistance to R knee.        Limitation/Restriction for FOTO knee Survey     Therapist reviewed FOTO scores for Tiffany Dobbs on 9/1/2022.   FOTO documents entered into [id number] - see Media section.     Limitation Score:   8/8/22: 82% impairment  9/1/22: 74% impairment       ASSESSMENT     TIFFANY has progressed well with physical therapy, demonstrating improvements in pain levels, hip/core strength, balance, transfers, range of motion, and activity tolerance. Pt continues to experience pain/difficulty with ADLs and functional mobility due to R knee pain/swelling/weakness. TIFFANY has met/partially met some goals from the initial evaluation, and the rest of those goals remain appropriate for the plan of care.     Patient will benefit from skilled outpatient Physical Therapy to address the deficits stated above and in the chart below, provide patient /family education, and to maximize patient's level of independence.     Patient prognosis is Excellent.   Plan of care discussed with patient: yes  Patient's spiritual, cultural and educational needs considered and patient is agreeable to the plan of care and goals.  Anticipated Barriers for therapy: none      Short Term Goals:  4 weeks  1.Report decreased R knee pain  < / =  5/10  to increase tolerance for ADLs and HEP - MET  2. Increase knee ROM to 0-90 deg in order to be able to perform ADLs without difficulty - NOT MET  3. Increase strength by 1/3 MMT grade in RLE  to increase tolerance for ADL and work activities - NOT MET  4. Pt to tolerate HEP to improve ROM and independence with acitivities of daily living - MET    Long Term Goals: 12 weeks  1.Report decreased R knee pain < / = 3/10  to increase tolerance for ADLs and work-related tasks - NOT MET  2.Patient goal: Walk normally - NOT  MET  3.Increase strength to >/= 4+/5 in R knee  to increase tolerance for ADL and work activities - NOT MET  4. Pt will report FOTO knee improvement to > 60% to demonstrate increase in LE function with every day tasks - NOT MET  5. Increase knee ROM to 0-120 deg or better in order to be able to perform ADLs without difficulty - NOT MET    PLAN     Plan of Care certification: 9/1/2022 to 12/1/2022    Outpatient Physical Therapy: 3 times weekly for 12 weeks to include the following interventions - Therapeutic Exercise, Manual Therapy, Therapeutic Activity, Neuromuscular Re-education, Electrical Stimulation (Unattended)    Chapis Hayward, PT, DPT

## 2022-09-07 ENCOUNTER — CLINICAL SUPPORT (OUTPATIENT)
Dept: REHABILITATION | Facility: HOSPITAL | Age: 32
End: 2022-09-07
Payer: MEDICAID

## 2022-09-07 DIAGNOSIS — M25.60 RANGE OF MOTION DEFICIT: ICD-10-CM

## 2022-09-07 DIAGNOSIS — M62.81 MUSCLE WEAKNESS OF LOWER EXTREMITY: Primary | ICD-10-CM

## 2022-09-07 PROCEDURE — 97110 THERAPEUTIC EXERCISES: CPT | Mod: PN,CQ

## 2022-09-07 NOTE — PROGRESS NOTES
"[facility]   Physical Therapy Treatment Note     Name: Tiffany Dobbs  Clinic Number: [id number]    Therapy Diagnosis:   Encounter Diagnoses   Name Primary?    Muscle weakness of lower extremity Yes    Range of motion deficit      Physician: Tevin Tubbs IV, MD    Visit Date: 9/7/2022    Physician Orders: PT Eval and Treat   Medical Diagnosis from Referral:   S83.511A (ICD-10-CM) - Sprain of anterior cruciate ligament of right knee, initial encounter   S89.91XA (ICD-10-CM) - Unspecified injury of right lower leg, initial encounter      S/p RECONSTRUCTION, KNEE, ACL, USING Allograft Achilles GRAFT, possible meniscus repair, possible PLC repair, knee exam under anesthesia 8/02/2022  Evaluation Date: 8/8/2022  Last Reassessment: 9/1/2022, visit #12  Authorization Period Expiration: 8/07/2023  Plan of Care Expiration: 12/1/2022  Progress Note Due: 11/1/2022  Visit # / Visits authorized: 13/24   FOTO: 2/3     Precautions: Post Op - ACL allograft. See protocol. Knee brace. Progress to TTWB at 4 weeks. Progress to 50% WB at week 6 x1 week until FWB tolerated and wean off brace.     PTA Visit #: 1/5     Time In: 1430  Time Out: 1510  Total Billable Time: 40 minutes    SUBJECTIVE     Tiffany reports that she has been completing her HEP, and it is going well. Patient states that she is becoming more confident/comfortable with TTWB.    She was compliant with home exercise program.  Response to previous treatment: no adverse reactions  Functional change: no reported change    Pain: 2/10 (increases to 4/10 at night with repositioning in bed)  Location: right knee      OBJECTIVE      See Progress Report (9/1/22)     Treatment     TIFFANY received the treatments listed below:      therapeutic exercises to develop strength, endurance and ROM for 42 minutes including:    Calf Stretch with strap 3x30"  Soleus Stretch with Strap 3x20"  HS stretch seated x30" between heel slide sets.  Weightshifts for TTWB " 3x10  SLR with 2sec QS donned with brace 3x15  SLR without brace, 3x6- improving with control of extensor lag  AP 3x15  QS/GS 10sec, 2min    Seated trunk rotation x10  Supine Heel Slides with Strap 3-5sec 3min  Hip adduction seated ball squeeze with VMO, 2-3sec 2min  Hip abduction seated donned with brace + green tband above knee, 2x30  Seated heel slide AAROM with towel, assisting with L foot. Cueing to keep RLE passive as possible x20  Seated core stabilization with cueing to engage lats with press down. 2'  L SKTC x15, to get some stretch of R hip flexor  Standing hip abduction with brace locked in extension (R), 3x10  Seated shoulder extension with green band + core activation, 3x15  Seated upright posture 1min 3x  HEP review         Patient Education and Home Exercises     Home Exercises Provided and Patient Education Provided     Education provided:     Post-op protocol    Complete all exercise and acitivities of daily living in pain free range    patient educated on the benefit of Ankle Pumps, ice and elevation for decreased inflamation    Patient educated on the correct positioning for elevation    Written Home Exercises Provided: Patient instructed to cont prior HEP. Exercises were reviewed and YANG was able to demonstrate them prior to the end of the session.  YANG demonstrated good  understanding of the education provided. See EMR under Patient Instructions for exercises provided during therapy sessions    ASSESSMENT     Good tolerance to exercise. Patient able to progress well with reps for exercise. Patient improving with passive range of motion for knee flexion. Patient improving with quad control.    YANG is progressing well towards her goals.   Pt prognosis is Good.     Pt will continue to benefit from skilled outpatient physical therapy to address the deficits listed in the problem list box on initial evaluation, provide pt/family education and to maximize pt's level of independence in the home  and community environment.     Pt's spiritual, cultural and educational needs considered and pt agreeable to plan of care and goals.     Anticipated barriers to physical therapy: none    Goals:    Short Term Goals:  4 weeks  1.Report decreased R knee pain  < / =  5/10  to increase tolerance for ADLs and HEP - MET  2. Increase knee ROM to 0-90 deg in order to be able to perform ADLs without difficulty - NOT MET  3. Increase strength by 1/3 MMT grade in RLE  to increase tolerance for ADL and work activities - NOT MET  4. Pt to tolerate HEP to improve ROM and independence with acitivities of daily living - MET    Long Term Goals: 12 weeks  1.Report decreased R knee pain < / = 3/10  to increase tolerance for ADLs and work-related tasks - NOT MET  2.Patient goal: Walk normally - NOT MET  3.Increase strength to >/= 4+/5 in R knee  to increase tolerance for ADL and work activities - NOT MET  4. Pt will report FOTO knee improvement to > 60% to demonstrate increase in LE function with every day tasks - NOT MET  5. Increase knee ROM to 0-120 deg or better in order to be able to perform ADLs without difficulty - NOT MET      PLAN     Progress as tolerated per Plan of Care. PT and PTA conference was held this date for the continued care of this patient. May change flexion block on brace to 70 deg next visit      Conrad Trujillo PTA

## 2022-09-09 ENCOUNTER — CLINICAL SUPPORT (OUTPATIENT)
Dept: REHABILITATION | Facility: HOSPITAL | Age: 32
End: 2022-09-09
Payer: MEDICAID

## 2022-09-09 DIAGNOSIS — M25.60 RANGE OF MOTION DEFICIT: ICD-10-CM

## 2022-09-09 DIAGNOSIS — M62.81 MUSCLE WEAKNESS OF LOWER EXTREMITY: Primary | ICD-10-CM

## 2022-09-09 PROCEDURE — 97110 THERAPEUTIC EXERCISES: CPT | Mod: PN,CQ

## 2022-09-09 NOTE — PROGRESS NOTES
"[facility]   Physical Therapy Treatment Note     Name: Tiffany Dobbs  Clinic Number: [id number]    Therapy Diagnosis:   Encounter Diagnoses   Name Primary?    Muscle weakness of lower extremity Yes    Range of motion deficit      Physician: Tevin Tubbs IV, MD    Visit Date: 9/9/2022    Physician Orders: PT Eval and Treat   Medical Diagnosis from Referral:   S83.511A (ICD-10-CM) - Sprain of anterior cruciate ligament of right knee, initial encounter   S89.91XA (ICD-10-CM) - Unspecified injury of right lower leg, initial encounter      S/p RECONSTRUCTION, KNEE, ACL, USING Allograft Achilles GRAFT, possible meniscus repair, possible PLC repair, knee exam under anesthesia 8/02/2022  Evaluation Date: 8/8/2022  Last Reassessment: 9/1/2022, visit #12  Authorization Period Expiration: 8/07/2023  Plan of Care Expiration: 12/1/2022  Progress Note Due: 11/1/2022  Visit # / Visits authorized: 13/24   FOTO: 2/3     Precautions: Post Op - ACL allograft. See protocol. Knee brace. Progress to TTWB at 4 weeks. Progress to 50% WB at week 6 x1 week until FWB tolerated and wean off brace.     PTA Visit #: 1/5     Time In: 1000  Time Out: 1040  Total Billable Time: 40 minutes    SUBJECTIVE     Tiffany reports that she has been completing her HEP, and it is going well. Patient states that she is eager to return to PLF and progress with weight bearing status.    She was compliant with home exercise program.  Response to previous treatment: no adverse reactions  Functional change: no reported change    Pain: 1/10  Location: right knee      OBJECTIVE      See Progress Report (9/1/22)     Treatment     TIFFANY received the treatments listed below:      therapeutic exercises to develop strength, endurance and ROM for 42 minutes including:    Calf Stretch with strap 3x30"  Soleus Stretch with Strap 3x20"  HS stretch seated x30" between heel slide sets.  Weightshifts for TTWB 3x10  SLR with 2sec QS donned with brace " 3x15  SLR without brace, 3x10- improving with control of extensor lag  AP 3x15  QS/GS 10sec, 2min    Seated trunk rotation x10  Supine Heel Slides with Strap 3-5sec 3min  Hip adduction seated ball squeeze with VMO, 2-3sec 2min  Hip abduction seated donned with brace + green tband above knee, 2x30  Seated heel slide AAROM with towel, assisting with L foot. Cueing to keep RLE passive as possible x20  Seated core stabilization with cueing to engage lats with press down. 2'  L SKTC x15, to get some stretch of R hip flexor  Standing hip abduction with brace locked in extension (R), 3x12  Seated shoulder extension with green band + core activation, 3x15  Seated upright posture 1min 3x  HEP review         Patient Education and Home Exercises     Home Exercises Provided and Patient Education Provided     Education provided:     Post-op protocol    Complete all exercise and acitivities of daily living in pain free range    patient educated on the benefit of Ankle Pumps, ice and elevation for decreased inflamation    Patient educated on the correct positioning for elevation    Written Home Exercises Provided: Patient instructed to cont prior HEP. Exercises were reviewed and YANG was able to demonstrate them prior to the end of the session.  YANG demonstrated good  understanding of the education provided. See EMR under Patient Instructions for exercises provided during therapy sessions    ASSESSMENT     Good tolerance to exercise. Patient able to progress well with reps for exercise. Patient improving with passive range of motion for knee flexion. Patient continues to improve with quad control.    YANG is progressing well towards her goals.   Pt prognosis is Good.     Pt will continue to benefit from skilled outpatient physical therapy to address the deficits listed in the problem list box on initial evaluation, provide pt/family education and to maximize pt's level of independence in the home and community environment.      Pt's spiritual, cultural and educational needs considered and pt agreeable to plan of care and goals.     Anticipated barriers to physical therapy: none    Goals:    Short Term Goals:  4 weeks  1.Report decreased R knee pain  < / =  5/10  to increase tolerance for ADLs and HEP - MET  2. Increase knee ROM to 0-90 deg in order to be able to perform ADLs without difficulty - NOT MET  3. Increase strength by 1/3 MMT grade in RLE  to increase tolerance for ADL and work activities - NOT MET  4. Pt to tolerate HEP to improve ROM and independence with acitivities of daily living - MET    Long Term Goals: 12 weeks  1.Report decreased R knee pain < / = 3/10  to increase tolerance for ADLs and work-related tasks - NOT MET  2.Patient goal: Walk normally - NOT MET  3.Increase strength to >/= 4+/5 in R knee  to increase tolerance for ADL and work activities - NOT MET  4. Pt will report FOTO knee improvement to > 60% to demonstrate increase in LE function with every day tasks - NOT MET  5. Increase knee ROM to 0-120 deg or better in order to be able to perform ADLs without difficulty - NOT MET      PLAN     Progress as tolerated per Plan of Care. PT and PTA conference was held this date for the continued care of this patient. May change flexion block on brace to 70 deg next visit      Conrad Trujillo PTA

## 2022-09-12 ENCOUNTER — CLINICAL SUPPORT (OUTPATIENT)
Dept: REHABILITATION | Facility: HOSPITAL | Age: 32
End: 2022-09-12
Payer: MEDICAID

## 2022-09-12 DIAGNOSIS — M25.60 RANGE OF MOTION DEFICIT: ICD-10-CM

## 2022-09-12 DIAGNOSIS — M62.81 MUSCLE WEAKNESS OF LOWER EXTREMITY: Primary | ICD-10-CM

## 2022-09-12 PROCEDURE — 97110 THERAPEUTIC EXERCISES: CPT | Mod: PN,CQ

## 2022-09-12 NOTE — PROGRESS NOTES
"[facility]   Physical Therapy Treatment Note     Name: Tiffany Dobbs  Clinic Number: [id number]    Therapy Diagnosis:   Encounter Diagnoses   Name Primary?    Muscle weakness of lower extremity Yes    Range of motion deficit      Physician: Tevin Tubbs IV, MD    Visit Date: 9/12/2022    Physician Orders: PT Eval and Treat   Medical Diagnosis from Referral:   S83.511A (ICD-10-CM) - Sprain of anterior cruciate ligament of right knee, initial encounter   S89.91XA (ICD-10-CM) - Unspecified injury of right lower leg, initial encounter      S/p RECONSTRUCTION, KNEE, ACL, USING Allograft Achilles GRAFT, possible meniscus repair, possible PLC repair, knee exam under anesthesia 8/02/2022  Evaluation Date: 8/8/2022  Last Reassessment: 9/1/2022, visit #12  Authorization Period Expiration: 8/07/2023  Plan of Care Expiration: 12/1/2022  Progress Note Due: 11/1/2022  Visit # / Visits authorized: 13/24   FOTO: 2/3     Precautions: Post Op - ACL allograft. See protocol. Knee brace. Progress to TTWB at 4 weeks. Progress to 50% WB at week 6 x1 week until FWB tolerated and wean off brace.     PTA Visit #: 1/5     Time In: 1230  Time Out: 1315  Total Billable Time: 45 minutes    SUBJECTIVE     Tiffany reports that she has been completing her HEP, and it is going well. Patient has continued reports that she is eager to return to PLF and progress with weight bearing status.  Patient states that she has a follow -up with MD before her next treatment session.    She was compliant with home exercise program.  Response to previous treatment: no adverse reactions  Functional change: no reported change    Pain: 1/10  Location: right knee      OBJECTIVE      See Progress Report (9/1/22)     Treatment     TIFFANY received the treatments listed below:      therapeutic exercises to develop strength, endurance and ROM for 42 minutes including:    Calf Stretch with strap 3x30"  Soleus Stretch with Strap 3x20"  HS stretch " "seated x30" between heel slide sets.  Weightshifts for TTWB 3x10  SLR with 2sec QS donned with brace 3x15  SLR without brace, 3x12- improving with control of extensor lag  AP 3x15  QS/GS 10sec, 2min    Seated trunk rotation x10  Supine Heel Slides with Strap 3-5sec 3min  Hip adduction seated ball squeeze with VMO, 2-3sec 2min  Hip abduction seated donned with brace + green tband above knee, 2x30  Seated heel slide AAROM with towel, assisting with L foot. Cueing to keep RLE passive as possible x20  Seated core stabilization with cueing to engage lats with press down. 2'  L SKTC for improved stretch of R hip flexor 10sec 10x  Standing hip abduction with brace locked in extension (R), 3x15  Seated shoulder extension with green band + core activation, 3x15  Seated upright posture 1min 3x  HEP review         Patient Education and Home Exercises     Home Exercises Provided and Patient Education Provided     Education provided:     Post-op protocol    Complete all exercise and acitivities of daily living in pain free range    patient educated on the benefit of Ankle Pumps, ice and elevation for decreased inflamation    Patient educated on the correct positioning for elevation    Written Home Exercises Provided: Patient instructed to cont prior HEP. Exercises were reviewed and YANG was able to demonstrate them prior to the end of the session.  YANG demonstrated good  understanding of the education provided. See EMR under Patient Instructions for exercises provided during therapy sessions    ASSESSMENT     Good tolerance to exercise. Patient able to progress well with reps for exercise. Patient improving with passive range of motion for knee flexion. Patient continues to improve with quad control.    YANG is progressing well towards her goals.   Pt prognosis is Good.     Pt will continue to benefit from skilled outpatient physical therapy to address the deficits listed in the problem list box on initial evaluation, " provide pt/family education and to maximize pt's level of independence in the home and community environment.     Pt's spiritual, cultural and educational needs considered and pt agreeable to plan of care and goals.     Anticipated barriers to physical therapy: none    Goals:    Short Term Goals:  4 weeks  1.Report decreased R knee pain  < / =  5/10  to increase tolerance for ADLs and HEP - MET  2. Increase knee ROM to 0-90 deg in order to be able to perform ADLs without difficulty - NOT MET  3. Increase strength by 1/3 MMT grade in RLE  to increase tolerance for ADL and work activities - NOT MET  4. Pt to tolerate HEP to improve ROM and independence with acitivities of daily living - MET    Long Term Goals: 12 weeks  1.Report decreased R knee pain < / = 3/10  to increase tolerance for ADLs and work-related tasks - NOT MET  2.Patient goal: Walk normally - NOT MET  3.Increase strength to >/= 4+/5 in R knee  to increase tolerance for ADL and work activities - NOT MET  4. Pt will report FOTO knee improvement to > 60% to demonstrate increase in LE function with every day tasks - NOT MET  5. Increase knee ROM to 0-120 deg or better in order to be able to perform ADLs without difficulty - NOT MET      PLAN     Progress as tolerated per Plan of Care. PT and PTA conference was held this date for the continued care of this patient. May change flexion block on brace to 70 deg next visit      Conrad Trujillo PTA

## 2022-09-14 ENCOUNTER — CLINICAL SUPPORT (OUTPATIENT)
Dept: REHABILITATION | Facility: HOSPITAL | Age: 32
End: 2022-09-14
Payer: MEDICAID

## 2022-09-14 ENCOUNTER — OFFICE VISIT (OUTPATIENT)
Dept: ORTHOPEDICS | Facility: CLINIC | Age: 32
End: 2022-09-14
Payer: MEDICAID

## 2022-09-14 VITALS
HEART RATE: 93 BPM | DIASTOLIC BLOOD PRESSURE: 76 MMHG | WEIGHT: 214.75 LBS | SYSTOLIC BLOOD PRESSURE: 119 MMHG | BODY MASS INDEX: 38.05 KG/M2 | HEIGHT: 63 IN

## 2022-09-14 DIAGNOSIS — S83.511A RUPTURE OF ANTERIOR CRUCIATE LIGAMENT OF RIGHT KNEE, INITIAL ENCOUNTER: Primary | ICD-10-CM

## 2022-09-14 DIAGNOSIS — M25.60 RANGE OF MOTION DEFICIT: ICD-10-CM

## 2022-09-14 DIAGNOSIS — S83.411A SPRAIN OF MEDIAL COLLATERAL LIGAMENT OF RIGHT KNEE, INITIAL ENCOUNTER: ICD-10-CM

## 2022-09-14 DIAGNOSIS — S89.91XA INJURY OF POSTEROLATERAL CORNER OF KNEE, RIGHT, INITIAL ENCOUNTER: ICD-10-CM

## 2022-09-14 DIAGNOSIS — M62.81 MUSCLE WEAKNESS OF LOWER EXTREMITY: Primary | ICD-10-CM

## 2022-09-14 PROCEDURE — 3078F PR MOST RECENT DIASTOLIC BLOOD PRESSURE < 80 MM HG: ICD-10-PCS | Mod: CPTII,,, | Performed by: ORTHOPAEDIC SURGERY

## 2022-09-14 PROCEDURE — 97110 THERAPEUTIC EXERCISES: CPT | Mod: PN

## 2022-09-14 PROCEDURE — 3008F PR BODY MASS INDEX (BMI) DOCUMENTED: ICD-10-PCS | Mod: CPTII,,, | Performed by: ORTHOPAEDIC SURGERY

## 2022-09-14 PROCEDURE — 1159F MED LIST DOCD IN RCRD: CPT | Mod: CPTII,,, | Performed by: ORTHOPAEDIC SURGERY

## 2022-09-14 PROCEDURE — 1159F PR MEDICATION LIST DOCUMENTED IN MEDICAL RECORD: ICD-10-PCS | Mod: CPTII,,, | Performed by: ORTHOPAEDIC SURGERY

## 2022-09-14 PROCEDURE — 3074F SYST BP LT 130 MM HG: CPT | Mod: CPTII,,, | Performed by: ORTHOPAEDIC SURGERY

## 2022-09-14 PROCEDURE — 3078F DIAST BP <80 MM HG: CPT | Mod: CPTII,,, | Performed by: ORTHOPAEDIC SURGERY

## 2022-09-14 PROCEDURE — 3074F PR MOST RECENT SYSTOLIC BLOOD PRESSURE < 130 MM HG: ICD-10-PCS | Mod: CPTII,,, | Performed by: ORTHOPAEDIC SURGERY

## 2022-09-14 PROCEDURE — 99999 PR PBB SHADOW E&M-EST. PATIENT-LVL III: CPT | Mod: PBBFAC,,, | Performed by: ORTHOPAEDIC SURGERY

## 2022-09-14 PROCEDURE — 99024 POSTOP FOLLOW-UP VISIT: CPT | Mod: ,,, | Performed by: ORTHOPAEDIC SURGERY

## 2022-09-14 PROCEDURE — 99999 PR PBB SHADOW E&M-EST. PATIENT-LVL III: ICD-10-PCS | Mod: PBBFAC,,, | Performed by: ORTHOPAEDIC SURGERY

## 2022-09-14 PROCEDURE — 99213 OFFICE O/P EST LOW 20 MIN: CPT | Mod: PBBFAC,PN | Performed by: ORTHOPAEDIC SURGERY

## 2022-09-14 PROCEDURE — 3008F BODY MASS INDEX DOCD: CPT | Mod: CPTII,,, | Performed by: ORTHOPAEDIC SURGERY

## 2022-09-14 PROCEDURE — 99024 PR POST-OP FOLLOW-UP VISIT: ICD-10-PCS | Mod: ,,, | Performed by: ORTHOPAEDIC SURGERY

## 2022-09-14 NOTE — PROGRESS NOTES
[facility], Orthopedics and Sports Medicine  [facility]    Knee Post-op Visit  09/14/2022       Diagnosis:   Right ACL Tear.  Right Posterolateral corner (PLC) Tear.     Procedure: 8/5/22   ACL reconstruction with Achilles allograft.  Posterolateral corner reconstruction with semitendinosus allograft    Subjective:      Tiffany Dobbs is a 32 y.o. female who is now 6 years status post right knee surgery. Pain is controlled without any medications. The patient denies fever, wound drainage, increasing redness, pus, increasing pain, increasing swelling. Post op problems reported: none.    The patient is in therapy, has been wearing brace and observing weight bearing restrictions as instructed.      Objective:      Ortho/SPM Exam  General: alert, appears stated age, and cooperative   Gait: has been NWB RLE  Sutures: Sutures out.  Incision: healing well, no significant drainage, no dehiscence, no significant erythema  Tenderness: none  Range of Motion: Extension 0 degrees  Flexion 80 degrees  Stability: Normal  Strength: Improving  Vascular: CR<2s. Palpable radial pulse    Imaging:  Radiographs of the right knee taken taken  8/17/2022  were personally reviewed from the [facility] EMR.  Multiple views of the knee are available today for review, including a standing AP and lateral view.  The hardware placed for ACL and PLC reconstruction is in appropriate position in the femur and tibia and fibular head.  No acute fractures or dislocations are noted in these images.         Assessment:       The patient is status post right knee surgery.  The primary encounter diagnosis was Rupture of anterior cruciate ligament of right knee, initial encounter. Diagnoses of Injury of posterolateral corner of knee, right, initial encounter and Sprain of medial collateral ligament of right knee, initial encounter were also pertinent to this visit. Doing well postoperatively. Continuation of post-op rehab course is  recommended at this time. All of the patient's questions were answered.       Plan:      Tylenol 650mg TID PRN for pain.  Continue physical therapy.  Advance to weightbearing as tolerated on operative extremity.  Follow up at 3 months after surgery.       Tevin Tubbs IV, MD   of Clinical Orthopedics  Department of Orthopedic Surgery  [facility]  Office: [phone]  Website: [email or web address]

## 2022-09-14 NOTE — PROGRESS NOTES
"[facility]   Physical Therapy Treatment Note     Name: Tiffany Dobbs  Clinic Number: [id number]    Therapy Diagnosis:   Encounter Diagnoses   Name Primary?    Muscle weakness of lower extremity Yes    Range of motion deficit      Physician: Tevin Tubbs IV, MD    Visit Date: 9/14/2022    Physician Orders: PT Eval and Treat   Medical Diagnosis from Referral:   S83.511A (ICD-10-CM) - Sprain of anterior cruciate ligament of right knee, initial encounter   S89.91XA (ICD-10-CM) - Unspecified injury of right lower leg, initial encounter      S/p RECONSTRUCTION, KNEE, ACL, USING Allograft Achilles GRAFT, possible meniscus repair, possible PLC repair, knee exam under anesthesia 8/02/2022  Evaluation Date: 8/8/2022  Last Reassessment: 9/1/2022, visit #12  Authorization Period Expiration: 8/07/2023  Plan of Care Expiration: 12/1/2022  Progress Note Due: 11/1/2022  Visit # / Visits authorized: 14/24   FOTO: 2/3     Precautions: Post Op - ACL allograft. Procedure date 8/05/2022. See protocol. Knee brace. Progress to TTWB at 4 weeks. Progress to 50% WB at week 6 x1 week until FWB tolerated and wean off brace.     PTA Visit #: 1/5     Time In: 1515  Time Out: 1600  Total Billable Time: 45 minutes    SUBJECTIVE     Tiffany reports minimal R knee pain complaints this afternoon. She states that her Ortho f/u went well. She has been compliant with her HEP.     She was compliant with home exercise program.  Response to previous treatment: no adverse reactions  Functional change: no reported change    Pain: 0/10  Location: right knee      OBJECTIVE      R knee ROM 0-82 deg.      Treatment     TIFFANY received the treatments listed below:      therapeutic exercises to develop strength, endurance and ROM for 45 minutes including:    NuStep lvl 1 S18 2 minutes S17 3 minutes, wearing brace with flexion lock at 70 deg to initiate this exercise.  Calf Stretch with strap 3x30"  Soleus Stretch with Strap 3x20"  HS " "stretch seated x30" between heel slide sets.  Weightshifts for TTWB 3x10  SLR with 2sec QS donned with brace 3x15  SLR without brace x15 and extension lag was greatly improved.   AP 3x15  QS with heel prop - seated, had RLE up on additional chair.     Seated trunk rotation x10  Supine Heel Slides with Strap 3-5sec 3min  Hip adduction seated ball squeeze with VMO, 2-3sec 2min  Hip abduction seated donned with brace + green tband above knee, 2x30  Seated heel slide AAROM with towel, assisting with L foot. Cueing to keep RLE passive as possible x20  Sit to Stands 2x10 RLE forward but contributing to transfer, Airex pad on seat for initial set.  Seated core stabilization with cueing to engage lats with press down. 2'  L SKTC for improved stretch of R hip flexor 10sec 10x  Standing hip abduction with brace locked in extension (R), 3x15  Seated shoulder extension with green band + core activation, 3x15  Seated upright posture 1min 3x    Pre-Gait lateral and AP weight shifts at wall, progressing WBAT  Gait training with axillary crutches and reciprocal gait. 3 point and 2 point progression. Cueing provided for step length, timing, and quad activation. Pt did still demonstrate some quad weakness with this evolution.     HEP review      Patient Education and Home Exercises     Home Exercises Provided and Patient Education Provided     Education provided:     Post-op protocol    Complete all exercise and acitivities of daily living in pain free range    patient educated on the benefit of Ankle Pumps, ice and elevation for decreased inflamation    Patient educated on the correct positioning for elevation    Written Home Exercises Provided: Patient instructed to cont prior HEP. Exercises were reviewed and YANG was able to demonstrate them prior to the end of the session.  YANG demonstrated good  understanding of the education provided. See EMR under Patient Instructions for exercises provided during therapy sessions. Added " weight shifts for WB and quad activation for HEP.     ASSESSMENT     Pt demonstrated good tolerance for exercise, functional movement and gait progression. Quad activation deficit remains but is improving.     YANG is progressing well towards her goals.   Pt prognosis is Good.     Pt will continue to benefit from skilled outpatient physical therapy to address the deficits listed in the problem list box on initial evaluation, provide pt/family education and to maximize pt's level of independence in the home and community environment.     Pt's spiritual, cultural and educational needs considered and pt agreeable to plan of care and goals.     Anticipated barriers to physical therapy: none    Goals:    Short Term Goals:  4 weeks  1.Report decreased R knee pain  < / =  5/10  to increase tolerance for ADLs and HEP - MET  2. Increase knee ROM to 0-90 deg in order to be able to perform ADLs without difficulty - NOT MET  3. Increase strength by 1/3 MMT grade in RLE  to increase tolerance for ADL and work activities - NOT MET  4. Pt to tolerate HEP to improve ROM and independence with acitivities of daily living - MET    Long Term Goals: 12 weeks  1.Report decreased R knee pain < / = 3/10  to increase tolerance for ADLs and work-related tasks - NOT MET  2.Patient goal: Walk normally - NOT MET  3.Increase strength to >/= 4+/5 in R knee  to increase tolerance for ADL and work activities - NOT MET  4. Pt will report FOTO knee improvement to > 60% to demonstrate increase in LE function with every day tasks - NOT MET  5. Increase knee ROM to 0-120 deg or better in order to be able to perform ADLs without difficulty - NOT MET      PLAN     Progress as tolerated per Plan of Care.  Flexion block was increased to 80 degrees today.       Osorio Sarmiento, PT

## 2022-09-19 ENCOUNTER — TELEPHONE (OUTPATIENT)
Dept: OPHTHALMOLOGY | Facility: CLINIC | Age: 32
End: 2022-09-19
Payer: MEDICAID

## 2022-09-19 NOTE — TELEPHONE ENCOUNTER
----- Message from Courtney Orlando sent at 9/19/2022  1:40 PM CDT -----  Regarding: Appt Request  Pt called to schedule a np routine eye exam with contact fitting. No solution found between 11/20/2022 and 1/20/2023.    Pts call back: [phone] (home)

## 2022-09-21 ENCOUNTER — CLINICAL SUPPORT (OUTPATIENT)
Dept: REHABILITATION | Facility: HOSPITAL | Age: 32
End: 2022-09-21
Payer: MEDICAID

## 2022-09-21 DIAGNOSIS — M62.81 MUSCLE WEAKNESS OF LOWER EXTREMITY: Primary | ICD-10-CM

## 2022-09-21 DIAGNOSIS — M25.60 RANGE OF MOTION DEFICIT: ICD-10-CM

## 2022-09-21 PROCEDURE — 97110 THERAPEUTIC EXERCISES: CPT | Mod: PN

## 2022-09-21 NOTE — PROGRESS NOTES
"[facility]   Physical Therapy Treatment Note     Name: Tiffany Dobbs  Clinic Number: [id number]    Therapy Diagnosis:   Encounter Diagnoses   Name Primary?    Muscle weakness of lower extremity Yes    Range of motion deficit      Physician: Tevin Tubbs IV, MD    Visit Date: 9/21/2022    Physician Orders: PT Eval and Treat   Medical Diagnosis from Referral:   S83.511A (ICD-10-CM) - Sprain of anterior cruciate ligament of right knee, initial encounter   S89.91XA (ICD-10-CM) - Unspecified injury of right lower leg, initial encounter      S/p RECONSTRUCTION, KNEE, ACL, USING Allograft Achilles GRAFT, possible meniscus repair, possible PLC repair, knee exam under anesthesia 8/02/2022  Evaluation Date: 8/8/2022  Last Reassessment: 9/1/2022, visit #12  Authorization Period Expiration: 8/07/2023  Plan of Care Expiration: 12/1/2022  Progress Note Due: 11/1/2022  Visit # / Visits authorized: 15/24   FOTO: 2/3     Precautions: Post Op - ACL allograft. Procedure date 8/05/2022. See protocol. Knee brace. Progress to TTWB at 4 weeks. Progress to 50% WB at week 6 x1 week until FWB tolerated and wean off brace.     PTA Visit #: 0/5     Time In: 1515  Time Out: 1600  Total Billable Time: 45 minutes    SUBJECTIVE     Tiffany reports feeling "great" today. No pain with R knee this afternoon.     She was compliant with home exercise program.  Response to previous treatment: no adverse reactions  Functional change: no reported change    Pain: 0/10  Location: right knee      OBJECTIVE      R knee ROM 0-105 deg.      Treatment     TIFFANY received the treatments listed below:      therapeutic exercises to develop strength, endurance and ROM for 45 minutes including:    NuStep lvl 1 S16 7 minutes, wearing brace with flexion lock at 80 deg   Calf Stretch with strap 3x30"  Soleus Stretch with Strap 3x20"  HS stretch seated x30" between heel slide sets.  Weightshifts for TTWB 3x10  SLR with 2sec QS donned with brace " 3x15  SLR without brace 2x15 and extension lag was greatly improved.   AP 3x15  QS with heel prop - seated, had RLE up on additional chair.     Seated trunk rotation x10  Supine Heel Slides with Strap 3-5sec 3min  Hip adduction seated ball squeeze with VMO, 2-3sec 2min  Hip abduction seated donned with brace + green tband above knee, 2x30  Seated heel slide AAROM with towel, assisting with L foot. Cueing to keep RLE passive as possible x20  Sit to Stands 2x10 RLE forward but contributing to transfer, Airex pad on seat for initial set.  Seated core stabilization with cueing to engage lats with press down. 2'  L SKTC for improved stretch of R hip flexor ICW supine heel slides.  Standing hip abduction with brace locked in extension (R), 3x15  TKE with blue tubing 3x15, moved  3rd set due to quad fatigue.   Seated shoulder extension with green band + core activation, 3x15  Seated upright posture 1min 3x    Gait training with 1 axillary crutch and reciprocal gait. Cueing provided for symmetrical step length and quad activation. 10 minutes.     HEP review      Patient Education and Home Exercises     Home Exercises Provided and Patient Education Provided     Education provided:     Post-op protocol    Complete all exercise and acitivities of daily living in pain free range    patient educated on the benefit of Ankle Pumps, ice and elevation for decreased inflamation    Patient educated on the correct positioning for elevation    Written Home Exercises Provided: Patient instructed to cont prior HEP. Exercises were reviewed and YANG was able to demonstrate them prior to the end of the session.  YANG demonstrated good  understanding of the education provided. See EMR under Patient Instructions for exercises provided during therapy sessions. Added weight shifts for WB and quad activation for HEP.     ASSESSMENT     Pt demonstrated good tolerance for exercise, functional movement and gait progression. Quad  activation deficit remains but is improving. Pt was able to demonstrate improved symmetrical step length with cueing and one axillary crutch today.     YANG is progressing well towards her goals.   Pt prognosis is Good.     Pt will continue to benefit from skilled outpatient physical therapy to address the deficits listed in the problem list box on initial evaluation, provide pt/family education and to maximize pt's level of independence in the home and community environment.     Pt's spiritual, cultural and educational needs considered and pt agreeable to plan of care and goals.     Anticipated barriers to physical therapy: none    Goals:    Short Term Goals:  4 weeks  1.Report decreased R knee pain  < / =  5/10  to increase tolerance for ADLs and HEP - MET  2. Increase knee ROM to 0-90 deg in order to be able to perform ADLs without difficulty - NOT MET  3. Increase strength by 1/3 MMT grade in RLE  to increase tolerance for ADL and work activities - NOT MET  4. Pt to tolerate HEP to improve ROM and independence with acitivities of daily living - MET    Long Term Goals: 12 weeks  1.Report decreased R knee pain < / = 3/10  to increase tolerance for ADLs and work-related tasks - NOT MET  2.Patient goal: Walk normally - NOT MET  3.Increase strength to >/= 4+/5 in R knee  to increase tolerance for ADL and work activities - NOT MET  4. Pt will report FOTO knee improvement to > 60% to demonstrate increase in LE function with every day tasks - NOT MET  5. Increase knee ROM to 0-120 deg or better in order to be able to perform ADLs without difficulty - NOT MET      PLAN     Progress as tolerated per Plan of Care.  Flexion block was increased to 80 degrees today.       Osorio Sarmiento, PT

## 2022-09-23 ENCOUNTER — CLINICAL SUPPORT (OUTPATIENT)
Dept: REHABILITATION | Facility: HOSPITAL | Age: 32
End: 2022-09-23
Payer: MEDICAID

## 2022-09-23 DIAGNOSIS — M25.60 RANGE OF MOTION DEFICIT: ICD-10-CM

## 2022-09-23 DIAGNOSIS — M62.81 MUSCLE WEAKNESS OF LOWER EXTREMITY: Primary | ICD-10-CM

## 2022-09-23 PROCEDURE — 97110 THERAPEUTIC EXERCISES: CPT | Mod: PN,CQ

## 2022-09-23 NOTE — PROGRESS NOTES
[facility]   Physical Therapy Treatment Note     Name: Tiffany Dobbs  Clinic Number: [id number]    Therapy Diagnosis:   Encounter Diagnoses   Name Primary?    Muscle weakness of lower extremity Yes    Range of motion deficit      Physician: Tevin Tubbs IV, MD    Visit Date: 9/23/2022    Physician Orders: PT Eval and Treat   Medical Diagnosis from Referral:   S83.511A (ICD-10-CM) - Sprain of anterior cruciate ligament of right knee, initial encounter   S89.91XA (ICD-10-CM) - Unspecified injury of right lower leg, initial encounter      S/p RECONSTRUCTION, KNEE, ACL, USING Allograft Achilles GRAFT, possible meniscus repair, possible PLC repair, knee exam under anesthesia 8/02/2022  Evaluation Date: 8/8/2022  Last Reassessment: 9/1/2022, visit #12  Authorization Period Expiration: 8/07/2023  Plan of Care Expiration: 12/1/2022  Progress Note Due: 11/1/2022  Visit # / Visits authorized: 16/24   FOTO: 2/3     Precautions: Post Op - ACL allograft. Procedure date 8/05/2022. See protocol. Knee brace. Progress to TTWB at 4 weeks. Progress to 50% WB at week 6 x1 week until FWB tolerated and wean off brace.     PTA Visit #: 1/5     Time In: 1145  Time Out: 1230  Total Billable Time: 45 minutes    SUBJECTIVE     Patient reports that she continues to be pain free with just some discomfort.  Patient states that she has used single crutch at home a little and is going well.  Patient states that she is glad to be progressing with weightbearing status and moving towards PLOF.    She was compliant with home exercise program.  Response to previous treatment: no adverse reactions  Functional change: no reported change    Pain: 0/10  Location: right knee      OBJECTIVE      Not completed at today's treatment session        Treatment     TIFFANY received the treatments listed below:      therapeutic exercises to develop strength, endurance and ROM for 45 minutes including:    NuStep lvl 1 S16 8 minutes, wearing  "brace with flexion lock at 80 deg   Calf Stretch with strap 3x30"  Soleus Stretch with Strap 3x20"  HS stretch seated x30" between heel slide sets.  Weightshifts for TTWB 3x10  SLR with 2sec QS donned with brace 3x15  SLR without brace 4x10    AP 3x15  QS with heel prop - seated, had RLE up on additional chair.     Seated trunk rotation x10  Supine Heel Slides with Strap 3-5sec 30x  Hip adduction seated ball squeeze with VMO, 2-3sec 2min  Hip abduction standing donned with brace 3x10  Seated heel slide AAROM with towel, assisting with L foot. Cueing to keep RLE passive as possible x20  Sit to Stands from chair with blue pad 3x10 RLE feet mostly even today  Seated core stabilization with cueing to engage lats with press down. 2'  L SKTC for improved stretch of R hip flexor ICW supine heel slides.  Standing hip abduction with brace locked in extension (R), 3x15  TKE with blue tubing 3x20, patinet reports less fatigue today  Standing Rows with green theratube 2min  Standing shoulder extension with green theratube 2min  Pallof press out green theratube 20x ea   Seated shoulder extension with green band + core activation, 3x15      Gait training with 1 axillary crutch and reciprocal gait. Cueing provided for symmetrical step length and quad activation. 10 minutes.     HEP review      Patient Education and Home Exercises     Home Exercises Provided and Patient Education Provided     Education provided:         Complete all exercise and acitivities of daily living in pain free range        Written Home Exercises Provided: Patient instructed to cont prior HEP. Exercises were reviewed and YANG was able to demonstrate them prior to the end of the session.  YANG demonstrated good  understanding of the education provided. See EMR under Patient Instructions for exercises provided during therapy sessions. Added weight shifts for WB and quad activation for HEP.     ASSESSMENT     Pt improving symmetrical step length, foot " clearance and effective heel strike with cueing and one axillary crutch today.  Patient improving with quad activation. Progressed to standing hip abduction with no reported pain or adverse reaction.    YANG is progressing well towards her goals.   Pt prognosis is Good.     Pt will continue to benefit from skilled outpatient physical therapy to address the deficits listed in the problem list box on initial evaluation, provide pt/family education and to maximize pt's level of independence in the home and community environment.     Pt's spiritual, cultural and educational needs considered and pt agreeable to plan of care and goals.     Anticipated barriers to physical therapy: none    Goals:    Short Term Goals:  4 weeks  1.Report decreased R knee pain  < / =  5/10  to increase tolerance for ADLs and HEP - MET  2. Increase knee ROM to 0-90 deg in order to be able to perform ADLs without difficulty - NOT MET  3. Increase strength by 1/3 MMT grade in RLE  to increase tolerance for ADL and work activities - NOT MET  4. Pt to tolerate HEP to improve ROM and independence with acitivities of daily living - MET    Long Term Goals: 12 weeks  1.Report decreased R knee pain < / = 3/10  to increase tolerance for ADLs and work-related tasks - NOT MET  2.Patient goal: Walk normally - NOT MET  3.Increase strength to >/= 4+/5 in R knee  to increase tolerance for ADL and work activities - NOT MET  4. Pt will report FOTO knee improvement to > 60% to demonstrate increase in LE function with every day tasks - NOT MET  5. Increase knee ROM to 0-120 deg or better in order to be able to perform ADLs without difficulty - NOT MET      PLAN     Progress as tolerated per Plan of Care.        Conrad Trujillo, PTA

## 2022-09-26 ENCOUNTER — CLINICAL SUPPORT (OUTPATIENT)
Dept: REHABILITATION | Facility: HOSPITAL | Age: 32
End: 2022-09-26
Payer: MEDICAID

## 2022-09-26 DIAGNOSIS — M25.60 RANGE OF MOTION DEFICIT: ICD-10-CM

## 2022-09-26 DIAGNOSIS — M62.81 MUSCLE WEAKNESS OF LOWER EXTREMITY: Primary | ICD-10-CM

## 2022-09-26 PROCEDURE — 97110 THERAPEUTIC EXERCISES: CPT | Mod: PN,CQ

## 2022-09-26 NOTE — PROGRESS NOTES
"[facility]   Physical Therapy Treatment Note     Name: Tiffany Dobbs  Clinic Number: [id number]    Therapy Diagnosis:   Encounter Diagnoses   Name Primary?    Muscle weakness of lower extremity Yes    Range of motion deficit      Physician: Tevin Tubbs IV, MD    Visit Date: 9/26/2022    Physician Orders: PT Eval and Treat   Medical Diagnosis from Referral:   S83.511A (ICD-10-CM) - Sprain of anterior cruciate ligament of right knee, initial encounter   S89.91XA (ICD-10-CM) - Unspecified injury of right lower leg, initial encounter      S/p RECONSTRUCTION, KNEE, ACL, USING Allograft Achilles GRAFT, possible meniscus repair, possible PLC repair, knee exam under anesthesia 8/02/2022  Evaluation Date: 8/8/2022  Last Reassessment: 9/1/2022, visit #12  Authorization Period Expiration: 8/07/2023  Plan of Care Expiration: 12/1/2022  Progress Note Due: 11/1/2022  Visit # / Visits authorized: 16/24   FOTO: 2/3     Precautions: Post Op - ACL allograft. Procedure date 8/05/2022. See protocol. Knee brace. Progress to TTWB at 4 weeks. Progress to 50% WB at week 6 x1 week until FWB tolerated and wean off brace.     PTA Visit #: 1/5     Time In: 1100  Time Out: 1145  Total Billable Time: 45 minutes    SUBJECTIVE     Patient reports that she continues to be pain free with just some discomfort.  Patient states that she is improving with ambulate at home and in the community.    She was compliant with home exercise program.  Response to previous treatment: no adverse reactions  Functional change: improved acitivities of daily living     Pain: 0/10  Location: right knee      OBJECTIVE      Not completed at today's treatment session        Treatment     TIFFANY received the treatments listed below:      therapeutic exercises to develop strength, endurance and ROM for 45 minutes including:    NuStep lvl 2 S16 8 minutes, wearing brace with flexion lock at 80 deg   Calf Stretch with strap 3x30"  Soleus Stretch " "with Strap 3x20"  HS stretch seated x30" between heel slide sets.  Weightshifts for TTWB 3x10  SLR with 2sec QS donned with brace 3x15  SLR without brace 3x15    AP 3x15  QS with heel prop - seated, had RLE up on additional chair.     Seated trunk rotation x10  Supine Heel Slides with Strap 3-5sec 30x  Hip adduction seated ball squeeze with VMO, 2-3sec 2min  Hip abduction standing donned with brace 3x10  Seated heel slide AAROM with towel, assisting with L foot. Cueing to keep RLE passive as possible x20  Sit to Stands from chair with blue pad 3x10   Seated Heel Raises 3x10  Seated core stabilization with cueing to engage lats with press down. 2'  L SKTC for improved stretch of R hip flexor ICW supine heel slides.  Standing hip abduction with brace locked in extension (R), 3x15  TKE with blue tubing 3x10x2   Standing Rows with green theratube 2min  Standing shoulder extension with green theratube 2min  Pallof press out green theratube 20x ea         Gait training with 1 axillary crutch and reciprocal gait. Cueing provided for symmetrical step length and quad activation. 10 minutes.     HEP review      Patient Education and Home Exercises     Home Exercises Provided and Patient Education Provided     Education provided:         Complete all exercise and acitivities of daily living in pain free range        Written Home Exercises Provided: Patient instructed to cont prior HEP. Exercises were reviewed and YANG was able to demonstrate them prior to the end of the session.  YANG demonstrated good  understanding of the education provided. See EMR under Patient Instructions for exercises provided during therapy sessions. Added weight shifts for WB and quad activation for HEP.     ASSESSMENT     Good tolerance to exercise.  Patient improving with endurance and able to progress well with reps.  Patient improving with weight acceptance through the Right Lower Extremity.    YANG is progressing well towards her goals.   Pt " prognosis is Good.     Pt will continue to benefit from skilled outpatient physical therapy to address the deficits listed in the problem list box on initial evaluation, provide pt/family education and to maximize pt's level of independence in the home and community environment.     Pt's spiritual, cultural and educational needs considered and pt agreeable to plan of care and goals.     Anticipated barriers to physical therapy: none    Goals:    Short Term Goals:  4 weeks  1.Report decreased R knee pain  < / =  5/10  to increase tolerance for ADLs and HEP - MET  2. Increase knee ROM to 0-90 deg in order to be able to perform ADLs without difficulty - NOT MET  3. Increase strength by 1/3 MMT grade in RLE  to increase tolerance for ADL and work activities - NOT MET  4. Pt to tolerate HEP to improve ROM and independence with acitivities of daily living - MET    Long Term Goals: 12 weeks  1.Report decreased R knee pain < / = 3/10  to increase tolerance for ADLs and work-related tasks - NOT MET  2.Patient goal: Walk normally - NOT MET  3.Increase strength to >/= 4+/5 in R knee  to increase tolerance for ADL and work activities - NOT MET  4. Pt will report FOTO knee improvement to > 60% to demonstrate increase in LE function with every day tasks - NOT MET  5. Increase knee ROM to 0-120 deg or better in order to be able to perform ADLs without difficulty - NOT MET      PLAN     Progress as tolerated per Plan of Care.        Conrad Trujillo, PTA

## 2022-09-28 ENCOUNTER — CLINICAL SUPPORT (OUTPATIENT)
Dept: REHABILITATION | Facility: HOSPITAL | Age: 32
End: 2022-09-28
Payer: MEDICAID

## 2022-09-28 DIAGNOSIS — M25.60 RANGE OF MOTION DEFICIT: ICD-10-CM

## 2022-09-28 DIAGNOSIS — M62.81 MUSCLE WEAKNESS OF LOWER EXTREMITY: Primary | ICD-10-CM

## 2022-09-28 PROCEDURE — 97110 THERAPEUTIC EXERCISES: CPT | Mod: PN

## 2022-09-28 NOTE — PROGRESS NOTES
[facility]   Physical Therapy Treatment Note     Name: Tiffany Dobbs  Clinic Number: [id number]    Therapy Diagnosis:   Encounter Diagnoses   Name Primary?    Muscle weakness of lower extremity Yes    Range of motion deficit      Physician: Tevin Tubbs IV, MD    Visit Date: 9/28/2022    Physician Orders: PT Eval and Treat   Medical Diagnosis from Referral:   S83.511A (ICD-10-CM) - Sprain of anterior cruciate ligament of right knee, initial encounter   S89.91XA (ICD-10-CM) - Unspecified injury of right lower leg, initial encounter      S/p RECONSTRUCTION, KNEE, ACL, USING Allograft Achilles GRAFT, possible meniscus repair, possible PLC repair, knee exam under anesthesia 8/02/2022  Evaluation Date: 8/8/2022  Last Reassessment: 9/1/2022, visit #12  Authorization Period Expiration: 8/07/2023  Plan of Care Expiration: 12/1/2022  Progress Note Due: 11/1/2022  Visit # / Visits authorized: 17/24   FOTO: 2/3     Precautions: Post Op - ACL allograft. Procedure date 8/05/2022. See protocol. Knee brace. Progress to TTWB at 4 weeks. Progress to 50% WB at week 6 x1 week until FWB tolerated and wean off brace.     PTA Visit #: 0/5     Time In: 1517  Time Out: 1600  Total Billable Time: 43 minutes    SUBJECTIVE     Patient reports that she continues to be pain free with just some discomfort at R knee. She feels as though she is getting stronger. She has been reporting increased R ankle soreness.     She was compliant with home exercise program.  Response to previous treatment: no adverse reactions  Functional change: improved acitivities of daily living     Pain: 0/10  Location: right knee      OBJECTIVE      Not completed at today's treatment session        Treatment     TIFFANY received the treatments listed below:      therapeutic exercises to develop strength, endurance and ROM for 45 minutes including:    Brace removed for treatment    NuStep lvl 2 S16 6 minutes  Calf Stretch with strap  "3x30"  Soleus Stretch with Strap 3x20"  HS stretch seated x30" between heel slide sets.  Weightshifts for TTWB 3x10  SLR with 2sec QS donned with brace 3x15  SLR without brace 3x15    Mini Squats at counter 2x10  Standing Hip abduction 2x10 bilat  AP 3x15  Seated HR/TR 2x20  QS with heel prop - seated, had RLE up on additional chair.     Seated trunk rotation x10  Supine Heel Slides with Strap 3-5sec 30x  Hip adduction seated ball squeeze with VMO, 2-3sec 2min  Hip abduction standing donned with brace 3x10  Seated heel slide AAROM assisting with L foot 2x15  Sit to Stands from chair with blue pad 3x10   Seated Heel Raises 3x10  Seated core stabilization with cueing to engage lats with press down. 2'  L SKTC for improved stretch of R hip flexor ICW supine heel slides.  TKE with blue tubing 3x15  Standing Rows with green theratube 2min  Standing shoulder extension with green theratube 2min  Pallof press out green theratube 20x ea     Initiated some gait without AD.     HEP review, added ankle ABCs      Patient Education and Home Exercises     Home Exercises Provided and Patient Education Provided     Education provided:     Complete all exercise and acitivities of daily living in pain free range    Written Home Exercises Provided: Patient instructed to cont prior HEP. Exercises were reviewed and YANG was able to demonstrate them prior to the end of the session.  YANG demonstrated good  understanding of the education provided. See EMR under Patient Instructions for exercises provided during therapy sessions. Added weight shifts for WB and quad activation for HEP.     ASSESSMENT     Good tolerance to exercise.  Patient improving with endurance and able to progress well with reps.  Patient improving with weight acceptance through the Right Lower Extremity. Have begun the process of weaning off the brace and crutches.     YANG is progressing well towards her goals.   Pt prognosis is Good.     Pt will continue to " benefit from skilled outpatient physical therapy to address the deficits listed in the problem list box on initial evaluation, provide pt/family education and to maximize pt's level of independence in the home and community environment.     Pt's spiritual, cultural and educational needs considered and pt agreeable to plan of care and goals.     Anticipated barriers to physical therapy: none    Goals:    Short Term Goals:  4 weeks  1.Report decreased R knee pain  < / =  5/10  to increase tolerance for ADLs and HEP - MET  2. Increase knee ROM to 0-90 deg in order to be able to perform ADLs without difficulty - NOT MET  3. Increase strength by 1/3 MMT grade in RLE  to increase tolerance for ADL and work activities - NOT MET  4. Pt to tolerate HEP to improve ROM and independence with acitivities of daily living - MET    Long Term Goals: 12 weeks  1.Report decreased R knee pain < / = 3/10  to increase tolerance for ADLs and work-related tasks - NOT MET  2.Patient goal: Walk normally - NOT MET  3.Increase strength to >/= 4+/5 in R knee  to increase tolerance for ADL and work activities - NOT MET  4. Pt will report FOTO knee improvement to > 60% to demonstrate increase in LE function with every day tasks - NOT MET  5. Increase knee ROM to 0-120 deg or better in order to be able to perform ADLs without difficulty - NOT MET      PLAN     Progress as tolerated per Plan of Care.  Pt may begin gait at home without AD.      Osorio Sarmiento, PT

## 2022-10-03 ENCOUNTER — CLINICAL SUPPORT (OUTPATIENT)
Dept: REHABILITATION | Facility: HOSPITAL | Age: 32
End: 2022-10-03
Attending: INTERNAL MEDICINE
Payer: MEDICAID

## 2022-10-03 DIAGNOSIS — M62.81 MUSCLE WEAKNESS OF LOWER EXTREMITY: Primary | ICD-10-CM

## 2022-10-03 DIAGNOSIS — M25.60 RANGE OF MOTION DEFICIT: ICD-10-CM

## 2022-10-03 PROCEDURE — 97110 THERAPEUTIC EXERCISES: CPT | Mod: PN,CQ

## 2022-10-03 NOTE — PROGRESS NOTES
[facility]   Physical Therapy Treatment Note     Name: Tiffany Dobbs  Clinic Number: [id number]    Therapy Diagnosis:   Encounter Diagnoses   Name Primary?    Muscle weakness of lower extremity Yes    Range of motion deficit      Physician: Tevin Tubbs IV, MD    Visit Date: 10/3/2022    Physician Orders: PT Eval and Treat   Medical Diagnosis from Referral:   S83.511A (ICD-10-CM) - Sprain of anterior cruciate ligament of right knee, initial encounter   S89.91XA (ICD-10-CM) - Unspecified injury of right lower leg, initial encounter      S/p RECONSTRUCTION, KNEE, ACL, USING Allograft Achilles GRAFT, possible meniscus repair, possible PLC repair, knee exam under anesthesia 8/02/2022  Evaluation Date: 8/8/2022  Last Reassessment: 9/1/2022, visit #12  Authorization Period Expiration: 8/07/2023  Plan of Care Expiration: 12/1/2022  Progress Note Due: 11/1/2022  Visit # / Visits authorized: 17/24   FOTO: 2/3     Precautions: Post Op - ACL allograft. Procedure date 8/05/2022. See protocol. Knee brace. Progress to TTWB at 4 weeks. Progress to 50% WB at week 6 x1 week until FWB tolerated and wean off brace.     PTA Visit #: 1/5     Time In: 930  Time Out: 1015  Total Billable Time: 45 minutes    SUBJECTIVE     Patient has continued reports that she continues to be pain free with just some discomfort at R knee. Patinet reports that she continues to ambulate better in the community and at home.  Patient states that she continues to practice ambulating with no AD at home short distances.     She was compliant with home exercise program.  Response to previous treatment: no adverse reactions  Functional change: improved acitivities of daily living     Pain: 1/10  Location: right knee      OBJECTIVE      Not completed at today's treatment session        Treatment     TIFFANY received the treatments listed below:      therapeutic exercises to develop strength, endurance and ROM for 45 minutes  "including:    Brace removed for treatment    NuStep lvl 2 S16 6 minutes  Calf Stretch with strap 3x30"  Soleus Stretch with Strap 3x20"  HS stretch seated x30" between heel slide sets.  Weightshifts for TTWB 3x10  SLR with 2sec QS donned with brace 3x15  SLR without brace 3x15    Mini Squats at counter 3x10  Standing Hip abduction 3x10 bilat  AP 3x15  Seated HR/TR 2x20  QS with heel prop - seated, had RLE up on additional chair.     Seated trunk rotation x10  Supine Heel Slides with Strap 3-5sec 30x  Hip adduction seated ball squeeze with VMO, 2-3sec 2min  Hip abduction standing donned with brace 3x10  Seated heel slide AAROM assisting with L foot 2x15  Sit to Stands from chair with blue pad 3x10   Seated Heel Raises 3x15  Seated core stabilization with cueing to engage lats with press down. 2'  L SKTC for improved stretch of R hip flexor ICW supine heel slides.  TKE with blue tubing 3x15  Standing Rows with green theratube 3x10  Standing shoulder extension with green theratube 3x10  Pallof press out green theratube 30x ea     Initiated some gait without AD.     HEP review, added ankle ABCs      Patient Education and Home Exercises     Home Exercises Provided and Patient Education Provided     Education provided:     Complete all exercise and acitivities of daily living in pain free range    Written Home Exercises Provided: Patient instructed to cont prior HEP. Exercises were reviewed and YANG was able to demonstrate them prior to the end of the session.  YANG demonstrated good  understanding of the education provided. See EMR under Patient Instructions for exercises provided during therapy sessions. Added weight shifts for WB and quad activation for HEP.     ASSESSMENT     Good tolerance to exercise.  Patient continues improving with weight acceptance through the Right Lower Extremity.      YANG is progressing well towards her goals.   Pt prognosis is Good.     Pt will continue to benefit from skilled outpatient " physical therapy to address the deficits listed in the problem list box on initial evaluation, provide pt/family education and to maximize pt's level of independence in the home and community environment.     Pt's spiritual, cultural and educational needs considered and pt agreeable to plan of care and goals.     Anticipated barriers to physical therapy: none    Goals:    Short Term Goals:  4 weeks  1.Report decreased R knee pain  < / =  5/10  to increase tolerance for ADLs and HEP - MET  2. Increase knee ROM to 0-90 deg in order to be able to perform ADLs without difficulty - NOT MET  3. Increase strength by 1/3 MMT grade in RLE  to increase tolerance for ADL and work activities - NOT MET  4. Pt to tolerate HEP to improve ROM and independence with acitivities of daily living - MET    Long Term Goals: 12 weeks  1.Report decreased R knee pain < / = 3/10  to increase tolerance for ADLs and work-related tasks - NOT MET  2.Patient goal: Walk normally - NOT MET  3.Increase strength to >/= 4+/5 in R knee  to increase tolerance for ADL and work activities - NOT MET  4. Pt will report FOTO knee improvement to > 60% to demonstrate increase in LE function with every day tasks - NOT MET  5. Increase knee ROM to 0-120 deg or better in order to be able to perform ADLs without difficulty - NOT MET      PLAN     Progress as tolerated per Plan of Care.  Pt may begin gait at home without AD.      Conrad Trujillo, SEN

## 2022-10-05 ENCOUNTER — CLINICAL SUPPORT (OUTPATIENT)
Dept: REHABILITATION | Facility: HOSPITAL | Age: 32
End: 2022-10-05
Attending: INTERNAL MEDICINE
Payer: MEDICAID

## 2022-10-05 DIAGNOSIS — M62.81 MUSCLE WEAKNESS OF LOWER EXTREMITY: Primary | ICD-10-CM

## 2022-10-05 DIAGNOSIS — M25.60 RANGE OF MOTION DEFICIT: ICD-10-CM

## 2022-10-05 PROCEDURE — 97110 THERAPEUTIC EXERCISES: CPT | Mod: PN

## 2022-10-05 NOTE — PROGRESS NOTES
"[facility]   Physical Therapy Treatment Note     Name: Tiffany Dobbs  Clinic Number: [id number]    Therapy Diagnosis:   Encounter Diagnoses   Name Primary?    Muscle weakness of lower extremity Yes    Range of motion deficit      Physician: Tevin Tubbs IV, MD    Visit Date: 10/5/2022    Physician Orders: PT Eval and Treat   Medical Diagnosis from Referral:   S83.511A (ICD-10-CM) - Sprain of anterior cruciate ligament of right knee, initial encounter   S89.91XA (ICD-10-CM) - Unspecified injury of right lower leg, initial encounter      S/p RECONSTRUCTION, KNEE, ACL, USING Allograft Achilles GRAFT, possible meniscus repair, possible PLC repair, knee exam under anesthesia 8/02/2022  Evaluation Date: 8/8/2022  Last Reassessment: 9/1/2022, visit #12  Authorization Period Expiration: 8/07/2023  Plan of Care Expiration: 12/1/2022  Progress Note Due: 11/1/2022  Visit # / Visits authorized: 18/24   FOTO: 2/3     Precautions: Post Op - ACL allograft. Procedure date 8/05/2022. See protocol. Knee brace. Progress to TTWB at 4 weeks. Progress to 50% WB at week 6 x1 week until FWB tolerated and wean off brace.     PTA Visit #: 0/5     Time In: 1515  Time Out: 1605  Total Billable Time: 50 minutes    SUBJECTIVE     Patient reports R knee stiffness today. She reports it as mild discomfort. She has also been having some R ankle discomfort.    She was compliant with home exercise program.  Response to previous treatment: no adverse reactions  Functional change: improved acitivities of daily living     Pain: 1/10  Location: right knee      OBJECTIVE      R knee ROM 0-126. Antalgic gait observed but with no increase in pain reported.        Treatment     TIFFANY received the treatments listed below:      therapeutic exercises to develop strength, endurance and ROM for 50 minutes including:    Brace removed for treatment    NuStep lvl 2.5 S16 6 minutes  Calf Stretch with strap 3x30"  Soleus Stretch with Strap " "3x20"  HS stretch seated x30" between heel slide sets.  Weightshifts for TTWB 3x10  SLR with 2sec QS donned with brace 3x15  SLR without brace 3x15    Mini Squats at counter 3x10  Standing Hip abduction 3x10 bilat --added 2#   AP 3x15  Seated HR/TR 2x20  QS with heel prop - seated, had RLE up on additional chair.     Seated trunk rotation x10  Supine Heel Slides with Strap 3-5sec 30x --Lifted foot of bed  Hip adduction seated ball squeeze with VMO, 2-3sec 2min  Hip abduction standing donned with brace 3x10  Seated heel slide AAROM assisting with L foot 2x15  Sit to Stands from chair with blue pad 3x10   Wall squats with green ball 3x10  Heel Raises on towel roll 3x15  Seated core stabilization with cueing to engage lats with press down. 2'  L SKTC for improved stretch of R hip flexor ICW supine heel slides.  TKE with blue tubing 3x15  Standing Rows with green theratube x20  Standing shoulder extension with green theratube x20  Pallof press out green and red theratube x20 ea  Gastroc/soleus stretch 3x20" ea  Prone stretch 2# 2x2'    No brace or AD used during treatment     HEP review, added ankle ABCs      Patient Education and Home Exercises     Home Exercises Provided and Patient Education Provided     Education provided:     Complete all exercise and acitivities of daily living in pain free range    Written Home Exercises Provided: Patient instructed to cont prior HEP. Exercises were reviewed and YANG was able to demonstrate them prior to the end of the session.  YANG demonstrated good  understanding of the education provided. See EMR under Patient Instructions for exercises provided during therapy sessions. Added weight shifts for WB and quad activation for HEP.     ASSESSMENT     Good tolerance to exercise progression. Patient continues improving with weight acceptance through the Right Lower Extremity. Continues to demonstrate gait deficits but stability with independent gait is improving.     YANG is " progressing well towards her goals.   Pt prognosis is Good.     Pt will continue to benefit from skilled outpatient physical therapy to address the deficits listed in the problem list box on initial evaluation, provide pt/family education and to maximize pt's level of independence in the home and community environment.     Pt's spiritual, cultural and educational needs considered and pt agreeable to plan of care and goals.     Anticipated barriers to physical therapy: none    Goals:    Short Term Goals:  4 weeks  1.Report decreased R knee pain  < / =  5/10  to increase tolerance for ADLs and HEP - MET  2. Increase knee ROM to 0-90 deg in order to be able to perform ADLs without difficulty - NOT MET  3. Increase strength by 1/3 MMT grade in RLE  to increase tolerance for ADL and work activities - NOT MET  4. Pt to tolerate HEP to improve ROM and independence with acitivities of daily living - MET    Long Term Goals: 12 weeks  1.Report decreased R knee pain < / = 3/10  to increase tolerance for ADLs and work-related tasks - NOT MET  2.Patient goal: Walk normally - NOT MET  3.Increase strength to >/= 4+/5 in R knee  to increase tolerance for ADL and work activities - NOT MET  4. Pt will report FOTO knee improvement to > 60% to demonstrate increase in LE function with every day tasks - NOT MET  5. Increase knee ROM to 0-120 deg or better in order to be able to perform ADLs without difficulty - NOT MET      PLAN     Progress as tolerated per Plan of Care.  Pt may begin gait at home without AD.      Osorio Sarmiento, PT

## 2022-10-12 ENCOUNTER — CLINICAL SUPPORT (OUTPATIENT)
Dept: REHABILITATION | Facility: HOSPITAL | Age: 32
End: 2022-10-12
Attending: INTERNAL MEDICINE
Payer: MEDICAID

## 2022-10-12 DIAGNOSIS — M25.60 RANGE OF MOTION DEFICIT: ICD-10-CM

## 2022-10-12 DIAGNOSIS — M62.81 MUSCLE WEAKNESS OF LOWER EXTREMITY: Primary | ICD-10-CM

## 2022-10-12 PROCEDURE — 97110 THERAPEUTIC EXERCISES: CPT | Mod: PN

## 2022-10-12 NOTE — PROGRESS NOTES
[facility]   Physical Therapy Treatment Note     Name: Tiffany Dobbs  Clinic Number: [id number]    Therapy Diagnosis:   Encounter Diagnoses   Name Primary?    Muscle weakness of lower extremity Yes    Range of motion deficit      Physician: Tevin Tubbs IV, MD    Visit Date: 10/12/2022    Physician Orders: PT Eval and Treat   Medical Diagnosis from Referral:   S83.511A (ICD-10-CM) - Sprain of anterior cruciate ligament of right knee, initial encounter   S89.91XA (ICD-10-CM) - Unspecified injury of right lower leg, initial encounter      S/p RECONSTRUCTION, KNEE, ACL, USING Allograft Achilles GRAFT, possible meniscus repair, possible PLC repair, knee exam under anesthesia 8/02/2022  Evaluation Date: 8/8/2022  Last Reassessment: 9/1/2022, visit #12  Authorization Period Expiration: 8/07/2023  Plan of Care Expiration: 12/1/2022  Progress Note Due: 11/1/2022  Visit # / Visits authorized: 19/24   FOTO: 2/3     Precautions: Post Op - ACL allograft. Procedure date 8/05/2022. See protocol. Knee brace. Progress to TTWB at 4 weeks. Progress to 50% WB at week 6 x1 week until FWB tolerated and wean off brace.     PTA Visit #: 0/5     Time In: 1518  Time Out: 1603  Total Billable Time: 45 minutes    SUBJECTIVE     Patient reports minimal R knee pain today. She states that she had some brief R calf cramping and R ankle weakness while walking to the clinic today.     She was compliant with home exercise program.  Response to previous treatment: no adverse reactions  Functional change: improved acitivities of daily living     Pain: 1/10  Location: right knee      OBJECTIVE      R knee ROM 0-126. Antalgic gait observed but with no increase in pain reported.       Treatment     TIFFANY received the treatments listed below:      therapeutic exercises to develop strength, endurance and ROM for 45 minutes including:    Brace removed for treatment    NuStep lvl 2.5 S16 5 minutes  Calf Stretch with strap  "3x30"  Soleus Stretch with Strap 3x20"  HS stretch seated x30" between heel slide sets.  Weightshifts for TTWB 3x10  SLR with 2sec QS donned with brace 3x15  SLR without brace 3x15    Mini Squats at counter 3x10  Standing Hip abduction 3x10 bilat --added 3#   AP 3x15  Seated HR/TR 2x20  QS with heel prop - seated, had RLE up on additional chair.     Seated trunk rotation x10  Supine Heel Slides with Strap 3-5sec 30x --Lifted foot of bed  Hip adduction seated ball squeeze with VMO, 2-3sec 2min  Hip abduction standing donned with brace 3x10  Seated heel slide AAROM assisting with L foot 2x15  Sit to Stands from chair with blue pad 3x10   Wall squats with green ball 3x10  Heel Raises 2x15  Seated core stabilization with cueing to engage lats with press down. 2'  L SKTC for improved stretch of R hip flexor ICW supine heel slides.  TKE with blue tubing 3x15  Standing Rows with green theratube x20  Standing shoulder extension with green theratube x20  Pallof press out green  x20 ea walkouts  Gastroc/soleus stretch 3x20" ea  Prone stretch 3# 3x1'  SL balance R at counter 3'  Knee Isometric ext 60-90 deg 3x10    No brace or AD used during treatment     HEP review, added ankle ABCs      Patient Education and Home Exercises     Home Exercises Provided and Patient Education Provided     Education provided:     Complete all exercise and acitivities of daily living in pain free range    Written Home Exercises Provided: Patient instructed to cont prior HEP. Exercises were reviewed and YANG was able to demonstrate them prior to the end of the session.  YANG demonstrated good  understanding of the education provided. See EMR under Patient Instructions for exercises provided during therapy sessions. Added weight shifts for WB and quad activation for HEP.     ASSESSMENT     Good tolerance to exercise progression. Patient continues improving with weight acceptance through the Right Lower Extremity. Continues to demonstrate gait " deficits but stability with independent gait is improving. Static SL balance deficit observed.     YANG is progressing well towards her goals.   Pt prognosis is Good.     Pt will continue to benefit from skilled outpatient physical therapy to address the deficits listed in the problem list box on initial evaluation, provide pt/family education and to maximize pt's level of independence in the home and community environment.     Pt's spiritual, cultural and educational needs considered and pt agreeable to plan of care and goals.     Anticipated barriers to physical therapy: none    Goals:    Short Term Goals:  4 weeks  1.Report decreased R knee pain  < / =  5/10  to increase tolerance for ADLs and HEP - MET  2. Increase knee ROM to 0-90 deg in order to be able to perform ADLs without difficulty - NOT MET  3. Increase strength by 1/3 MMT grade in RLE  to increase tolerance for ADL and work activities - NOT MET  4. Pt to tolerate HEP to improve ROM and independence with acitivities of daily living - MET    Long Term Goals: 12 weeks  1.Report decreased R knee pain < / = 3/10  to increase tolerance for ADLs and work-related tasks - NOT MET  2.Patient goal: Walk normally - NOT MET  3.Increase strength to >/= 4+/5 in R knee  to increase tolerance for ADL and work activities - NOT MET  4. Pt will report FOTO knee improvement to > 60% to demonstrate increase in LE function with every day tasks - NOT MET  5. Increase knee ROM to 0-120 deg or better in order to be able to perform ADLs without difficulty - NOT MET      PLAN     Progress as tolerated per Plan of Care.      Osorio Sarmiento, PT

## 2022-10-14 ENCOUNTER — CLINICAL SUPPORT (OUTPATIENT)
Dept: REHABILITATION | Facility: HOSPITAL | Age: 32
End: 2022-10-14
Payer: MEDICAID

## 2022-10-14 DIAGNOSIS — M25.60 RANGE OF MOTION DEFICIT: ICD-10-CM

## 2022-10-14 DIAGNOSIS — M62.81 MUSCLE WEAKNESS OF LOWER EXTREMITY: Primary | ICD-10-CM

## 2022-10-14 PROCEDURE — 97110 THERAPEUTIC EXERCISES: CPT | Mod: PN,CQ

## 2022-10-14 NOTE — PROGRESS NOTES
[facility]   Physical Therapy Treatment Note     Name: Tiffany Dobbs  Clinic Number: [id number]    Therapy Diagnosis:   Encounter Diagnoses   Name Primary?    Muscle weakness of lower extremity Yes    Range of motion deficit      Physician: Tevin Tubbs IV, MD    Visit Date: 10/14/2022    Physician Orders: PT Eval and Treat   Medical Diagnosis from Referral:   S83.511A (ICD-10-CM) - Sprain of anterior cruciate ligament of right knee, initial encounter   S89.91XA (ICD-10-CM) - Unspecified injury of right lower leg, initial encounter      S/p RECONSTRUCTION, KNEE, ACL, USING Allograft Achilles GRAFT, possible meniscus repair, possible PLC repair, knee exam under anesthesia 8/02/2022  Evaluation Date: 8/8/2022  Last Reassessment: 9/1/2022, visit #12  Authorization Period Expiration: 8/07/2023  Plan of Care Expiration: 12/1/2022  Progress Note Due: 11/1/2022  Visit # / Visits authorized: 19/24   FOTO: 2/3     Precautions: Post Op - ACL allograft. Procedure date 8/05/2022. See protocol. Knee brace. Progress to TTWB at 4 weeks. Progress to 50% WB at week 6 x1 week until FWB tolerated and wean off brace.     PTA Visit #: 1/5     Time In: 920  Time Out: 1000  Total Billable Time: 40 minutes    SUBJECTIVE     Patient reports she continues to have right ankle discomfort but is improving with heel raises and calf stretch.  Patient reports that she is returning to in-person classes on campus today and is excited to walk on campus.  Patient states that she is eager to return to driving and has tried a few times between treatment sessions.     She was compliant with home exercise program.  Response to previous treatment: no adverse reactions  Functional change: improved acitivities of daily living     Pain: 1/10  Location: right knee      OBJECTIVE      Not completed at today's treatment session        Treatment     TIFFANY received the treatments listed below:      therapeutic exercises to develop  "strength, endurance and ROM for 45 minutes including:    Brace removed for treatment    NuStep lvl 3.0 S16 5 minutes  Calf Stretch with strap 3x30"  Soleus Stretch with Strap 3x20"  HS stretch seated x30" between heel slide sets.  Weightshifts for TTWB 3x10  SLR with 2sec QS donned with brace 3x15  SLR without brace 3x15    Mini Squats at counter 3x10  Standing Hip abduction 3x10 bilat --added 3#   AP 3x15  Seated HR/TR 2x20  QS with heel prop - seated, had RLE up on additional chair.     Seated trunk rotation x10  Supine Heel Slides with Strap 3-5sec 30x --Lifted foot of bed  Hip adduction seated ball squeeze with VMO, 2-3sec 2min  Hip abduction standing donned with brace 3x10  Seated heel slide AAROM assisting with L foot 2x15  Sit to Stands from chair with blue pad 3x10   Wall squats with green ball 3x12  Heel Raises 3x15  Seated core stabilization with cueing to engage lats with press down. 2'  L SKTC for improved stretch of R hip flexor ICW supine heel slides.  TKE with blue tubing 3x15  Standing Rows with blue theratube 2min  Standing shoulder extension with blue theratube 2min  Pallof press out blue  1min ea walkouts  Gastroc/soleus stretch 1min 3x  Prone stretch 3# 3x1'  SL balance R at counter 3'  Knee Isometric ext 60-90 deg 3x10    No brace or AD used during treatment     HEP review, added ankle ABCs      Patient Education and Home Exercises     Home Exercises Provided and Patient Education Provided     Education provided:     Complete all exercise and acitivities of daily living in pain free range    Written Home Exercises Provided: Patient instructed to cont prior HEP. Exercises were reviewed and YNAG was able to demonstrate them prior to the end of the session.  YANG demonstrated good  understanding of the education provided. See EMR under Patient Instructions for exercises provided during therapy sessions. Added weight shifts for WB and quad activation for HEP.     ASSESSMENT     Good tolerance to " exercise progression. Patient continues improving with weight acceptance through the Right Lower Extremity. Patient improving with but is challenged balance exercise.     YANG is progressing well towards her goals.   Pt prognosis is Good.     Pt will continue to benefit from skilled outpatient physical therapy to address the deficits listed in the problem list box on initial evaluation, provide pt/family education and to maximize pt's level of independence in the home and community environment.     Pt's spiritual, cultural and educational needs considered and pt agreeable to plan of care and goals.     Anticipated barriers to physical therapy: none    Goals:    Short Term Goals:  4 weeks  1.Report decreased R knee pain  < / =  5/10  to increase tolerance for ADLs and HEP - MET  2. Increase knee ROM to 0-90 deg in order to be able to perform ADLs without difficulty - NOT MET  3. Increase strength by 1/3 MMT grade in RLE  to increase tolerance for ADL and work activities - NOT MET  4. Pt to tolerate HEP to improve ROM and independence with acitivities of daily living - MET    Long Term Goals: 12 weeks  1.Report decreased R knee pain < / = 3/10  to increase tolerance for ADLs and work-related tasks - NOT MET  2.Patient goal: Walk normally - NOT MET  3.Increase strength to >/= 4+/5 in R knee  to increase tolerance for ADL and work activities - NOT MET  4. Pt will report FOTO knee improvement to > 60% to demonstrate increase in LE function with every day tasks - NOT MET  5. Increase knee ROM to 0-120 deg or better in order to be able to perform ADLs without difficulty - NOT MET      PLAN     Progress as tolerated per Plan of Care.      Conrad Trujillo, PTA

## 2022-10-19 ENCOUNTER — CLINICAL SUPPORT (OUTPATIENT)
Dept: REHABILITATION | Facility: HOSPITAL | Age: 32
End: 2022-10-19
Payer: MEDICAID

## 2022-10-19 DIAGNOSIS — M62.81 MUSCLE WEAKNESS OF LOWER EXTREMITY: Primary | ICD-10-CM

## 2022-10-19 DIAGNOSIS — M25.60 RANGE OF MOTION DEFICIT: ICD-10-CM

## 2022-10-19 PROCEDURE — 97110 THERAPEUTIC EXERCISES: CPT | Mod: PN

## 2022-10-19 NOTE — PROGRESS NOTES
[facility]   Physical Therapy Progress Note and Treatment Note     Name: Tfifany Dobbs  Clinic Number: [id number]    Therapy Diagnosis:   Encounter Diagnoses   Name Primary?    Muscle weakness of lower extremity Yes    Range of motion deficit      Physician: Tevin Tubbs IV, MD    Visit Date: 10/19/2022    Physician Orders: PT Eval and Treat   Medical Diagnosis from Referral:   S83.511A (ICD-10-CM) - Sprain of anterior cruciate ligament of right knee, initial encounter   S89.91XA (ICD-10-CM) - Unspecified injury of right lower leg, initial encounter      S/p RECONSTRUCTION, KNEE, ACL, USING Allograft Achilles GRAFT, possible meniscus repair, possible PLC repair, knee exam under anesthesia 8/02/2022  Evaluation Date: 8/8/2022  Authorization Period Expiration: 8/07/2023  Plan of Care Expiration: 12/1/2022  Progress Note Due: 11/19/2022  Visit # / Visits authorized: 20/ 41(authorized)   FOTO: 2/3     Precautions: Post Op - ACL allograft. Procedure date 8/05/2022. See protocol. Knee brace. Progress to TTWB at 4 weeks. Progress to 50% WB at week 6 x1 week until FWB tolerated and wean off brace.     PTA Visit #: 0/5     Time In: 1305  Time Out: 1335  Total Billable Time: 30 minutes    SUBJECTIVE     Patient reports some increased R knee stiffness today with minimal pain complaints. She continues to wear brace unlocked when outside of her home. She has had R ankle discomfort since returning to gait with lesser AD and no AD. She states that this is improving. Patient requests to leave early today for other appointment.     She was compliant with home exercise program.  Response to previous treatment: no adverse reactions  Functional change: improved acitivities of daily living     Pain: 1/10  Location: right knee      OBJECTIVE      Range of Motion:      Knee Right active Right Passive Left Active Left passive   Flexion 125 128 WFL NT   Extension 0 deg NT WFL NT      * = increased pain when  "performing ROM at end-range     Lower Extremity Strength:     Right LE   Left LE     Knee extension: 3+/5 Knee extension: 5/5   Knee flexion: 3+/5 Knee flexion: 5/5   Hip flexion: 4/5 Hip flexion: 5/5   Hip extension:  4/5 Hip extension: 5/5   Hip abduction: 4/5 Hip abduction: 5/5   Hip adduction: 4/5 Hip adduction 5/5   Ankle dorsiflexion: 4-/5 Ankle dorsiflexion: 5/5   Ankle plantarflexion: 4/5 Ankle plantarflexion: 5/5     Gait: Has progressed to no AD. Continues to wear brace unlocked when walking in community.     Edema: Improving, non-pitting, primarily around patella medial > lateral.     Balance: SL L: 30s +; R: 30s - increased Ant Tib and peroneal activation    Functional Tests: 30sCST 12 reps with good technique and no UE assist       Treatment     YANG received the treatments listed below:      therapeutic exercises to develop strength, endurance and ROM for 30 minutes including:    Brace removed for treatment    NuStep lvl 3.0 S16 6 minutes  HS stretch seated x30" between heel slide sets.  Weightshifts for TTWB 3x10  SLR without brace 3x15    Mini Squats at counter 3x10  Standing Hip abduction 3x10 bilat --added 3#   Supine Heel Slides with Strap 3-5sec 30x --Lifted foot of bed  Sit to Stands from chair with blue pad 3x10   Wall squats with green ball 3x10  Heel Raises 3x15  Seated core stabilization with cueing to engage lats with press down. 2'  L SKTC for improved stretch of R hip flexor ICW supine heel slides.  TKE with blue tubing 3x15  Standing Rows with blue theratube 2min  Standing shoulder extension with blue theratube 2min  Pallof press out blue  1min ea walkouts  Gastroc/soleus stretch 1min 3x  Prone stretch 3# 3x1'  SL balance R at counter 3'  Knee Isometric ext 60-90 deg 3x10    No brace or AD used during treatment     HEP review, added ankle ABCs      Patient Education and Home Exercises     Home Exercises Provided and Patient Education Provided     Education provided:     Complete all " exercise and acitivities of daily living in pain free range  Self patella mobilizations  SL balance progression.     Written Home Exercises Provided: Patient instructed to cont prior HEP. Exercises were reviewed and YANG was able to demonstrate them prior to the end of the session.  YANG demonstrated good  understanding of the education provided. See EMR under Patient Instructions for exercises provided during therapy sessions. Added weight shifts for WB and quad activation for HEP.     ASSESSMENT     Good tolerance to exercise progression. Pt is progressing with RLE stability with both static and dynamic challenges. Has not progressed to agility or single leg hop comparisons at this time. Will progress SL balance challenges as appropriate. Pt is continuing to wean off brace. Will continue to progress strengthening/conditioning as appropriate.     YANG is progressing well towards her goals.   Pt prognosis is Good.     Pt will continue to benefit from skilled outpatient physical therapy to address the deficits listed in the problem list box on initial evaluation, provide pt/family education and to maximize pt's level of independence in the home and community environment.     Pt's spiritual, cultural and educational needs considered and pt agreeable to plan of care and goals.     Anticipated barriers to physical therapy: none    Goals:    Short Term Goals:  4 weeks  1.Report decreased R knee pain  < / =  5/10  to increase tolerance for ADLs and HEP - MET  2. Increase knee ROM to 0-90 deg in order to be able to perform ADLs without difficulty - NOT MET  3. Increase strength by 1/3 MMT grade in RLE  to increase tolerance for ADL and work activities - NOT MET  4. Pt to tolerate HEP to improve ROM and independence with acitivities of daily living - MET    Long Term Goals: 12 weeks  1.Report decreased R knee pain < / = 3/10  to increase tolerance for ADLs and work-related tasks - NOT MET  2.Patient goal: Walk normally -  NOT MET  3.Increase strength to >/= 4+/5 in R knee  to increase tolerance for ADL and work activities - NOT MET  4. Pt will report FOTO knee improvement to > 60% to demonstrate increase in LE function with every day tasks - NOT MET  5. Increase knee ROM to 0-120 deg or better in order to be able to perform ADLs without difficulty - NOT MET      PLAN     Progress as tolerated per Plan of Care.      Osorio Sarmiento, PT

## 2022-10-21 ENCOUNTER — CLINICAL SUPPORT (OUTPATIENT)
Dept: REHABILITATION | Facility: HOSPITAL | Age: 32
End: 2022-10-21
Payer: MEDICAID

## 2022-10-21 DIAGNOSIS — M62.81 MUSCLE WEAKNESS OF LOWER EXTREMITY: Primary | ICD-10-CM

## 2022-10-21 DIAGNOSIS — M25.60 RANGE OF MOTION DEFICIT: ICD-10-CM

## 2022-10-21 PROCEDURE — 97110 THERAPEUTIC EXERCISES: CPT | Mod: PN,CQ

## 2022-10-21 NOTE — PROGRESS NOTES
"[facility]   Physical Therapy Progress Note and Treatment Note     Name: Tiffany Dobbs  Clinic Number: [id number]    Therapy Diagnosis:   Encounter Diagnoses   Name Primary?    Muscle weakness of lower extremity Yes    Range of motion deficit      Physician: Tevin Tubbs IV, MD    Visit Date: 10/21/2022    Physician Orders: PT Eval and Treat   Medical Diagnosis from Referral:   S83.511A (ICD-10-CM) - Sprain of anterior cruciate ligament of right knee, initial encounter   S89.91XA (ICD-10-CM) - Unspecified injury of right lower leg, initial encounter      S/p RECONSTRUCTION, KNEE, ACL, USING Allograft Achilles GRAFT, possible meniscus repair, possible PLC repair, knee exam under anesthesia 8/02/2022  Evaluation Date: 8/8/2022  Authorization Period Expiration: 8/07/2023  Plan of Care Expiration: 12/1/2022  Progress Note Due: 11/19/2022  Visit # / Visits authorized: 21/ 41(authorized)   FOTO: 2/3     Precautions: Post Op - ACL allograft. Procedure date 8/05/2022. See protocol. Knee brace. Progress to TTWB at 4 weeks. Progress to 50% WB at week 6 x1 week until FWB tolerated and wean off brace.     PTA Visit #: 1/5     Time In: 0930 (10 min late arrival)  Time Out: 1000  Total Billable Time: 30 minutes    SUBJECTIVE     Patient reports some increased R knee stiffness today. She continues to wear brace unlocked when outside of her home.    She was compliant with home exercise program.  Response to previous treatment: no adverse reactions  Functional change: improved acitivities of daily living     Pain: 1/10  Location: right knee      OBJECTIVE      Not completed at today's treatment session       Treatment     TIFFANY received the treatments listed below:      therapeutic exercises to develop strength, endurance and ROM for 30 minutes including:    Brace removed for treatment    NuStep lvl 3.0 S16 6 minutes  HS stretch seated x30" between heel slide sets.  Weightshifts for TTWB 3x10  SLR " without brace 3x15    Mini Squats at counter 3x10  Standing Hip abduction 3x10 bilat --added 3#   Supine Heel Slides with Strap 3-5sec 30x --Lifted foot of bed  Sit to Stands from chair with blue pad 3x10  Lateral Steps at counter with red theraband 3min   Wall squats with green ball 3x10  Heel Raises 3x15  Seated core stabilization with cueing to engage lats with press down. 2'  L SKTC for improved stretch of R hip flexor ICW supine heel slides.  TKE with blue theraband 3x15  Standing Rows with blue theratube 2min  Standing shoulder extension with blue theratube 2min  Pallof press out blue  1min ea walkouts  Gastroc/soleus stretch 1min 3x  Prone stretch 3# 3x1'  SL balance R at counter 3'  Knee Isometric ext 60-90 deg 3x10    No brace or AD used during treatment     HEP review, added ankle ABCs      Patient Education and Home Exercises     Home Exercises Provided and Patient Education Provided     Education provided:     Complete all exercise and acitivities of daily living in pain free range  Self patella mobilizations  SL balance progression.     Written Home Exercises Provided: Patient instructed to cont prior HEP. Exercises were reviewed and YANG was able to demonstrate them prior to the end of the session.  YANG demonstrated good  understanding of the education provided. See EMR under Patient Instructions for exercises provided during therapy sessions. Added weight shifts for WB and quad activation for HEP.     ASSESSMENT     Good tolerance to exercise progression. Pt is progressing with RLE stability with both static and dynamic challenges. Has not progressed to agility or single leg hop comparisons at this time. Will progress SL balance challenges as appropriate. Pt is continuing to wean off brace. Will continue to progress strengthening/conditioning as appropriate.     YANG is progressing well towards her goals.   Pt prognosis is Good.     Pt will continue to benefit from skilled outpatient physical  therapy to address the deficits listed in the problem list box on initial evaluation, provide pt/family education and to maximize pt's level of independence in the home and community environment.     Pt's spiritual, cultural and educational needs considered and pt agreeable to plan of care and goals.     Anticipated barriers to physical therapy: none    Goals:    Short Term Goals:  4 weeks  1.Report decreased R knee pain  < / =  5/10  to increase tolerance for ADLs and HEP - MET  2. Increase knee ROM to 0-90 deg in order to be able to perform ADLs without difficulty - NOT MET  3. Increase strength by 1/3 MMT grade in RLE  to increase tolerance for ADL and work activities - NOT MET  4. Pt to tolerate HEP to improve ROM and independence with acitivities of daily living - MET    Long Term Goals: 12 weeks  1.Report decreased R knee pain < / = 3/10  to increase tolerance for ADLs and work-related tasks - NOT MET  2.Patient goal: Walk normally - NOT MET  3.Increase strength to >/= 4+/5 in R knee  to increase tolerance for ADL and work activities - NOT MET  4. Pt will report FOTO knee improvement to > 60% to demonstrate increase in LE function with every day tasks - NOT MET  5. Increase knee ROM to 0-120 deg or better in order to be able to perform ADLs without difficulty - NOT MET      PLAN     Progress as tolerated per Plan of Care.      Conrad Trujillo, PTA

## 2022-10-27 ENCOUNTER — CLINICAL SUPPORT (OUTPATIENT)
Dept: REHABILITATION | Facility: HOSPITAL | Age: 32
End: 2022-10-27
Payer: MEDICAID

## 2022-10-27 DIAGNOSIS — M25.60 RANGE OF MOTION DEFICIT: ICD-10-CM

## 2022-10-27 DIAGNOSIS — M62.81 MUSCLE WEAKNESS OF LOWER EXTREMITY: Primary | ICD-10-CM

## 2022-10-27 PROCEDURE — 97140 MANUAL THERAPY 1/> REGIONS: CPT | Mod: PN

## 2022-10-27 PROCEDURE — 97110 THERAPEUTIC EXERCISES: CPT | Mod: PN

## 2022-10-27 NOTE — PROGRESS NOTES
"[facility]   Physical Therapy Progress Note and Treatment Note     Name: Tiffany Dobbs  Clinic Number: [id number]    Therapy Diagnosis:   No diagnosis found.    Physician: Tevin Tubbs IV, MD    Visit Date: 10/27/2022    Physician Orders: PT Eval and Treat   Medical Diagnosis from Referral:   S83.511A (ICD-10-CM) - Sprain of anterior cruciate ligament of right knee, initial encounter   S89.91XA (ICD-10-CM) - Unspecified injury of right lower leg, initial encounter      S/p RECONSTRUCTION, KNEE, ACL, USING Allograft Achilles GRAFT, possible meniscus repair, possible PLC repair, knee exam under anesthesia 8/02/2022  Evaluation Date: 8/8/2022  Authorization Period Expiration: 8/07/2023  Plan of Care Expiration: 12/1/2022  Progress Note Due: 11/19/2022  Visit # / Visits authorized: 27/ 41(authorized)   FOTO: 2/3     Precautions: Post Op - ACL allograft. Procedure date 8/05/2022. See protocol. Knee brace. Progress to TTWB at 4 weeks. Progress to 50% WB at week 6 x1 week until FWB tolerated and wean off brace.     PTA Visit #: 1/5     Time In: 10:00(10 min late arrival)  Time Out: 10:45  Total Billable Time: 45 minutes    SUBJECTIVE     Patient reports some increased R knee stiffness today. She continues to wear brace unlocked when outside of her home.    She was compliant with home exercise program.  Response to previous treatment: no adverse reactions  Functional change: improved acitivities of daily living     Pain: 1/10  Location: right knee      OBJECTIVE      Not completed at today's treatment session       Treatment     TIFFANY received the treatments listed below:      therapeutic exercises to develop strength, endurance and ROM for 30 minutes including:    Brace removed for treatment    NuStep lvl 3.0 S16 6 minutes  HS stretch seated x30" between heel slide sets.  Weightshifts for TTWB 3x10  SLR without brace 3x15    Mini Squats at counter 3x10  Standing Hip abduction 3x10 bilat --added " 3#   Supine Heel Slides with Strap 3-5sec 30x --Lifted foot of bed  Sit to Stands from chair with blue pad 3x10  Lateral Steps at counter with red theraband 3min   Wall squats with green ball 3x10  Heel Raises 3x15  Seated core stabilization with cueing to engage lats with press down. 2'  L SKTC for improved stretch of R hip flexor ICW supine heel slides.  TKE with blue theraband 3x15  Standing Rows with blue theratube 2min  Standing shoulder extension with blue theratube 2min  Pallof press out blue  1min ea walkouts  Gastroc/soleus stretch 1min 3x  Prone stretch 3# 3x1'  SL balance R at counter 3'  Knee Isometric ext 60-90 deg 3x10    No brace or AD used during treatment     HEP review, added ankle ABCs    Manual Therapy for 15 mins  STM to R Knee    Patient Education and Home Exercises     Home Exercises Provided and Patient Education Provided     Education provided:     Complete all exercise and acitivities of daily living in pain free range  Self patella mobilizations  SL balance progression.     Written Home Exercises Provided: Patient instructed to cont prior HEP. Exercises were reviewed and YANG was able to demonstrate them prior to the end of the session.  YANG demonstrated good  understanding of the education provided. See EMR under Patient Instructions for exercises provided during therapy sessions. Added weight shifts for WB and quad activation for HEP.     ASSESSMENT     Good tolerance to exercise progression. Pt is progressing with RLE stability with both static and dynamic challenges. Has not progressed to agility or single leg hop comparisons at this time. Will progress SL balance challenges as appropriate. Pt is continuing to wean off brace. Will continue to progress strengthening/conditioning as appropriate.     YANG is progressing well towards her goals.   Pt prognosis is Good.     Pt will continue to benefit from skilled outpatient physical therapy to address the deficits listed in the problem  list box on initial evaluation, provide pt/family education and to maximize pt's level of independence in the home and community environment.     Pt's spiritual, cultural and educational needs considered and pt agreeable to plan of care and goals.     Anticipated barriers to physical therapy: none    Goals:    Short Term Goals:  4 weeks  1.Report decreased R knee pain  < / =  5/10  to increase tolerance for ADLs and HEP - MET  2. Increase knee ROM to 0-90 deg in order to be able to perform ADLs without difficulty - NOT MET  3. Increase strength by 1/3 MMT grade in RLE  to increase tolerance for ADL and work activities - NOT MET  4. Pt to tolerate HEP to improve ROM and independence with acitivities of daily living - MET    Long Term Goals: 12 weeks  1.Report decreased R knee pain < / = 3/10  to increase tolerance for ADLs and work-related tasks - NOT MET  2.Patient goal: Walk normally - NOT MET  3.Increase strength to >/= 4+/5 in R knee  to increase tolerance for ADL and work activities - NOT MET  4. Pt will report FOTO knee improvement to > 60% to demonstrate increase in LE function with every day tasks - NOT MET  5. Increase knee ROM to 0-120 deg or better in order to be able to perform ADLs without difficulty - NOT MET      PLAN     Progress as tolerated per Plan of Care.      Bijan Solorzano, PT

## 2022-11-01 ENCOUNTER — CLINICAL SUPPORT (OUTPATIENT)
Dept: REHABILITATION | Facility: HOSPITAL | Age: 32
End: 2022-11-01
Payer: MEDICAID

## 2022-11-01 DIAGNOSIS — M62.81 MUSCLE WEAKNESS OF LOWER EXTREMITY: Primary | ICD-10-CM

## 2022-11-01 DIAGNOSIS — M25.60 RANGE OF MOTION DEFICIT: ICD-10-CM

## 2022-11-01 PROCEDURE — 97110 THERAPEUTIC EXERCISES: CPT | Mod: PN

## 2022-11-01 NOTE — PROGRESS NOTES
"[facility]   Physical Therapy Progress Note and Treatment Note     Name: Tiffany Dobbs  Clinic Number: [id number]    Therapy Diagnosis:   Encounter Diagnoses   Name Primary?    Muscle weakness of lower extremity Yes    Range of motion deficit        Physician: Tevin Tubbs IV, MD    Visit Date: 11/1/2022    Physician Orders: PT Eval and Treat   Medical Diagnosis from Referral:   S83.511A (ICD-10-CM) - Sprain of anterior cruciate ligament of right knee, initial encounter   S89.91XA (ICD-10-CM) - Unspecified injury of right lower leg, initial encounter      S/p RECONSTRUCTION, KNEE, ACL, USING Allograft Achilles GRAFT, possible meniscus repair, possible PLC repair, knee exam under anesthesia 8/02/2022  Evaluation Date: 8/8/2022  Authorization Period Expiration: 8/07/2023  Plan of Care Expiration: 12/1/2022  Progress Note Due: 11/19/2022  Visit # / Visits authorized: 27/ 41(authorized)   FOTO: 2/3     Precautions: Post Op - ACL allograft. Procedure date 8/05/2022. See protocol. Knee brace. Progress to TTWB at 4 weeks. Progress to 50% WB at week 6 x1 week until FWB tolerated and wean off brace.     PTA Visit #: 1/5     Time In: 10:45  Time Out: 11:30  Total Billable Time: 45 minutes    SUBJECTIVE     Patient reports some R knee stiffness today. She states that her ankle is doing much better though    She was compliant with home exercise program.  Response to previous treatment: no adverse reactions  Functional change: improved acitivities of daily living     Pain: 1/10  Location: right knee      OBJECTIVE      Not completed at today's treatment session       Treatment     TIFFANY received the treatments listed below:      therapeutic exercises to develop strength, endurance and ROM for 30 minutes including:    Brace removed for treatment    NuStep lvl 3.0 S16 6 minutes  HS stretch seated x30" between heel slide sets.  Weightshifts for TTWB 3x10  SLR without brace 3x15    Mini Squats at counter " 3x10  Standing Hip abduction 3x10 bilat --added 3#   Supine Heel Slides with Strap 3-5sec 30x --Lifted foot of bed  Sit to Stands from chair with blue pad 3x10  Lateral Steps at counter with red theraband 3min   Wall squats with green ball 3x10  Heel Raises 3x15  Seated core stabilization with cueing to engage lats with press down. 2'  L SKTC for improved stretch of R hip flexor ICW supine heel slides.  TKE with blue theraband 3x15  Standing Rows with blue theratube 2min  Standing shoulder extension with blue theratube 2min  Pallof press out blue  1min ea walkouts  Gastroc/soleus stretch 1min 3x  Prone stretch 3# 3x1'  SL balance R at counter 3'  Knee Isometric ext 60-90 deg 3x10  Step Downs 3 x 10 w/ Airex  Sit to Stands with 10lb KB 3 X 10  SLR w/ 2lb weight 3 x 10    No brace or AD used during treatment     HEP review, added ankle ABCs    Manual Therapy for 0 mins  STM to R Knee    Patient Education and Home Exercises     Home Exercises Provided and Patient Education Provided     Education provided:     Complete all exercise and acitivities of daily living in pain free range  Self patella mobilizations  SL balance progression.     Written Home Exercises Provided: Patient instructed to cont prior HEP. Exercises were reviewed and YANG was able to demonstrate them prior to the end of the session.  YANG demonstrated good  understanding of the education provided. See EMR under Patient Instructions for exercises provided during therapy sessions. Added weight shifts for WB and quad activation for HEP.     ASSESSMENT     Good tolerance to exercise progression.     YANG is progressing well towards her goals.   Pt prognosis is Good.     Pt will continue to benefit from skilled outpatient physical therapy to address the deficits listed in the problem list box on initial evaluation, provide pt/family education and to maximize pt's level of independence in the home and community environment.     Pt's spiritual, cultural  and educational needs considered and pt agreeable to plan of care and goals.     Anticipated barriers to physical therapy: none    Goals:    Short Term Goals:  4 weeks  1.Report decreased R knee pain  < / =  5/10  to increase tolerance for ADLs and HEP - MET  2. Increase knee ROM to 0-90 deg in order to be able to perform ADLs without difficulty - NOT MET  3. Increase strength by 1/3 MMT grade in RLE  to increase tolerance for ADL and work activities - NOT MET  4. Pt to tolerate HEP to improve ROM and independence with acitivities of daily living - MET    Long Term Goals: 12 weeks  1.Report decreased R knee pain < / = 3/10  to increase tolerance for ADLs and work-related tasks - NOT MET  2.Patient goal: Walk normally - NOT MET  3.Increase strength to >/= 4+/5 in R knee  to increase tolerance for ADL and work activities - NOT MET  4. Pt will report FOTO knee improvement to > 60% to demonstrate increase in LE function with every day tasks - NOT MET  5. Increase knee ROM to 0-120 deg or better in order to be able to perform ADLs without difficulty - NOT MET      PLAN     Progress as tolerated per Plan of Care.      Bijan Solorzano, PT

## 2022-11-02 ENCOUNTER — OFFICE VISIT (OUTPATIENT)
Dept: ORTHOPEDICS | Facility: CLINIC | Age: 32
End: 2022-11-02
Payer: MEDICAID

## 2022-11-02 VITALS
HEIGHT: 63 IN | SYSTOLIC BLOOD PRESSURE: 128 MMHG | BODY MASS INDEX: 35.58 KG/M2 | HEART RATE: 77 BPM | DIASTOLIC BLOOD PRESSURE: 74 MMHG | WEIGHT: 200.81 LBS

## 2022-11-02 DIAGNOSIS — S89.91XA INJURY OF POSTEROLATERAL CORNER OF KNEE, RIGHT, INITIAL ENCOUNTER: ICD-10-CM

## 2022-11-02 DIAGNOSIS — S83.411A SPRAIN OF MEDIAL COLLATERAL LIGAMENT OF RIGHT KNEE, INITIAL ENCOUNTER: ICD-10-CM

## 2022-11-02 DIAGNOSIS — S83.511A RUPTURE OF ANTERIOR CRUCIATE LIGAMENT OF RIGHT KNEE, INITIAL ENCOUNTER: Primary | ICD-10-CM

## 2022-11-02 PROCEDURE — 3074F SYST BP LT 130 MM HG: CPT | Mod: CPTII,,, | Performed by: PHYSICIAN ASSISTANT

## 2022-11-02 PROCEDURE — 99999 PR PBB SHADOW E&M-EST. PATIENT-LVL III: CPT | Mod: PBBFAC,,, | Performed by: PHYSICIAN ASSISTANT

## 2022-11-02 PROCEDURE — 3078F DIAST BP <80 MM HG: CPT | Mod: CPTII,,, | Performed by: PHYSICIAN ASSISTANT

## 2022-11-02 PROCEDURE — 1160F PR REVIEW ALL MEDS BY PRESCRIBER/CLIN PHARMACIST DOCUMENTED: ICD-10-PCS | Mod: CPTII,,, | Performed by: PHYSICIAN ASSISTANT

## 2022-11-02 PROCEDURE — 99024 PR POST-OP FOLLOW-UP VISIT: ICD-10-PCS | Mod: ,,, | Performed by: PHYSICIAN ASSISTANT

## 2022-11-02 PROCEDURE — 1160F RVW MEDS BY RX/DR IN RCRD: CPT | Mod: CPTII,,, | Performed by: PHYSICIAN ASSISTANT

## 2022-11-02 PROCEDURE — 3078F PR MOST RECENT DIASTOLIC BLOOD PRESSURE < 80 MM HG: ICD-10-PCS | Mod: CPTII,,, | Performed by: PHYSICIAN ASSISTANT

## 2022-11-02 PROCEDURE — 1159F PR MEDICATION LIST DOCUMENTED IN MEDICAL RECORD: ICD-10-PCS | Mod: CPTII,,, | Performed by: PHYSICIAN ASSISTANT

## 2022-11-02 PROCEDURE — 1159F MED LIST DOCD IN RCRD: CPT | Mod: CPTII,,, | Performed by: PHYSICIAN ASSISTANT

## 2022-11-02 PROCEDURE — 3008F PR BODY MASS INDEX (BMI) DOCUMENTED: ICD-10-PCS | Mod: CPTII,,, | Performed by: PHYSICIAN ASSISTANT

## 2022-11-02 PROCEDURE — 99999 PR PBB SHADOW E&M-EST. PATIENT-LVL III: ICD-10-PCS | Mod: PBBFAC,,, | Performed by: PHYSICIAN ASSISTANT

## 2022-11-02 PROCEDURE — 99024 POSTOP FOLLOW-UP VISIT: CPT | Mod: ,,, | Performed by: PHYSICIAN ASSISTANT

## 2022-11-02 PROCEDURE — 3074F PR MOST RECENT SYSTOLIC BLOOD PRESSURE < 130 MM HG: ICD-10-PCS | Mod: CPTII,,, | Performed by: PHYSICIAN ASSISTANT

## 2022-11-02 PROCEDURE — 3008F BODY MASS INDEX DOCD: CPT | Mod: CPTII,,, | Performed by: PHYSICIAN ASSISTANT

## 2022-11-02 PROCEDURE — 99213 OFFICE O/P EST LOW 20 MIN: CPT | Mod: PBBFAC,PN | Performed by: PHYSICIAN ASSISTANT

## 2022-11-02 NOTE — PROGRESS NOTES
[facility], Orthopedics and Sports Medicine  [facility]    Knee Post-op Visit  11/02/2022       Diagnosis:   Right ACL Tear.  Right Posterolateral corner (PLC) Tear.     Procedure: 8/5/22   ACL reconstruction with Achilles allograft.  Posterolateral corner reconstruction with semitendinosus allograft       Subjective:      Tiffany Dobbs is a 32 y.o. female who is now 3 months status post right knee surgery. The patient is not having any pain. The patient denies none, fever, wound drainage, increasing redness, pus, increasing pain, increasing swelling. Post op problems reported: none.    Doing well in PT. Therapist would like her to continue until end of the year. Just starting doing hopping activities. Main complaint is occasional stiffness and discomfort.      Objective:      Ortho/SPM Exam  General: alert, appears stated age, and cooperative   Gait: normal  Sutures: Sutures out.  Incision: healing well, no significant drainage, no dehiscence, no significant erythema  Tenderness: none  Range of Motion: Extension 0 degrees  Flexion 120 degrees  Stability: Normal  Strength: Improving  Vascular: CR<2s. Palpable radial pulse    Imaging:  None taken today.      Assessment:       The patient is status post right knee surgery.  The primary encounter diagnosis was Rupture of anterior cruciate ligament of right knee, initial encounter. Diagnoses of Injury of posterolateral corner of knee, right, initial encounter and Sprain of medial collateral ligament of right knee, initial encounter were also pertinent to this visit. Doing well postoperatively. Continuation of post-op rehab course is recommended at this time. All of the patient's questions were answered.    Continue progression in PT. New order placed to extend visits until the end of the year.      Plan:      Tylenol 650mg TID PRN for pain.  Continue physical therapy.  Weightbearing as tolerated on operative extremity.  Gradually return to all  activities as tolerated.  Follow up with Dr. Tubbs at 6 mon PO        Madison Samaniego PA-C  Department of Orthopedic Surgery  [facility]  Office: [phone]        Orders Placed This Encounter    Ambulatory referral/consult to Physical/Occupational Therapy

## 2022-11-04 ENCOUNTER — CLINICAL SUPPORT (OUTPATIENT)
Dept: REHABILITATION | Facility: HOSPITAL | Age: 32
End: 2022-11-04
Payer: MEDICAID

## 2022-11-04 DIAGNOSIS — M62.81 MUSCLE WEAKNESS OF LOWER EXTREMITY: Primary | ICD-10-CM

## 2022-11-04 DIAGNOSIS — M25.60 RANGE OF MOTION DEFICIT: ICD-10-CM

## 2022-11-04 PROCEDURE — 97110 THERAPEUTIC EXERCISES: CPT | Mod: PN,CQ

## 2022-11-04 NOTE — PROGRESS NOTES
[facility]   Physical Therapy Progress Note and Treatment Note     Name: Tiffany Dobbs  Clinic Number: [id number]    Therapy Diagnosis:   Encounter Diagnoses   Name Primary?    Muscle weakness of lower extremity Yes    Range of motion deficit        Physician: Tevin Tubbs IV, MD    Visit Date: 11/4/2022    Physician Orders: PT Eval and Treat   Medical Diagnosis from Referral:   S83.511A (ICD-10-CM) - Sprain of anterior cruciate ligament of right knee, initial encounter   S89.91XA (ICD-10-CM) - Unspecified injury of right lower leg, initial encounter      S/p RECONSTRUCTION, KNEE, ACL, USING Allograft Achilles GRAFT, possible meniscus repair, possible PLC repair, knee exam under anesthesia 8/02/2022  Evaluation Date: 8/8/2022  Authorization Period Expiration: 8/07/2023  Plan of Care Expiration: 12/1/2022  Progress Note Due: 11/19/2022  Visit # / Visits authorized: 27/ 41(authorized)   FOTO: 2/3     Precautions: Post Op - ACL allograft. Procedure date 8/05/2022. See protocol. Knee brace. Progress to TTWB at 4 weeks. Progress to 50% WB at week 6 x1 week until FWB tolerated and wean off brace.     PTA Visit #: 1/5     Time In: 10:00  Time Out: 1040  Total Billable Time: 40 minutes    SUBJECTIVE     Patient reports some R knee stiffness today. She states that her ankle is bothering her more then her knee today.    She was compliant with home exercise program.  Response to previous treatment: no adverse reactions  Functional change: improved acitivities of daily living     Pain: 1/10 knee , 2-3/10 ankle  Location: right knee      OBJECTIVE      Not completed at today's treatment session       Treatment     TIFFANY received the treatments listed below:      therapeutic exercises to develop strength, endurance and ROM for 40 minutes including:    Brace removed for treatment    NuStep lvl 3.0 S16 6 minutes         Lateral Steps at counter with green theraband 3min   Wall squats with green ball  3x10      TKE with blue theraband 3x15  Standing Rows with blue theratube 2min  Standing shoulder extension with blue theratube 2min  Pallof press out blue  1min ea walkouts  Gastroc/soleus stretch 1min 3xea    SL balance R at counter 3'  Step Stretch on TM with 6in riser 10sec 2min  Step Downs 6in riser 3 x 10 w/ Airex  Sit to Stands with 10lb KB 3 X 12  Standing Heel Raises with 10lb KB 3x12  Seated HS Stretch [id number]    SLR w/ 2lb weight 3 x 10    No brace or AD used during treatment     HEP review, added ankle ABCs    Manual Therapy for 0 mins  STM to R Knee    Patient Education and Home Exercises     Home Exercises Provided and Patient Education Provided     Education provided:     Complete all exercise and acitivities of daily living in pain free range  Self patella mobilizations  SL balance progression.     Written Home Exercises Provided: Patient instructed to cont prior HEP. Exercises were reviewed and YANG was able to demonstrate them prior to the end of the session.  YANG demonstrated good  understanding of the education provided. See EMR under Patient Instructions for exercises provided during therapy sessions. Added weight shifts for WB and quad activation for HEP.     ASSESSMENT     Good tolerance to exercise progression. Patient able to maintain well current intensity and progress well with reps.    YANG is progressing well towards her goals.   Pt prognosis is Good.     Pt will continue to benefit from skilled outpatient physical therapy to address the deficits listed in the problem list box on initial evaluation, provide pt/family education and to maximize pt's level of independence in the home and community environment.     Pt's spiritual, cultural and educational needs considered and pt agreeable to plan of care and goals.     Anticipated barriers to physical therapy: none    Goals:    Short Term Goals:  4 weeks  1.Report decreased R knee pain  < / =  5/10  to increase tolerance for ADLs and HEP  - MET  2. Increase knee ROM to 0-90 deg in order to be able to perform ADLs without difficulty - NOT MET  3. Increase strength by 1/3 MMT grade in RLE  to increase tolerance for ADL and work activities - NOT MET  4. Pt to tolerate HEP to improve ROM and independence with acitivities of daily living - MET    Long Term Goals: 12 weeks  1.Report decreased R knee pain < / = 3/10  to increase tolerance for ADLs and work-related tasks - NOT MET  2.Patient goal: Walk normally - NOT MET  3.Increase strength to >/= 4+/5 in R knee  to increase tolerance for ADL and work activities - NOT MET  4. Pt will report FOTO knee improvement to > 60% to demonstrate increase in LE function with every day tasks - NOT MET  5. Increase knee ROM to 0-120 deg or better in order to be able to perform ADLs without difficulty - NOT MET      PLAN     Progress as tolerated per Plan of Care.      Conrad Trujillo, PTA

## 2022-11-09 ENCOUNTER — CLINICAL SUPPORT (OUTPATIENT)
Dept: REHABILITATION | Facility: HOSPITAL | Age: 32
End: 2022-11-09
Payer: MEDICAID

## 2022-11-09 DIAGNOSIS — M62.81 MUSCLE WEAKNESS OF LOWER EXTREMITY: Primary | ICD-10-CM

## 2022-11-09 DIAGNOSIS — M25.60 RANGE OF MOTION DEFICIT: ICD-10-CM

## 2022-11-09 PROCEDURE — 97110 THERAPEUTIC EXERCISES: CPT | Mod: PN,CQ

## 2022-11-09 NOTE — PROGRESS NOTES
[facility]   Physical Therapy Progress Note and Treatment Note     Name: Tiffany Dobbs  Clinic Number: [id number]    Therapy Diagnosis:   Encounter Diagnoses   Name Primary?    Muscle weakness of lower extremity Yes    Range of motion deficit        Physician: Tevin Tubbs IV, MD    Visit Date: 11/9/2022    Physician Orders: PT Eval and Treat   Medical Diagnosis from Referral:   S83.511A (ICD-10-CM) - Sprain of anterior cruciate ligament of right knee, initial encounter   S89.91XA (ICD-10-CM) - Unspecified injury of right lower leg, initial encounter      S/p RECONSTRUCTION, KNEE, ACL, USING Allograft Achilles GRAFT, possible meniscus repair, possible PLC repair, knee exam under anesthesia 8/02/2022  Evaluation Date: 8/8/2022  Authorization Period Expiration: 8/07/2023  Plan of Care Expiration: 12/1/2022  Progress Note Due: 11/19/2022  Visit # / Visits authorized: 28/ 41(authorized)   FOTO: 2/3     Precautions: Post Op - ACL allograft. Procedure date 8/05/2022. See protocol. Knee brace. Progress to TTWB at 4 weeks. Progress to 50% WB at week 6 x1 week until FWB tolerated and wean off brace.     PTA Visit #: 2/5     Time In: 1150  Time Out: 1230  Total Billable Time: 40 minutes    SUBJECTIVE     Patient reports some R knee stiffness today.     She was compliant with home exercise program.  Response to previous treatment: no adverse reactions  Functional change: improved acitivities of daily living     Pain: 1/10 knee   Location: right knee      OBJECTIVE      Not completed at today's treatment session       Treatment     TIFFANY received the treatments listed below:      therapeutic exercises to develop strength, endurance and ROM for 40 minutes including:    Brace removed for treatment    NuStep lvl 3.0 S16 6 minutes         Lateral Steps at counter with green theraband 3min   Wall squats with green ball 3x10      TKE with blue theraband 3x15  Standing Rows with blue theratube  2min  Standing shoulder extension with blue theratube 2min  Pallof press out blue  1min ea walkouts  Gastroc/soleus stretch 1min 3xea    SL balance R on airex ball toss with green ball 3'  Step Stretch on TM with 6in riser 10sec 2min  Step Downs 6in riser 3 x 10 w/ Airex  Sit to Stands with 10lb KB 3 X 12  Standing Heel Raises with 10lb KB 3x12  Recumbant bike L5 7min  Seated HS Stretch [id number]    SLR w/ 2lb weight 3 x 10    No brace or AD used during treatment     HEP review, added ankle ABCs    Manual Therapy for 0 mins  STM to R Knee    Patient Education and Home Exercises     Home Exercises Provided and Patient Education Provided     Education provided:     Complete all exercise and acitivities of daily living in pain free range  Self patella mobilizations  SL balance progression.     Written Home Exercises Provided: Patient instructed to cont prior HEP. Exercises were reviewed and YANG was able to demonstrate them prior to the end of the session.  YANG demonstrated good  understanding of the education provided. See EMR under Patient Instructions for exercises provided during therapy sessions. Added weight shifts for WB and quad activation for HEP.     ASSESSMENT     Good tolerance to exercise progression. Patient able to maintain well current intensity and progress well with reps.   Added in bike with no reported increase in symptoms.    YANG is progressing well towards her goals.   Pt prognosis is Good.     Pt will continue to benefit from skilled outpatient physical therapy to address the deficits listed in the problem list box on initial evaluation, provide pt/family education and to maximize pt's level of independence in the home and community environment.     Pt's spiritual, cultural and educational needs considered and pt agreeable to plan of care and goals.     Anticipated barriers to physical therapy: none    Goals:    Short Term Goals:  4 weeks  1.Report decreased R knee pain  < / =  5/10  to  increase tolerance for ADLs and HEP - MET  2. Increase knee ROM to 0-90 deg in order to be able to perform ADLs without difficulty - NOT MET  3. Increase strength by 1/3 MMT grade in RLE  to increase tolerance for ADL and work activities - NOT MET  4. Pt to tolerate HEP to improve ROM and independence with acitivities of daily living - MET    Long Term Goals: 12 weeks  1.Report decreased R knee pain < / = 3/10  to increase tolerance for ADLs and work-related tasks - NOT MET  2.Patient goal: Walk normally - NOT MET  3.Increase strength to >/= 4+/5 in R knee  to increase tolerance for ADL and work activities - NOT MET  4. Pt will report FOTO knee improvement to > 60% to demonstrate increase in LE function with every day tasks - NOT MET  5. Increase knee ROM to 0-120 deg or better in order to be able to perform ADLs without difficulty - NOT MET      PLAN     Progress as tolerated per Plan of Care.      Conrad Trujillo, PTA

## 2022-11-17 ENCOUNTER — CLINICAL SUPPORT (OUTPATIENT)
Dept: REHABILITATION | Facility: HOSPITAL | Age: 32
End: 2022-11-17
Attending: PHYSICIAN ASSISTANT
Payer: MEDICAID

## 2022-11-17 DIAGNOSIS — M25.60 RANGE OF MOTION DEFICIT: ICD-10-CM

## 2022-11-17 DIAGNOSIS — S83.411A SPRAIN OF MEDIAL COLLATERAL LIGAMENT OF RIGHT KNEE, INITIAL ENCOUNTER: ICD-10-CM

## 2022-11-17 DIAGNOSIS — M62.81 MUSCLE WEAKNESS OF LOWER EXTREMITY: Primary | ICD-10-CM

## 2022-11-17 DIAGNOSIS — S89.91XA INJURY OF POSTEROLATERAL CORNER OF KNEE, RIGHT, INITIAL ENCOUNTER: ICD-10-CM

## 2022-11-17 DIAGNOSIS — S83.511A RUPTURE OF ANTERIOR CRUCIATE LIGAMENT OF RIGHT KNEE, INITIAL ENCOUNTER: ICD-10-CM

## 2022-11-17 PROCEDURE — 97110 THERAPEUTIC EXERCISES: CPT | Mod: PN

## 2022-11-17 NOTE — PROGRESS NOTES
[facility]   Physical Therapy Progress Note and Treatment Note     Name: Tiffany Dobbs  Clinic Number: [id number]    Therapy Diagnosis:   Encounter Diagnoses   Name Primary?    Rupture of anterior cruciate ligament of right knee, initial encounter     Injury of posterolateral corner of knee, right, initial encounter     Sprain of medial collateral ligament of right knee, initial encounter     Muscle weakness of lower extremity Yes    Range of motion deficit        Physician: Tevin Tubbs IV, MD    Visit Date: 11/17/2022    Physician Orders: PT Eval and Treat   Medical Diagnosis from Referral:   S83.511A (ICD-10-CM) - Sprain of anterior cruciate ligament of right knee, initial encounter   S89.91XA (ICD-10-CM) - Unspecified injury of right lower leg, initial encounter      S/p RECONSTRUCTION, KNEE, ACL, USING Allograft Achilles GRAFT, possible meniscus repair, possible PLC repair, knee exam under anesthesia 8/02/2022  Evaluation Date: 8/8/2022  Authorization Period Expiration: 8/07/2023  Plan of Care Expiration: 12/1/2022  Progress Note Due: 11/19/2022  Visit # / Visits authorized: 31/ 41(authorized)   FOTO: 2/3     Precautions: Post Op - ACL allograft. Procedure date 8/05/2022. See protocol. Knee brace. Progress to TTWB at 4 weeks. Progress to 50% WB at week 6 x1 week until FWB tolerated and wean off brace.     PTA Visit #: 0/5     Time In: 820  Time Out: 0905  Total Billable Time: 45 minutes    SUBJECTIVE     Patient reports that she woke up with some stiffness but she is feeling better today.      She was not compliant with home exercise program.  Response to previous treatment: no adverse reactions  Functional change: improved acitivities of daily living     Pain: 2/10 knee   Location: right knee      OBJECTIVE      Not completed at today's treatment session       Treatment     TIFFANY received the treatments listed below:      therapeutic exercises to develop strength, endurance and ROM  "for 40 minutes including:    NuStep lvl 3.0 S16 6 minutes  Wall squats with green ball 3x10  TKE with blue theraband 3x15  Standing Rows with blue theratube 2min  Standing shoulder extension with blue theratube 2min  Pallof press out blue  1min ea walkouts  Gastroc/soleus stretch 1min 3xea  +treadmill x6 min  SL balance R on airex ball toss with green ball 3'  Step Stretch on TM with 6in riser 10sec 2min  +anterior step downs - started at 6", went down to 2" 2x12  +Squat  tap with 15 lb KB 3 X 12  +Deadlift 15# 2x12  +lateral walking red theraband 3x30 ft in mini squat  +step up 6" 3x10  +tandem stance w/ kb passes 2x15  +SL RDL 2x10  Standing Heel Raises with 10lb KB 3x12  Recumbant bike L5 7min  Seated HS Stretch [id number]    SLR w/ 2lb weight 3 x 10    No brace or AD used during treatment     HEP review, added ankle ABCs    Manual Therapy for 0 mins  STM to R Knee      Patient Education and Home Exercises     Home Exercises Provided and Patient Education Provided     Education provided:     Proper step up mechanics  HEP compliance to decrease risk of another tear  Prevention of valgus at the knee    Written Home Exercises Provided: Patient instructed to cont prior HEP. Exercises were reviewed and YANG was able to demonstrate them prior to the end of the session.  YANG demonstrated good  understanding of the education provided. See EMR under Patient Instructions for exercises provided during therapy sessions. Added weight shifts for WB and quad activation for HEP.     ASSESSMENT     Pt required education on stair navigation as she tended to hop off of her L leg instead of relying on her R leg. Anterior step downs were added in to improve overall stair navigation which was fear inducing to the pt and she reported feeling weak. Stair height was reduced to 2" with focus on eccentric lowering and preventing knee valgus. Pt is progressing with balance at this time. Pt able to tolerate the treadmill for a cool down and " discussed slowly increasing her walking tolerance on her own.    YANG is progressing well towards her goals.   Pt prognosis is Good.     Pt will continue to benefit from skilled outpatient physical therapy to address the deficits listed in the problem list box on initial evaluation, provide pt/family education and to maximize pt's level of independence in the home and community environment.     Pt's spiritual, cultural and educational needs considered and pt agreeable to plan of care and goals.     Anticipated barriers to physical therapy: none    Goals:    Short Term Goals:  4 weeks  1.Report decreased R knee pain  < / =  5/10  to increase tolerance for ADLs and HEP - MET  2. Increase knee ROM to 0-90 deg in order to be able to perform ADLs without difficulty - NOT MET  3. Increase strength by 1/3 MMT grade in RLE  to increase tolerance for ADL and work activities - NOT MET  4. Pt to tolerate HEP to improve ROM and independence with acitivities of daily living - MET    Long Term Goals: 12 weeks  1.Report decreased R knee pain < / = 3/10  to increase tolerance for ADLs and work-related tasks - NOT MET  2.Patient goal: Walk normally - NOT MET  3.Increase strength to >/= 4+/5 in R knee  to increase tolerance for ADL and work activities - NOT MET  4. Pt will report FOTO knee improvement to > 60% to demonstrate increase in LE function with every day tasks - NOT MET  5. Increase knee ROM to 0-120 deg or better in order to be able to perform ADLs without difficulty - NOT MET      PLAN     Progress as tolerated per Plan of Care.      Jael Carter, PT

## 2022-11-21 ENCOUNTER — CLINICAL SUPPORT (OUTPATIENT)
Dept: REHABILITATION | Facility: HOSPITAL | Age: 32
End: 2022-11-21
Payer: MEDICAID

## 2022-11-21 DIAGNOSIS — M25.60 RANGE OF MOTION DEFICIT: ICD-10-CM

## 2022-11-21 DIAGNOSIS — M62.81 MUSCLE WEAKNESS OF LOWER EXTREMITY: Primary | ICD-10-CM

## 2022-11-21 PROCEDURE — 97110 THERAPEUTIC EXERCISES: CPT | Mod: PN,CQ

## 2022-11-21 PROCEDURE — 97140 MANUAL THERAPY 1/> REGIONS: CPT | Mod: PN,CQ

## 2022-11-21 NOTE — PROGRESS NOTES
[facility]   Physical Therapy Progress Note and Treatment Note     Name: Tiffany Dobbs  Clinic Number: [id number]    Therapy Diagnosis:   Encounter Diagnoses   Name Primary?    Muscle weakness of lower extremity Yes    Range of motion deficit          Physician: Tevin Tubbs IV, MD    Visit Date: 11/21/2022    Physician Orders: PT Eval and Treat   Medical Diagnosis from Referral:   S83.511A (ICD-10-CM) - Sprain of anterior cruciate ligament of right knee, initial encounter   S89.91XA (ICD-10-CM) - Unspecified injury of right lower leg, initial encounter      S/p RECONSTRUCTION, KNEE, ACL, USING Allograft Achilles GRAFT, possible meniscus repair, possible PLC repair, knee exam under anesthesia 8/02/2022  Evaluation Date: 8/8/2022  Authorization Period Expiration: 8/07/2023  Plan of Care Expiration: 12/1/2022  Progress Note Due: 11/19/2022  Visit # / Visits authorized: 31/ 41(authorized)   FOTO: 2/3     Precautions: Post Op - ACL allograft. Procedure date 8/05/2022. See protocol. Knee brace. Progress to TTWB at 4 weeks. Progress to 50% WB at week 6 x1 week until FWB tolerated and wean off brace.     PTA Visit #: 1/5     Time In: 935  Time Out: 1015  Total Billable Time: 40 minutes    SUBJECTIVE     Patient reports that she is having a little increase in knee discomfort today that she attributes to increased acitivities of daily living.      She was not compliant with home exercise program.  Response to previous treatment: no adverse reactions  Functional change: improved acitivities of daily living     Pain: 2/10 knee   Location: right knee      OBJECTIVE      Not completed at today's treatment session       Treatment     TIFFANY received the treatments listed below:      therapeutic exercises to develop strength, endurance and ROM for 30 minutes including:    NuStep lvl 3.0 S16 6 minutes  Wall squats with green ball 3x10  TKE with blue theraband 3x15  Standing Rows with blue theratube  "2min  Standing shoulder extension with blue theratube 2min  Pallof press out blue  1min ea walkouts  Gastroc/soleus stretch 1min 3xea  +treadmill x6 min  SL balance R on airex ball toss with green ball 3'  Lateral steps at counter with blue theraband 3min  Step Stretch on TM with 6in riser 10sec 2min  Lateral Step Downs on 2in riser 3x10  Standing hip abd with 4lb 3x10  Standing Hip Extension 4lb 3x10  Standing knee flexion with 4lb 3x10  +anterior step downs - started at 6", went down to 2" 2x12  +Squat  tap with 15 lb KB 3 X 12  +Deadlift 15# 2x12  +lateral walking red theraband 3x30 ft in mini squat  +step up 6" 3x10  +tandem stance w/ kb passes 2x15  +SL RDL 2x10  Standing Heel Raises with 10lb KB 3x12  Recumbant bike L5 7min  Seated HS Stretch [id number]    SLR w/ 2lb weight 3 x 10        HEP review, added ankle ABCs    Manual Therapy for 10 mins:    STM to R Knee (quads, IT)  Patella glides      Patient Education and Home Exercises     Home Exercises Provided and Patient Education Provided     Education provided:     Proper step up mechanics  HEP compliance to decrease risk of another tear  Prevention of valgus at the knee    Written Home Exercises Provided: Patient instructed to cont prior HEP. Exercises were reviewed and YANG was able to demonstrate them prior to the end of the session.  YANG demonstrated good  understanding of the education provided. See EMR under Patient Instructions for exercises provided during therapy sessions. Added weight shifts for WB and quad activation for HEP.     ASSESSMENT     Good tolerance to exercise.  Patient was challenged with lateral step downs and improved with quad control at the end of the exercise.  Patient did require cueing for correct positioning and sequencing for exercise.  Patient improving with lateral hip engagement.    YANG is progressing well towards her goals.   Pt prognosis is Good.     Pt will continue to benefit from skilled outpatient physical therapy " to address the deficits listed in the problem list box on initial evaluation, provide pt/family education and to maximize pt's level of independence in the home and community environment.     Pt's spiritual, cultural and educational needs considered and pt agreeable to plan of care and goals.     Anticipated barriers to physical therapy: none    Goals:    Short Term Goals:  4 weeks  1.Report decreased R knee pain  < / =  5/10  to increase tolerance for ADLs and HEP - MET  2. Increase knee ROM to 0-90 deg in order to be able to perform ADLs without difficulty - NOT MET  3. Increase strength by 1/3 MMT grade in RLE  to increase tolerance for ADL and work activities - NOT MET  4. Pt to tolerate HEP to improve ROM and independence with acitivities of daily living - MET    Long Term Goals: 12 weeks  1.Report decreased R knee pain < / = 3/10  to increase tolerance for ADLs and work-related tasks - NOT MET  2.Patient goal: Walk normally - NOT MET  3.Increase strength to >/= 4+/5 in R knee  to increase tolerance for ADL and work activities - NOT MET  4. Pt will report FOTO knee improvement to > 60% to demonstrate increase in LE function with every day tasks - NOT MET  5. Increase knee ROM to 0-120 deg or better in order to be able to perform ADLs without difficulty - NOT MET      PLAN     Progress as tolerated per Plan of Care.      Conrad Trujillo, PTA

## 2022-11-23 ENCOUNTER — CLINICAL SUPPORT (OUTPATIENT)
Dept: REHABILITATION | Facility: HOSPITAL | Age: 32
End: 2022-11-23
Payer: MEDICAID

## 2022-11-23 DIAGNOSIS — M62.81 MUSCLE WEAKNESS OF LOWER EXTREMITY: Primary | ICD-10-CM

## 2022-11-23 DIAGNOSIS — M25.60 RANGE OF MOTION DEFICIT: ICD-10-CM

## 2022-11-23 PROCEDURE — 97110 THERAPEUTIC EXERCISES: CPT | Mod: PN,CQ

## 2022-11-23 NOTE — PROGRESS NOTES
[facility]   Physical Therapy Progress Note and Treatment Note     Name: Tiffany Dobbs  Clinic Number: [id number]    Therapy Diagnosis:   Encounter Diagnoses   Name Primary?    Muscle weakness of lower extremity Yes    Range of motion deficit          Physician: Tevin Tubbs IV, MD    Visit Date: 11/23/2022    Physician Orders: PT Eval and Treat   Medical Diagnosis from Referral:   S83.511A (ICD-10-CM) - Sprain of anterior cruciate ligament of right knee, initial encounter   S89.91XA (ICD-10-CM) - Unspecified injury of right lower leg, initial encounter      S/p RECONSTRUCTION, KNEE, ACL, USING Allograft Achilles GRAFT, possible meniscus repair, possible PLC repair, knee exam under anesthesia 8/02/2022  Evaluation Date: 8/8/2022  Authorization Period Expiration: 8/07/2023  Plan of Care Expiration: 12/1/2022  Progress Note Due: 11/19/2022  Visit # / Visits authorized: 33/ 41(authorized)   FOTO: 2/3     Precautions: Post Op - ACL allograft. Procedure date 8/05/2022. See protocol. Knee brace. Progress to TTWB at 4 weeks. Progress to 50% WB at week 6 x1 week until FWB tolerated and wean off brace.     PTA Visit #: 2/5     Time In: 935  Time Out: 1015  Total Billable Time: 40 minutes    SUBJECTIVE     Patient reports that she is having decreased knee discomfort today.      She was not compliant with home exercise program.  Response to previous treatment: no adverse reactions  Functional change: improved acitivities of daily living     Pain: 0/10 knee   Location: right knee      OBJECTIVE      Not completed at today's treatment session       Treatment     TIFFANY received the treatments listed below:      therapeutic exercises to develop strength, endurance and ROM for 30 minutes including:    NuStep lvl 3.0 S16 6 minutes  Recumbant Bike L6 5min  Wall squats with green ball 3x12  TKE with blue theraband 5sec 2min  Standing Rows with blue theratube 2min  Standing shoulder extension with blue  "theratube 2min  Pallof press out blue  1min ea walkouts  Gastroc/soleus stretch 1min 3xea  +treadmill x6 min  SL balance R on airex ball toss with green ball 3'  Lateral steps at counter with blue theraband 3min  Step Stretch on TM with 6in riser 10sec 2min  Lateral Step Downs on 2in riser 3x10  Standing hip abd with 4lb 3x10  Standing Hip Extension 4lb 3x10  Standing knee flexion with 4lb 3x10  LAQ with adduction ball 5lb 3x10  +anterior step downs - started at 6", went down to 2" 2x12  +Squat  tap with 15 lb KB 3 X 12  +Deadlift 15# 2x12  +lateral walking red theraband 3x30 ft in mini squat  +step up 6" 3x10  +tandem stance w/ kb passes 2x15  +SL RDL 2x10  Standing Heel Raises with 10lb KB 3x12  Recumbant bike L5 7min  Seated HS Stretch [id number]    SLR w/ 2lb weight 3 x 10        HEP review, added ankle ABCs    Manual Therapy for 10 mins:    STM to R Knee (quads, IT)  Patella glides      Patient Education and Home Exercises     Home Exercises Provided and Patient Education Provided     Education provided:     Proper step up mechanics  HEP compliance to decrease risk of another tear  Prevention of valgus at the knee    Written Home Exercises Provided: Patient instructed to cont prior HEP. Exercises were reviewed and YANG was able to demonstrate them prior to the end of the session.  YANG demonstrated good  understanding of the education provided. See EMR under Patient Instructions for exercises provided during therapy sessions. Added weight shifts for WB and quad activation for HEP.     ASSESSMENT     Good tolerance to exercise.  Patient improving with lateral step downs and improving overall with quad control.      YANG is progressing well towards her goals.   Pt prognosis is Good.     Pt will continue to benefit from skilled outpatient physical therapy to address the deficits listed in the problem list box on initial evaluation, provide pt/family education and to maximize pt's level of independence in the home " and community environment.     Pt's spiritual, cultural and educational needs considered and pt agreeable to plan of care and goals.     Anticipated barriers to physical therapy: none    Goals:    Short Term Goals:  4 weeks  1.Report decreased R knee pain  < / =  5/10  to increase tolerance for ADLs and HEP - MET  2. Increase knee ROM to 0-90 deg in order to be able to perform ADLs without difficulty - NOT MET  3. Increase strength by 1/3 MMT grade in RLE  to increase tolerance for ADL and work activities - NOT MET  4. Pt to tolerate HEP to improve ROM and independence with acitivities of daily living - MET    Long Term Goals: 12 weeks  1.Report decreased R knee pain < / = 3/10  to increase tolerance for ADLs and work-related tasks - NOT MET  2.Patient goal: Walk normally - NOT MET  3.Increase strength to >/= 4+/5 in R knee  to increase tolerance for ADL and work activities - NOT MET  4. Pt will report FOTO knee improvement to > 60% to demonstrate increase in LE function with every day tasks - NOT MET  5. Increase knee ROM to 0-120 deg or better in order to be able to perform ADLs without difficulty - NOT MET      PLAN     Progress as tolerated per Plan of Care.      Conrad Trujillo, PTA

## 2022-11-28 ENCOUNTER — CLINICAL SUPPORT (OUTPATIENT)
Dept: REHABILITATION | Facility: HOSPITAL | Age: 32
End: 2022-11-28
Payer: MEDICAID

## 2022-11-28 ENCOUNTER — PATIENT MESSAGE (OUTPATIENT)
Dept: OBSTETRICS AND GYNECOLOGY | Facility: CLINIC | Age: 32
End: 2022-11-28
Payer: MEDICAID

## 2022-11-28 DIAGNOSIS — M62.81 MUSCLE WEAKNESS OF LOWER EXTREMITY: Primary | ICD-10-CM

## 2022-11-28 DIAGNOSIS — M25.60 RANGE OF MOTION DEFICIT: ICD-10-CM

## 2022-11-28 PROCEDURE — 97110 THERAPEUTIC EXERCISES: CPT | Mod: PN,CQ

## 2022-11-28 NOTE — PROGRESS NOTES
[facility]   Physical Therapy Progress Note and Treatment Note     Name: Tiffany Dobbs  Clinic Number: [id number]    Therapy Diagnosis:   Encounter Diagnoses   Name Primary?    Muscle weakness of lower extremity Yes    Range of motion deficit          Physician: Tevin Tubbs IV, MD    Visit Date: 11/28/2022    Physician Orders: PT Eval and Treat   Medical Diagnosis from Referral:   S83.511A (ICD-10-CM) - Sprain of anterior cruciate ligament of right knee, initial encounter   S89.91XA (ICD-10-CM) - Unspecified injury of right lower leg, initial encounter      S/p RECONSTRUCTION, KNEE, ACL, USING Allograft Achilles GRAFT, possible meniscus repair, possible PLC repair, knee exam under anesthesia 8/02/2022  Evaluation Date: 8/8/2022  Authorization Period Expiration: 8/07/2023  Plan of Care Expiration: 12/1/2022  Progress Note Due: 11/19/2022  Visit # / Visits authorized: 34/ 41(authorized)   FOTO: 2/3     Precautions: Post Op - ACL allograft. Procedure date 8/05/2022. See protocol. Knee brace. Progress to TTWB at 4 weeks. Progress to 50% WB at week 6 x1 week until FWB tolerated and wean off brace.     PTA Visit #: 3/5     Time In: 0805  Time Out: 845  Total Billable Time: 40 minutes    SUBJECTIVE     Patient reports that she is having some stiffness today but is not associated with pain.   Patinet states that she has some weakness and is maore challenged with descending the stairs that ascending steps.    She was not compliant with home exercise program.  Response to previous treatment: no adverse reactions  Functional change: improved acitivities of daily living     Pain: 0/10 knee   Location: right knee      OBJECTIVE      Not completed at today's treatment session       Treatment     TIFFANY received the treatments listed below:      therapeutic exercises to develop strength, endurance and ROM for 40 minutes including:    NuStep lvl 3.0 S16 6 minutes  Recumbant Bike L5 5min  Wall squats  "with green ball 3x15  TKE with blue theraband 5sec 2min  Standing Rows with blue theratube 2min  Standing shoulder extension with blue theratube 2min  Pallof press out blue  1min ea walkouts  Gastroc/soleus stretch 1min 3xea  +treadmill x6 min  SL balance R on airex ball toss with green ball 3'  Lateral steps at counter with blue theraband 3min  Step Stretch on TM with 6in riser 10sec 2min  Lateral Step Downs on 2in riser 3x10  Standing hip abd with 4lb 3x10  Standing Hip Extension 4lb 3x10  Standing knee flexion with 4lb 3x10  LAQ with adduction ball 5lb 3x10  +anterior step downs - started at 6", went down to 2" 2x12  +Squat  tap with 15 lb KB 3 X 12  +Deadlift 15# 2x12  +lateral walking red theraband 3x30 ft in mini squat  +step up 6" 3x10  +tandem stance w/ kb passes 2x15  +SL RDL 2x10  Standing Heel Raises with 10lb KB 3x12  Recumbant bike L5 7min  Seated HS Stretch [id number]    SLR w/ 2lb weight 3 x 10        HEP review, added ankle ABCs    Manual Therapy for 00 mins:    STM to R Knee (quads, IT)  Patella glides      Patient Education and Home Exercises     Home Exercises Provided and Patient Education Provided     Education provided:     Proper step up mechanics  HEP compliance to decrease risk of another tear  Prevention of valgus at the knee    Written Home Exercises Provided: Patient instructed to cont prior HEP. Exercises were reviewed and YANG was able to demonstrate them prior to the end of the session.  YANG demonstrated good  understanding of the education provided. See EMR under Patient Instructions for exercises provided during therapy sessions. Added weight shifts for WB and quad activation for HEP.     ASSESSMENT     Good tolerance to exercise.  Patient continues to improve with lateral step downs and improving overall with quad control.      YANG is progressing well towards her goals.   Pt prognosis is Good.     Pt will continue to benefit from skilled outpatient physical therapy to address the " deficits listed in the problem list box on initial evaluation, provide pt/family education and to maximize pt's level of independence in the home and community environment.     Pt's spiritual, cultural and educational needs considered and pt agreeable to plan of care and goals.     Anticipated barriers to physical therapy: none    Goals:    Short Term Goals:  4 weeks  1.Report decreased R knee pain  < / =  5/10  to increase tolerance for ADLs and HEP - MET  2. Increase knee ROM to 0-90 deg in order to be able to perform ADLs without difficulty - NOT MET  3. Increase strength by 1/3 MMT grade in RLE  to increase tolerance for ADL and work activities - NOT MET  4. Pt to tolerate HEP to improve ROM and independence with acitivities of daily living - MET    Long Term Goals: 12 weeks  1.Report decreased R knee pain < / = 3/10  to increase tolerance for ADLs and work-related tasks - NOT MET  2.Patient goal: Walk normally - NOT MET  3.Increase strength to >/= 4+/5 in R knee  to increase tolerance for ADL and work activities - NOT MET  4. Pt will report FOTO knee improvement to > 60% to demonstrate increase in LE function with every day tasks - NOT MET  5. Increase knee ROM to 0-120 deg or better in order to be able to perform ADLs without difficulty - NOT MET      PLAN     Progress as tolerated per Plan of Care.      Conrad Trujillo, PTA

## 2022-11-30 ENCOUNTER — CLINICAL SUPPORT (OUTPATIENT)
Dept: REHABILITATION | Facility: HOSPITAL | Age: 32
End: 2022-11-30
Payer: MEDICAID

## 2022-11-30 DIAGNOSIS — M62.81 MUSCLE WEAKNESS OF LOWER EXTREMITY: Primary | ICD-10-CM

## 2022-11-30 DIAGNOSIS — M25.60 RANGE OF MOTION DEFICIT: ICD-10-CM

## 2022-11-30 PROCEDURE — 97110 THERAPEUTIC EXERCISES: CPT | Mod: PN,CQ

## 2022-11-30 NOTE — PROGRESS NOTES
[facility]   Physical Therapy Progress Note and Treatment Note     Name: Tiffany Dobbs  Clinic Number: [id number]    Therapy Diagnosis:   Encounter Diagnoses   Name Primary?    Muscle weakness of lower extremity Yes    Range of motion deficit            Physician: Tevin Tubbs IV, MD    Visit Date: 11/30/2022    Physician Orders: PT Eval and Treat   Medical Diagnosis from Referral:   S83.511A (ICD-10-CM) - Sprain of anterior cruciate ligament of right knee, initial encounter   S89.91XA (ICD-10-CM) - Unspecified injury of right lower leg, initial encounter      S/p RECONSTRUCTION, KNEE, ACL, USING Allograft Achilles GRAFT, possible meniscus repair, possible PLC repair, knee exam under anesthesia 8/02/2022  Evaluation Date: 8/8/2022  Authorization Period Expiration: 8/07/2023  Plan of Care Expiration: 12/1/2022  Progress Note Due: 11/19/2022  Visit # / Visits authorized: 34/ 41(authorized)   FOTO: 2/3     Precautions: Post Op - ACL allograft. Procedure date 8/05/2022. See protocol. Knee brace. Progress to TTWB at 4 weeks. Progress to 50% WB at week 6 x1 week until FWB tolerated and wean off brace.     PTA Visit #: 3/5     Time In: 0935  Time Out: 1015  Total Billable Time: 40 minutes    SUBJECTIVE     Patient reports that she is having some stiffness today but is not associated with pain.   Patinet states that she  is improving with descending the stairs that ascending steps.  Patient states that she continues to increase her duration for walks and is ambulating ~30min trials each day.    She was not compliant with home exercise program.  Response to previous treatment: no adverse reactions  Functional change: improved acitivities of daily living     Pain: 0/10 knee   Location: right knee      OBJECTIVE      Not completed at today's treatment session       Treatment     TIFFANY received the treatments listed below:      therapeutic exercises to develop strength, endurance and ROM for 40  "minutes including:      Recumbant Bike L6 5min  Wall squats with green ball 3x15  TKE with blue theraband 5sec 2min    Pallof press out blue  1min ea walkouts  Gastroc/soleus stretch 1min 3xea  +treadmill x6 min  SL balance R on airex ball toss with green ball 3'  Lateral steps at counter with blue theraband 3min  Step Stretch on TM with 6in riser 10sec 2min  Step up on 6in riser 3x10  Standing Heel raises with 10lb KB 3x10  Lateral Step Downs on 2in riser 3x10  Standing hip abd with 4lb 3x10  Standing Hip Extension 4lb 3x10  Standing knee flexion with 4lb 3x10  LAQ with adduction ball 5lb 3x10    +anterior step downs - started at 6", went down to 2" 2x12  +Squat  tap with 15 lb KB 3 X 12  +Deadlift 15# 2x12  +lateral walking red theraband 3x30 ft in mini squat  +step up 6" 3x10  +tandem stance w/ kb passes 2x15  +SL RDL 2x10        SLR w/ 2lb weight 3 x 10        HEP review, added ankle ABCs    Manual Therapy for 00 mins:    STM to R Knee (quads, IT)  Patella glides      Patient Education and Home Exercises     Home Exercises Provided and Patient Education Provided     Education provided:     Proper step up mechanics  HEP compliance to decrease risk of another tear  Prevention of valgus at the knee    Written Home Exercises Provided: Patient instructed to cont prior HEP. Exercises were reviewed and YANG was able to demonstrate them prior to the end of the session.  YANG demonstrated good  understanding of the education provided. See EMR under Patient Instructions for exercises provided during therapy sessions. Added weight shifts for WB and quad activation for HEP.     ASSESSMENT     Good tolerance to exercise.  Patient continues to improve with lateral step downs and improving overall with quad control.      YANG is progressing well towards her goals.   Pt prognosis is Good.     Pt will continue to benefit from skilled outpatient physical therapy to address the deficits listed in the problem list box on initial " evaluation, provide pt/family education and to maximize pt's level of independence in the home and community environment.     Pt's spiritual, cultural and educational needs considered and pt agreeable to plan of care and goals.     Anticipated barriers to physical therapy: none    Goals:    Short Term Goals:  4 weeks  1.Report decreased R knee pain  < / =  5/10  to increase tolerance for ADLs and HEP - MET  2. Increase knee ROM to 0-90 deg in order to be able to perform ADLs without difficulty - NOT MET  3. Increase strength by 1/3 MMT grade in RLE  to increase tolerance for ADL and work activities - NOT MET  4. Pt to tolerate HEP to improve ROM and independence with acitivities of daily living - MET    Long Term Goals: 12 weeks  1.Report decreased R knee pain < / = 3/10  to increase tolerance for ADLs and work-related tasks - NOT MET  2.Patient goal: Walk normally - NOT MET  3.Increase strength to >/= 4+/5 in R knee  to increase tolerance for ADL and work activities - NOT MET  4. Pt will report FOTO knee improvement to > 60% to demonstrate increase in LE function with every day tasks - NOT MET  5. Increase knee ROM to 0-120 deg or better in order to be able to perform ADLs without difficulty - NOT MET      PLAN     Progress as tolerated per Plan of Care.      Conrad Trujillo, PTA

## 2022-12-07 ENCOUNTER — OFFICE VISIT (OUTPATIENT)
Dept: OPTOMETRY | Facility: CLINIC | Age: 32
End: 2022-12-07
Payer: MEDICAID

## 2022-12-07 DIAGNOSIS — H52.13 MYOPIA, BILATERAL: ICD-10-CM

## 2022-12-07 DIAGNOSIS — Z46.0 FITTING AND ADJUSTMENT OF SPECTACLES AND CONTACT LENSES: Primary | ICD-10-CM

## 2022-12-07 DIAGNOSIS — Z83.511 FAMILY HISTORY OF GLAUCOMA: ICD-10-CM

## 2022-12-07 DIAGNOSIS — H47.393 OPTIC NERVE CUPPING OF BOTH EYES: Primary | ICD-10-CM

## 2022-12-07 PROCEDURE — 99999 PR PBB SHADOW E&M-EST. PATIENT-LVL I: ICD-10-PCS | Mod: PBBFAC,,, | Performed by: OPTOMETRIST

## 2022-12-07 PROCEDURE — 99999 PR PBB SHADOW E&M-EST. PATIENT-LVL III: ICD-10-PCS | Mod: PBBFAC,,, | Performed by: OPTOMETRIST

## 2022-12-07 PROCEDURE — 92310 PR CONTACT LENS FITTING (NO CHANGE): ICD-10-PCS | Mod: CSM,,, | Performed by: OPTOMETRIST

## 2022-12-07 PROCEDURE — 1159F PR MEDICATION LIST DOCUMENTED IN MEDICAL RECORD: ICD-10-PCS | Mod: CPTII,,, | Performed by: OPTOMETRIST

## 2022-12-07 PROCEDURE — 92310 CONTACT LENS FITTING OU: CPT | Mod: CSM,,, | Performed by: OPTOMETRIST

## 2022-12-07 PROCEDURE — 92015 DETERMINE REFRACTIVE STATE: CPT | Mod: ,,, | Performed by: OPTOMETRIST

## 2022-12-07 PROCEDURE — 92015 PR REFRACTION: ICD-10-PCS | Mod: ,,, | Performed by: OPTOMETRIST

## 2022-12-07 PROCEDURE — 1159F MED LIST DOCD IN RCRD: CPT | Mod: CPTII,,, | Performed by: OPTOMETRIST

## 2022-12-07 PROCEDURE — 99211 OFF/OP EST MAY X REQ PHY/QHP: CPT | Mod: PBBFAC | Performed by: OPTOMETRIST

## 2022-12-07 PROCEDURE — 92004 COMPRE OPH EXAM NEW PT 1/>: CPT | Mod: S$PBB,,, | Performed by: OPTOMETRIST

## 2022-12-07 PROCEDURE — 92250 FUNDUS PHOTOGRAPHY W/I&R: CPT | Mod: PBBFAC | Performed by: OPTOMETRIST

## 2022-12-07 PROCEDURE — 92250 COLOR FUNDUS PHOTOGRAPHY - OU - BOTH EYES: ICD-10-PCS | Mod: 26,S$PBB,, | Performed by: OPTOMETRIST

## 2022-12-07 PROCEDURE — 92004 PR EYE EXAM, NEW PATIENT,COMPREHESV: ICD-10-PCS | Mod: S$PBB,,, | Performed by: OPTOMETRIST

## 2022-12-07 PROCEDURE — 1160F RVW MEDS BY RX/DR IN RCRD: CPT | Mod: CPTII,,, | Performed by: OPTOMETRIST

## 2022-12-07 PROCEDURE — 99999 PR PBB SHADOW E&M-EST. PATIENT-LVL I: CPT | Mod: PBBFAC,,, | Performed by: OPTOMETRIST

## 2022-12-07 PROCEDURE — 99999 PR PBB SHADOW E&M-EST. PATIENT-LVL III: CPT | Mod: PBBFAC,,, | Performed by: OPTOMETRIST

## 2022-12-07 PROCEDURE — 1160F PR REVIEW ALL MEDS BY PRESCRIBER/CLIN PHARMACIST DOCUMENTED: ICD-10-PCS | Mod: CPTII,,, | Performed by: OPTOMETRIST

## 2022-12-07 PROCEDURE — 92133 CPTRZD OPH DX IMG PST SGM ON: CPT | Mod: PBBFAC | Performed by: OPTOMETRIST

## 2022-12-07 PROCEDURE — 99213 OFFICE O/P EST LOW 20 MIN: CPT | Mod: 25,PBBFAC | Performed by: OPTOMETRIST

## 2022-12-07 NOTE — PROGRESS NOTES
HPI    Pt here for a contact exam. Pt mom has glaucoma and pt was always told   that her optic nerve was large.   Last edited by Joan Mary on 12/7/2022  8:35 AM.            Assessment /Plan     For exam results, see Encounter Report.    Optic nerve cupping of both eyes  Family history of glaucoma: mother, Aunt  IOP WNL   May 2019 OCT WNL HVF 24-2 WNL    -     today Color Fundus Photography - OU - Both Eyes   RTC 1 week for HVF 24-2 and OCT optic nerve      Myopia, bilateral   Rx specs   CL fit today: dispensed trial of biofinity toric    Remove nightly, replace monthly   Ok to order if happy with vision and comfort OU    RTC 1 year

## 2022-12-12 ENCOUNTER — CLINICAL SUPPORT (OUTPATIENT)
Dept: REHABILITATION | Facility: HOSPITAL | Age: 32
End: 2022-12-12
Payer: MEDICAID

## 2022-12-12 DIAGNOSIS — M25.60 RANGE OF MOTION DEFICIT: ICD-10-CM

## 2022-12-12 DIAGNOSIS — M62.81 MUSCLE WEAKNESS OF LOWER EXTREMITY: Primary | ICD-10-CM

## 2022-12-12 PROCEDURE — 97110 THERAPEUTIC EXERCISES: CPT | Mod: PN,CQ

## 2022-12-12 NOTE — PROGRESS NOTES
[facility]   Physical Therapy Progress Note and Treatment Note     Name: Tiffany Dobbs  Clinic Number: [id number]    Therapy Diagnosis:   Encounter Diagnoses   Name Primary?    Muscle weakness of lower extremity Yes    Range of motion deficit            Physician: Tevin Tubbs IV, MD    Visit Date: 12/12/2022    Physician Orders: PT Eval and Treat   Medical Diagnosis from Referral:   S83.511A (ICD-10-CM) - Sprain of anterior cruciate ligament of right knee, initial encounter   S89.91XA (ICD-10-CM) - Unspecified injury of right lower leg, initial encounter      S/p RECONSTRUCTION, KNEE, ACL, USING Allograft Achilles GRAFT, possible meniscus repair, possible PLC repair, knee exam under anesthesia 8/02/2022  Evaluation Date: 8/8/2022  Authorization Period Expiration: 8/07/2023  Plan of Care Expiration: 12/1/2022  Progress Note Due: 11/19/2022  Visit # / Visits authorized: 34/ 41(authorized)   FOTO: 2/3     Precautions: Post Op - ACL allograft. Procedure date 8/05/2022. See protocol. Knee brace. Progress to TTWB at 4 weeks. Progress to 50% WB at week 6 x1 week until FWB tolerated and wean off brace.     PTA Visit #: 5/5     Time In: 1455  Time Out: 1535  Total Billable Time: 40 minutes    SUBJECTIVE     Patient reports that she is having some lateral right knee discomfort/tightness today.    She was not compliant with home exercise program.  Response to previous treatment: no adverse reactions  Functional change: improved acitivities of daily living     Pain: 0/10 knee   Location: right knee      OBJECTIVE      Not completed at today's treatment session       Treatment     TIFFANY received the treatments listed below:      therapeutic exercises to develop strength, endurance and ROM for 40 minutes including:      Recumbant Bike L6 6min  Wall squats with green ball 3x15  TKE with blue theraband 5sec 2min    Pallof press out blue  1min ea walkouts  Gastroc/soleus stretch 1min 3xea  +treadmill x6  "min  SL balance R on airex ball toss with green ball 3'  Lateral steps at counter with blue theraband 3min  Step Stretch on TM with 6in riser 10sec 2min  Step up on 6in riser 3x10  Standing Heel raises with 10lb KB 3x15  Lateral Step Downs on 2in riser 3x10  Standing hip abd with 4lb 3x12  Standing Hip Extension 4lb 3x12  Standing knee flexion with 4lb 3x12  LAQ with adduction ball 5lb 3x10    +anterior step downs - started at 6", went down to 2" 2x12  +Squat  tap with 15 lb KB 3 X 12  +Deadlift 15# 2x12  +lateral walking red theraband 3x30 ft in mini squat  +step up 6" 3x10  +tandem stance w/ kb passes 2x15  +SL RDL 2x10        SLR w/ 2lb weight 3 x 10        HEP review, added ankle ABCs    Manual Therapy for 00 mins:    STM to R Knee (quads, IT)  Patella glides      Patient Education and Home Exercises     Home Exercises Provided and Patient Education Provided     Education provided:     Proper step up mechanics  HEP compliance to decrease risk of another tear  Prevention of valgus at the knee    Written Home Exercises Provided: Patient instructed to cont prior HEP. Exercises were reviewed and YANG was able to demonstrate them prior to the end of the session.  YANG demonstrated good  understanding of the education provided. See EMR under Patient Instructions for exercises provided during therapy sessions. Added weight shifts for WB and quad activation for HEP.     ASSESSMENT     Good tolerance to exercise.  Patient continues to improve with lateral step downs and able to maintain well current intensity for exercise and progress well with reps.     YANG is progressing well towards her goals.   Pt prognosis is Good.     Pt will continue to benefit from skilled outpatient physical therapy to address the deficits listed in the problem list box on initial evaluation, provide pt/family education and to maximize pt's level of independence in the home and community environment.     Pt's spiritual, cultural and " educational needs considered and pt agreeable to plan of care and goals.     Anticipated barriers to physical therapy: none    Goals:    Short Term Goals:  4 weeks  1.Report decreased R knee pain  < / =  5/10  to increase tolerance for ADLs and HEP - MET  2. Increase knee ROM to 0-90 deg in order to be able to perform ADLs without difficulty - NOT MET  3. Increase strength by 1/3 MMT grade in RLE  to increase tolerance for ADL and work activities - NOT MET  4. Pt to tolerate HEP to improve ROM and independence with acitivities of daily living - MET    Long Term Goals: 12 weeks  1.Report decreased R knee pain < / = 3/10  to increase tolerance for ADLs and work-related tasks - NOT MET  2.Patient goal: Walk normally - NOT MET  3.Increase strength to >/= 4+/5 in R knee  to increase tolerance for ADL and work activities - NOT MET  4. Pt will report FOTO knee improvement to > 60% to demonstrate increase in LE function with every day tasks - NOT MET  5. Increase knee ROM to 0-120 deg or better in order to be able to perform ADLs without difficulty - NOT MET      PLAN     Progress as tolerated per Plan of Care.      Conrad Trujillo, PTA

## 2022-12-14 ENCOUNTER — CLINICAL SUPPORT (OUTPATIENT)
Dept: REHABILITATION | Facility: HOSPITAL | Age: 32
End: 2022-12-14
Payer: MEDICAID

## 2022-12-14 DIAGNOSIS — M62.81 MUSCLE WEAKNESS OF LOWER EXTREMITY: Primary | ICD-10-CM

## 2022-12-14 DIAGNOSIS — M25.60 RANGE OF MOTION DEFICIT: ICD-10-CM

## 2022-12-14 PROCEDURE — 97140 MANUAL THERAPY 1/> REGIONS: CPT | Mod: PN

## 2022-12-14 PROCEDURE — 97110 THERAPEUTIC EXERCISES: CPT | Mod: PN

## 2022-12-14 NOTE — PROGRESS NOTES
[facility]   Physical Therapy Progress Note and Treatment Note/Updated POC    Name: Tiffany Dobbs  Clinic Number: [id number]    Therapy Diagnosis:   Encounter Diagnoses   Name Primary?    Muscle weakness of lower extremity Yes    Range of motion deficit            Physician: Tevin Tubbs IV, MD    Visit Date: 12/14/2022    Physician Orders: PT Eval and Treat   Medical Diagnosis from Referral:   S83.511A (ICD-10-CM) - Sprain of anterior cruciate ligament of right knee, initial encounter   S89.91XA (ICD-10-CM) - Unspecified injury of right lower leg, initial encounter      S/p RECONSTRUCTION, KNEE, ACL, USING Allograft Achilles GRAFT, possible meniscus repair, possible PLC repair, knee exam under anesthesia 8/02/2022  Evaluation Date: 8/8/2022  Authorization Period Expiration: 8/07/2023  Plan of Care Expiration: 12/1/2022  Progress Note Due: 11/19/2022  Visit # / Visits authorized: 37/ 41(authorized)   FOTO: 2/3     Precautions: Post Op - ACL allograft. Procedure date 8/05/2022. See protocol. Knee brace. Progress to TTWB at 4 weeks. Progress to 50% WB at week 6 x1 week until FWB tolerated and wean off brace.     PTA Visit #: 5/5     Time In: 845  Time Out: 930  Total Billable Time: 45 minutes    SUBJECTIVE     Patient reports that she is having some lateral right knee discomfort/tightness today.    She was not compliant with home exercise program.  Response to previous treatment: no adverse reactions  Functional change: improved acitivities of daily living     Pain: 0/10 knee   Location: right knee      OBJECTIVE      Not completed at today's treatment session       Treatment     TIFFANY received the treatments listed below:      therapeutic exercises to develop strength, endurance and ROM for 8 minutes including:      Recumbant Bike L6 6min  Wall squats with green ball 3x15  TKE with blue theraband 5sec 2min    Pallof press out blue  1min ea walkouts  Gastroc/soleus stretch 1min  "3xea  +treadmill x6 min  SL balance R on airex ball toss with green ball 3'  Lateral steps at counter with blue theraband 3min  Step Stretch on TM with 6in riser 10sec 2min  Step up on 6in riser 3x10  Standing Heel raises with 10lb KB 3x15  Lateral Step Downs on 2in riser 3x10  Standing hip abd with 4lb 3x12  Standing Hip Extension 4lb 3x12  Standing knee flexion with 4lb 3x12  LAQ with adduction ball 5lb 3x10    +anterior step downs - started at 6", went down to 2" 2x12  +Squat  tap with 15 lb KB 3 X 12  +Deadlift 15# 2x12  +lateral walking red theraband 3x30 ft in mini squat  +step up 6" 3x10  +tandem stance w/ kb passes 2x15  +SL RDL 2x10        SLR w/ 2lb weight 3 x 10        HEP review, added ankle ABCs    Manual Therapy for 37 mins:  FDN to R Lateral Knee using 2 40 mm needles. Pt gave consent prior to treatment.  STM to R Knee (quads, IT)  Patella glides      Patient Education and Home Exercises     Home Exercises Provided and Patient Education Provided     Education provided:     Proper step up mechanics  HEP compliance to decrease risk of another tear  Prevention of valgus at the knee    Written Home Exercises Provided: Patient instructed to cont prior HEP. Exercises were reviewed and YANG was able to demonstrate them prior to the end of the session.  YANG demonstrated good  understanding of the education provided. See EMR under Patient Instructions for exercises provided during therapy sessions. Added weight shifts for WB and quad activation for HEP.     ASSESSMENT     Good tolerance to exercise.  Patient continues to improve with lateral step downs and able to maintain well current intensity for exercise and progress well with reps. Pt reported her knee feeling loose and more stable after manual therapy. Cont to progress POC as tolerated.    YANG is progressing well towards her goals.   Pt prognosis is Good.     Pt will continue to benefit from skilled outpatient physical therapy to address the " deficits listed in the problem list box on initial evaluation, provide pt/family education and to maximize pt's level of independence in the home and community environment.     Pt's spiritual, cultural and educational needs considered and pt agreeable to plan of care and goals.     Anticipated barriers to physical therapy: none    Goals:    Short Term Goals:  4 weeks  1.Report decreased R knee pain  < / =  5/10  to increase tolerance for ADLs and HEP - MET  2. Increase knee ROM to 0-90 deg in order to be able to perform ADLs without difficulty - NOT MET  3. Increase strength by 1/3 MMT grade in RLE  to increase tolerance for ADL and work activities - NOT MET  4. Pt to tolerate HEP to improve ROM and independence with acitivities of daily living - MET    Long Term Goals: 12 weeks  1.Report decreased R knee pain < / = 3/10  to increase tolerance for ADLs and work-related tasks - NOT MET  2.Patient goal: Walk normally - NOT MET  3.Increase strength to >/= 4+/5 in R knee  to increase tolerance for ADL and work activities - NOT MET  4. Pt will report FOTO knee improvement to > 60% to demonstrate increase in LE function with every day tasks - NOT MET  5. Increase knee ROM to 0-120 deg or better in order to be able to perform ADLs without difficulty - NOT MET      PLAN   Updated POC Cert Period: 12/14/2022-1/14/2023  Progress as tolerated per Plan of Care.      Bijan Solorzano, PT

## 2022-12-16 ENCOUNTER — PATIENT MESSAGE (OUTPATIENT)
Dept: OPTOMETRY | Facility: CLINIC | Age: 32
End: 2022-12-16
Payer: MEDICAID

## 2022-12-16 ENCOUNTER — CLINICAL SUPPORT (OUTPATIENT)
Dept: OPHTHALMOLOGY | Facility: CLINIC | Age: 32
End: 2022-12-16
Payer: MEDICAID

## 2022-12-16 DIAGNOSIS — H47.393 OPTIC NERVE CUPPING OF BOTH EYES: ICD-10-CM

## 2022-12-16 NOTE — PROGRESS NOTES
Rel/fix/coop good ou chart checked for allergies taped od -tl  -3.00+1.00x090  -3.00+1.00x090

## 2022-12-21 ENCOUNTER — CLINICAL SUPPORT (OUTPATIENT)
Dept: REHABILITATION | Facility: HOSPITAL | Age: 32
End: 2022-12-21
Payer: MEDICAID

## 2022-12-21 DIAGNOSIS — M62.81 MUSCLE WEAKNESS OF LOWER EXTREMITY: Primary | ICD-10-CM

## 2022-12-21 DIAGNOSIS — M25.60 RANGE OF MOTION DEFICIT: ICD-10-CM

## 2022-12-21 PROCEDURE — 97110 THERAPEUTIC EXERCISES: CPT | Mod: PN,CQ

## 2022-12-21 PROCEDURE — 97140 MANUAL THERAPY 1/> REGIONS: CPT | Mod: PN,CQ

## 2022-12-21 NOTE — PROGRESS NOTES
[facility]   Physical Therapy Progress Note and Treatment Note/Updated POC    Name: Tiffany Dobbs  Clinic Number: [id number]    Therapy Diagnosis:   Encounter Diagnoses   Name Primary?    Muscle weakness of lower extremity Yes    Range of motion deficit              Physician: Tevin Tubbs IV, MD    Visit Date: 12/21/2022    Physician Orders: PT Eval and Treat   Medical Diagnosis from Referral:   S83.511A (ICD-10-CM) - Sprain of anterior cruciate ligament of right knee, initial encounter   S89.91XA (ICD-10-CM) - Unspecified injury of right lower leg, initial encounter      S/p RECONSTRUCTION, KNEE, ACL, USING Allograft Achilles GRAFT, possible meniscus repair, possible PLC repair, knee exam under anesthesia 8/02/2022  Evaluation Date: 8/8/2022  Authorization Period Expiration: 8/07/2023  Plan of Care Expiration: 12/1/2022  Progress Note Due: 11/19/2022  Visit # / Visits authorized: 37/ 41(authorized)   FOTO: 2/3     Precautions: Post Op - ACL allograft. Procedure date 8/05/2022. See protocol. Knee brace. Progress to TTWB at 4 weeks. Progress to 50% WB at week 6 x1 week until FWB tolerated and wean off brace.     PTA Visit #: 1/5     Time In: 1630  Time Out: 1700  Total Billable Time: 30 minutes    SUBJECTIVE     Patient reports that she is having some lateral right knee discomfort/tightness today.    She was not compliant with home exercise program.  Response to previous treatment: no adverse reactions  Functional change: improved acitivities of daily living     Pain: 0/10 knee   Location: right knee      OBJECTIVE      Not completed at today's treatment session       Treatment     TIFFANY received the treatments listed below:      therapeutic exercises to develop strength, endurance and ROM for 15 minutes including:      Recumbant Bike L6 6min  Wall squats with green ball 3x15  TKE with blue theraband 5sec 2min    Pallof press out blue  1min ea walkouts  Gastroc/soleus stretch 1min  "3xea  +treadmill x6 min  SL balance R on airex ball toss with green ball 3'  Lateral steps at counter with blue theraband 3min  Step Stretch on TM with 6in riser 10sec 2min  Step up on 6in riser 3x10  Standing Heel raises with 10lb KB 3x15  Lateral Step Downs on 2in riser 3x10  Standing hip abd with 4lb 3x12  Standing Hip Extension 4lb 3x12  Standing knee flexion with 4lb 3x12  LAQ with adduction ball 5lb 3x10    +anterior step downs - started at 6", went down to 2" 2x12  +Squat  tap with 15 lb KB 3 X 12  +Deadlift 15# 2x12  +lateral walking red theraband 3x30 ft in mini squat  +step up 6" 3x10  +tandem stance w/ kb passes 2x15  +SL RDL 2x10        SLR w/ 2lb weight 3 x 10        HEP review, added ankle ABCs    Manual Therapy for 15 mins:    FDN to R Lateral Knee using 2 40 mm needles. Pt gave consent prior to treatment.  STM to R Knee (quads, IT) with percussion device  Patella glides      Patient Education and Home Exercises     Home Exercises Provided and Patient Education Provided     Education provided:     Proper step up mechanics  HEP compliance to decrease risk of another tear  Prevention of valgus at the knee    Written Home Exercises Provided: Patient instructed to cont prior HEP. Exercises were reviewed and YANG was able to demonstrate them prior to the end of the session.  YANG demonstrated good  understanding of the education provided. See EMR under Patient Instructions for exercises provided during therapy sessions. Added weight shifts for WB and quad activation for HEP.     ASSESSMENT     Good tolerance to exercise.  Patient did report some tightness after onset of exercise.  Patient responded well to MT with percussion device.  Luis reports decreased IT lateral knee tightness post treatment session.    YANG is progressing well towards her goals.   Pt prognosis is Good.     Pt will continue to benefit from skilled outpatient physical therapy to address the deficits listed in the problem list box " on initial evaluation, provide pt/family education and to maximize pt's level of independence in the home and community environment.     Pt's spiritual, cultural and educational needs considered and pt agreeable to plan of care and goals.     Anticipated barriers to physical therapy: none    Goals:    Short Term Goals:  4 weeks  1.Report decreased R knee pain  < / =  5/10  to increase tolerance for ADLs and HEP - MET  2. Increase knee ROM to 0-90 deg in order to be able to perform ADLs without difficulty - NOT MET  3. Increase strength by 1/3 MMT grade in RLE  to increase tolerance for ADL and work activities - NOT MET  4. Pt to tolerate HEP to improve ROM and independence with acitivities of daily living - MET    Long Term Goals: 12 weeks  1.Report decreased R knee pain < / = 3/10  to increase tolerance for ADLs and work-related tasks - NOT MET  2.Patient goal: Walk normally - NOT MET  3.Increase strength to >/= 4+/5 in R knee  to increase tolerance for ADL and work activities - NOT MET  4. Pt will report FOTO knee improvement to > 60% to demonstrate increase in LE function with every day tasks - NOT MET  5. Increase knee ROM to 0-120 deg or better in order to be able to perform ADLs without difficulty - NOT MET      PLAN   Updated POC Cert Period: 12/14/2022-1/14/2023  Progress as tolerated per Plan of Care.      Conrad Trujillo, PTA

## 2022-12-22 ENCOUNTER — CLINICAL SUPPORT (OUTPATIENT)
Dept: REHABILITATION | Facility: HOSPITAL | Age: 32
End: 2022-12-22
Payer: MEDICAID

## 2022-12-22 DIAGNOSIS — M25.60 RANGE OF MOTION DEFICIT: ICD-10-CM

## 2022-12-22 DIAGNOSIS — M62.81 MUSCLE WEAKNESS OF LOWER EXTREMITY: Primary | ICD-10-CM

## 2022-12-22 PROCEDURE — 97110 THERAPEUTIC EXERCISES: CPT | Mod: PN,CQ

## 2022-12-22 PROCEDURE — 97140 MANUAL THERAPY 1/> REGIONS: CPT | Mod: PN,CQ

## 2022-12-22 NOTE — PROGRESS NOTES
[facility]   Physical Therapy Progress Note and Treatment Note/Updated POC    Name: Tiffany Dobbs  Clinic Number: [id number]    Therapy Diagnosis:   Encounter Diagnoses   Name Primary?    Muscle weakness of lower extremity Yes    Range of motion deficit              Physician: Tevin Tubbs IV, MD    Visit Date: 12/22/2022    Physician Orders: PT Eval and Treat   Medical Diagnosis from Referral:   S83.511A (ICD-10-CM) - Sprain of anterior cruciate ligament of right knee, initial encounter   S89.91XA (ICD-10-CM) - Unspecified injury of right lower leg, initial encounter      S/p RECONSTRUCTION, KNEE, ACL, USING Allograft Achilles GRAFT, possible meniscus repair, possible PLC repair, knee exam under anesthesia 8/02/2022  Evaluation Date: 8/8/2022  Authorization Period Expiration: 8/07/2023  Plan of Care Expiration: 12/1/2022  Progress Note Due: 11/19/2022  Visit # / Visits authorized: 37/ 41(authorized)   FOTO: 2/3     Precautions: Post Op - ACL allograft. Procedure date 8/05/2022. See protocol. Knee brace. Progress to TTWB at 4 weeks. Progress to 50% WB at week 6 x1 week until FWB tolerated and wean off brace.     PTA Visit #: 1/5     Time In: 1408  Time Out: 1433  Total Billable Time: 25 minutes    SUBJECTIVE     Patient reports that she is having some lateral right knee discomfort/tightness with exercise and ambulation.    She was not compliant with home exercise program.  Response to previous treatment: no adverse reactions  Functional change: improved acitivities of daily living     Pain: 0/10 knee   Location: right knee      OBJECTIVE      Not completed at today's treatment session       Treatment     TIFFANY received the treatments listed below:      therapeutic exercises to develop strength, endurance and ROM for 15 minutes including:      Recumbant Bike L6 6min  Wall squats with green ball 3x15  TKE with blue theraband 5sec 2min    Pallof press out blue  1min ea  "walkouts  Gastroc/soleus stretch 1min 3xea  +treadmill x6 min  SL balance R on airex ball toss with green ball 3'  Lateral steps at counter with blue theraband 3min  Step Stretch on TM with 6in riser 10sec 2min  Step up on 6in riser 3x10  Standing Heel raises with 10lb KB 3x15  Lateral Step Downs on 2in riser 3x10  Standing hip abd with 4lb 3x12  Standing Hip Extension 4lb 3x12  Standing knee flexion with 4lb 3x12  LAQ with adduction ball 5lb 3x10    +anterior step downs - started at 6", went down to 2" 2x12  +Squat  tap with 15 lb KB 3 X 12  +Deadlift 15# 2x12  +lateral walking red theraband 3x30 ft in mini squat  +step up 6" 3x10  +tandem stance w/ kb passes 2x15  +SL RDL 2x10        SLR w/ 2lb weight 3 x 10        HEP review, added ankle ABCs    Manual Therapy for 15 mins:    FDN to R Lateral Knee using 2 40 mm needles. Pt gave consent prior to treatment.  STM to R Knee (quads, IT) with percussion device  Patella glides      Patient Education and Home Exercises     Home Exercises Provided and Patient Education Provided     Education provided:     Proper step up mechanics  HEP compliance to decrease risk of another tear  Prevention of valgus at the knee    Written Home Exercises Provided: Patient instructed to cont prior HEP. Exercises were reviewed and YANG was able to demonstrate them prior to the end of the session.  YANG demonstrated good  understanding of the education provided. See EMR under Patient Instructions for exercises provided during therapy sessions. Added weight shifts for WB and quad activation for HEP.     ASSESSMENT     Good tolerance to exercise.   Patient responded well to MT with percussion device.  Luis reports decreased IT lateral knee tightness post treatment session.    YANG is progressing well towards her goals.   Pt prognosis is Good.     Pt will continue to benefit from skilled outpatient physical therapy to address the deficits listed in the problem list box on initial " evaluation, provide pt/family education and to maximize pt's level of independence in the home and community environment.     Pt's spiritual, cultural and educational needs considered and pt agreeable to plan of care and goals.     Anticipated barriers to physical therapy: none    Goals:    Short Term Goals:  4 weeks  1.Report decreased R knee pain  < / =  5/10  to increase tolerance for ADLs and HEP - MET  2. Increase knee ROM to 0-90 deg in order to be able to perform ADLs without difficulty - NOT MET  3. Increase strength by 1/3 MMT grade in RLE  to increase tolerance for ADL and work activities - NOT MET  4. Pt to tolerate HEP to improve ROM and independence with acitivities of daily living - MET    Long Term Goals: 12 weeks  1.Report decreased R knee pain < / = 3/10  to increase tolerance for ADLs and work-related tasks - NOT MET  2.Patient goal: Walk normally - NOT MET  3.Increase strength to >/= 4+/5 in R knee  to increase tolerance for ADL and work activities - NOT MET  4. Pt will report FOTO knee improvement to > 60% to demonstrate increase in LE function with every day tasks - NOT MET  5. Increase knee ROM to 0-120 deg or better in order to be able to perform ADLs without difficulty - NOT MET      PLAN   Updated POC Cert Period: 12/14/2022-1/14/2023  Progress as tolerated per Plan of Care.      Conrad Trujillo, PTA

## 2022-12-28 ENCOUNTER — CLINICAL SUPPORT (OUTPATIENT)
Dept: REHABILITATION | Facility: HOSPITAL | Age: 32
End: 2022-12-28
Payer: MEDICAID

## 2022-12-28 DIAGNOSIS — M25.60 RANGE OF MOTION DEFICIT: ICD-10-CM

## 2022-12-28 DIAGNOSIS — M62.81 MUSCLE WEAKNESS OF LOWER EXTREMITY: Primary | ICD-10-CM

## 2022-12-28 PROCEDURE — 97164 PT RE-EVAL EST PLAN CARE: CPT | Mod: PN

## 2022-12-28 PROCEDURE — 97110 THERAPEUTIC EXERCISES: CPT | Mod: PN

## 2022-12-29 NOTE — PROGRESS NOTES
[facility]   Physical Therapy Progress Note and Treatment Note/Discharge Summary    Name: Tiffany Dobbs  Clinic Number: [id number]    Therapy Diagnosis:   Encounter Diagnoses   Name Primary?    Muscle weakness of lower extremity Yes    Range of motion deficit              Physician: Tevin Tubbs IV, MD    Visit Date: 12/28/2022    Physician Orders: PT Eval and Treat   Medical Diagnosis from Referral:   S83.511A (ICD-10-CM) - Sprain of anterior cruciate ligament of right knee, initial encounter   S89.91XA (ICD-10-CM) - Unspecified injury of right lower leg, initial encounter      S/p RECONSTRUCTION, KNEE, ACL, USING Allograft Achilles GRAFT, possible meniscus repair, possible PLC repair, knee exam under anesthesia 8/02/2022  Evaluation Date: 8/8/2022  Authorization Period Expiration: 8/07/2023  Plan of Care Expiration: 12/1/2022  Progress Note Due: 11/19/2022  Visit # / Visits authorized: 40/ 41(authorized)   FOTO: 2/3     Precautions: Post Op - ACL allograft. Procedure date 8/05/2022. See protocol. Knee brace. Progress to TTWB at 4 weeks. Progress to 50% WB at week 6 x1 week until FWB tolerated and wean off brace.     PTA Visit #: 1/5     Time In: 4:15  Time Out: 4:40  Total Billable Time: 25 minutes    SUBJECTIVE     Patient reports that she is no lateral right knee discomfort/tightness with exercise and ambulation.    She was not compliant with home exercise program.  Response to previous treatment: no adverse reactions  Functional change: improved acitivities of daily living     Pain: 0/10 knee   Location: right knee      OBJECTIVE      R KNEE ROM: WNL  R KNEE MMT: 4+/5       Treatment     TIFFANY received the treatments listed below:      therapeutic exercises to develop strength, endurance and ROM for 15 minutes including:      Recumbant Bike L6 6min  Wall squats with green ball 3x15  TKE with blue theraband 5sec 2min    Pallof press out blue  1min ea walkouts  Gastroc/soleus stretch  "1min 3xea  +treadmill x6 min  SL balance R on airex ball toss with green ball 3'  Lateral steps at counter with blue theraband 3min  Step Stretch on TM with 6in riser 10sec 2min  Step up on 6in riser 3x10  Standing Heel raises with 10lb KB 3x15  Lateral Step Downs on 2in riser 3x10  Standing hip abd with 4lb 3x12  Standing Hip Extension 4lb 3x12  Standing knee flexion with 4lb 3x12  LAQ with adduction ball 5lb 3x10    +anterior step downs - started at 6", went down to 2" 2x12  +Squat  tap with 15 lb KB 3 X 12  +Deadlift 15# 2x12  +lateral walking red theraband 3x30 ft in mini squat  +step up 6" 3x10  +tandem stance w/ kb passes 2x15  +SL RDL 2x10        SLR w/ 2lb weight 3 x 10        HEP review, added ankle ABCs    Manual Therapy for 15 mins:    FDN to R Lateral Knee using 2 40 mm needles. Pt gave consent prior to treatment.  STM to R Knee (quads, IT) with percussion device  Patella glides      Patient Education and Home Exercises     Home Exercises Provided and Patient Education Provided     Education provided:     Proper step up mechanics  HEP compliance to decrease risk of another tear  Prevention of valgus at the knee    Written Home Exercises Provided: Patient instructed to cont prior HEP. Exercises were reviewed and YANG was able to demonstrate them prior to the end of the session.  YAGN demonstrated good  understanding of the education provided. See EMR under Patient Instructions for exercises provided during therapy sessions. Added weight shifts for WB and quad activation for HEP.     ASSESSMENT     Good tolerance to exercise.   Patient responded well to MT with percussion device.  Luis reports decreased IT lateral knee tightness post treatment session.    YANG is progressing well towards her goals.   Pt prognosis is Good.     Pt will continue to benefit from skilled outpatient physical therapy to address the deficits listed in the problem list box on initial evaluation, provide pt/family education " and to maximize pt's level of independence in the home and community environment.     Pt's spiritual, cultural and educational needs considered and pt agreeable to plan of care and goals.     Anticipated barriers to physical therapy: none    Goals:    Short Term Goals:  4 weeks  1.Report decreased R knee pain  < / =  5/10  to increase tolerance for ADLs and HEP - MET  2. Increase knee ROM to 0-90 deg in order to be able to perform ADLs without difficulty - NOT MET  3. Increase strength by 1/3 MMT grade in RLE  to increase tolerance for ADL and work activities - NOT MET  4. Pt to tolerate HEP to improve ROM and independence with acitivities of daily living - MET    Long Term Goals: 12 weeks  1.Report decreased R knee pain < / = 3/10  to increase tolerance for ADLs and work-related tasks - NOT MET  2.Patient goal: Walk normally - NOT MET  3.Increase strength to >/= 4+/5 in R knee  to increase tolerance for ADL and work activities - NOT MET  4. Pt will report FOTO knee improvement to > 60% to demonstrate increase in LE function with every day tasks - NOT MET  5. Increase knee ROM to 0-120 deg or better in order to be able to perform ADLs without difficulty - NOT MET      PLAN   Updated POC Cert Period: 12/14/2022-1/14/2023  Progress as tolerated per Plan of Care.      Bijan Solorzano, PT

## 2023-02-02 ENCOUNTER — OFFICE VISIT (OUTPATIENT)
Dept: ORTHOPEDICS | Facility: CLINIC | Age: 33
End: 2023-02-02
Payer: MEDICAID

## 2023-02-02 VITALS
DIASTOLIC BLOOD PRESSURE: 68 MMHG | HEART RATE: 86 BPM | HEIGHT: 63 IN | BODY MASS INDEX: 34.8 KG/M2 | WEIGHT: 196.44 LBS | SYSTOLIC BLOOD PRESSURE: 117 MMHG

## 2023-02-02 DIAGNOSIS — S89.91XA INJURY OF POSTEROLATERAL CORNER OF KNEE, RIGHT, INITIAL ENCOUNTER: ICD-10-CM

## 2023-02-02 DIAGNOSIS — S83.511A RUPTURE OF ANTERIOR CRUCIATE LIGAMENT OF RIGHT KNEE, INITIAL ENCOUNTER: Primary | ICD-10-CM

## 2023-02-02 DIAGNOSIS — S83.411A SPRAIN OF MEDIAL COLLATERAL LIGAMENT OF RIGHT KNEE, INITIAL ENCOUNTER: ICD-10-CM

## 2023-02-02 PROCEDURE — 99213 PR OFFICE/OUTPT VISIT, EST, LEVL III, 20-29 MIN: ICD-10-PCS | Mod: S$PBB,,, | Performed by: PHYSICIAN ASSISTANT

## 2023-02-02 PROCEDURE — 99213 OFFICE O/P EST LOW 20 MIN: CPT | Mod: PBBFAC,PN | Performed by: PHYSICIAN ASSISTANT

## 2023-02-02 PROCEDURE — 99999 PR PBB SHADOW E&M-EST. PATIENT-LVL III: ICD-10-PCS | Mod: PBBFAC,,, | Performed by: PHYSICIAN ASSISTANT

## 2023-02-02 PROCEDURE — 99213 OFFICE O/P EST LOW 20 MIN: CPT | Mod: S$PBB,,, | Performed by: PHYSICIAN ASSISTANT

## 2023-02-02 PROCEDURE — 99999 PR PBB SHADOW E&M-EST. PATIENT-LVL III: CPT | Mod: PBBFAC,,, | Performed by: PHYSICIAN ASSISTANT

## 2023-02-02 NOTE — PROGRESS NOTES
[facility], Orthopedics and Sports Medicine  [facility]    Knee Post-op Visit  02/02/2023       Diagnosis:   Right ACL Tear.  Right Posterolateral corner (PLC) Tear.     Procedure: 8/5/22   ACL reconstruction with Achilles allograft.  Posterolateral corner reconstruction with semitendinosus allograft    Subjective:      Tiffany Dobbs is a 32 y.o. female who is now 6 months status post right knee surgery. The patient is not having any pain. The patient denies none, fever, wound drainage, increasing redness, pus, increasing pain, increasing swelling. Post op problems reported: none.    Stopped PT at the end of December. States she has not been compliant with home exercise program. Wants to start back at the gym though. Notes mild weakness and stiffness.      Objective:      Ortho/SPM Exam  General: alert, appears stated age, and cooperative   Gait: normal  Sutures: Sutures out.  Incision: healing well, no significant drainage, no dehiscence, no significant erythema  Tenderness: none  Range of Motion: Extension 0 degrees  Flexion 120 degrees   Stability: Normal  Strength: 4/5 quad strength  Vascular: CR<2s. Palpable radial pulse    Imaging:  None taken today       Assessment:       The patient is status post right knee surgery.  The primary encounter diagnosis was Rupture of anterior cruciate ligament of right knee, initial encounter. Diagnoses of Injury of posterolateral corner of knee, right, initial encounter and Sprain of medial collateral ligament of right knee, initial encounter were also pertinent to this visit. Doing well postoperatively. Continuation of post-op rehab course is recommended at this time. All of the patient's questions were answered.    Recommend continuation of home exercise program to help improve strength and stiffness. She may do these exercises at the gym if able. Patient plans on re-joining the gym with a family member soon. WBAT.      Plan:      Tylenol 650mg TID  PRN for pain.  Continue home exercise program.  Weightbearing as tolerated on operative extremity.  Follow up as needed.       Madison Samaniego PA-C  Department of Orthopedic Surgery  [facility]  Office: [phone]

## 2023-04-11 ENCOUNTER — LAB VISIT (OUTPATIENT)
Dept: LAB | Facility: HOSPITAL | Age: 33
End: 2023-04-11
Payer: MEDICAID

## 2023-04-11 DIAGNOSIS — Z11.3 SCREENING EXAMINATION FOR VENEREAL DISEASE: ICD-10-CM

## 2023-04-11 DIAGNOSIS — E78.2 MIXED HYPERLIPIDEMIA: Primary | ICD-10-CM

## 2023-04-11 DIAGNOSIS — Z01.84 ENCOUNTER FOR ANTIBODY RESPONSE EXAMINATION: ICD-10-CM

## 2023-04-11 DIAGNOSIS — Z01.818 PRE-OP TESTING: ICD-10-CM

## 2023-04-11 DIAGNOSIS — M10.9 GOUT, UNSPECIFIED: ICD-10-CM

## 2023-04-11 DIAGNOSIS — D64.9 ANEMIA, UNSPECIFIED: ICD-10-CM

## 2023-04-11 DIAGNOSIS — N39.0 URINARY TRACT INFECTION, SITE NOT SPECIFIED: ICD-10-CM

## 2023-04-11 DIAGNOSIS — E11.9 DIABETES MELLITUS WITHOUT COMPLICATION: ICD-10-CM

## 2023-04-11 DIAGNOSIS — E55.9 AVITAMINOSIS D: ICD-10-CM

## 2023-04-11 DIAGNOSIS — Z11.59 SCREENING EXAMINATION FOR POLIOMYELITIS: ICD-10-CM

## 2023-04-11 DIAGNOSIS — E53.8 BIOTIN-(PROPIONYL-COA-CARBOXYLASE) LIGASE DEFICIENCY: ICD-10-CM

## 2023-04-11 DIAGNOSIS — Z13.29 SCREENING FOR THYROID DISORDER: ICD-10-CM

## 2023-04-11 DIAGNOSIS — Z13.220 SCREENING FOR LIPOID DISORDERS: ICD-10-CM

## 2023-04-11 DIAGNOSIS — Z13.1 SCREENING FOR DIABETES MELLITUS: ICD-10-CM

## 2023-04-11 DIAGNOSIS — M15.9 GENERALIZED OSTEOARTHROSIS, INVOLVING MULTIPLE SITES: ICD-10-CM

## 2023-04-11 DIAGNOSIS — E05.00 TOXIC DIFFUSE GOITER WITH PRETIBIAL MYXEDEMA: ICD-10-CM

## 2023-04-11 DIAGNOSIS — N40.0 BENIGN ENLARGEMENT OF PROSTATE: ICD-10-CM

## 2023-04-11 DIAGNOSIS — R94.5 NONSPECIFIC ABNORMAL RESULTS OF LIVER FUNCTION STUDY: ICD-10-CM

## 2023-04-11 DIAGNOSIS — N92.1 METRORRHAGIA: ICD-10-CM

## 2023-04-11 DIAGNOSIS — E03.8 TOXIC DIFFUSE GOITER WITH PRETIBIAL MYXEDEMA: ICD-10-CM

## 2023-04-11 DIAGNOSIS — R73.9 BLOOD GLUCOSE ELEVATED: ICD-10-CM

## 2023-04-11 LAB
ABO + RH BLD: NORMAL
ALBUMIN SERPL BCP-MCNC: 3.4 G/DL (ref 3.5–5.2)
ALP SERPL-CCNC: 117 U/L (ref 55–135)
ALT SERPL W/O P-5'-P-CCNC: 16 U/L (ref 10–44)
ANION GAP SERPL CALC-SCNC: 7 MMOL/L (ref 8–16)
AST SERPL-CCNC: 15 U/L (ref 10–40)
BASOPHILS # BLD AUTO: 0.03 K/UL (ref 0–0.2)
BASOPHILS NFR BLD: 0.5 % (ref 0–1.9)
BILIRUB SERPL-MCNC: 0.6 MG/DL (ref 0.1–1)
BUN SERPL-MCNC: 8 MG/DL (ref 6–20)
CALCIUM SERPL-MCNC: 9.7 MG/DL (ref 8.7–10.5)
CCP AB SER IA-ACNC: <0.5 U/ML
CHLORIDE SERPL-SCNC: 105 MMOL/L (ref 95–110)
CHOLEST SERPL-MCNC: 131 MG/DL (ref 120–199)
CHOLEST/HDLC SERPL: 2.6 {RATIO} (ref 2–5)
CO2 SERPL-SCNC: 21 MMOL/L (ref 23–29)
CREAT SERPL-MCNC: 0.7 MG/DL (ref 0.5–1.4)
DHEA-S SERPL-MCNC: 122.9 UG/DL (ref 95.8–511.7)
DIFFERENTIAL METHOD: ABNORMAL
EOSINOPHIL # BLD AUTO: 0.2 K/UL (ref 0–0.5)
EOSINOPHIL NFR BLD: 3.1 % (ref 0–8)
ERYTHROCYTE [DISTWIDTH] IN BLOOD BY AUTOMATED COUNT: 18.8 % (ref 11.5–14.5)
EST. GFR  (NO RACE VARIABLE): >60 ML/MIN/1.73 M^2
ESTIMATED AVG GLUCOSE: 105 MG/DL (ref 68–131)
FERRITIN SERPL-MCNC: 4 NG/ML (ref 20–300)
FSH SERPL-ACNC: 3.02 MIU/ML
GLUCOSE SERPL-MCNC: 83 MG/DL (ref 70–110)
HBA1C MFR BLD: 5.3 % (ref 4–5.6)
HCT VFR BLD AUTO: 30.3 % (ref 37–48.5)
HDLC SERPL-MCNC: 51 MG/DL (ref 40–75)
HDLC SERPL: 38.9 % (ref 20–50)
HGB BLD-MCNC: 8.4 G/DL (ref 12–16)
IMM GRANULOCYTES # BLD AUTO: 0.01 K/UL (ref 0–0.04)
IMM GRANULOCYTES NFR BLD AUTO: 0.2 % (ref 0–0.5)
IRON SERPL-MCNC: 16 UG/DL (ref 30–160)
LDLC SERPL CALC-MCNC: 64 MG/DL (ref 63–159)
LH SERPL-ACNC: 4.8 MIU/ML
LYMPHOCYTES # BLD AUTO: 2.3 K/UL (ref 1–4.8)
LYMPHOCYTES NFR BLD: 35.3 % (ref 18–48)
MCH RBC QN AUTO: 18 PG (ref 27–31)
MCHC RBC AUTO-ENTMCNC: 27.7 G/DL (ref 32–36)
MCV RBC AUTO: 65 FL (ref 82–98)
MONOCYTES # BLD AUTO: 0.7 K/UL (ref 0.3–1)
MONOCYTES NFR BLD: 10.7 % (ref 4–15)
NEUTROPHILS # BLD AUTO: 3.2 K/UL (ref 1.8–7.7)
NEUTROPHILS NFR BLD: 50.2 % (ref 38–73)
NONHDLC SERPL-MCNC: 80 MG/DL
NRBC BLD-RTO: 0 /100 WBC
PLATELET # BLD AUTO: 552 K/UL (ref 150–450)
PMV BLD AUTO: 10.1 FL (ref 9.2–12.9)
POTASSIUM SERPL-SCNC: 3.4 MMOL/L (ref 3.5–5.1)
PROT SERPL-MCNC: 7.8 G/DL (ref 6–8.4)
RBC # BLD AUTO: 4.67 M/UL (ref 4–5.4)
SARS-COV-2 IGG SERPL IA-ACNC: NORMAL AU/ML
SARS-COV-2 IGG SERPL QL IA: POSITIVE
SATURATED IRON: 3 % (ref 20–50)
SODIUM SERPL-SCNC: 133 MMOL/L (ref 136–145)
T4 FREE SERPL-MCNC: 1.69 NG/DL (ref 0.71–1.51)
TESTOST SERPL-MCNC: 69 NG/DL (ref 5–73)
TOTAL IRON BINDING CAPACITY: 522 UG/DL (ref 250–450)
TRANSFERRIN SERPL-MCNC: 353 MG/DL (ref 200–375)
TRIGL SERPL-MCNC: 80 MG/DL (ref 30–150)
TSH SERPL DL<=0.005 MIU/L-ACNC: <0.01 UIU/ML (ref 0.4–4)
VIT B12 SERPL-MCNC: 289 PG/ML (ref 210–950)
WBC # BLD AUTO: 6.45 K/UL (ref 3.9–12.7)

## 2023-04-11 PROCEDURE — 81269 HBA1/HBA2 GENE DUP/DEL VRNTS: CPT

## 2023-04-11 PROCEDURE — 84439 ASSAY OF FREE THYROXINE: CPT

## 2023-04-11 PROCEDURE — 83002 ASSAY OF GONADOTROPIN (LH): CPT

## 2023-04-11 PROCEDURE — 85025 COMPLETE CBC W/AUTO DIFF WBC: CPT

## 2023-04-11 PROCEDURE — 84403 ASSAY OF TOTAL TESTOSTERONE: CPT

## 2023-04-11 PROCEDURE — 84443 ASSAY THYROID STIM HORMONE: CPT

## 2023-04-11 PROCEDURE — 86480 TB TEST CELL IMMUN MEASURE: CPT

## 2023-04-11 PROCEDURE — 82627 DEHYDROEPIANDROSTERONE: CPT

## 2023-04-11 PROCEDURE — 83020 HEMOGLOBIN ELECTROPHORESIS: CPT

## 2023-04-11 PROCEDURE — 86769 SARS-COV-2 COVID-19 ANTIBODY: CPT

## 2023-04-11 PROCEDURE — 86900 BLOOD TYPING SEROLOGIC ABO: CPT

## 2023-04-11 PROCEDURE — 83036 HEMOGLOBIN GLYCOSYLATED A1C: CPT

## 2023-04-11 PROCEDURE — 84466 ASSAY OF TRANSFERRIN: CPT

## 2023-04-11 PROCEDURE — 36415 COLL VENOUS BLD VENIPUNCTURE: CPT

## 2023-04-11 PROCEDURE — 82607 VITAMIN B-12: CPT

## 2023-04-11 PROCEDURE — 83001 ASSAY OF GONADOTROPIN (FSH): CPT

## 2023-04-11 PROCEDURE — 86200 CCP ANTIBODY: CPT

## 2023-04-11 PROCEDURE — 80053 COMPREHEN METABOLIC PANEL: CPT

## 2023-04-11 PROCEDURE — 80061 LIPID PANEL: CPT

## 2023-04-11 PROCEDURE — 86364 TISS TRNSGLTMNASE EA IG CLAS: CPT

## 2023-04-11 PROCEDURE — 82728 ASSAY OF FERRITIN: CPT

## 2023-04-12 LAB
GAMMA INTERFERON BACKGROUND BLD IA-ACNC: 0.04 IU/ML
M TB IFN-G CD4+ BCKGRND COR BLD-ACNC: 0.03 IU/ML
MITOGEN IGNF BCKGRD COR BLD-ACNC: 9.96 IU/ML
TB GOLD PLUS: NEGATIVE
TB2 - NIL: 0.03 IU/ML

## 2023-04-13 LAB
HB ELECTROPHORESIS INTERP CANCEL: ABNORMAL
HB ELECTROPHORESIS INTERPRETATION: ABNORMAL
HGB A MFR BLD ELPH: 98.1 % (ref 95.8–98)
HGB A2 MFR BLD: 1.9 % (ref 2–3.3)
HGB A2+XXX MFR BLD ELPH: ABNORMAL %
HGB F MFR BLD: 0 % (ref 0–0.9)
HGB XXX MFR BLD ELPH: ABNORMAL %
HPLC HB VARIANT: ABNORMAL

## 2023-04-14 LAB
GLIADIN PEPTIDE IGA SER-ACNC: 1.5 U/ML
GLIADIN PEPTIDE IGG SER-ACNC: <0.6 U/ML
IGA SERPL-MCNC: 266 MG/DL (ref 70–400)
TTG IGA SER-ACNC: 0.8 U/ML
TTG IGG SER-ACNC: <0.6 U/ML

## 2023-04-17 ENCOUNTER — OFFICE VISIT (OUTPATIENT)
Dept: OBSTETRICS AND GYNECOLOGY | Facility: CLINIC | Age: 33
End: 2023-04-17
Payer: MEDICAID

## 2023-04-17 VITALS
SYSTOLIC BLOOD PRESSURE: 120 MMHG | WEIGHT: 184.63 LBS | DIASTOLIC BLOOD PRESSURE: 68 MMHG | BODY MASS INDEX: 32.71 KG/M2

## 2023-04-17 DIAGNOSIS — N64.52 DISCHARGE FROM NIPPLE: ICD-10-CM

## 2023-04-17 DIAGNOSIS — Z01.419 ENCOUNTER FOR GYNECOLOGICAL EXAMINATION WITHOUT ABNORMAL FINDING: Primary | ICD-10-CM

## 2023-04-17 DIAGNOSIS — N89.8 VAGINAL IRRITATION: ICD-10-CM

## 2023-04-17 DIAGNOSIS — Z80.3 FAMILY HISTORY OF BREAST CANCER: ICD-10-CM

## 2023-04-17 DIAGNOSIS — Z11.3 SCREEN FOR STD (SEXUALLY TRANSMITTED DISEASE): ICD-10-CM

## 2023-04-17 DIAGNOSIS — N64.3 GALACTORRHEA: ICD-10-CM

## 2023-04-17 PROCEDURE — 99999 PR PBB SHADOW E&M-EST. PATIENT-LVL II: ICD-10-PCS | Mod: PBBFAC,,, | Performed by: OBSTETRICS & GYNECOLOGY

## 2023-04-17 PROCEDURE — 1160F PR REVIEW ALL MEDS BY PRESCRIBER/CLIN PHARMACIST DOCUMENTED: ICD-10-PCS | Mod: CPTII,,, | Performed by: OBSTETRICS & GYNECOLOGY

## 2023-04-17 PROCEDURE — 3078F DIAST BP <80 MM HG: CPT | Mod: CPTII,,, | Performed by: OBSTETRICS & GYNECOLOGY

## 2023-04-17 PROCEDURE — 87591 N.GONORRHOEAE DNA AMP PROB: CPT | Performed by: OBSTETRICS & GYNECOLOGY

## 2023-04-17 PROCEDURE — 3008F BODY MASS INDEX DOCD: CPT | Mod: CPTII,,, | Performed by: OBSTETRICS & GYNECOLOGY

## 2023-04-17 PROCEDURE — 81514 NFCT DS BV&VAGINITIS DNA ALG: CPT | Performed by: OBSTETRICS & GYNECOLOGY

## 2023-04-17 PROCEDURE — 99395 PREV VISIT EST AGE 18-39: CPT | Mod: S$PBB,,, | Performed by: OBSTETRICS & GYNECOLOGY

## 2023-04-17 PROCEDURE — 3074F PR MOST RECENT SYSTOLIC BLOOD PRESSURE < 130 MM HG: ICD-10-PCS | Mod: CPTII,,, | Performed by: OBSTETRICS & GYNECOLOGY

## 2023-04-17 PROCEDURE — 3044F PR MOST RECENT HEMOGLOBIN A1C LEVEL <7.0%: ICD-10-PCS | Mod: CPTII,,, | Performed by: OBSTETRICS & GYNECOLOGY

## 2023-04-17 PROCEDURE — 99999 PR PBB SHADOW E&M-EST. PATIENT-LVL II: CPT | Mod: PBBFAC,,, | Performed by: OBSTETRICS & GYNECOLOGY

## 2023-04-17 PROCEDURE — 3074F SYST BP LT 130 MM HG: CPT | Mod: CPTII,,, | Performed by: OBSTETRICS & GYNECOLOGY

## 2023-04-17 PROCEDURE — 3008F PR BODY MASS INDEX (BMI) DOCUMENTED: ICD-10-PCS | Mod: CPTII,,, | Performed by: OBSTETRICS & GYNECOLOGY

## 2023-04-17 PROCEDURE — 3078F PR MOST RECENT DIASTOLIC BLOOD PRESSURE < 80 MM HG: ICD-10-PCS | Mod: CPTII,,, | Performed by: OBSTETRICS & GYNECOLOGY

## 2023-04-17 PROCEDURE — 99212 OFFICE O/P EST SF 10 MIN: CPT | Mod: PBBFAC,PN | Performed by: OBSTETRICS & GYNECOLOGY

## 2023-04-17 PROCEDURE — 1159F MED LIST DOCD IN RCRD: CPT | Mod: CPTII,,, | Performed by: OBSTETRICS & GYNECOLOGY

## 2023-04-17 PROCEDURE — 3044F HG A1C LEVEL LT 7.0%: CPT | Mod: CPTII,,, | Performed by: OBSTETRICS & GYNECOLOGY

## 2023-04-17 PROCEDURE — 1160F RVW MEDS BY RX/DR IN RCRD: CPT | Mod: CPTII,,, | Performed by: OBSTETRICS & GYNECOLOGY

## 2023-04-17 PROCEDURE — 99395 PR PREVENTIVE VISIT,EST,18-39: ICD-10-PCS | Mod: S$PBB,,, | Performed by: OBSTETRICS & GYNECOLOGY

## 2023-04-17 PROCEDURE — 1159F PR MEDICATION LIST DOCUMENTED IN MEDICAL RECORD: ICD-10-PCS | Mod: CPTII,,, | Performed by: OBSTETRICS & GYNECOLOGY

## 2023-04-17 NOTE — PROGRESS NOTES
HISTORY OF PRESENT ILLNESS:    Tiffany Dobbs is a 32 y.o. female, , Patient's last menstrual period was 2023.,  presents for a routine exam and has no complaints.  Patient reports cycles occur every 4 weeks lasting 5-7 days using 3-4 pads per day.   She denies any BTB.     She uses no birth control.   She does not desire STD screening.    The patient participates in regular exercise: yes.    The patient does not smoke.    The patient wears seatbelts.     Pt denies any domestic violence.    Vaginal irritation after sex with new partner in February    Past Medical History:   Diagnosis Date    Anemia     PCOS (polycystic ovarian syndrome) 2020       Past Surgical History:   Procedure Laterality Date    BREAST REDUCTION      EXAMINATION UNDER ANESTHESIA  2022    Procedure: EXAM UNDER ANESTHESIA;  Surgeon: Tevin Tubbs IV, MD;  Location: [facility] OR;  Service: Orthopedics;;    RECONSTRUCTION OF ANTERIOR CRUCIATE LIGAMENT USING GRAFT Right 2022    Procedure: RECONSTRUCTION, KNEE, ACL, USING Allograft Achilles GRAFT, possible meniscus repair, possible PLC repair, knee exam under anesthesia;  Surgeon: Tevin Tubbs IV, MD;  Location: [facility] OR;  Service: Orthopedics;  Laterality: Right;  Achilles allograft with bone block(IN FREEZER); open/THAW at start of case  Black knee post/operative side, well leg marvin on nonsurgical side  Position: ACL P    TOTAL REDUCTION MAMMOPLASTY         MEDICATIONS AND ALLERGIES:    No current outpatient medications on file.    Review of patient's allergies indicates:   Allergen Reactions    Bactrim [sulfamethoxazole-trimethoprim] Itching and Rash       Family History   Problem Relation Age of Onset    Breast cancer Maternal Aunt     Glaucoma Maternal Aunt     Breast cancer Paternal Aunt     Ovarian cancer Maternal Aunt     Gestational diabetes Mother     Glaucoma Mother     Heart disease Father         arrythmia, has device - ICD?    Diverticulitis Father     Diabetes  Maternal Grandmother     Dementia Maternal Grandmother     Arthritis Maternal Grandmother         spinal    Hydrocephalus Maternal Grandmother     Stomach cancer Maternal Grandfather     Diabetes Maternal Aunt     No Known Problems Brother     Colon cancer Neg Hx     Heart attack Neg Hx        Social History     Socioeconomic History    Marital status: Single   Tobacco Use    Smoking status: Never    Smokeless tobacco: Never   Substance and Sexual Activity    Alcohol use: Yes     Comment: socially, twice per month    Drug use: No    Sexual activity: Yes     Partners: Male     Birth control/protection: Condom   Social History Narrative    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  Infection History: Denies STDs. Denies PID.  Benign History: Denies uterine fibroids. Denies ovarian cysts. Denies endometriosis. Denies other conditions.  Cancer History: Denies cervical cancer. Denies uterine cancer or hyperplasia. Denies ovarian cancer. Denies vulvar cancer or pre-cancer. Denies vaginal cancer or pre-cancer. Denies breast cancer. Denies colon cancer.  Sexual Activity History: Reports currently being sexually active  Menstrual History: Monthly. Mod then light flow.   Dysmenorrhea History: Reports mild dysmenorrhea.       ROS:  GENERAL: No weight changes. No swelling. No fatigue. No fever.  CARDIOVASCULAR: No chest pain. No shortness of breath. No leg cramps.   NEUROLOGICAL: No headaches. No vision changes.  BREASTS: No pain. No lumps. No discharge.  ABDOMEN: No pain. No nausea. No vomiting. No diarrhea. No constipation.  REPRODUCTIVE: No abnormal bleeding.   VULVA: No pain. No lesions. No itching.  VAGINA: No relaxation. No itching. No odor. No discharge. No lesions.  URINARY: No incontinence. No nocturia. No frequency. No dysuria.    /68   Wt 83.7 kg (184 lb 10.2 oz)   LMP 03/17/2023   BMI 32.71 kg/m²     PE:  APPEARANCE: Well nourished, well  developed, in no acute distress.  AFFECT: WNL, alert and oriented x 3.  SKIN: No acne or hirsutism.  NECK: Neck symmetric, without masses or thyromegaly.  NODES: No inguinal, cervical, axillary or femoral lymph node enlargement.  CHEST: Good respiratory effort.   ABDOMEN: Soft. No tenderness or masses. No hepatosplenomegaly. No hernias.  BREASTS: Symmetrical, no skin changes, visible lesions, palpable masses or nipple discharge bilaterally.  PELVIC: External female genitalia without lesions.  Female hair distribution. Adequate perineal body, Normal urethral meatus. Vagina moist and well rugated without lesions or discharge.  No significant cystocele or rectocele present. Cervix pink without lesions, discharge or tenderness. Uterus is 4-6 week size, regular, mobile and nontender. Adnexa without masses or tenderness.  EXTREMITIES: No edema    DIAGNOSIS:  1. Encounter for gynecological examination without abnormal finding    2. Vaginal irritation    3. Screen for STD (sexually transmitted disease)        PLAN:    Orders Placed This Encounter    C. trachomatis/N. gonorrhoeae by AMP DNA    Vaginosis Screen by DNA Probe    Hepatitis Panel, Acute    HIV 1/2 Ag/Ab (4th Gen)    RPR       COUNSELING:  The patient was counseled today on:  -A.C.S. Pap and pelvic exam guidelines (pap every 3 years), recomendations for yearly mammogram;  -to follow up with her PCP for other health maintenance.    FOLLOW-UP with me annually.

## 2023-04-18 LAB
BACTERIAL VAGINOSIS DNA: NEGATIVE
CANDIDA GLABRATA DNA: NEGATIVE
CANDIDA KRUSEI DNA: NEGATIVE
CANDIDA RRNA VAG QL PROBE: NEGATIVE
T VAGINALIS RRNA GENITAL QL PROBE: POSITIVE

## 2023-04-19 ENCOUNTER — PATIENT MESSAGE (OUTPATIENT)
Dept: RESEARCH | Facility: HOSPITAL | Age: 33
End: 2023-04-19
Payer: MEDICAID

## 2023-04-20 LAB
C TRACH DNA SPEC QL NAA+PROBE: NOT DETECTED
N GONORRHOEA DNA SPEC QL NAA+PROBE: NOT DETECTED

## 2023-04-21 LAB
ALPHA GLOBIN RELEASED BY: NORMAL
ALPHA-GLOBIN SPECIMEN: NORMAL
GENETICIST REVIEW: NORMAL
HBA1 GENE MUT ANL BLD/T: NEGATIVE
HBA1 GENE MUT ANL BLD/T: NORMAL
REF LAB TEST METHOD: NORMAL
SERVICE CMNT-IMP: NORMAL
SPECIMEN SOURCE: NORMAL

## 2023-05-01 ENCOUNTER — PATIENT MESSAGE (OUTPATIENT)
Dept: OBSTETRICS AND GYNECOLOGY | Facility: CLINIC | Age: 33
End: 2023-05-01
Payer: COMMERCIAL

## 2023-05-01 ENCOUNTER — TELEPHONE (OUTPATIENT)
Dept: OBSTETRICS AND GYNECOLOGY | Facility: CLINIC | Age: 33
End: 2023-05-01
Payer: COMMERCIAL

## 2023-05-01 DIAGNOSIS — N76.0 ACUTE VAGINITIS: Primary | ICD-10-CM

## 2023-05-01 RX ORDER — METRONIDAZOLE 500 MG/1
500 TABLET ORAL 2 TIMES DAILY
Qty: 14 TABLET | Refills: 0 | Status: SHIPPED | OUTPATIENT
Start: 2023-05-01 | End: 2023-05-08

## 2023-05-01 NOTE — TELEPHONE ENCOUNTER
----- Message from Lizzy Chery MD sent at 5/1/2023 10:06 AM CDT -----  Patient has trichomonas. Please call.    Flagyl 500 mg, disp 14, on po bid for 7 days. No alcohol with this medication or for 48 hours following medication.     Spouse or partner should be evaluated & treated for infection. Patient should abstain form sex for 2 weeks after both she and partner have been treated.     Please schedule appt for patient to be seen to in 4 weeks for TEMITOPE.     Potential Problems  If the infection is not treated, it can lead to more serious health problems. In women, this can be pelvic inflammatory disease (PID). PID can make a woman sterile. It can also cause an ectopic (tubal) pregnancy. This type of pregnancy cannot be carried to term. Symptoms of PID include fever, pain during sex, and pain in the abdomen.  Prevention  Know your partner's history. Protect yourself by using a latex condom whenever you have sex. If you are pregnant, take extra care.

## 2023-05-02 ENCOUNTER — CLINICAL SUPPORT (OUTPATIENT)
Dept: OTHER | Facility: CLINIC | Age: 33
End: 2023-05-02
Payer: COMMERCIAL

## 2023-05-02 DIAGNOSIS — Z00.8 ENCOUNTER FOR OTHER GENERAL EXAMINATION: ICD-10-CM

## 2023-05-05 VITALS
WEIGHT: 181 LBS | BODY MASS INDEX: 32.07 KG/M2 | HEIGHT: 63 IN | SYSTOLIC BLOOD PRESSURE: 128 MMHG | DIASTOLIC BLOOD PRESSURE: 78 MMHG

## 2023-05-05 LAB
HDLC SERPL-MCNC: 41 MG/DL
POC CHOLESTEROL, LDL (DOCK): 84 MG/DL
POC CHOLESTEROL, TOTAL: 144 MG/DL
POC GLUCOSE, FASTING: 88 MG/DL (ref 60–110)
TRIGL SERPL-MCNC: 104 MG/DL

## 2023-05-16 ENCOUNTER — TELEPHONE (OUTPATIENT)
Dept: ENDOCRINOLOGY | Facility: CLINIC | Age: 33
End: 2023-05-16
Payer: COMMERCIAL

## 2023-05-16 NOTE — TELEPHONE ENCOUNTER
----- Message from Caryn Whitten MA sent at 5/4/2023  9:34 AM CDT -----    ----- Message -----  From: Charisma Acosta  Sent: 5/3/2023   3:36 PM CDT  To: , Patricia Dobbs calling regarding Appointment Access for new overactive thyroids issues, call back to schedule, [phone]

## 2023-05-31 ENCOUNTER — HOSPITAL ENCOUNTER (OUTPATIENT)
Dept: RADIOLOGY | Facility: HOSPITAL | Age: 33
Discharge: HOME | End: 2023-05-31
Attending: OBSTETRICS & GYNECOLOGY
Payer: COMMERCIAL

## 2023-05-31 VITALS — WEIGHT: 180 LBS | HEIGHT: 63 IN | BODY MASS INDEX: 31.89 KG/M2

## 2023-05-31 DIAGNOSIS — Z12.31 VISIT FOR SCREENING MAMMOGRAM: ICD-10-CM

## 2023-05-31 PROCEDURE — 77063 MAMMO DIGITAL SCREENING BILAT WITH TOMO: ICD-10-PCS | Mod: 26,,, | Performed by: RADIOLOGY

## 2023-05-31 PROCEDURE — 77067 SCR MAMMO BI INCL CAD: CPT | Mod: TC

## 2023-05-31 PROCEDURE — 77067 SCR MAMMO BI INCL CAD: CPT | Mod: 26,,, | Performed by: RADIOLOGY

## 2023-05-31 PROCEDURE — 77067 MAMMO DIGITAL SCREENING BILAT WITH TOMO: ICD-10-PCS | Mod: 26,,, | Performed by: RADIOLOGY

## 2023-05-31 PROCEDURE — 77063 BREAST TOMOSYNTHESIS BI: CPT | Mod: 26,,, | Performed by: RADIOLOGY

## 2023-06-25 DIAGNOSIS — Z91.89 AT HIGH RISK FOR BREAST CANCER: Primary | ICD-10-CM

## 2023-06-26 ENCOUNTER — PATIENT MESSAGE (OUTPATIENT)
Dept: OBSTETRICS AND GYNECOLOGY | Facility: CLINIC | Age: 33
End: 2023-06-26
Payer: COMMERCIAL

## 2023-06-26 ENCOUNTER — TELEPHONE (OUTPATIENT)
Dept: OBSTETRICS AND GYNECOLOGY | Facility: CLINIC | Age: 33
End: 2023-06-26
Payer: COMMERCIAL

## 2023-07-13 ENCOUNTER — OFFICE VISIT (OUTPATIENT)
Dept: INTERNAL MEDICINE | Facility: CLINIC | Age: 33
End: 2023-07-13
Payer: COMMERCIAL

## 2023-07-13 ENCOUNTER — LAB VISIT (OUTPATIENT)
Dept: LAB | Facility: HOSPITAL | Age: 33
End: 2023-07-13
Payer: COMMERCIAL

## 2023-07-13 DIAGNOSIS — E28.2 PCOS (POLYCYSTIC OVARIAN SYNDROME): ICD-10-CM

## 2023-07-13 DIAGNOSIS — E05.90 HYPERTHYROIDISM: ICD-10-CM

## 2023-07-13 DIAGNOSIS — E05.90 HYPERTHYROIDISM: Primary | ICD-10-CM

## 2023-07-13 DIAGNOSIS — D50.0 IRON DEFICIENCY ANEMIA DUE TO CHRONIC BLOOD LOSS: ICD-10-CM

## 2023-07-13 LAB
ALBUMIN SERPL BCP-MCNC: 3.6 G/DL (ref 3.5–5.2)
ALP SERPL-CCNC: 139 U/L (ref 55–135)
ALT SERPL W/O P-5'-P-CCNC: 33 U/L (ref 10–44)
ANION GAP SERPL CALC-SCNC: 11 MMOL/L (ref 8–16)
AST SERPL-CCNC: 29 U/L (ref 10–40)
BASOPHILS # BLD AUTO: 0.02 K/UL (ref 0–0.2)
BASOPHILS NFR BLD: 0.4 % (ref 0–1.9)
BILIRUB SERPL-MCNC: 0.5 MG/DL (ref 0.1–1)
BUN SERPL-MCNC: 12 MG/DL (ref 6–20)
CALCIUM SERPL-MCNC: 10 MG/DL (ref 8.7–10.5)
CHLORIDE SERPL-SCNC: 105 MMOL/L (ref 95–110)
CO2 SERPL-SCNC: 19 MMOL/L (ref 23–29)
CREAT SERPL-MCNC: 0.7 MG/DL (ref 0.5–1.4)
DIFFERENTIAL METHOD: ABNORMAL
EOSINOPHIL # BLD AUTO: 0.2 K/UL (ref 0–0.5)
EOSINOPHIL NFR BLD: 4.4 % (ref 0–8)
ERYTHROCYTE [DISTWIDTH] IN BLOOD BY AUTOMATED COUNT: 19.1 % (ref 11.5–14.5)
EST. GFR  (NO RACE VARIABLE): >60 ML/MIN/1.73 M^2
GLUCOSE SERPL-MCNC: 88 MG/DL (ref 70–110)
HCT VFR BLD AUTO: 39.8 % (ref 37–48.5)
HGB BLD-MCNC: 11.9 G/DL (ref 12–16)
IMM GRANULOCYTES # BLD AUTO: 0.02 K/UL (ref 0–0.04)
IMM GRANULOCYTES NFR BLD AUTO: 0.4 % (ref 0–0.5)
LYMPHOCYTES # BLD AUTO: 1.8 K/UL (ref 1–4.8)
LYMPHOCYTES NFR BLD: 40.1 % (ref 18–48)
MCH RBC QN AUTO: 21.7 PG (ref 27–31)
MCHC RBC AUTO-ENTMCNC: 29.9 G/DL (ref 32–36)
MCV RBC AUTO: 73 FL (ref 82–98)
MONOCYTES # BLD AUTO: 0.5 K/UL (ref 0.3–1)
MONOCYTES NFR BLD: 11.8 % (ref 4–15)
NEUTROPHILS # BLD AUTO: 2 K/UL (ref 1.8–7.7)
NEUTROPHILS NFR BLD: 42.9 % (ref 38–73)
NRBC BLD-RTO: 0 /100 WBC
PLATELET # BLD AUTO: 428 K/UL (ref 150–450)
PMV BLD AUTO: 10.6 FL (ref 9.2–12.9)
POTASSIUM SERPL-SCNC: 4 MMOL/L (ref 3.5–5.1)
PROT SERPL-MCNC: 8.2 G/DL (ref 6–8.4)
RBC # BLD AUTO: 5.49 M/UL (ref 4–5.4)
SODIUM SERPL-SCNC: 135 MMOL/L (ref 136–145)
T4 FREE SERPL-MCNC: 2.35 NG/DL (ref 0.71–1.51)
TSH SERPL DL<=0.005 MIU/L-ACNC: <0.01 UIU/ML (ref 0.4–4)
WBC # BLD AUTO: 4.56 K/UL (ref 3.9–12.7)

## 2023-07-13 PROCEDURE — 3077F SYST BP >= 140 MM HG: CPT | Mod: CPTII,S$GLB,,

## 2023-07-13 PROCEDURE — 3079F DIAST BP 80-89 MM HG: CPT | Mod: CPTII,S$GLB,,

## 2023-07-13 PROCEDURE — 36415 COLL VENOUS BLD VENIPUNCTURE: CPT

## 2023-07-13 PROCEDURE — 99213 OFFICE O/P EST LOW 20 MIN: CPT | Mod: S$GLB,,,

## 2023-07-13 PROCEDURE — 86800 THYROGLOBULIN ANTIBODY: CPT

## 2023-07-13 PROCEDURE — 83520 IMMUNOASSAY QUANT NOS NONAB: CPT

## 2023-07-13 PROCEDURE — 80053 COMPREHEN METABOLIC PANEL: CPT

## 2023-07-13 PROCEDURE — 84443 ASSAY THYROID STIM HORMONE: CPT

## 2023-07-13 PROCEDURE — 3079F PR MOST RECENT DIASTOLIC BLOOD PRESSURE 80-89 MM HG: ICD-10-PCS | Mod: CPTII,S$GLB,,

## 2023-07-13 PROCEDURE — 99213 PR OFFICE/OUTPT VISIT, EST, LEVL III, 20-29 MIN: ICD-10-PCS | Mod: S$GLB,,,

## 2023-07-13 PROCEDURE — 99999 PR PBB SHADOW E&M-EST. PATIENT-LVL IV: ICD-10-PCS | Mod: PBBFAC,,,

## 2023-07-13 PROCEDURE — 3044F PR MOST RECENT HEMOGLOBIN A1C LEVEL <7.0%: ICD-10-PCS | Mod: CPTII,S$GLB,,

## 2023-07-13 PROCEDURE — 84439 ASSAY OF FREE THYROXINE: CPT

## 2023-07-13 PROCEDURE — 3044F HG A1C LEVEL LT 7.0%: CPT | Mod: CPTII,S$GLB,,

## 2023-07-13 PROCEDURE — 3077F PR MOST RECENT SYSTOLIC BLOOD PRESSURE >= 140 MM HG: ICD-10-PCS | Mod: CPTII,S$GLB,,

## 2023-07-13 PROCEDURE — 3008F PR BODY MASS INDEX (BMI) DOCUMENTED: ICD-10-PCS | Mod: CPTII,S$GLB,,

## 2023-07-13 PROCEDURE — 85025 COMPLETE CBC W/AUTO DIFF WBC: CPT

## 2023-07-13 PROCEDURE — 86376 MICROSOMAL ANTIBODY EACH: CPT

## 2023-07-13 PROCEDURE — 99999 PR PBB SHADOW E&M-EST. PATIENT-LVL IV: CPT | Mod: PBBFAC,,,

## 2023-07-13 PROCEDURE — 3008F BODY MASS INDEX DOCD: CPT | Mod: CPTII,S$GLB,,

## 2023-07-13 RX ORDER — FERROUS SULFATE 325(65) MG
325 TABLET ORAL 2 TIMES DAILY
Qty: 60 TABLET | Refills: 2 | Status: SHIPPED | OUTPATIENT
Start: 2023-07-13

## 2023-07-13 RX ORDER — ATENOLOL 50 MG/1
50 TABLET ORAL DAILY
Qty: 30 TABLET | Refills: 2 | Status: SHIPPED | OUTPATIENT
Start: 2023-07-13 | End: 2024-07-12

## 2023-07-13 NOTE — PATIENT INSTRUCTIONS
Take vitamin C daily    Take iron supplement twice daily    Take atenolol to help with symptoms from hyperthyroidism    Follow up with endocrinology appointment

## 2023-07-13 NOTE — PROGRESS NOTES
I have reviewed the notes, assessments, and/or procedures performed by Dr. Dumont, I concur with her/his documentation of Tiffany Dobbs.    Establishment of care  Hyperthyroidism - symptomatic, start propanolol, if repeat labwork persistent, will start methimazole; work up underlying etiology.  Iron deficiency anemia - PO repletion, bowel regimen, recheck in 3 months    Close follow up to ensure compliance

## 2023-07-13 NOTE — PROGRESS NOTES
Clinic Note  7/13/2023      Subjective:       Patient ID:  Tiffany is a 32 y.o. female being seen for abnormal thyroid labs    Chief Complaint: Establish Care    HPI  32 year-old patient PCOS, anemia presenting to establish care. Patient noted to have abnormal thyroid lab values at last visit with TSH that was undetectable and T4 elevated to 1.6. Patient has noticed palpitations, weight loss, hair loss, anemia. Patient has diarrhea frequently, she associated that with what she ate. Patient has seen endocrine in the past but it was for PCOS. Patient notes she recently started Tirzepatide this past Saturday and notes 4 pounds of weight loss. Never been pregnant, possible early miscarriage in past.         Review of Systems   Constitutional:  Positive for weight loss (has been trying on her own to lose weight). Negative for chills (sometimes night sweats) and fever.   HENT:  Negative for congestion and sinus pain.    Eyes:  Negative for blurred vision and pain.   Respiratory:  Positive for cough. Negative for shortness of breath.    Cardiovascular:  Positive for palpitations. Negative for chest pain and leg swelling.   Gastrointestinal:  Positive for constipation (sometimes with iron supplements) and diarrhea (sometimes contributes to eating, last few days). Negative for vomiting.   Musculoskeletal: Negative.    Neurological:  Negative for speech change, focal weakness, weakness and headaches.   Psychiatric/Behavioral:  Negative for memory loss. The patient is nervous/anxious.      Past Medical History:   Diagnosis Date    Anemia     PCOS (polycystic ovarian syndrome) 04/2020       Family History   Problem Relation Age of Onset    Breast cancer Maternal Aunt     Glaucoma Maternal Aunt     Breast cancer Paternal Aunt     Ovarian cancer Maternal Aunt     Gestational diabetes Mother     Glaucoma Mother     Heart disease Father         arrythmia, has device - ICD?    Diverticulitis Father     Diabetes Maternal Grandmother   "   Dementia Maternal Grandmother     Arthritis Maternal Grandmother         spinal    Hydrocephalus Maternal Grandmother     Stomach cancer Maternal Grandfather     Diabetes Maternal Aunt     No Known Problems Brother     Colon cancer Neg Hx     Heart attack Neg Hx         reports that she has never smoked. She has never used smokeless tobacco. She reports current alcohol use. She reports that she does not use drugs.      Review of patient's allergies indicates:   Allergen Reactions    Bactrim [sulfamethoxazole-trimethoprim] Itching and Rash       Patient Active Problem List   Diagnosis    Surgical wound dehiscence    Chronic pain of left knee    Menorrhagia with irregular cycle    Microcytic anemia    Keloid    Patellofemoral pain syndrome of both knees    Chronic hip pain, bilateral    Acute midline low back pain without sciatica    Rupture of anterior cruciate ligament of right knee    Range of motion deficit    Muscle weakness of lower extremity    Injury of posterolateral corner of knee, right, initial encounter    Status post arthroscopic surgery of right knee    Sprain of medial collateral ligament of right knee           Objective:      BP (!) 140/80   Pulse 98   Ht 5' 6" (1.676 m)   Wt 79.9 kg (176 lb 2.4 oz)   LMP 06/21/2023 (Approximate)   SpO2 100%   BMI 28.43 kg/m²   Estimated body mass index is 28.43 kg/m² as calculated from the following:    Height as of this encounter: 5' 6" (1.676 m).    Weight as of this encounter: 79.9 kg (176 lb 2.4 oz).  Physical Exam  Vitals reviewed.   Constitutional:       Appearance: Normal appearance.   Neck:      Thyroid: No thyroid mass, thyromegaly or thyroid tenderness.      Comments: No thyroid bruit noted  Cardiovascular:      Rate and Rhythm: Tachycardia present.   Musculoskeletal:      Cervical back: Normal range of motion. No rigidity or tenderness.   Neurological:      Mental Status: She is alert.       Assessment and Plan:         Tiffany was seen today for " establish care.    Diagnoses and all orders for this visit:    Hyperthyroidism  -     Cancel: Ambulatory referral/consult to Endocrinology; Future  -     THYROID PEROXIDASE ANTIBODY; Future  -     CBC W/ AUTO DIFFERENTIAL; Future  -     COMPREHENSIVE METABOLIC PANEL; Future  -     TSH; Future  -     T4, FREE; Future  -     THYROTROPIN RECEPTOR ANTIBODY; Future  -     THYROGLOBULIN AB SCREEN; Future  -     Ambulatory referral/consult to Endocrinology; Future    32 year old with symptomatic hyperthyroidism, with labs first showing abnormality in April 2023. Patient with palpitations, no eye symptoms, no enlargement of thyroid will treat her symptoms with atenolol 50mg (can uptitrate as needed for symptom relief). Ordered repeat thyroid labs and will consider methimazole based on those results. Ordered antibodies to help diagnose cause of hyperthyroidism (graves, hashimotos). Patient with maternal grandmother who had hyperthyroidism. No mention of recent viral illnesses. No goiter, or nodules felt/seen. Not in thyroid storm currently. RTC in 2 weeks.    On 7/14 Spoke with patient regarding her hyperthyroidism and T4 being elevated, TSH still significantly suppressed. Will start methimazole 5mg TID and  obtain thyroid ultrasound. Patient agrees to plan.    Iron deficiency anemia due to chronic blood loss  --patient recently on Iron supplements  --Given such low level of ferritin on last measurement will continue ferritin 325 mg bid, will repeat iron levels after treatment  --if needs iron transfusions because cannot tolerate will consider heme referral for IV iron  --Patient has vitamin C at home to take  --patient recently with diarrhea but will get OTC miralax to take for constipation side effect of iron    PCOS (polycystic ovarian syndrome)  --endocrine referral placed    Other orders  -     atenoloL (TENORMIN) 50 MG tablet; Take 1 tablet (50 mg total) by mouth once daily.  -     ferrous sulfate (IRON) 325 mg (65 mg  iron) Tab tablet; Take 1 tablet (325 mg total) by mouth 2 (two) times daily.        Follow up in about 2 weeks (around 7/27/2023).    Other Orders Placed This Visit:  Orders Placed This Encounter   Procedures    THYROID PEROXIDASE ANTIBODY    CBC W/ AUTO DIFFERENTIAL    COMPREHENSIVE METABOLIC PANEL    TSH    T4, FREE    THYROTROPIN RECEPTOR ANTIBODY    THYROGLOBULIN AB SCREEN    Ambulatory referral/consult to Endocrinology         Raúl Fried MD  Internal Medicine PGY-3

## 2023-07-14 ENCOUNTER — PATIENT MESSAGE (OUTPATIENT)
Dept: INTERNAL MEDICINE | Facility: CLINIC | Age: 33
End: 2023-07-14
Payer: COMMERCIAL

## 2023-07-14 ENCOUNTER — TELEPHONE (OUTPATIENT)
Dept: INTERNAL MEDICINE | Facility: CLINIC | Age: 33
End: 2023-07-14
Payer: COMMERCIAL

## 2023-07-14 VITALS
SYSTOLIC BLOOD PRESSURE: 140 MMHG | OXYGEN SATURATION: 100 % | HEIGHT: 66 IN | HEART RATE: 98 BPM | DIASTOLIC BLOOD PRESSURE: 80 MMHG | WEIGHT: 176.13 LBS | BODY MASS INDEX: 28.31 KG/M2

## 2023-07-14 LAB
THYROGLOB AB SERPL IA-ACNC: 4.1 IU/ML (ref 0–3.9)
THYROPEROXIDASE IGG SERPL-ACNC: 14.6 IU/ML

## 2023-07-14 RX ORDER — METHIMAZOLE 5 MG/1
5 TABLET ORAL 3 TIMES DAILY
Qty: 90 TABLET | Refills: 1 | Status: SHIPPED | OUTPATIENT
Start: 2023-07-14 | End: 2023-08-15

## 2023-07-14 NOTE — TELEPHONE ENCOUNTER
Spoke with patient about her thyroid hormone results, they are still elevated. Anti-TPO  and thyroglobulin tests  elevated. Still waiting on  anti-TSH antibodies.    Spoke with patient who agrees to Methimazole 5 mg TID  every 8 hours. Spoke with her it's important to not miss doses.    Patient agrees to get thyroid ultrasound, will message staff about getting her an appointment.    She understands it is important  to follow up to her primary care appointments to get thyroid levels checked.    Patients iron deficiency anemia improved from prior, however suspect still iron deficient. she is going to take iron tablets.    SILVESTRE Fried MD  PGY-3

## 2023-07-17 LAB — TSH RECEP AB SER-ACNC: 5.94 IU/L (ref 0–1.75)

## 2023-07-19 ENCOUNTER — HOSPITAL ENCOUNTER (OUTPATIENT)
Dept: RADIOLOGY | Facility: HOSPITAL | Age: 33
Discharge: HOME | End: 2023-07-19
Payer: COMMERCIAL

## 2023-07-19 DIAGNOSIS — E05.90 HYPERTHYROIDISM: ICD-10-CM

## 2023-07-19 PROCEDURE — 76536 US THYROID: ICD-10-PCS | Mod: 26,,, | Performed by: RADIOLOGY

## 2023-07-19 PROCEDURE — 76536 US EXAM OF HEAD AND NECK: CPT | Mod: 26,,, | Performed by: RADIOLOGY

## 2023-07-19 PROCEDURE — 76536 US EXAM OF HEAD AND NECK: CPT | Mod: TC

## 2023-07-28 ENCOUNTER — TELEPHONE (OUTPATIENT)
Dept: ENDOCRINOLOGY | Facility: CLINIC | Age: 33
End: 2023-07-28
Payer: COMMERCIAL

## 2023-07-28 NOTE — TELEPHONE ENCOUNTER
----- Message from Apurva Barron MA sent at 7/28/2023  9:17 AM CDT -----  Regarding: RE: Clinic visit options  No problem Laly Peña patient is scheduled please call patient and let them know thank you.    ----- Message -----  From: Raúl Catalan DO  Sent: 7/21/2023   9:12 AM CDT  To: Apurva Barron MA  Subject: Clinic visit options                             Good morning,    Can you help with trying to schedule this patient a sooner visit?  It does not necessarily have to be with me, the referring physician just wanted her to get a visit sooner if possible.  For now she is not scheduled here til November.  It may be a good one for a new fellow if they have openings otherwise any provider should do.  They have newly diagnosed Grave's disease and ideally should be seen within a month or two.  Sooner the better.  Can you help with this?    Maxi

## 2023-07-28 NOTE — TELEPHONE ENCOUNTER
Tried calling patient to inform her that we have her schedule with Dr. Catalan on August 18 @ 8 am

## 2023-08-11 ENCOUNTER — TELEPHONE (OUTPATIENT)
Dept: PULMONOLOGY | Facility: HOSPITAL | Age: 33
End: 2023-08-11
Payer: COMMERCIAL

## 2023-08-11 ENCOUNTER — OFFICE VISIT (OUTPATIENT)
Dept: HEMATOLOGY/ONCOLOGY | Facility: CLINIC | Age: 33
End: 2023-08-11
Payer: COMMERCIAL

## 2023-08-11 DIAGNOSIS — Z91.89 AT HIGH RISK FOR BREAST CANCER: ICD-10-CM

## 2023-08-11 DIAGNOSIS — R92.30 DENSE BREAST TISSUE ON MAMMOGRAM: ICD-10-CM

## 2023-08-11 DIAGNOSIS — Z80.3 FAMILY HISTORY OF BREAST CANCER: ICD-10-CM

## 2023-08-11 DIAGNOSIS — E66.3 OVERWEIGHT (BMI 25.0-29.9): Primary | ICD-10-CM

## 2023-08-11 PROCEDURE — 3044F HG A1C LEVEL LT 7.0%: CPT | Mod: CPTII,95,, | Performed by: NURSE PRACTITIONER

## 2023-08-11 PROCEDURE — 3044F PR MOST RECENT HEMOGLOBIN A1C LEVEL <7.0%: ICD-10-PCS | Mod: CPTII,95,, | Performed by: NURSE PRACTITIONER

## 2023-08-11 PROCEDURE — 99205 OFFICE O/P NEW HI 60 MIN: CPT | Mod: 95,,, | Performed by: NURSE PRACTITIONER

## 2023-08-11 PROCEDURE — 99205 PR OFFICE/OUTPT VISIT, NEW, LEVL V, 60-74 MIN: ICD-10-PCS | Mod: 95,,, | Performed by: NURSE PRACTITIONER

## 2023-08-11 NOTE — PROGRESS NOTES
The patient location is: [place]  The chief complaint leading to consultation is: high risk     Visit type: audiovisual      60 minutes of total time spent on the encounter, which includes face to face time and non-face to face time preparing to see the patient (eg, review of tests), Obtaining and/or reviewing separately obtained history, Documenting clinical information in the electronic or other health record, Independently interpreting results (not separately reported) and communicating results to the patient/family/caregiver, or Care coordination (not separately reported).         Each patient to whom he or she provides medical services by telemedicine is:  (1) informed of the relationship between the physician and patient and the respective role of any other health care provider with respect to management of the patient; and (2) notified that he or she may decline to receive medical services by telemedicine and may withdraw from such care at any time.    Notes:     Reason For Consultation:   High-Risk Breast Cancer      Referring Provider:   Lizzy Chery MD  [address] [address]    Records Obtained: Records of the patients history including those obtained from the referring provider were reviewed and summarized in detail.    HPI:   Tiffany Dobbs is a 33 y.o. who presents for consultation of increased risk of breast cancer.      Today, Feels good and no complaints.   No breast concerns.  Losing weight - intentional. 27lbs.     5/31/2023 MMG:   Impression:   No mammographic evidence of malignancy.     BI-RADS Category 1: Negative     Recommendation:  Routine Screening mammogram in 1 Year, High Risk      Your estimated lifetime risk of breast cancer (to age 85) based on Tyrer-Cuzick risk assessment model is 24 %.  According to the American Cancer Society, patients with a lifetime breast cancer risk of 20% or higher might benefit from supplemental screening tests. ??       High Risk  Breast cancer specific history:  - Age: 33 y.o.   - Height:  5'3  - Weight:   Wt Readings from Last 3 Encounters:   23 1402 79.9 kg (176 lb 2.4 oz)   23 0803 81.6 kg (180 lb)   23 0926 82.1 kg (181 lb)      - Breast density per BI-RADS:  c - Heterogeneously dense  - Age at menarche:  8 yo   - Number of pregnancies: ;   -Uterus and ovaries intact: Yes  - She is premenopausal. Age at menopause, if applicable:  n/a.   - HRT: No  - Genetic testing: No  - Personal history of cancer: No  - Previous chest radiation exposure between ages 10-30 years old: No  - Personal history of breast biopsy: No  - Ashkenazi Yarsani Inheritance: No  - Family history of cancer:    Maternal great aunt- breast cancer dx 40's   Paternal aunt- breast cancer dx 40's  Maternal great aunt- ovarian cancer  Maternal great grandfather- stomach cancer    Social History:  Tobacco use:  no  Alcohol use:  social  Exercise regimen: no  Employment: yes,     SEE CALCULATED RISK BELOW.     Past Medical   Past Medical History:   Diagnosis Date    Anemia     PCOS (polycystic ovarian syndrome) 2020     Patient Active Problem List   Diagnosis    Surgical wound dehiscence    Chronic pain of left knee    Menorrhagia with irregular cycle    Microcytic anemia    Keloid    Patellofemoral pain syndrome of both knees    Chronic hip pain, bilateral    Acute midline low back pain without sciatica    Rupture of anterior cruciate ligament of right knee    Range of motion deficit    Muscle weakness of lower extremity    Injury of posterolateral corner of knee, right, initial encounter    Status post arthroscopic surgery of right knee    Sprain of medial collateral ligament of right knee     Social History   Social History     Tobacco Use    Smoking status: Never    Smokeless tobacco: Never   Substance Use Topics    Alcohol use: Yes     Comment: socially, twice per month    Drug use: No     Family History  Family History   Problem  Relation Age of Onset    Breast cancer Maternal Aunt     Glaucoma Maternal Aunt     Breast cancer Paternal Aunt     Ovarian cancer Maternal Aunt     Gestational diabetes Mother     Glaucoma Mother     Heart disease Father         arrythmia, has device - ICD?    Diverticulitis Father     Diabetes Maternal Grandmother     Dementia Maternal Grandmother     Arthritis Maternal Grandmother         spinal    Hydrocephalus Maternal Grandmother     Stomach cancer Maternal Grandfather     Diabetes Maternal Aunt     No Known Problems Brother     Colon cancer Neg Hx     Heart attack Neg Hx      Medications    Current Outpatient Medications:     atenoloL (TENORMIN) 50 MG tablet, Take 1 tablet (50 mg total) by mouth once daily., Disp: 30 tablet, Rfl: 2    ferrous sulfate (IRON) 325 mg (65 mg iron) Tab tablet, Take 1 tablet (325 mg total) by mouth 2 (two) times daily., Disp: 60 tablet, Rfl: 2    methIMAzole (TAPAZOLE) 5 MG Tab, Take 1 tablet (5 mg total) by mouth 3 (three) times daily., Disp: 90 tablet, Rfl: 1  Allergies  Review of patient's allergies indicates:   Allergen Reactions    Bactrim [sulfamethoxazole-trimethoprim] Itching and Rash       Review of Systems       See above   All other systems reviewed and are negative.    Objective:      Vitals: There were no vitals filed for this visit.  BMI: There is no height or weight on file to calculate BMI.   There is no height or weight on file to calculate BSA.    Physical Exam  Appears comfortable  Exam limited due to virtual      Laboratory Data: reviewed most recent   Imaging: reviewed most recent      General Education discussed:      1. General education: (not patient specific)    Risk factors associated with breast cancer are categorized into 2 groups: Modifiable and Non-modifiable. Modifiable risk factors include use of hormones, alcohol, smoking, diet and exercise. Non-modifiable risk factors include breast density, genetics, chest radiation, previous pregnancies, age of  first period, and age of menopause.     Factors associated with greater breast cancer risk: (this list is not patient specific)  -Increasing age The risk of breast cancer increases with older age.  -Female sex  -White race (In the [place], the highest breast cancer risk occurs among White women, although breast cancer remains the most common cancer among women of every major ethnic/racial group )  -Weight and body fat in postmenopausal women -Obesity (defined as body mass index [BMI] ?30 kg/m2) is associated with an overall increase  in morbidity and mortality. However, the risk of breast cancer associated with BMI differs by menopausal status.   ?Postmenopausal women - A higher BMI and/or perimenopausal weight gain have been consistently associated with a higher risk of breast cancer among postmenopausal women. The association between a higher BMI and postmenopausal breast cancer risk may be mediated by higher estrogen levels resulting from the peripheral conversion of estrogen precursors (from adipose tissue) to estrogen    ?Inverse relationship in premenopausal women - Unlike postmenopausal women, an increased BMI is associated with a lower risk of breast cancer in premenopausal women, particularly in early adulthood.  The explanation of this finding remains unclear.  -Tall stature -women who were >175 cm (69 inches) tall were 20 percent more likely to develop breast cancer than those <160 cm (63 inches) tall.  -Benign breast disease  -Dense breast tissue -- The density of breast tissue reflects the relative amount of glandular and connective tissue (parenchyma) to adipose tissue. Women with mammographically dense breast tissue, generally defined as dense tissue comprising ?75 percent of the breast, have a four to five times higher breast cancer risk compared with women of similar age with less or no dense tissue. Although breast density is a largely inherited trait, other factors can influence density. For  example, lower density has been associated with higher levels of physical activity  and with a low-fat, high-carbohydrate diet. In postmenopausal women, estrogen and progesterone increase breast density  while the ER antagonist tamoxifen decreases breast density.  Despite the association of exogenous hormones with breast density, breast density is not strongly correlated with endogenous hormone levels. Breast tend to become more fatty with age.   -Bone mineral density -In multiple studies, women with higher bone density have a higher breast cancer risk  -Hormonal factors: HRT. Of note, IVF does not appear to increase the long-term risk of breast cancer, even in women with BRCA 1 and 2 mutations.     In the Women's Health Initiative (WHI), the risk of invasive breast cancer was significantly increased with combined hormone therapy (HT) at an average follow-up of 5.6 years.     A 2019 meta-analysis of all available epidemiologic evidence on the association between menopausal hormone therapy (MHT) use and breast cancer risk has been published. The analysis included nearly 145,000 women with breast cancer (51 percent of whom had used MHT) and nearly 425,000 without breast cancer. Their findings included:  ?Similar to the WHI, estrogen-progestin regimens were associated with excess breast cancer risk. An excess risk was also seen with estrogen-only regimens (a reduction in risk was seen in the WHI). There was no excess risk with vaginal estrogens.   ?Breast cancer risk increased with the duration of systemic MHT use. Unlike previous studies, obesity was not associated with excess risk; instead, it attenuated risk.  ?The authors of the study calculated that for women of average weight, five years of MHT use starting at age 50 years would increase their 20-year risk of breast cancer (between the ages of 50 and 69 years) by approximately:  One in every 50 users of estrogen plus daily progestin  One in every 70 users of  estrogen plus intermittent progestin   One in every 200 users of estrogen-only regimens   Of note: There were important limitations in this study.   While this meta-analysis has renewed concerns for some about the association between MHT and breast cancer, we continue to suggest an individualized approach when counseling symptomatic postmenopausal women about treatment. This includes putting the potential risk of breast cancer (and cardiovascular disease) in the context of the benefits of MHT (eg, relief of vasomotor symptoms, improved sleep and quality of life, and prevention of bone loss)  -Reproductive factors -Earlier menarche (before age 12) or later menopause (after age 52), Nulliparity, Increasing age at first full-term pregnancy.   (Of note, It is estimated that for every 12 months of breastfeeding, there was a 4.3 percent reduction in the relative risk (RR) of breast cancer)  -Personal and family history of breast cancer  -Alcohol use and smoking  -Exposure to therapeutic ionizing radiation           2. Risk stratifying models:  There are several models available for stratifying breast cancer risk, and Hattie-Breana is presently the model utilized by [facility] and is a model recommended per current NCCN guidelines.      Educational videos:   What Is a TC Score?    https://youtu.be/Lbfm2LNFywX     What If I Have a High-Risk TC Score?     https://youtu.be/yCb3uPq3x9I      The Yara Model for Breast Cancer risk estimates the absolute 5 year risk and lifetime risk of developing breast cancer. Family history includes only first degree relatives with breast cancer, which is not enough information to estimate the risk of a patient having BRCA mutation. It also underestimates the cancer risk for patients with extensive family history. The Yara Model is a good predictor of risk for populations but not for individuals. It adjusts risk for race/ethnicity. It may underestimate breast cancer risk in patients  with atypical hyperplasia and strong family history. The Yara Model was NOT designed to estimate risk for: Women with a prior diagnosis of breast cancer, lobular carcinoma in situ (LCIS), or ductal carcinoma in situ (DCIS);  Women who have received previous radiation therapy to the chest for treatment of Hodgkin lymphoma;  Women with gene mutations in BRCA1 or BRCA2, or those who are known to have certain genetic syndromes that increase risk for breast cancer; Women of age <35 or >85.    We discussed that there are limitations to every model for risk assessment, particularly that TC can overestimate risk in women with atypical hyperplasia and dense breasts and that Yara underestimates risk for those with a strong family history of breast or ovarian cancers as well as non-white women with atypical hyperplasia which can make them appear to not be candidates for risk reducing therapies.               3. High risk patients:       INCREASED RISK SCREENING: per NCCN                      MRI breast:       The use of MRI for breast cancer detection is based on the concept of  tumor angiogenesis or neovascularity. Tumor-associated blood vessels have increased permeability, which leads to prompt uptake and release of gadolinium within the first one to two minutes after administration, leading to a pattern of rapid enhancement and washout on MRI.   Bilateral breast examination - Both breasts should be evaluated in an MRI study, for comparison purposes, even when concern about possible pathology involves only one breast.  Contrast - Intravenous gadolinium contrast must be used to maximize cancer detection and is administered before breast MRI to highlight the neovascularity associated with cancers. Contrast is not necessary when the study is performed to evaluate silicone implant integrity.  Allergic and anaphylactoid reactions to gadolinium are rare, but can occur. In addition, in patients with renal failure, gadolinium can  cause contrast nephropathy and/or nephrogenic systemic fibrosis.   A few studies have also reported gadolinium deposition in the brain from repeated intravenous administration, with the degree of deposition varying based on the specific contrast agent. The clinical significance of this deposition remains unknown, and no data for humans exist to show any adverse effects or harm at this time.   https://www.fda.gov/drugs/drug-safety-and-availability/fda-drug-safety-communication-fda-identifies-no-harmful-effects-date-brain-retention-gadolinium  https://www.fda.gov/Drugs/DrugSafety/jis173211.htm    -Contact insurance company with regards to coverage of MRI breasts.   -Cannot undergo an MRI if pregnant.   -MRI's may have false positives                 VALERIO (contrast enhanced mammogram):  VALERIO is the main alternative for anyone who benefits by but cannot have a breast MRI with IV contrast.    Main indications:   High risk screening   Suspicious clinical symptoms with inconclusive mammogram and ultrasound   New breast cancer, evaluate extent of disease   Pre and post jalen-adjuvant systemic therapy evaluation     For high-risk screening, VALERIO replaces the regular non-contrast mammogram and any other supplemental test         For age over 75 years, screening recommendations are considered on an individual basis.         -RECOMMENDED LIFESTYLE MODIFICATIONS FOR HIGH RISK PATIENTS:    * Reviewed Lifestyle modifications which have shown benefit:  Limit alcohol consumption to less than 1 drink per day (1 ounce liquor, 6 oz wine, 8 oz beer)  Avoid smoking.  Exercise at least 150 minutes per week of moderate intensity aerobic activity or at least 75 minutes of vigorous activity. Exercise can lower the relative risk of breast cancer by ~18-20%.  Maintain healthy weight and avoid post-menopausal weight gain. Avoid processed foods and eat more lean proteins, fruits and vegetables.                               * Available resources  include genetic counseling, nutrition, weight management.           -CHEMOPREVENTION: (Patient does not qualify)             * For women at high risk for breast cancer, endocrine therapy can reduce the risk of invasive and/or in situ breast cancers. (tamoxifen for premenopausal or postmenopausal women and raloxifene or exemestane for postmenopausal women).   -Tamoxifen 20 mg daily for 5 years has shown to reduce risk of breast cancer by 49% and women with ADH/ALH or LCIS have an even more significant reduction of risk of 86%. Aromatase inhibitors for 5 years have also shown risk reduction in terms of 50-60%. At current, there is not adequate data to recommend longer courses of therapy more than 5 years for risk reduction.    -Above have been shown to lower the risk of breast cancer incidence, however there is no survival benefit in patients who don't have breast cancer.   -Tamoxifen has limited data in BRCA 1/2 mutation carriers but limited retrospective data is suggestive of benefit. There is retrospective data that aromatase inhibitors can reduce the risk of contralateral ER positive breast cancers in BRCA 1/2 patients who were taking AIs as adjuvant therapy. There is no data for raloxifen in the population.    -Risks of Tamoxifen side effects include hot flashes, invasive endometrial cancer in women > 49 years of age (2.3/1000 compared to 0.9/1000), cataracts, increased risk of pulmonary embolism among others.              Assessment:     No diagnosis found.      Breast Cancer Risk Stratification   Current, Estimated Breast Cancer Risk Model Used Patient's Score Risk for general population Patient's Risk Category   5-year Yara Model 0.3% 0.3%  [] N/A given age <35   [x] Average risk (<1.7%)   [] Increased risk (?1.7%)   10-year Tyrer-Cuzick v8.0b    [] <5%   [] ?5%    Lifetime (to age 85) Tyrer-Cuzick v8.0b 24%   [] Average risk (<15%)   [] Intermediate risk (?15% - <20%)   [x] High risk (?20%)   According to the  American Cancer Society, patients with a lifetime breast cancer risk of 20% or higher might benefit from supplemental screening exams. Women with a 5-year risk greater than or equal to 1.7% may benefit from chemoprevention agents (tamoxifen, raloxifene, aromatase inhibitors) to reduce risk.    Note: [facility] site was down and I was unable to recalculate her TC score today.     Patient's risk factors include but are not limited to:  Dense breast   Family history  nulliparity    Plan:         Patient elects to proceed with alternating annual mammogram and annual breast MRI along with semiannual CBEs. She will alternate CBE here and with GYN/PCP.  Encouraged breast awareness, including monthly breast self-exams.   Recommend lifestyle modifications as above.   Referral to cancer genetics.  Refer [facility] for weight loss (Chapis BERRY)- she is losing weight but would like to see Chapis MEJIA as well.     RTC in late 11/2023 to see me either at [facility] or on 3rd floor with a MRI breast a couple of days prior.   Route Chart for Scheduling    Med Onc Chart Routing      Follow up with physician    Follow up with SHANNAN . RTC in late 11/2023 to see me either at [facility] or on 3rd floor with a MRI breast a couple of days prior.   Infusion scheduling note    Injection scheduling note    Labs    Imaging    Pharmacy appointment    Other referrals     Additional referrals needed  Chapis Lomas and genetics                Questions were encouraged and answered to patient's satisfaction, and patient verbalized understanding of information and agreement with the plan. Advised patient to RTC with any interval changes or concerns.          Patient is in agreement with the proposed treatment plan. All questions were answered to the patient's satisfaction. Pt knows to call clinic for any new or worsening symptoms and if anything is needed before the next clinic visit.      Ladonna Lyons, FNP-C  Hematology & Oncology  [facility] [place] [address]  ph. [phone]  Fax. [phone]

## 2023-08-11 NOTE — PATIENT INSTRUCTIONS
General Education discussed:      1. General education: (not patient specific)    Risk factors associated with breast cancer are categorized into 2 groups: Modifiable and Non-modifiable. Modifiable risk factors include use of hormones, alcohol, smoking, diet and exercise. Non-modifiable risk factors include breast density, genetics, chest radiation, previous pregnancies, age of first period, and age of menopause.     Factors associated with greater breast cancer risk: (this list is not patient specific)  -Increasing age The risk of breast cancer increases with older age.  -Female sex  -White race (In the [place], the highest breast cancer risk occurs among White women, although breast cancer remains the most common cancer among women of every major ethnic/racial group )  -Weight and body fat in postmenopausal women -Obesity (defined as body mass index [BMI] ?30 kg/m2) is associated with an overall increase  in morbidity and mortality. However, the risk of breast cancer associated with BMI differs by menopausal status.   ?Postmenopausal women - A higher BMI and/or perimenopausal weight gain have been consistently associated with a higher risk of breast cancer among postmenopausal women. The association between a higher BMI and postmenopausal breast cancer risk may be mediated by higher estrogen levels resulting from the peripheral conversion of estrogen precursors (from adipose tissue) to estrogen    ?Inverse relationship in premenopausal women - Unlike postmenopausal women, an increased BMI is associated with a lower risk of breast cancer in premenopausal women, particularly in early adulthood.  The explanation of this finding remains unclear.  -Tall stature -women who were >175 cm (69 inches) tall were 20 percent more likely to develop breast cancer than those <160 cm (63 inches) tall.  -Benign breast disease  -Dense breast tissue -- The density of breast tissue reflects the relative amount of glandular  and connective tissue (parenchyma) to adipose tissue. Women with mammographically dense breast tissue, generally defined as dense tissue comprising ?75 percent of the breast, have a four to five times higher breast cancer risk compared with women of similar age with less or no dense tissue. Although breast density is a largely inherited trait, other factors can influence density. For example, lower density has been associated with higher levels of physical activity  and with a low-fat, high-carbohydrate diet. In postmenopausal women, estrogen and progesterone increase breast density  while the ER antagonist tamoxifen decreases breast density.  Despite the association of exogenous hormones with breast density, breast density is not strongly correlated with endogenous hormone levels. Breast tend to become more fatty with age.   -Bone mineral density -In multiple studies, women with higher bone density have a higher breast cancer risk  -Hormonal factors: HRT. Of note, IVF does not appear to increase the long-term risk of breast cancer, even in women with BRCA 1 and 2 mutations.     In the Women's Health Initiative (WHI), the risk of invasive breast cancer was significantly increased with combined hormone therapy (HT) at an average follow-up of 5.6 years.     A 2019 meta-analysis of all available epidemiologic evidence on the association between menopausal hormone therapy (MHT) use and breast cancer risk has been published. The analysis included nearly 145,000 women with breast cancer (51 percent of whom had used MHT) and nearly 425,000 without breast cancer. Their findings included:  ?Similar to the WHI, estrogen-progestin regimens were associated with excess breast cancer risk. An excess risk was also seen with estrogen-only regimens (a reduction in risk was seen in the WHI). There was no excess risk with vaginal estrogens.   ?Breast cancer risk increased with the duration of systemic MHT use. Unlike previous studies,  obesity was not associated with excess risk; instead, it attenuated risk.  ?The authors of the study calculated that for women of average weight, five years of MHT use starting at age 50 years would increase their 20-year risk of breast cancer (between the ages of 50 and 69 years) by approximately:  One in every 50 users of estrogen plus daily progestin  One in every 70 users of estrogen plus intermittent progestin   One in every 200 users of estrogen-only regimens   Of note: There were important limitations in this study.   While this meta-analysis has renewed concerns for some about the association between MHT and breast cancer, we continue to suggest an individualized approach when counseling symptomatic postmenopausal women about treatment. This includes putting the potential risk of breast cancer (and cardiovascular disease) in the context of the benefits of MHT (eg, relief of vasomotor symptoms, improved sleep and quality of life, and prevention of bone loss)  -Reproductive factors -Earlier menarche (before age 12) or later menopause (after age 52), Nulliparity, Increasing age at first full-term pregnancy.   (Of note, It is estimated that for every 12 months of breastfeeding, there was a 4.3 percent reduction in the relative risk (RR) of breast cancer)  -Personal and family history of breast cancer  -Alcohol use and smoking  -Exposure to therapeutic ionizing radiation           2. Risk stratifying models:  There are several models available for stratifying breast cancer risk, and Ayush is presently the model utilized by [facility] and is a model recommended per current NCCN guidelines.      Educational videos:   What Is a TC Score?    https://youtu.be/Kbng6PEJkuN     What If I Have a High-Risk TC Score?     https://youtu.be/vEb4zLp8h0X      The Yara Model for Breast Cancer risk estimates the absolute 5 year risk and lifetime risk of developing breast cancer. Family history includes only  first degree relatives with breast cancer, which is not enough information to estimate the risk of a patient having BRCA mutation. It also underestimates the cancer risk for patients with extensive family history. The Yara Model is a good predictor of risk for populations but not for individuals. It adjusts risk for race/ethnicity. It may underestimate breast cancer risk in patients with atypical hyperplasia and strong family history. The Yara Model was NOT designed to estimate risk for: Women with a prior diagnosis of breast cancer, lobular carcinoma in situ (LCIS), or ductal carcinoma in situ (DCIS);  Women who have received previous radiation therapy to the chest for treatment of Hodgkin lymphoma;  Women with gene mutations in BRCA1 or BRCA2, or those who are known to have certain genetic syndromes that increase risk for breast cancer; Women of age <35 or >85.    We discussed that there are limitations to every model for risk assessment, particularly that TC can overestimate risk in women with atypical hyperplasia and dense breasts and that Yara underestimates risk for those with a strong family history of breast or ovarian cancers as well as non-white women with atypical hyperplasia which can make them appear to not be candidates for risk reducing therapies.               3. High risk patients:       INCREASED RISK SCREENING: per NCCN                      MRI breast:       The use of MRI for breast cancer detection is based on the concept of  tumor angiogenesis or neovascularity. Tumor-associated blood vessels have increased permeability, which leads to prompt uptake and release of gadolinium within the first one to two minutes after administration, leading to a pattern of rapid enhancement and washout on MRI.   Bilateral breast examination - Both breasts should be evaluated in an MRI study, for comparison purposes, even when concern about possible pathology involves only one breast.  Contrast - Intravenous  gadolinium contrast must be used to maximize cancer detection and is administered before breast MRI to highlight the neovascularity associated with cancers. Contrast is not necessary when the study is performed to evaluate silicone implant integrity.  Allergic and anaphylactoid reactions to gadolinium are rare, but can occur. In addition, in patients with renal failure, gadolinium can cause contrast nephropathy and/or nephrogenic systemic fibrosis.   A few studies have also reported gadolinium deposition in the brain from repeated intravenous administration, with the degree of deposition varying based on the specific contrast agent. The clinical significance of this deposition remains unknown, and no data for humans exist to show any adverse effects or harm at this time.   https://www.fda.gov/drugs/drug-safety-and-availability/fda-drug-safety-communication-fda-identifies-no-harmful-effects-date-brain-retention-gadolinium  https://www.fda.gov/Drugs/DrugSafety/wxt116329.htm    -Contact insurance company with regards to coverage of MRI breasts.   -Cannot undergo an MRI if pregnant.   -MRI's may have false positives                 VALERIO (contrast enhanced mammogram):  VALERIO is the main alternative for anyone who benefits by but cannot have a breast MRI with IV contrast.    Main indications:   High risk screening   Suspicious clinical symptoms with inconclusive mammogram and ultrasound   New breast cancer, evaluate extent of disease   Pre and post jalen-adjuvant systemic therapy evaluation     For high-risk screening, VALERIO replaces the regular non-contrast mammogram and any other supplemental test         For age over 75 years, screening recommendations are considered on an individual basis.         -RECOMMENDED LIFESTYLE MODIFICATIONS FOR HIGH RISK PATIENTS:    * Reviewed Lifestyle modifications which have shown benefit:  Limit alcohol consumption to less than 1 drink per day (1 ounce liquor, 6 oz wine, 8 oz beer)  Avoid  smoking.  Exercise at least 150 minutes per week of moderate intensity aerobic activity or at least 75 minutes of vigorous activity. Exercise can lower the relative risk of breast cancer by ~18-20%.  Maintain healthy weight and avoid post-menopausal weight gain. Avoid processed foods and eat more lean proteins, fruits and vegetables.                               * Available resources include genetic counseling, nutrition, weight management.           -CHEMOPREVENTION: (Patient does not qualify)             * For women at high risk for breast cancer, endocrine therapy can reduce the risk of invasive and/or in situ breast cancers. (tamoxifen for premenopausal or postmenopausal women and raloxifene or exemestane for postmenopausal women).   -Tamoxifen 20 mg daily for 5 years has shown to reduce risk of breast cancer by 49% and women with ADH/ALH or LCIS have an even more significant reduction of risk of 86%. Aromatase inhibitors for 5 years have also shown risk reduction in terms of 50-60%. At current, there is not adequate data to recommend longer courses of therapy more than 5 years for risk reduction.    -Above have been shown to lower the risk of breast cancer incidence, however there is no survival benefit in patients who don't have breast cancer.   -Tamoxifen has limited data in BRCA 1/2 mutation carriers but limited retrospective data is suggestive of benefit. There is retrospective data that aromatase inhibitors can reduce the risk of contralateral ER positive breast cancers in BRCA 1/2 patients who were taking AIs as adjuvant therapy. There is no data for raloxifen in the population.    -Risks of Tamoxifen side effects include hot flashes, invasive endometrial cancer in women > 49 years of age (2.3/1000 compared to 0.9/1000), cataracts, increased risk of pulmonary embolism among others.              Assessment:     No diagnosis found.      Breast Cancer Risk Stratification   Current, Estimated Breast Cancer  Risk Model Used Patient's Score Risk for general population Patient's Risk Category   5-year Yara Model 0.3% 0.3%  [] N/A given age <35   [x] Average risk (<1.7%)   [] Increased risk (?1.7%)   10-year Tyrer-Cuzick v8.0b    [] <5%   [] ?5%    Lifetime (to age 85) Tyrer-Cuzick v8.0b 24%   [] Average risk (<15%)   [] Intermediate risk (?15% - <20%)   [x] High risk (?20%)   According to the American Cancer Society, patients with a lifetime breast cancer risk of 20% or higher might benefit from supplemental screening exams. Women with a 5-year risk greater than or equal to 1.7% may benefit from chemoprevention agents (tamoxifen, raloxifene, aromatase inhibitors) to reduce risk.    Note: [facility] site was down and I was unable to recalculate her TC score today.     Patient's risk factors include but are not limited to:  Dense breast   Family history  nulliparity    Plan:         Patient elects to proceed with alternating annual mammogram and annual breast MRI along with semiannual CBEs. She will alternate CBE here and with GYN/PCP.  Encouraged breast awareness, including monthly breast self-exams.   Recommend lifestyle modifications as above.   Referral to cancer genetics.  Refer [facility] for weight loss (Chapis BERRY)- she is losing weight but would like to see Chapis MEJIA as well.     RTC in late 11/2023 to see me either at [facility] or on 3rd floor with a MRI breast a couple of days prior.

## 2023-08-11 NOTE — TELEPHONE ENCOUNTER
Spoke with patient again to ensure she is aware of upcoming appointment with Endocrinology on Monday 8/14. She notes symptomatic improvement and that her heart is not racing  anymore since taking the medication. Spoke with Endocrinology Fellow who helped get her an earlier appointment. Patient following up with me this upcoming week. Results reviewed previously with her, suspect graves disease at this time.      SILVESTRE Fried MD  PGY-3 IM

## 2023-08-14 ENCOUNTER — LAB VISIT (OUTPATIENT)
Dept: LAB | Facility: HOSPITAL | Age: 33
End: 2023-08-14
Payer: MEDICAID

## 2023-08-14 ENCOUNTER — OFFICE VISIT (OUTPATIENT)
Dept: ENDOCRINOLOGY | Facility: CLINIC | Age: 33
End: 2023-08-14
Payer: COMMERCIAL

## 2023-08-14 VITALS
SYSTOLIC BLOOD PRESSURE: 112 MMHG | WEIGHT: 167.25 LBS | BODY MASS INDEX: 29.63 KG/M2 | HEART RATE: 103 BPM | HEIGHT: 63 IN | DIASTOLIC BLOOD PRESSURE: 64 MMHG | OXYGEN SATURATION: 97 % | RESPIRATION RATE: 18 BRPM

## 2023-08-14 DIAGNOSIS — E05.90 HYPERTHYROIDISM: ICD-10-CM

## 2023-08-14 DIAGNOSIS — E04.2 MULTIPLE THYROID NODULES: ICD-10-CM

## 2023-08-14 DIAGNOSIS — E05.00 GRAVES' DISEASE: ICD-10-CM

## 2023-08-14 LAB
T3 SERPL-MCNC: 153 NG/DL (ref 60–180)
T4 FREE SERPL-MCNC: 1.33 NG/DL (ref 0.71–1.51)

## 2023-08-14 PROCEDURE — 1160F PR REVIEW ALL MEDS BY PRESCRIBER/CLIN PHARMACIST DOCUMENTED: ICD-10-PCS | Mod: CPTII,S$GLB,, | Performed by: STUDENT IN AN ORGANIZED HEALTH CARE EDUCATION/TRAINING PROGRAM

## 2023-08-14 PROCEDURE — 99999 PR PBB SHADOW E&M-EST. PATIENT-LVL V: ICD-10-PCS | Mod: PBBFAC,,, | Performed by: STUDENT IN AN ORGANIZED HEALTH CARE EDUCATION/TRAINING PROGRAM

## 2023-08-14 PROCEDURE — 3008F PR BODY MASS INDEX (BMI) DOCUMENTED: ICD-10-PCS | Mod: CPTII,S$GLB,, | Performed by: STUDENT IN AN ORGANIZED HEALTH CARE EDUCATION/TRAINING PROGRAM

## 2023-08-14 PROCEDURE — 3044F PR MOST RECENT HEMOGLOBIN A1C LEVEL <7.0%: ICD-10-PCS | Mod: CPTII,S$GLB,, | Performed by: STUDENT IN AN ORGANIZED HEALTH CARE EDUCATION/TRAINING PROGRAM

## 2023-08-14 PROCEDURE — 1160F RVW MEDS BY RX/DR IN RCRD: CPT | Mod: CPTII,S$GLB,, | Performed by: STUDENT IN AN ORGANIZED HEALTH CARE EDUCATION/TRAINING PROGRAM

## 2023-08-14 PROCEDURE — 1159F MED LIST DOCD IN RCRD: CPT | Mod: CPTII,S$GLB,, | Performed by: STUDENT IN AN ORGANIZED HEALTH CARE EDUCATION/TRAINING PROGRAM

## 2023-08-14 PROCEDURE — 3074F PR MOST RECENT SYSTOLIC BLOOD PRESSURE < 130 MM HG: ICD-10-PCS | Mod: CPTII,S$GLB,, | Performed by: STUDENT IN AN ORGANIZED HEALTH CARE EDUCATION/TRAINING PROGRAM

## 2023-08-14 PROCEDURE — 3008F BODY MASS INDEX DOCD: CPT | Mod: CPTII,S$GLB,, | Performed by: STUDENT IN AN ORGANIZED HEALTH CARE EDUCATION/TRAINING PROGRAM

## 2023-08-14 PROCEDURE — 3044F HG A1C LEVEL LT 7.0%: CPT | Mod: CPTII,S$GLB,, | Performed by: STUDENT IN AN ORGANIZED HEALTH CARE EDUCATION/TRAINING PROGRAM

## 2023-08-14 PROCEDURE — 3078F PR MOST RECENT DIASTOLIC BLOOD PRESSURE < 80 MM HG: ICD-10-PCS | Mod: CPTII,S$GLB,, | Performed by: STUDENT IN AN ORGANIZED HEALTH CARE EDUCATION/TRAINING PROGRAM

## 2023-08-14 PROCEDURE — 99204 PR OFFICE/OUTPT VISIT, NEW, LEVL IV, 45-59 MIN: ICD-10-PCS | Mod: S$GLB,,, | Performed by: STUDENT IN AN ORGANIZED HEALTH CARE EDUCATION/TRAINING PROGRAM

## 2023-08-14 PROCEDURE — 1159F PR MEDICATION LIST DOCUMENTED IN MEDICAL RECORD: ICD-10-PCS | Mod: CPTII,S$GLB,, | Performed by: STUDENT IN AN ORGANIZED HEALTH CARE EDUCATION/TRAINING PROGRAM

## 2023-08-14 PROCEDURE — 84480 ASSAY TRIIODOTHYRONINE (T3): CPT | Performed by: STUDENT IN AN ORGANIZED HEALTH CARE EDUCATION/TRAINING PROGRAM

## 2023-08-14 PROCEDURE — 3074F SYST BP LT 130 MM HG: CPT | Mod: CPTII,S$GLB,, | Performed by: STUDENT IN AN ORGANIZED HEALTH CARE EDUCATION/TRAINING PROGRAM

## 2023-08-14 PROCEDURE — 3078F DIAST BP <80 MM HG: CPT | Mod: CPTII,S$GLB,, | Performed by: STUDENT IN AN ORGANIZED HEALTH CARE EDUCATION/TRAINING PROGRAM

## 2023-08-14 PROCEDURE — 99999 PR PBB SHADOW E&M-EST. PATIENT-LVL V: CPT | Mod: PBBFAC,,, | Performed by: STUDENT IN AN ORGANIZED HEALTH CARE EDUCATION/TRAINING PROGRAM

## 2023-08-14 PROCEDURE — 84439 ASSAY OF FREE THYROXINE: CPT | Performed by: STUDENT IN AN ORGANIZED HEALTH CARE EDUCATION/TRAINING PROGRAM

## 2023-08-14 PROCEDURE — 36415 COLL VENOUS BLD VENIPUNCTURE: CPT | Performed by: STUDENT IN AN ORGANIZED HEALTH CARE EDUCATION/TRAINING PROGRAM

## 2023-08-14 PROCEDURE — 99204 OFFICE O/P NEW MOD 45 MIN: CPT | Mod: S$GLB,,, | Performed by: STUDENT IN AN ORGANIZED HEALTH CARE EDUCATION/TRAINING PROGRAM

## 2023-08-14 NOTE — PROGRESS NOTES
I have reviewed and concur with Dr. Patricia Lazo's history, physical, assessment, and plan.  I have personally interviewed and examined the patient.  See below addendum for my evaluation and additional findings.    Patient with recently diagnosed Grave's disease with neck ultrasound showing some nodules (all subcentimeter as right nodule #2 measures 0.9 cm in TV and not 2.9 cm which is an error in the report).  Has neen on TDD of methimazole 15 mg x 1 month(s) and is clinically euthyroid, interested in getting pregnant soon.    Recommend avoiding pregnancy until T3 and free T4 normalize and using contraception until then.   Discussed treatment options including ongoing medical management (with slightly increased risk of birth defects), thyroidectomy, radioactive iodine (which would require her to abstain from pregnancy for at least a year ).    She prefers to continue with medical management with the goal of coming off medication in the far future.  Will get repeat labs including free T4 and total T3 and based on levels will  consider changing to PTU labs are normal.     Víctor Garcia MD

## 2023-08-14 NOTE — PROGRESS NOTES
ENDOCRINOLOGY NEW PATIENT VISIT: 08/14/2023    Subjective:      Patient ID: Tiffany Dobbs is a 33 y.o. female.    Chief Complaint:  Hyperthyroidism    History of Present Illness  Regarding hyperthyroidism:  Found on labs beginning April 2023 by her PCP. Currently on methimazole 5 mg TID, started mid July/2023. Not currently having palpitations anymore, not as anxious anymore; therapy seems to have helped her.    Weight: lost 30 lb (intentional)  Energy level: normal  Appetite: normal  Cold/heat intolerance: some heat intolerance  Bowel movements: some hyperdefecation  Tremor: none  Palpitations: yes  Nervousness/mood changes: anxious    Menses: regular periods    Plans for pregnancy: wants to try in the next 6 months  Peripartum thyroid disorder: no    Exposure to iodine/contrast: no    Eye symptoms:  Double vision: no  Changes in eye appearance: no  Periorbital edema: no  Eye pain/pressure: no  Eye grittiness/dryness: no  Light sensitivity: no    Denied neck enlargement, hoarseness, dysphagia.    FH: Graves' disease in grandmother and aunt.    Lab Results   Component Value Date    TSH <0.010 (L) 07/13/2023    TSH <0.010 (L) 04/11/2023    TSH 2.600 05/03/2021    FREET4 1.33 08/14/2023    FREET4 2.35 (H) 07/13/2023    FREET4 1.69 (H) 04/11/2023    THGABSCRN 4.1 (H) 07/13/2023     Thyroid US 7/14/23:  FINDINGS:  Right lobe of the thyroid measures 5.4 x 1.5 x 2.2 cm.  Multiple nodules, as below:  1.  0.7 x 0.2 x 0.6 cm cystic, anechoic nodule in the upper right lobe.  TI-RADS 1.  2.  2.9 x 0.4 x 0.7 cm hypoechoic, mixed cystic and solid nodule in the mid right lobe without calcification.  TI-RADS 3.  3.  0.6 x 0.3 x 0.7 cm hypoechoic, mixed cystic and solid nodule in the upper right lobe without calcification.  TI-RADS 3.  The isthmus measures 0.4 cm.  No nodules identified.  Left lobe of the thyroid measures 4.6 x 1.6 x 2.0 cm.  There is a 0.8 x 0.3 x 0.5 cm solid, hyperechoic nodule with ill-defined borders and  "without calcification.  TI-RADS 3.  Heterogeneous thyroid parenchyma with increased vascularity predominantly in the left lobe.  No cervical lymphadenopathy.     Impression:  Multinodular thyroid.  No nodule meets ACR criteria for follow-up or fine-needle aspiration biopsy.  Heterogeneous thyroid with increased vascularity as can be seen in acute thyroiditis.        ROS:   As above    Objective:     /64 (BP Location: Right arm, Patient Position: Sitting, BP Method: Medium (Automatic))   Pulse 103   Resp 18   Ht 5' 3" (1.6 m)   Wt 75.8 kg (167 lb 3.5 oz)   LMP 07/25/2023 (Approximate)   SpO2 97%   BMI 29.62 kg/m²   BP Readings from Last 3 Encounters:   08/14/23 112/64   07/13/23 (!) 140/80   05/02/23 128/78     Wt Readings from Last 1 Encounters:   08/14/23 0811 75.8 kg (167 lb 3.5 oz)     Body mass index is 29.62 kg/m².      Physical Exam  Vitals reviewed.   Constitutional:       General: She is not in acute distress.     Appearance: Normal appearance. She is not toxic-appearing.   Eyes:      Extraocular Movements: Extraocular movements intact.      Conjunctiva/sclera: Conjunctivae normal.      Pupils: Pupils are equal, round, and reactive to light.   Neck:      Thyroid: No thyroid mass, thyromegaly or thyroid tenderness.   Pulmonary:      Effort: Pulmonary effort is normal. No respiratory distress.   Musculoskeletal:      Cervical back: Neck supple.   Lymphadenopathy:      Cervical: No cervical adenopathy.   Skin:     General: Skin is warm and dry.   Neurological:      General: No focal deficit present.      Mental Status: She is alert and oriented to person, place, and time.   Psychiatric:         Mood and Affect: Mood normal.         Thought Content: Thought content normal.            Lab Review:   Lab Results   Component Value Date    HGBA1C 5.3 04/11/2023     Lab Results   Component Value Date    CHOL 131 04/11/2023    HDL 51 04/11/2023    LDLCALC 64.0 04/11/2023    TRIG 80 04/11/2023    CHOLHDL " "38.9 04/11/2023     Lab Results   Component Value Date     (L) 07/13/2023    K 4.0 07/13/2023     07/13/2023    CO2 19 (L) 07/13/2023    GLU 88 07/13/2023    BUN 12 07/13/2023    CREATININE 0.7 07/13/2023    CALCIUM 10.0 07/13/2023    PROT 8.2 07/13/2023    ALBUMIN 3.6 07/13/2023    BILITOT 0.5 07/13/2023    ALKPHOS 139 (H) 07/13/2023    AST 29 07/13/2023    ALT 33 07/13/2023    ANIONGAP 11 07/13/2023    ESTGFRAFRICA >60 07/25/2022    EGFRNONAA >60 07/25/2022    TSH <0.010 (L) 07/13/2023     No results found for: "[id number]"    Assessment and Plan     Graves' disease  No concern for orbitopathy. Improved symptoms on methimazole.  Discussed the chance of Graves remission (40%) after 2 years of ATD therapy - with a 50% chance of recurrence.  Patient opted for continuing methimazole at 15 mg daily.  Will review T3 and T4.    We discussed her desire for fertility and different treatment options including surgery (she is not interested in it).  Will change to PTU once euthyroid.    Call if fever, sore throat, rash, anorexia, nausea or vomiting.  Reviewed side effects of ATDs are rare (agranulocytosis and liver failure) but can be very serious.         Multiple thyroid nodules  Multiple nodules seen on last US, TIRADS 3 at the highest. Unclear whether those were real nodules or just heterogenous thyroid texture. No indication for biopsy.  -Repeat US here in 6 months      Joanne Gomez MD  [facility], 6th Floor  [address] [place], [address]       "

## 2023-08-14 NOTE — ASSESSMENT & PLAN NOTE
No concern for orbitopathy. Improved symptoms on methimazole.  Discussed the chance of Graves remission (40%) after 2 years of ATD therapy - with a 50% chance of recurrence.  Patient opted for continuing methimazole at 15 mg daily.  Will review T3 and T4.    We discussed her desire for fertility and different treatment options including surgery (she is not interested in it).  Will change to PTU once euthyroid.    Call if fever, sore throat, rash, anorexia, nausea or vomiting.  Reviewed side effects of ATDs are rare (agranulocytosis and liver failure) but can be very serious.

## 2023-08-14 NOTE — PATIENT INSTRUCTIONS
"METHIMAZOLE RULES     You are taking either propylthioracil (also called PTU) or methimazole for treatment of your hyperthyroidism.  One out of 500 patients taking either of these drugs may develop a condition called "agranulocytosis", which is a lowering of the white blood cell count.  White blood cells help your body to fight infection.  Patients with low white blood cell counts develop fevers and sore throats and risk severe infections. The agranulocytosis will resolve when you stop the drug.    During the entire period you are taking either PTU or methimazole, if you develop a fever or sore throat you MUST stop the drug and call my office immediately at [phone].  If it is after hours, please ask to speak to the endocrine fellow ON CALL to tell him/her that you have had either a fever or a sore throat and stopped the medication.    Do not resume taking PTU or methimazole until you hear from me or another physician in my practice.           "

## 2023-08-14 NOTE — ASSESSMENT & PLAN NOTE
Multiple nodules seen on last US, TIRADS 3 at the highest. Unclear whether those were real nodules or just heterogenous thyroid texture. No indication for biopsy.  -Repeat US here in 6 months

## 2023-08-15 ENCOUNTER — PATIENT MESSAGE (OUTPATIENT)
Dept: HEMATOLOGY/ONCOLOGY | Facility: CLINIC | Age: 33
End: 2023-08-15
Payer: COMMERCIAL

## 2023-08-15 DIAGNOSIS — E05.90 HYPERTHYROIDISM: Primary | ICD-10-CM

## 2023-08-15 RX ORDER — PROPYLTHIOURACIL 50 MG/1
100 TABLET ORAL EVERY 8 HOURS
Qty: 180 TABLET | Refills: 11 | Status: SHIPPED | OUTPATIENT
Start: 2023-08-15 | End: 2023-09-07

## 2023-08-16 ENCOUNTER — OFFICE VISIT (OUTPATIENT)
Dept: INTERNAL MEDICINE | Facility: CLINIC | Age: 33
End: 2023-08-16
Payer: COMMERCIAL

## 2023-08-16 VITALS
DIASTOLIC BLOOD PRESSURE: 66 MMHG | OXYGEN SATURATION: 99 % | HEART RATE: 90 BPM | WEIGHT: 171.5 LBS | HEIGHT: 63 IN | SYSTOLIC BLOOD PRESSURE: 132 MMHG | BODY MASS INDEX: 30.39 KG/M2

## 2023-08-16 DIAGNOSIS — E28.2 PCOS (POLYCYSTIC OVARIAN SYNDROME): ICD-10-CM

## 2023-08-16 DIAGNOSIS — Z23 NEED FOR VACCINATION: ICD-10-CM

## 2023-08-16 DIAGNOSIS — D50.0 IRON DEFICIENCY ANEMIA DUE TO CHRONIC BLOOD LOSS: ICD-10-CM

## 2023-08-16 DIAGNOSIS — Z23 NEED FOR TETANUS BOOSTER: ICD-10-CM

## 2023-08-16 DIAGNOSIS — E05.90 HYPERTHYROIDISM: Primary | ICD-10-CM

## 2023-08-16 PROCEDURE — 3078F PR MOST RECENT DIASTOLIC BLOOD PRESSURE < 80 MM HG: ICD-10-PCS | Mod: CPTII,S$GLB,,

## 2023-08-16 PROCEDURE — 3044F HG A1C LEVEL LT 7.0%: CPT | Mod: CPTII,S$GLB,,

## 2023-08-16 PROCEDURE — 99213 OFFICE O/P EST LOW 20 MIN: CPT | Mod: 25,GC,S$GLB,

## 2023-08-16 PROCEDURE — 99213 PR OFFICE/OUTPT VISIT, EST, LEVL III, 20-29 MIN: ICD-10-PCS | Mod: 25,GC,S$GLB,

## 2023-08-16 PROCEDURE — 3008F BODY MASS INDEX DOCD: CPT | Mod: CPTII,S$GLB,,

## 2023-08-16 PROCEDURE — 99999 PR PBB SHADOW E&M-EST. PATIENT-LVL III: ICD-10-PCS | Mod: PBBFAC,,,

## 2023-08-16 PROCEDURE — 90715 TDAP VACCINE 7 YRS/> IM: CPT | Mod: S$GLB,,, | Performed by: INTERNAL MEDICINE

## 2023-08-16 PROCEDURE — 3075F SYST BP GE 130 - 139MM HG: CPT | Mod: CPTII,S$GLB,,

## 2023-08-16 PROCEDURE — 3078F DIAST BP <80 MM HG: CPT | Mod: CPTII,S$GLB,,

## 2023-08-16 PROCEDURE — 3008F PR BODY MASS INDEX (BMI) DOCUMENTED: ICD-10-PCS | Mod: CPTII,S$GLB,,

## 2023-08-16 PROCEDURE — 90471 IMMUNIZATION ADMIN: CPT | Mod: S$GLB,,, | Performed by: INTERNAL MEDICINE

## 2023-08-16 PROCEDURE — 3044F PR MOST RECENT HEMOGLOBIN A1C LEVEL <7.0%: ICD-10-PCS | Mod: CPTII,S$GLB,,

## 2023-08-16 PROCEDURE — 90715 TDAP VACCINE GREATER THAN OR EQUAL TO 7YO IM: ICD-10-PCS | Mod: S$GLB,,, | Performed by: INTERNAL MEDICINE

## 2023-08-16 PROCEDURE — 3075F PR MOST RECENT SYSTOLIC BLOOD PRESS GE 130-139MM HG: ICD-10-PCS | Mod: CPTII,S$GLB,,

## 2023-08-16 PROCEDURE — 99999 PR PBB SHADOW E&M-EST. PATIENT-LVL III: CPT | Mod: PBBFAC,,,

## 2023-08-16 PROCEDURE — 90471 TDAP VACCINE GREATER THAN OR EQUAL TO 7YO IM: ICD-10-PCS | Mod: S$GLB,,, | Performed by: INTERNAL MEDICINE

## 2023-08-16 NOTE — Clinical Note
Wonder if you can help or direct us. This pt is listed as being on the chronic Liver disease registry. . . We cannot figure how that came to be or if that is correct etc. Any help you can lend would be appreciated.

## 2023-08-16 NOTE — PROGRESS NOTES
Clinic Note  8/16/2023      Subjective:       Patient ID:  Tiffany is a 33 y.o. female being seen for an established visit.    Chief Complaint: Follow-up    HPI    33 year-old female with history of PCOS, recently diagnosed Graves disease, family history of breast cancer in maternal aunt presenting for follow up visit.    Graves disease:Patient recently seen by me in clinic for abnormal thyroid labs and was found to have graves disease upon further testing. She was started on Methimazole and Atenolol. Patient notes she never took the atenolol but her symptoms have improved. Patient recently saw endocrinology for her graves disease on 8/14/23, her thyroid levels were rechecked and shown to normalize. Patient with concerns about getting pregnant while on anti-thyroid medications. Patient worried about taking PTU as well because of possible cranio-facial abnormalities.     Family history of breast cancer: Patient seen by Breast clinic given family history of breast cancer: per there note she is going to get alternating annual mammograms and annual MRI along with semiannual CBE. Patient was referred to [facility].     Iron deficiency anemia: She is taking iron supplements daily. We reviewed her CBC and there was improvement in her Hgb back in July. No symptoms. Discussed repeating iron studies with patient. She is following an ob/gyn.        Review of Systems   Constitutional:  Negative for chills, fever and weight loss.   HENT: Negative.     Eyes:  Negative for pain.   Respiratory: Negative.     Cardiovascular:  Negative for chest pain and leg swelling.   Gastrointestinal:  Negative for abdominal pain, nausea and vomiting.   Genitourinary: Negative.    Musculoskeletal: Negative.    Skin: Negative.    Neurological: Negative.    Psychiatric/Behavioral:  Negative for memory loss. The patient does not have insomnia.        Past Medical History:   Diagnosis Date    Anemia     PCOS  (polycystic ovarian syndrome) 04/2020       Family History   Problem Relation Age of Onset    Breast cancer Maternal Aunt     Glaucoma Maternal Aunt     Breast cancer Paternal Aunt     Ovarian cancer Maternal Aunt     Gestational diabetes Mother     Glaucoma Mother     Heart disease Father         arrythmia, has device - ICD?    Diverticulitis Father     Diabetes Maternal Grandmother     Dementia Maternal Grandmother     Arthritis Maternal Grandmother         spinal    Hydrocephalus Maternal Grandmother     Stomach cancer Maternal Grandfather     Diabetes Maternal Aunt     No Known Problems Brother     Colon cancer Neg Hx     Heart attack Neg Hx         reports that she has never smoked. She has never used smokeless tobacco. She reports current alcohol use. She reports that she does not use drugs.    Medication List with Changes/Refills   Current Medications    ATENOLOL (TENORMIN) 50 MG TABLET    Take 1 tablet (50 mg total) by mouth once daily.    FERROUS SULFATE (IRON) 325 MG (65 MG IRON) TAB TABLET    Take 1 tablet (325 mg total) by mouth 2 (two) times daily.    PROPYLTHIOURACIL (PTU) 50 MG TAB    Take 2 tablets (100 mg total) by mouth every 8 (eight) hours.     Review of patient's allergies indicates:   Allergen Reactions    Bactrim [sulfamethoxazole-trimethoprim] Itching and Rash       Patient Active Problem List   Diagnosis    Surgical wound dehiscence    Chronic pain of left knee    Menorrhagia with irregular cycle    Microcytic anemia    Keloid    Patellofemoral pain syndrome of both knees    Chronic hip pain, bilateral    Acute midline low back pain without sciatica    Rupture of anterior cruciate ligament of right knee    Range of motion deficit    Muscle weakness of lower extremity    Injury of posterolateral corner of knee, right, initial encounter    Status post arthroscopic surgery of right knee    Sprain of medial collateral ligament of right knee    Graves' disease    Multiple thyroid nodules          "  Objective:      /66   Pulse 90   Ht 5' 3" (1.6 m)   Wt 77.8 kg (171 lb 8.3 oz)   LMP 07/25/2023 (Approximate)   SpO2 99%   BMI 30.38 kg/m²   Estimated body mass index is 30.38 kg/m² as calculated from the following:    Height as of this encounter: 5' 3" (1.6 m).    Weight as of this encounter: 77.8 kg (171 lb 8.3 oz).  Physical Exam  Vitals reviewed.   Constitutional:       Appearance: Normal appearance.   HENT:      Head: Normocephalic and atraumatic.      Right Ear: External ear normal.      Left Ear: External ear normal.      Nose: Nose normal.   Eyes:      General:         Right eye: No discharge.         Left eye: No discharge.      Conjunctiva/sclera: Conjunctivae normal.   Cardiovascular:      Rate and Rhythm: Normal rate and regular rhythm.      Pulses: Normal pulses.      Heart sounds: Normal heart sounds.   Pulmonary:      Effort: Pulmonary effort is normal.      Breath sounds: Normal breath sounds.   Abdominal:      General: Abdomen is flat.      Palpations: Abdomen is soft.   Musculoskeletal:      Cervical back: Normal range of motion and neck supple.      Right lower leg: No edema.      Left lower leg: No edema.   Skin:     General: Skin is warm and dry.   Neurological:      Mental Status: She is alert and oriented to person, place, and time. Mental status is at baseline.   Psychiatric:         Mood and Affect: Mood normal.         Behavior: Behavior normal.         Assessment and Plan:   33  year old female with history of PCOS, iron deficiency anemia from menstrual cycle, recently diagnosed with graves disease at last clinic appointment now with normalized thyroid levels after being started on methimazole 15 mg daily. Patient notes she has not needed atenolol since she has improved on methimazole. Patient is now established with Endocrinology and will follow up for her graves disease. Patient hesitant about trying to get pregnant on anti-thyroid medication. Discussed this is something " she can further discuss with endocrinology about switching to PTU in the future. Will follow up patients iron studies in 3 months, suspect it is improving based on most recent CBC given hemoglobin at that time was normalizing and MCV improving. Patient overdue for tetanus shot, this was given in clinic. She needs COVID booster, form given for that but closed currently so she will have to come back another day.      Tiffnay was seen today for follow-up.    Diagnoses and all orders for this visit:    Hyperthyroidism  --see above  --continue methimazole 15 mg daily  --patient opted not to take atenolol, will hold given her symptoms are improved.  --follow up with future endocrinology appointment for further discussion about anti-thyroid medication and pregnancy.    PCOS (polycystic ovarian syndrome)  --patient following ob/gyn    Need for vaccination  --patient needs COVID booster, given form to get it done here.    Need for tetanus booster  --done in clinic    Iron deficiency anemia due to chronic blood loss  -     IRON AND TIBC; Future  -     FERRITIN; Future  --follow up iron studies in 3 months.    Other orders  -     (In Office Administered) Tdap Vaccine        Follow up in about 6 months (around 2/16/2024) for follow up.    Other Orders Placed This Visit:  Orders Placed This Encounter   Procedures    (In Office Administered) Tdap Vaccine    IRON AND TIBC    FERRITIN         Raúl Fried MD  Internal Medicine PGY-3

## 2023-08-17 ENCOUNTER — PATIENT OUTREACH (OUTPATIENT)
Dept: ADMINISTRATIVE | Facility: HOSPITAL | Age: 33
End: 2023-08-17
Payer: COMMERCIAL

## 2023-08-17 PROBLEM — M25.562 CHRONIC PAIN OF LEFT KNEE: Status: RESOLVED | Noted: 2017-06-05 | Resolved: 2023-08-17

## 2023-08-17 PROBLEM — G89.29 CHRONIC PAIN OF LEFT KNEE: Status: RESOLVED | Noted: 2017-06-05 | Resolved: 2023-08-17

## 2023-09-05 ENCOUNTER — PATIENT MESSAGE (OUTPATIENT)
Dept: OBSTETRICS AND GYNECOLOGY | Facility: CLINIC | Age: 33
End: 2023-09-05

## 2023-09-05 ENCOUNTER — OFFICE VISIT (OUTPATIENT)
Dept: OBSTETRICS AND GYNECOLOGY | Facility: CLINIC | Age: 33
End: 2023-09-05
Payer: COMMERCIAL

## 2023-09-05 VITALS
SYSTOLIC BLOOD PRESSURE: 124 MMHG | HEIGHT: 63 IN | WEIGHT: 173.63 LBS | BODY MASS INDEX: 30.77 KG/M2 | DIASTOLIC BLOOD PRESSURE: 78 MMHG

## 2023-09-05 DIAGNOSIS — E66.9 OBESITY, UNSPECIFIED CLASSIFICATION, UNSPECIFIED OBESITY TYPE, UNSPECIFIED WHETHER SERIOUS COMORBIDITY PRESENT: ICD-10-CM

## 2023-09-05 DIAGNOSIS — E05.00 GRAVES' DISEASE: ICD-10-CM

## 2023-09-05 DIAGNOSIS — E88.810 METABOLIC SYNDROME: Primary | ICD-10-CM

## 2023-09-05 DIAGNOSIS — Z91.89 AT HIGH RISK FOR BREAST CANCER: Primary | ICD-10-CM

## 2023-09-05 DIAGNOSIS — E66.3 OVERWEIGHT (BMI 25.0-29.9): ICD-10-CM

## 2023-09-05 PROCEDURE — 1159F PR MEDICATION LIST DOCUMENTED IN MEDICAL RECORD: ICD-10-PCS | Mod: CPTII,S$GLB,, | Performed by: PHYSICIAN ASSISTANT

## 2023-09-05 PROCEDURE — 1160F PR REVIEW ALL MEDS BY PRESCRIBER/CLIN PHARMACIST DOCUMENTED: ICD-10-PCS | Mod: CPTII,S$GLB,, | Performed by: PHYSICIAN ASSISTANT

## 2023-09-05 PROCEDURE — 3074F SYST BP LT 130 MM HG: CPT | Mod: CPTII,S$GLB,, | Performed by: PHYSICIAN ASSISTANT

## 2023-09-05 PROCEDURE — 3078F PR MOST RECENT DIASTOLIC BLOOD PRESSURE < 80 MM HG: ICD-10-PCS | Mod: CPTII,S$GLB,, | Performed by: PHYSICIAN ASSISTANT

## 2023-09-05 PROCEDURE — 3074F PR MOST RECENT SYSTOLIC BLOOD PRESSURE < 130 MM HG: ICD-10-PCS | Mod: CPTII,S$GLB,, | Performed by: PHYSICIAN ASSISTANT

## 2023-09-05 PROCEDURE — 1160F RVW MEDS BY RX/DR IN RCRD: CPT | Mod: CPTII,S$GLB,, | Performed by: PHYSICIAN ASSISTANT

## 2023-09-05 PROCEDURE — 99999 PR PBB SHADOW E&M-EST. PATIENT-LVL III: CPT | Mod: PBBFAC,,, | Performed by: PHYSICIAN ASSISTANT

## 2023-09-05 PROCEDURE — 99214 PR OFFICE/OUTPT VISIT, EST, LEVL IV, 30-39 MIN: ICD-10-PCS | Mod: S$PBB,,, | Performed by: PHYSICIAN ASSISTANT

## 2023-09-05 PROCEDURE — 3078F DIAST BP <80 MM HG: CPT | Mod: CPTII,S$GLB,, | Performed by: PHYSICIAN ASSISTANT

## 2023-09-05 PROCEDURE — 99214 OFFICE O/P EST MOD 30 MIN: CPT | Mod: S$PBB,,, | Performed by: PHYSICIAN ASSISTANT

## 2023-09-05 PROCEDURE — 3008F BODY MASS INDEX DOCD: CPT | Mod: CPTII,S$GLB,, | Performed by: PHYSICIAN ASSISTANT

## 2023-09-05 PROCEDURE — 1159F MED LIST DOCD IN RCRD: CPT | Mod: CPTII,S$GLB,, | Performed by: PHYSICIAN ASSISTANT

## 2023-09-05 PROCEDURE — 99999 PR PBB SHADOW E&M-EST. PATIENT-LVL III: ICD-10-PCS | Mod: PBBFAC,,, | Performed by: PHYSICIAN ASSISTANT

## 2023-09-05 PROCEDURE — 3044F PR MOST RECENT HEMOGLOBIN A1C LEVEL <7.0%: ICD-10-PCS | Mod: CPTII,S$GLB,, | Performed by: PHYSICIAN ASSISTANT

## 2023-09-05 PROCEDURE — 3008F PR BODY MASS INDEX (BMI) DOCUMENTED: ICD-10-PCS | Mod: CPTII,S$GLB,, | Performed by: PHYSICIAN ASSISTANT

## 2023-09-05 PROCEDURE — 3044F HG A1C LEVEL LT 7.0%: CPT | Mod: CPTII,S$GLB,, | Performed by: PHYSICIAN ASSISTANT

## 2023-09-05 RX ORDER — SEMAGLUTIDE 0.68 MG/ML
0.25 INJECTION, SOLUTION SUBCUTANEOUS
Qty: 3 ML | Refills: 3 | Status: SHIPPED | OUTPATIENT
Start: 2023-09-05 | End: 2023-09-13

## 2023-09-05 RX ORDER — SEMAGLUTIDE 0.25 MG/.5ML
0.25 INJECTION, SOLUTION SUBCUTANEOUS
Qty: 2 ML | Refills: 2 | Status: SHIPPED | OUTPATIENT
Start: 2023-09-05 | End: 2023-09-13

## 2023-09-05 NOTE — PROGRESS NOTES
Subjective:      Tiffany Dobbs is a 33 y.o. female who presents to discuss weight loss. She is at high risk for breast cancer and would like to lose weight to help reduce this risk. Started to lose weight with reducing sugar intake. Then started compounded tirzepatide with outside clinic. She is now taking compounded tirzepatide 5mg weekly. Lost about 10 pounds with this initially but not has not lost any more since. Stopped for about 2 weeks and gained weight back. Therefore, restarted compounded tirzepatide 5mg weekly last week. The compounded is expensive and makes her nervous. She would like to discuss options.  She does have a history of Graves disease that is well controlled with oral medication. No history of thyroid cancer.    Sleep: 7-8 hours nightly     Stress: Normal    Exercise: None     A typical day consists of:  Breakfast: Skips or bagel with cream cheese  Lunch: Picks up and varies- fast food or [place]ic   Dinner: Skips or snacks  Snack: popcorn, sweets- cake  Beverages: water, unsweet tea. Alcohol- 1-2 per month      Lab Visit on 08/14/2023   Component Date Value Ref Range Status    T3, Total 08/14/2023 153  60 - 180 ng/dL Final    Free T4 08/14/2023 1.33  0.71 - 1.51 ng/dL Final   Lab Visit on 07/13/2023   Component Date Value Ref Range Status    Thyroperoxidase Antibodies 07/13/2023 14.6 (H)  <6.0 IU/mL Final    WBC 07/13/2023 4.56  3.90 - 12.70 K/uL Final    RBC 07/13/2023 5.49 (H)  4.00 - 5.40 M/uL Final    Hemoglobin 07/13/2023 11.9 (L)  12.0 - 16.0 g/dL Final    Hematocrit 07/13/2023 39.8  37.0 - 48.5 % Final    MCV 07/13/2023 73 (L)  82 - 98 fL Final    MCH 07/13/2023 21.7 (L)  27.0 - 31.0 pg Final    MCHC 07/13/2023 29.9 (L)  32.0 - 36.0 g/dL Final    RDW 07/13/2023 19.1 (H)  11.5 - 14.5 % Final    Platelets 07/13/2023 428  150 - 450 K/uL Final    MPV 07/13/2023 10.6  9.2 - 12.9 fL Final    Immature Granulocytes 07/13/2023 0.4  0.0 - 0.5 % Final    Gran # (ANC) 07/13/2023 2.0  1.8 - 7.7  K/uL Final    Immature Grans (Abs) 07/13/2023 0.02  0.00 - 0.04 K/uL Final    Lymph # 07/13/2023 1.8  1.0 - 4.8 K/uL Final    Mono # 07/13/2023 0.5  0.3 - 1.0 K/uL Final    Eos # 07/13/2023 0.2  0.0 - 0.5 K/uL Final    Baso # 07/13/2023 0.02  0.00 - 0.20 K/uL Final    nRBC 07/13/2023 0  0 /100 WBC Final    Gran % 07/13/2023 42.9  38.0 - 73.0 % Final    Lymph % 07/13/2023 40.1  18.0 - 48.0 % Final    Mono % 07/13/2023 11.8  4.0 - 15.0 % Final    Eosinophil % 07/13/2023 4.4  0.0 - 8.0 % Final    Basophil % 07/13/2023 0.4  0.0 - 1.9 % Final    Differential Method 07/13/2023 Automated   Final    Sodium 07/13/2023 135 (L)  136 - 145 mmol/L Final    Potassium 07/13/2023 4.0  3.5 - 5.1 mmol/L Final    Chloride 07/13/2023 105  95 - 110 mmol/L Final    CO2 07/13/2023 19 (L)  23 - 29 mmol/L Final    Glucose 07/13/2023 88  70 - 110 mg/dL Final    BUN 07/13/2023 12  6 - 20 mg/dL Final    Creatinine 07/13/2023 0.7  0.5 - 1.4 mg/dL Final    Calcium 07/13/2023 10.0  8.7 - 10.5 mg/dL Final    Total Protein 07/13/2023 8.2  6.0 - 8.4 g/dL Final    Albumin 07/13/2023 3.6  3.5 - 5.2 g/dL Final    Total Bilirubin 07/13/2023 0.5  0.1 - 1.0 mg/dL Final    Alkaline Phosphatase 07/13/2023 139 (H)  55 - 135 U/L Final    AST 07/13/2023 29  10 - 40 U/L Final    ALT 07/13/2023 33  10 - 44 U/L Final    eGFR 07/13/2023 >60.0  >60 mL/min/1.73 m^2 Final    Anion Gap 07/13/2023 11  8 - 16 mmol/L Final    TSH 07/13/2023 <0.010 (L)  0.400 - 4.000 uIU/mL Final    Free T4 07/13/2023 2.35 (H)  0.71 - 1.51 ng/dL Final    Thyrotropin Receptor Ab 07/13/2023 5.94 (H)  0.00 - 1.75 IU/L Final    Thyroglobulin Ab Screen 07/13/2023 4.1 (H)  0.0 - 3.9 IU/mL Final       Past Medical History:   Diagnosis Date    Anemia     Chronic pain of left knee 6/5/2017    PCOS (polycystic ovarian syndrome) 04/2020     Past Surgical History:   Procedure Laterality Date    BREAST REDUCTION  2012    EXAMINATION UNDER ANESTHESIA  08/02/2022    Procedure: EXAM UNDER ANESTHESIA;   Surgeon: Tevin Tubbs IV, MD;  Location: [facility] OR;  Service: Orthopedics;;    RECONSTRUCTION OF ANTERIOR CRUCIATE LIGAMENT USING GRAFT Right 2022    Procedure: RECONSTRUCTION, KNEE, ACL, USING Allograft Achilles GRAFT, possible meniscus repair, possible PLC repair, knee exam under anesthesia;  Surgeon: Tevin Tubbs IV, MD;  Location: [facility] OR;  Service: Orthopedics;  Laterality: Right;  Achilles allograft with bone block(IN FREEZER); open/THAW at start of case  Black knee post/operative side, well leg marvin on nonsurgical side  Position: ACL P    TOTAL REDUCTION MAMMOPLASTY       Social History     Tobacco Use    Smoking status: Never    Smokeless tobacco: Never   Substance Use Topics    Alcohol use: Yes     Comment: socially, twice per month    Drug use: No     Family History   Problem Relation Age of Onset    Breast cancer Maternal Aunt     Glaucoma Maternal Aunt     Breast cancer Paternal Aunt     Ovarian cancer Maternal Aunt     Gestational diabetes Mother     Glaucoma Mother     Heart disease Father         arrythmia, has device - ICD?    Diverticulitis Father     Diabetes Maternal Grandmother     Dementia Maternal Grandmother     Arthritis Maternal Grandmother         spinal    Hydrocephalus Maternal Grandmother     Stomach cancer Maternal Grandfather     Diabetes Maternal Aunt     No Known Problems Brother     Colon cancer Neg Hx     Heart attack Neg Hx      OB History    Para Term  AB Living   0         0   SAB IAB Ectopic Multiple Live Births                   Current Outpatient Medications:     atenoloL (TENORMIN) 50 MG tablet, Take 1 tablet (50 mg total) by mouth once daily., Disp: 30 tablet, Rfl: 2    ferrous sulfate (IRON) 325 mg (65 mg iron) Tab tablet, Take 1 tablet (325 mg total) by mouth 2 (two) times daily., Disp: 60 tablet, Rfl: 2    propylthiouraciL (PTU) 50 mg Tab, Take 2 tablets (100 mg total) by mouth every 8 (eight) hours., Disp: 180 tablet, Rfl: 11    semaglutide, weight  "loss, (WEGOVY) 0.25 mg/0.5 mL PnIj, Inject 0.25 mg into the skin every 7 days., Disp: 2 mL, Rfl: 2    Review of Systems:  General: No fever, chills, or weight loss.  Chest: No chest pain, shortness of breath, or palpitations.  Breast: No pain, masses, or nipple discharge.  Vulva: No pain, lesions, or itching.  Vagina: No relaxation, itching, discharge, or lesions.  Abdomen: No pain, nausea, vomiting, diarrhea, or constipation.  Urinary: No incontinence, nocturia, frequency, or dysuria.  Extremities:  No leg cramps, edema, or calf pain.  Neurologic: No headaches, dizziness, or visual changes.    Objective:     Vitals:    09/05/23 1040   BP: 124/78   Weight: 78.7 kg (173 lb 9.6 oz)   Height: 5' 3" (1.6 m)   PainSc: 0-No pain     Body mass index is 30.75 kg/m².    PHYSICAL EXAM:  APPEARANCE: Well nourished, well developed, in no acute distress.  AFFECT: WNL, alert and oriented x 3  SKIN: No acne or hirsutism  CHEST: Good respiratory effect    Assessment:    At high risk for breast cancer    Obesity, unspecified classification, unspecified obesity type, unspecified whether serious comorbidity present  -     semaglutide, weight loss, (WEGOVY) 0.25 mg/0.5 mL PnIj; Inject 0.25 mg into the skin every 7 days.  Dispense: 2 mL; Refill: 2    Graves' disease        BMR calculated at 1449 calories per day.  Plan:     Developed meal plan with handout of preferred foods.  Needs at least 2 regular meals a day with lean protein. Reviewed options for this.  Wide variety of fruits and vegetables. Limit starch to 1/3 cup or 1 piece of bread per meal.  Log in silvia with goal of 1250 calories a day (35% protein, 35% carbs (mostly vegetables/fruits), 30% fat)  Reduce compounded tirzepatide to 2.5mg weekly until she runs out.  Then start Wegovy 0.25mg SC qweekly. Counseled on use.  Add strength workouts when she can.  Follow up in 6 weeks.    Instructed patient to call if she experiences any side effects or has any questions.    I spent a " total of 30 minutes on the day of the visit.This includes face to face time and non-face to face time preparing to see the patient (eg, review of tests), obtaining and/or reviewing separately obtained history, documenting clinical information in the electronic or other health record, independently interpreting results and communicating results to the patient/family/caregiver, or care coordinator.

## 2023-09-07 ENCOUNTER — OFFICE VISIT (OUTPATIENT)
Dept: OBSTETRICS AND GYNECOLOGY | Facility: CLINIC | Age: 33
End: 2023-09-07
Payer: COMMERCIAL

## 2023-09-07 ENCOUNTER — PATIENT MESSAGE (OUTPATIENT)
Dept: ENDOCRINOLOGY | Facility: CLINIC | Age: 33
End: 2023-09-07
Payer: COMMERCIAL

## 2023-09-07 ENCOUNTER — LAB VISIT (OUTPATIENT)
Dept: PRIMARY CARE CLINIC | Facility: CLINIC | Age: 33
End: 2023-09-07
Payer: MEDICAID

## 2023-09-07 VITALS
WEIGHT: 174.19 LBS | SYSTOLIC BLOOD PRESSURE: 110 MMHG | DIASTOLIC BLOOD PRESSURE: 70 MMHG | BODY MASS INDEX: 30.86 KG/M2 | HEIGHT: 63 IN

## 2023-09-07 DIAGNOSIS — Z11.3 SCREEN FOR STD (SEXUALLY TRANSMITTED DISEASE): Primary | ICD-10-CM

## 2023-09-07 DIAGNOSIS — Z11.3 SCREEN FOR STD (SEXUALLY TRANSMITTED DISEASE): ICD-10-CM

## 2023-09-07 LAB
HAV IGM SERPL QL IA: NORMAL
HBV CORE IGM SERPL QL IA: NORMAL
HBV SURFACE AG SERPL QL IA: NORMAL
HCV AB SERPL QL IA: NORMAL
HIV 1+2 AB+HIV1 P24 AG SERPL QL IA: NORMAL

## 2023-09-07 PROCEDURE — 3078F DIAST BP <80 MM HG: CPT | Mod: CPTII,S$GLB,, | Performed by: OBSTETRICS & GYNECOLOGY

## 2023-09-07 PROCEDURE — 3008F BODY MASS INDEX DOCD: CPT | Mod: CPTII,S$GLB,, | Performed by: OBSTETRICS & GYNECOLOGY

## 2023-09-07 PROCEDURE — 3078F PR MOST RECENT DIASTOLIC BLOOD PRESSURE < 80 MM HG: ICD-10-PCS | Mod: CPTII,S$GLB,, | Performed by: OBSTETRICS & GYNECOLOGY

## 2023-09-07 PROCEDURE — 99213 OFFICE O/P EST LOW 20 MIN: CPT | Mod: S$GLB,,, | Performed by: OBSTETRICS & GYNECOLOGY

## 2023-09-07 PROCEDURE — 3008F PR BODY MASS INDEX (BMI) DOCUMENTED: ICD-10-PCS | Mod: CPTII,S$GLB,, | Performed by: OBSTETRICS & GYNECOLOGY

## 2023-09-07 PROCEDURE — 1159F MED LIST DOCD IN RCRD: CPT | Mod: CPTII,S$GLB,, | Performed by: OBSTETRICS & GYNECOLOGY

## 2023-09-07 PROCEDURE — 1160F RVW MEDS BY RX/DR IN RCRD: CPT | Mod: CPTII,S$GLB,, | Performed by: OBSTETRICS & GYNECOLOGY

## 2023-09-07 PROCEDURE — 3074F PR MOST RECENT SYSTOLIC BLOOD PRESSURE < 130 MM HG: ICD-10-PCS | Mod: CPTII,S$GLB,, | Performed by: OBSTETRICS & GYNECOLOGY

## 2023-09-07 PROCEDURE — 99213 PR OFFICE/OUTPT VISIT, EST, LEVL III, 20-29 MIN: ICD-10-PCS | Mod: S$GLB,,, | Performed by: OBSTETRICS & GYNECOLOGY

## 2023-09-07 PROCEDURE — 1160F PR REVIEW ALL MEDS BY PRESCRIBER/CLIN PHARMACIST DOCUMENTED: ICD-10-PCS | Mod: CPTII,S$GLB,, | Performed by: OBSTETRICS & GYNECOLOGY

## 2023-09-07 PROCEDURE — 3074F SYST BP LT 130 MM HG: CPT | Mod: CPTII,S$GLB,, | Performed by: OBSTETRICS & GYNECOLOGY

## 2023-09-07 PROCEDURE — 80074 ACUTE HEPATITIS PANEL: CPT | Performed by: OBSTETRICS & GYNECOLOGY

## 2023-09-07 PROCEDURE — 81514 NFCT DS BV&VAGINITIS DNA ALG: CPT | Performed by: OBSTETRICS & GYNECOLOGY

## 2023-09-07 PROCEDURE — 99999 PR PBB SHADOW E&M-EST. PATIENT-LVL III: ICD-10-PCS | Mod: PBBFAC,,, | Performed by: OBSTETRICS & GYNECOLOGY

## 2023-09-07 PROCEDURE — 3044F PR MOST RECENT HEMOGLOBIN A1C LEVEL <7.0%: ICD-10-PCS | Mod: CPTII,S$GLB,, | Performed by: OBSTETRICS & GYNECOLOGY

## 2023-09-07 PROCEDURE — 3044F HG A1C LEVEL LT 7.0%: CPT | Mod: CPTII,S$GLB,, | Performed by: OBSTETRICS & GYNECOLOGY

## 2023-09-07 PROCEDURE — 87591 N.GONORRHOEAE DNA AMP PROB: CPT | Performed by: OBSTETRICS & GYNECOLOGY

## 2023-09-07 PROCEDURE — 1159F PR MEDICATION LIST DOCUMENTED IN MEDICAL RECORD: ICD-10-PCS | Mod: CPTII,S$GLB,, | Performed by: OBSTETRICS & GYNECOLOGY

## 2023-09-07 PROCEDURE — 99999 PR PBB SHADOW E&M-EST. PATIENT-LVL III: CPT | Mod: PBBFAC,,, | Performed by: OBSTETRICS & GYNECOLOGY

## 2023-09-07 PROCEDURE — 86592 SYPHILIS TEST NON-TREP QUAL: CPT | Performed by: OBSTETRICS & GYNECOLOGY

## 2023-09-07 PROCEDURE — 87389 HIV-1 AG W/HIV-1&-2 AB AG IA: CPT | Performed by: OBSTETRICS & GYNECOLOGY

## 2023-09-07 RX ORDER — METHIMAZOLE 5 MG/1
5 TABLET ORAL 3 TIMES DAILY
Qty: 90 TABLET | Refills: 11 | Status: SHIPPED | OUTPATIENT
Start: 2023-09-07 | End: 2024-02-26 | Stop reason: SDUPTHER

## 2023-09-08 ENCOUNTER — TELEPHONE (OUTPATIENT)
Dept: OBSTETRICS AND GYNECOLOGY | Facility: CLINIC | Age: 33
End: 2023-09-08
Payer: COMMERCIAL

## 2023-09-08 DIAGNOSIS — N76.0 BV (BACTERIAL VAGINOSIS): Primary | ICD-10-CM

## 2023-09-08 DIAGNOSIS — B96.89 BV (BACTERIAL VAGINOSIS): Primary | ICD-10-CM

## 2023-09-08 LAB
BACTERIAL VAGINOSIS DNA: POSITIVE
C TRACH DNA SPEC QL NAA+PROBE: NOT DETECTED
CANDIDA GLABRATA DNA: NEGATIVE
CANDIDA KRUSEI DNA: NEGATIVE
CANDIDA RRNA VAG QL PROBE: NEGATIVE
N GONORRHOEA DNA SPEC QL NAA+PROBE: NOT DETECTED
RPR SER QL: NORMAL
T VAGINALIS RRNA GENITAL QL PROBE: NEGATIVE

## 2023-09-08 RX ORDER — METRONIDAZOLE 7.5 MG/G
1 GEL VAGINAL DAILY
Qty: 1 G | Refills: 0 | Status: SHIPPED | OUTPATIENT
Start: 2023-09-08 | End: 2023-09-15

## 2023-09-08 NOTE — PROGRESS NOTES
Patient has Bacterial vaginosis.  Plan:  Metrogel Disp one tube, one applicatorful qhs for 5 nights sent to the pharmacy.    Please  patient on vaginitis prevention including :  a. avoiding feminine products such as deoderant soaps, body wash, bubble bath, douches, scented toilet paper, deoderant tampons or pads, feminine wipes, chronic pad use, etc.  b. avoiding other vulvovaginal irritants such as long hot baths, humidity, tight, synthetic clothing, chlorine and sitting around in wet bathing suits  c. wearing cotton underwear, avoiding thong underwear and no underwear to bed  d. taking showers instead of baths and use a hair dryer on cool setting afterwards to dry  e. wearing cotton to exercise and shower immediately after exercise and change clothes  f. using polyurethane condoms without spermicide if sexually active and symptoms are triggered by intercourse

## 2023-09-08 NOTE — PROGRESS NOTES
HISTORY OF PRESENT ILLNESS:    Tiffany Dobbs is a 33 y.o. female  Patient's last menstrual period was 2023 (exact date). presents today for STD screening.  Partner informed of STD patient longer relationship.      Past Medical History:   Diagnosis Date    Anemia     Chronic pain of left knee 2017    Graves disease     PCOS (polycystic ovarian syndrome) 2020       Past Surgical History:   Procedure Laterality Date    BREAST REDUCTION      EXAMINATION UNDER ANESTHESIA  2022    Procedure: EXAM UNDER ANESTHESIA;  Surgeon: Tevin Tubbs IV, MD;  Location: [facility] OR;  Service: Orthopedics;;    RECONSTRUCTION OF ANTERIOR CRUCIATE LIGAMENT USING GRAFT Right 2022    Procedure: RECONSTRUCTION, KNEE, ACL, USING Allograft Achilles GRAFT, possible meniscus repair, possible PLC repair, knee exam under anesthesia;  Surgeon: Tevin Tubbs IV, MD;  Location: [facility] OR;  Service: Orthopedics;  Laterality: Right;  Achilles allograft with bone block(IN FREEZER); open/THAW at start of case  Black knee post/operative side, well leg marvin on nonsurgical side  Position: ACL P    TOTAL REDUCTION MAMMOPLASTY         MEDICATIONS AND ALLERGIES:      Current Outpatient Medications:     atenoloL (TENORMIN) 50 MG tablet, Take 1 tablet (50 mg total) by mouth once daily., Disp: 30 tablet, Rfl: 2    ferrous sulfate (IRON) 325 mg (65 mg iron) Tab tablet, Take 1 tablet (325 mg total) by mouth 2 (two) times daily., Disp: 60 tablet, Rfl: 2    semaglutide (OZEMPIC) 0.25 mg or 0.5 mg (2 mg/3 mL) pen injector, Inject 0.25 mg into the skin every 7 days., Disp: 3 mL, Rfl: 3    semaglutide, weight loss, (WEGOVY) 0.25 mg/0.5 mL PnIj, Inject 0.25 mg into the skin every 7 days., Disp: 2 mL, Rfl: 2    methIMAzole (TAPAZOLE) 5 MG Tab, Take 1 tablet (5 mg total) by mouth 3 (three) times daily., Disp: 90 tablet, Rfl: 11    Review of patient's allergies indicates:   Allergen Reactions    Bactrim [sulfamethoxazole-trimethoprim] Itching  and Rash       Family History   Problem Relation Age of Onset    Breast cancer Maternal Aunt     Glaucoma Maternal Aunt     Breast cancer Paternal Aunt     Ovarian cancer Maternal Aunt     Gestational diabetes Mother     Glaucoma Mother     Heart disease Father         arrythmia, has device - ICD?    Diverticulitis Father     Diabetes Maternal Grandmother     Dementia Maternal Grandmother     Arthritis Maternal Grandmother         spinal    Hydrocephalus Maternal Grandmother     Stomach cancer Maternal Grandfather     Diabetes Maternal Aunt     No Known Problems Brother     Colon cancer Neg Hx     Heart attack Neg Hx        Social History     Socioeconomic History    Marital status: Single   Tobacco Use    Smoking status: Never     Passive exposure: Never    Smokeless tobacco: Never   Substance and Sexual Activity    Alcohol use: Yes     Comment: socially, twice per month    Drug use: No    Sexual activity: Yes     Partners: Male     Birth control/protection: Condom   Social History Narrative    Admin coordinator at [employer]       COMPREHENSIVE GYN HISTORY:  PAP History: Denies abnormal Paps.  Infection History: Denies STDs. Denies PID.  Benign History: Denies uterine fibroids. Denies ovarian cysts. Denies endometriosis. Denies other conditions.  Cancer History: Denies cervical cancer. Denies uterine cancer or hyperplasia. Denies ovarian cancer. Denies vulvar cancer or pre-cancer. Denies vaginal cancer or pre-cancer. Denies breast cancer. Denies colon cancer.  Sexual Activity History: Reports currently being sexually active  Menstrual History: Every 28 days, flows for 4 days. Light flow.  Dysmenorrhea History: Denies dysmenorrhea.      ROS:  GENERAL: No weight changes. No swelling. No fatigue. No fever.  CARDIOVASCULAR: No chest pain. No shortness of breath. No leg cramps.   NEUROLOGICAL: No headaches. No vision changes.  BREASTS: No pain. No lumps. No discharge.  ABDOMEN: No pain. No nausea. No vomiting. No  diarrhea. No constipation.  REPRODUCTIVE: No abnormal bleeding.   VULVA: No pain. No lesions. No itching.  VAGINA: No relaxation. No itching. No odor. No discharge. No lesions.  URINARY: No incontinence. No nocturia. No frequency. No dysuria.    PE:  APPEARANCE: Well nourished, well developed, in no acute distress.  AFFECT: WNL, alert and oriented x 3.  ABDOMEN: Soft. No tenderness or masses. No hepatosplenomegaly. No hernias.  PELVIC: Normal external female genitalia without lesions. Normal hair distribution. Adequate perineal body, normal urethral meatus. Vagina pink and well rugated without lesions or discharge. Cervix pink without lesions, discharge or tenderness. No significant cystocele or rectocele. Bimanual exam shows uterus to be 4-6 weeks size, regular, mobile and nontender. Adnexa without masses or tenderness.    PROCEDURES:  Affirm  GC & CT     DIAGNOSIS:  Vaginitis    PLAN:Awaiting culture results.     COUNSELING:  Please counseled on vaginitis prevention including :  a. avoiding feminine products such as deoderant soaps, body wash, bubble bath, douches, scented toilet paper, deoderant tampons or pads, feminine wipes, chronic pad use, etc.  b. avoiding other vulvovaginal irritants such as long hot baths, humidity, tight, synthetic clothing, chlorine and sitting around in wet bathing suits  c. wearing cotton underwear, avoiding thong underwear and no underwear to bed  d. taking showers instead of baths and use a hair dryer on cool setting afterwards to dry  e. wearing cotton to exercise and shower immediately after exercise and change clothes  f. using polyurethane condoms without spermicide if sexually active and symptoms are triggered by intercourse    FOLLOW-UP with me for annual exam or prn.

## 2023-10-10 ENCOUNTER — OFFICE VISIT (OUTPATIENT)
Dept: HEMATOLOGY/ONCOLOGY | Facility: CLINIC | Age: 33
End: 2023-10-10
Payer: COMMERCIAL

## 2023-10-10 DIAGNOSIS — Z80.3 FAMILY HISTORY OF BREAST CANCER: ICD-10-CM

## 2023-10-10 DIAGNOSIS — Z71.83 ENCOUNTER FOR NONPROCREATIVE GENETIC COUNSELING: Primary | ICD-10-CM

## 2023-10-10 PROCEDURE — 99215 OFFICE O/P EST HI 40 MIN: CPT | Mod: 95,,, | Performed by: NURSE PRACTITIONER

## 2023-10-10 PROCEDURE — 99215 PR OFFICE/OUTPT VISIT, EST, LEVL V, 40-54 MIN: ICD-10-PCS | Mod: 95,,, | Performed by: NURSE PRACTITIONER

## 2023-10-10 PROCEDURE — 3044F PR MOST RECENT HEMOGLOBIN A1C LEVEL <7.0%: ICD-10-PCS | Mod: CPTII,95,, | Performed by: NURSE PRACTITIONER

## 2023-10-10 PROCEDURE — 3044F HG A1C LEVEL LT 7.0%: CPT | Mod: CPTII,95,, | Performed by: NURSE PRACTITIONER

## 2023-10-10 NOTE — Clinical Note
- Patient prefers to be contacted for sample collection via:  PORTAL - Lab order in Epic?  YES - Lab order in portal?  YES - [facility] Order Number: [id number] - Documents uploaded to genetics lab portal:  PEDIGREE - Post-test visit:  Navigator to schedule Pt medicated as directed by ER md, poc update given to pt. Further orders and dispo pending at this time. No further questions from pt at this time

## 2023-10-10 NOTE — PROGRESS NOTES
"Cancer Genetics  Department of Hematology and Oncology  [facility]  [facility]  [facility]    Date of Service:  10/10/23  Visit Provider:  Willard Deutsch DNP  Collaborating Physician:  Lulu Bah MD    Patient ID  Name: Tiffany Dobbs    : 1990    MRN: [id number]      Referring Provider  Ladonna Lyons NP  [address] [place] [address]    Televisit Information  The patient location is: [place], [place].    The chief complaint leading to consultation is:  As below.    Visit type: audiovisual.    Face-to-face time with patient:  Approximately 42 minutes.    Approximately 53 minutes in total were spent on this encounter, which includes face-to-face time and non-face-to-face time preparing to see the patient (e.g., review of tests), obtaining and/or reviewing separately obtained history, documenting clinical information in the electronic or other health record, independently interpreting results (not separately reported) and communicating results to the patient/family/caregiver, or care coordination (not separately reported).  Each patient to whom he or she provides medical services by telemedicine is:  (1) informed of the relationship between the physician and patient and the respective role of any other health care provider with respect to management of the patient; and (2) notified that he or she may decline to receive medical services by telemedicine and may withdraw from such care at any time.  Notes:  As below.    SUBJECTIVE      Chief Complaint: Genetic Evaluation    History of Present Illness (HPI):  Tiffany Dobbs ("Tiffany"), 33 y.o., assigned female sex at birth, is established with the [facility] but new to me.  She was referred by Ladonna Lyons NP ([facility]) for cancer-genetic risk assessment given her family history of cancer.    Focused " Medical History  Genetic testing:  Alpha-Globin Gene Analysis in 4/2023 - negative results  Cancer:  No  Colon polyp:  N/A - No history of colonoscopy  Other pertinent tumor/mass/lesion:  Yes  Thyroid nodules (most recent U/S 7/19/23)  Uterine fibroid  Pancreatitis or pancreatic cyst:  No  Blood disorder:   Anemia (microcytic) only  Amys medical history also includes Graves' disease, PCOS, keloid.    Focused Surgical History  Reproductive organs:  Intact    Ancestry  Ashkenazi Gnosticist:  No    Family Oncologic History  Consanguinity:  Grandmother and grandfather are believed to have been very distant cousins either by blood or through marriage   Hereditary cancer genetic testing in blood relatives:  No  Other than noted, no known history of cancer in relatives depicted in the pedigree or in:  maternal first cousins, maternal extended family, paternal first cousins, paternal extended family.  Other than noted, no known family history of benign tumor/mass/lesion, pancreatitis, of colon polyps.  Pedigree:  ** If this pedigree appears small/illegible on your screen, expand this note window horizontally. **      Review of Systems  See HPI.       OBJECTIVE     Physical Exam  Significantly limited secondary to the inherent nature of a virtual visit.  Very pleasant patient.  Constitutional      Appearance:  Appears well developed and well nourished. No distress.   Neurological     Mental Status:  Alert and oriented.  Psychiatric         Mood and Affect:  Normal.     Thought Content:  Normal.     Speech:  Normal.     Behavior:  Normal.     Judgment:  Normal.  Genetics-specific     It is my assessment that the patient is ready to proceed with cancer genetic testing from a psychosocial perspective.    CANCER GENETIC COUNSELING      Cancer Genetics     Germline cancer genetic testing is the testing of genes associated with cancer, known as cancer susceptibility genes.  Just as these genes are inherited from parents--one copy  of each gene from each parent--mutations in these genes can be inherited, as well.  A mutation in a cancer susceptibility gene adversely affects the gene's ability to prevent cancer; therefore, carriers of cancer susceptibility gene mutations may be at increased risk for certain cancers.   These genes often work in multiple organs rather than just one organ.     Causes of Cancer    Only approximately 5%-10% of cancers are caused by an inherited cancer susceptibility gene mutation; rather, the majority of cancers are sporadic.  Causes of sporadic cancers may include environmental risk factors, lifestyle risk factors, and non-modifiable risk factors.  It is important to note that members of a family often share not only their genetics but also other risk factors, including environmental and lifestyle risk factors, so cancers can be familial.     Potential Results of Genetic Testing, and Their Implications     Potential results of genetic testing include positive, negative, and variant of unknown significance (VUS).    Positive:  A positive result indicates the presence of at least one clinically significant gene mutation, and the individual's associated cancer risks vary depending upon the cancer susceptibility gene(s) in which there is/are a mutation(s).  With a positive result, depending upon the specific result and the individual's clinical history, modified risk management may be recommended, including measures for risk reduction and/or surveillance; however, there are not always effective strategies for modified risk management.  Negative:  A negative result indicates that no clinically significant mutations were identified in the gene(s) tested.  The ability to interpret the meaning of a negative genetic testing result is limited in an individual unaffected with cancer whose affected relatives have not first undergone such testing.  The most informative individual in a family to undergo testing for hereditary  cancer syndromes first are those who have personally had cancer.  A negative result in the patient does not indicate that she cannot develop cancer, and, in fact, she may even be at increased risk for cancer based on shared risk factors with affected relatives.   VUS:  A VUS indicates that there is not presently enough data for the laboratory to make a determination as to whether the genetic variant is clinically significant.  VUSs are not typically acted upon clinically.       Genetic Mutation Inheritance     When an individual tests positive for a gene mutation, her first-degree relatives each have a 50% chance of carrying the same mutation, and other, more distant blood relatives can also be at risk of carrying the same mutation.      Discussed potential for reproductive implications of genetic testing.     Genetic Discrimination     Genetic discrimination occurs when individuals are discriminated against on the basis of their genetic information.    The Genetic Information Nondiscrimination Act of 2008 (JOSE) is U.S. federal legislation that provides some protections against use of an individual's genetic information by their health insurer and by their employer.      Title I of JOSE prohibits most health insurers from utilizing an individual's genetic information to make decisions regarding insurance eligibility or premium charges.  This protection does not apply to health insurance obtained through a job with the [employer], and it may not apply to health insurance obtained through the Federal Employees Health Benefits Plan.    Title II of JOSE prohibits covered entities, in many cases, from requesting or requiring the genetic information of employees and applicants and from using said information to make employment decisions.  This protection does not apply to employers with fewer than 15 employees or to the .    JOSE does not protect individuals from genetic discrimination by any other type of policy  "or entity, including but not limited to life insurance, disability insurance, long-term care insurance,  benefits, and [facility] benefits.    Genetic Testing Logistics     An outside laboratory would perform the testing after a blood sample is collected at [facility].    There is a potential for the patient to incur out-of-pocket costs related to genetic testing.    One can expect this genetic testing to take approximately three weeks to result.    Post-test genetic counseling can be conducted once the genetic testing results are available.     ASSESSMENT / PLAN          ICD-10-CM ICD-9-CM   1. Encounter for nonprocreative genetic counseling  Z71.83 V26.33   2. Family history of breast cancer  Z80.3 V16.3     1. Encounter for nonprocreative genetic counseling     Tiffany Dobbs ("Tiffany"), 33 y.o., assigned female sex at birth, is established with the [facility] but new to me.  She was referred by Ladonna Lyons NP ([facility]) for cancer-genetic risk assessment given her family history of cancer.  Cancer-genetic risk assessment and pre-test cancer genetic counseling were conducted.        From a clinical standpoint, regarding hereditary cancer susceptibility gene testing:  Tiffany meets current National Comprehensive Cancer Network (NCCN) criteria for such testing based on her paternal aunt Yady's age at breast cancer diagnosis.  Yady is , but if Tiffany's father were to undergo testing of all appropriate genes, with negative results, such testing would not be indicated for Tiffany at this time.    Offered Tiffany options of proceeding with hereditary cancer susceptibility gene testing at this time versus delaying/declining such.  Tiffany desires to proceed with such testing at this time.  Provided germline cancer genetic test options of focused panel versus broad panel, and Tiffany opts for the latter (ie, broad).    Genetic Test " Information:  Testing lab: [facility]   Test panel: Panel 9510 x85 oncology-related genes +RNAinsight   ICD-10 code(s): Z80.3   Verbal informed consent: Obtained   Written informed consent: To be obtained   Specimen type: Blood  (Patient denies blood disorders that would necessitate a skin fibroblast specimen)   Specimen collection by: [facility]   Specimen collection date: To be scheduled   Results expected by: Approximately 2-3 weeks after the genetic testing lab receives the specimen   Post-test genetic counseling: ~4 weeks after sample collection       2. Family history of breast cancer  - Ambulatory referral/consult to Genetics:  COMPLETED 10/10/2023       Questions were encouraged and answered to the patient's satisfaction, and she verbalized understanding of the information and agreement with the plan.           Willard Deutsch, DNP, APRN, FNP-BC, AOCNP, CGRA  Nurse Practitioner, Cancer Genetics  Department of Hematology and Oncology  [facility]        CC:  Ladonna Lyons NP        Routed to Cancer Genetics Navigator:  - Patient prefers to be contacted for sample collection via:  PORTAL  - Lab order in Epic?  YES  - Lab order in portal?  YES - [facility] Order Number: [id number]  - Documents uploaded to genetics lab portal:  PEDIGREE  - Post-test visit:  Navigator to schedule

## 2023-10-11 ENCOUNTER — PATIENT MESSAGE (OUTPATIENT)
Dept: HEMATOLOGY/ONCOLOGY | Facility: CLINIC | Age: 33
End: 2023-10-11
Payer: COMMERCIAL

## 2023-10-26 ENCOUNTER — PATIENT MESSAGE (OUTPATIENT)
Dept: ENDOCRINOLOGY | Facility: CLINIC | Age: 33
End: 2023-10-26
Payer: COMMERCIAL

## 2023-10-30 ENCOUNTER — OFFICE VISIT (OUTPATIENT)
Dept: OBSTETRICS AND GYNECOLOGY | Facility: CLINIC | Age: 33
End: 2023-10-30
Payer: COMMERCIAL

## 2023-10-30 VITALS — BODY MASS INDEX: 29.23 KG/M2 | HEIGHT: 63 IN | WEIGHT: 165 LBS

## 2023-10-30 DIAGNOSIS — E66.3 OVERWEIGHT (BMI 25.0-29.9): ICD-10-CM

## 2023-10-30 DIAGNOSIS — E88.810 METABOLIC SYNDROME: Primary | ICD-10-CM

## 2023-10-30 PROCEDURE — 1160F RVW MEDS BY RX/DR IN RCRD: CPT | Mod: CPTII,95,, | Performed by: PHYSICIAN ASSISTANT

## 2023-10-30 PROCEDURE — 3008F BODY MASS INDEX DOCD: CPT | Mod: CPTII,95,, | Performed by: PHYSICIAN ASSISTANT

## 2023-10-30 PROCEDURE — 3044F PR MOST RECENT HEMOGLOBIN A1C LEVEL <7.0%: ICD-10-PCS | Mod: CPTII,95,, | Performed by: PHYSICIAN ASSISTANT

## 2023-10-30 PROCEDURE — 99213 PR OFFICE/OUTPT VISIT, EST, LEVL III, 20-29 MIN: ICD-10-PCS | Mod: 95,,, | Performed by: PHYSICIAN ASSISTANT

## 2023-10-30 PROCEDURE — 1159F PR MEDICATION LIST DOCUMENTED IN MEDICAL RECORD: ICD-10-PCS | Mod: CPTII,95,, | Performed by: PHYSICIAN ASSISTANT

## 2023-10-30 PROCEDURE — 3044F HG A1C LEVEL LT 7.0%: CPT | Mod: CPTII,95,, | Performed by: PHYSICIAN ASSISTANT

## 2023-10-30 PROCEDURE — 3008F PR BODY MASS INDEX (BMI) DOCUMENTED: ICD-10-PCS | Mod: CPTII,95,, | Performed by: PHYSICIAN ASSISTANT

## 2023-10-30 PROCEDURE — 99213 OFFICE O/P EST LOW 20 MIN: CPT | Mod: 95,,, | Performed by: PHYSICIAN ASSISTANT

## 2023-10-30 PROCEDURE — 1160F PR REVIEW ALL MEDS BY PRESCRIBER/CLIN PHARMACIST DOCUMENTED: ICD-10-PCS | Mod: CPTII,95,, | Performed by: PHYSICIAN ASSISTANT

## 2023-10-30 PROCEDURE — 1159F MED LIST DOCD IN RCRD: CPT | Mod: CPTII,95,, | Performed by: PHYSICIAN ASSISTANT

## 2023-10-30 NOTE — PROGRESS NOTES
The patient location is: Home  The chief complaint leading to consultation is: weight loss follow up    Visit type: audiovisual    Face to Face time with patient: 10 minutes  15 minutes of total time spent on the encounter, which includes face to face time and non-face to face time preparing to see the patient (eg, review of tests), Obtaining and/or reviewing separately obtained history, Documenting clinical information in the electronic or other health record, Independently interpreting results (not separately reported) and communicating results to the patient/family/caregiver, or Care coordination (not separately reported).         Each patient to whom he or she provides medical services by telemedicine is:  (1) informed of the relationship between the physician and patient and the respective role of any other health care provider with respect to management of the patient; and (2) notified that he or she may decline to receive medical services by telemedicine and may withdraw from such care at any time.    Notes:   Subjective:      Tiffany Dobbs is a 33 y.o. female who presents for weight loss follow up. She is taking compounded tirzepatide 5.0mg SC weekly. Her bowels are regular. Last week she did have a dull RUQ pain that came and went. Did not think it was associated with food. She still has her gall bladder. She is otherwise doing well. Continues to los weight but slow. She would like to continue tirzepatide 5.0mg weekly for now.    Lab Visit on 09/07/2023   Component Date Value Ref Range Status    Hepatitis B Surface Ag 09/07/2023 Non-reactive  Non-reactive Final    Hep B C IgM 09/07/2023 Non-reactive  Non-reactive Final    Hep A IgM 09/07/2023 Non-reactive  Non-reactive Final    Hepatitis C Ab 09/07/2023 Non-reactive  Non-reactive Final    HIV 1/2 Ag/Ab 09/07/2023 Non-reactive  Non-reactive Final    RPR 09/07/2023 Non-reactive  Non-reactive Final   Office Visit on 09/07/2023   Component Date Value Ref Range  Status    Chlamydia, Amplified DNA 09/07/2023 Not Detected  Not Detected Final    N gonorrhoeae, amplified DNA 09/07/2023 Not Detected  Not Detected Final    Trichomonas vaginalis 09/07/2023 Negative  Negative Final    Candida sp 09/07/2023 Negative  Negative Final    Carla glabrata DNA 09/07/2023 Negative  Negative Final    Carla krusei DNA 09/07/2023 Negative  Negative Final    Bacterial vaginosis DNA 09/07/2023 Positive (A)  Negative Final   Lab Visit on 08/14/2023   Component Date Value Ref Range Status    T3, Total 08/14/2023 153  60 - 180 ng/dL Final    Free T4 08/14/2023 1.33  0.71 - 1.51 ng/dL Final       Past Medical History:   Diagnosis Date    Anemia     Chronic pain of left knee 06/05/2017    Graves disease     PCOS (polycystic ovarian syndrome) 04/2020     Past Surgical History:   Procedure Laterality Date    BREAST REDUCTION  2012    EXAMINATION UNDER ANESTHESIA  08/02/2022    Procedure: EXAM UNDER ANESTHESIA;  Surgeon: Tevin Tubbs IV, MD;  Location: [facility] OR;  Service: Orthopedics;;    RECONSTRUCTION OF ANTERIOR CRUCIATE LIGAMENT USING GRAFT Right 08/02/2022    Procedure: RECONSTRUCTION, KNEE, ACL, USING Allograft Achilles GRAFT, possible meniscus repair, possible PLC repair, knee exam under anesthesia;  Surgeon: Tevin Tubbs IV, MD;  Location: [facility] OR;  Service: Orthopedics;  Laterality: Right;  Achilles allograft with bone block(IN FREEZER); open/THAW at start of case  Black knee post/operative side, well leg marvin on nonsurgical side  Position: ACL P    TOTAL REDUCTION MAMMOPLASTY       Social History     Tobacco Use    Smoking status: Never     Passive exposure: Never    Smokeless tobacco: Never   Substance Use Topics    Alcohol use: Yes     Comment: socially, twice per month    Drug use: No     Family History   Problem Relation Age of Onset    Other Mother         cervical dysplasia    Gestational diabetes Mother     Glaucoma Mother     Heart disease Father         arrythmia, has device -  "ICD?    Diverticulitis Father     Other Maternal Grandmother         benign breast bx    Diabetes Maternal Grandmother     Dementia Maternal Grandmother     Arthritis Maternal Grandmother         spinal    Hydrocephalus Maternal Grandmother     Breast cancer Paternal Aunt         dx.~42    Breast cancer Other         dx.~42    Stomach cancer Other         dx. ~early- to mid-70s    Ovarian cancer Other         dx.~55    Diabetes Other     Glaucoma Maternal Aunt     Colon cancer Neg Hx     Heart attack Neg Hx      OB History    Para Term  AB Living   0         0   SAB IAB Ectopic Multiple Live Births                   Current Outpatient Medications:     atenoloL (TENORMIN) 50 MG tablet, Take 1 tablet (50 mg total) by mouth once daily., Disp: 30 tablet, Rfl: 2    ferrous sulfate (IRON) 325 mg (65 mg iron) Tab tablet, Take 1 tablet (325 mg total) by mouth 2 (two) times daily., Disp: 60 tablet, Rfl: 2    methIMAzole (TAPAZOLE) 5 MG Tab, Take 1 tablet (5 mg total) by mouth 3 (three) times daily., Disp: 90 tablet, Rfl: 11    tirzepatide 5 mg/0.5 mL PnIj, Inject 5 mg into the skin every 7 days., Disp: 4 pen , Rfl: 3    Review of Systems:  General: No fever, chills.  Chest: No chest pain, shortness of breath, or palpitations.  Breast: No pain, masses, or nipple discharge.  Vulva: No pain, lesions, or itching.  Vagina: No relaxation, itching, discharge, or lesions.  Abdomen: No pain, nausea, vomiting, diarrhea, or constipation.  Urinary: No incontinence, nocturia, frequency, or dysuria.  Extremities:  No leg cramps, edema, or calf pain.  Neurologic: No headaches, dizziness, or visual changes.    Objective:     Vitals:    10/30/23 1219   Weight: 74.8 kg (165 lb)   Height: 5' 3" (1.6 m)     Body mass index is 29.23 kg/m².    PHYSICAL EXAM:  APPEARANCE: Well nourished, well developed, in no acute distress.  AFFECT: WNL, alert and oriented x 3      Assessment:      Metabolic syndrome    Overweight (BMI " 25.0-29.9)        Plan:   Continue low glycemic diet with lean protein at each meal.  Maintain hydration.  Continue compounded tirzepatide 5.0mg sc weekly  Discussed signs and symptoms of cholecystitis to watch for.  Follow up in 3 months.    Instructed patient to call if she experiences any side effects or has any questions.    I spent a total of 15 minutes on the day of the visit.This includes face to face time and non-face to face time preparing to see the patient (eg, review of tests), obtaining and/or reviewing separately obtained history, documenting clinical information in the electronic or other health record, independently interpreting results and communicating results to the patient/family/caregiver, or care coordinator.

## 2023-12-12 ENCOUNTER — PATIENT MESSAGE (OUTPATIENT)
Dept: OBSTETRICS AND GYNECOLOGY | Facility: CLINIC | Age: 33
End: 2023-12-12
Payer: COMMERCIAL

## 2023-12-12 DIAGNOSIS — E88.810 METABOLIC SYNDROME: Primary | ICD-10-CM

## 2024-01-02 ENCOUNTER — LAB VISIT (OUTPATIENT)
Dept: LAB | Facility: HOSPITAL | Age: 34
End: 2024-01-02
Attending: STUDENT IN AN ORGANIZED HEALTH CARE EDUCATION/TRAINING PROGRAM
Payer: COMMERCIAL

## 2024-01-02 ENCOUNTER — PATIENT MESSAGE (OUTPATIENT)
Dept: ENDOCRINOLOGY | Facility: CLINIC | Age: 34
End: 2024-01-02
Payer: COMMERCIAL

## 2024-01-02 DIAGNOSIS — E05.90 HYPERTHYROIDISM: ICD-10-CM

## 2024-01-02 LAB
T3 SERPL-MCNC: 65 NG/DL (ref 60–180)
T4 FREE SERPL-MCNC: 0.64 NG/DL (ref 0.71–1.51)

## 2024-01-02 PROCEDURE — 84480 ASSAY TRIIODOTHYRONINE (T3): CPT | Performed by: STUDENT IN AN ORGANIZED HEALTH CARE EDUCATION/TRAINING PROGRAM

## 2024-01-02 PROCEDURE — 36415 COLL VENOUS BLD VENIPUNCTURE: CPT | Mod: PO | Performed by: STUDENT IN AN ORGANIZED HEALTH CARE EDUCATION/TRAINING PROGRAM

## 2024-01-02 PROCEDURE — 84439 ASSAY OF FREE THYROXINE: CPT | Performed by: STUDENT IN AN ORGANIZED HEALTH CARE EDUCATION/TRAINING PROGRAM

## 2024-01-04 DIAGNOSIS — E05.00 GRAVES' DISEASE: ICD-10-CM

## 2024-01-04 DIAGNOSIS — E04.2 MULTIPLE THYROID NODULES: Primary | ICD-10-CM

## 2024-02-02 ENCOUNTER — OFFICE VISIT (OUTPATIENT)
Dept: OBSTETRICS AND GYNECOLOGY | Facility: CLINIC | Age: 34
End: 2024-02-02
Payer: COMMERCIAL

## 2024-02-02 VITALS — BODY MASS INDEX: 28 KG/M2 | WEIGHT: 158 LBS | HEIGHT: 63 IN

## 2024-02-02 DIAGNOSIS — E88.810 METABOLIC SYNDROME: Primary | ICD-10-CM

## 2024-02-02 DIAGNOSIS — E66.3 OVERWEIGHT (BMI 25.0-29.9): ICD-10-CM

## 2024-02-02 PROCEDURE — 1159F MED LIST DOCD IN RCRD: CPT | Mod: CPTII,95,, | Performed by: PHYSICIAN ASSISTANT

## 2024-02-02 PROCEDURE — 3008F BODY MASS INDEX DOCD: CPT | Mod: CPTII,95,, | Performed by: PHYSICIAN ASSISTANT

## 2024-02-02 PROCEDURE — 1160F RVW MEDS BY RX/DR IN RCRD: CPT | Mod: CPTII,95,, | Performed by: PHYSICIAN ASSISTANT

## 2024-02-02 PROCEDURE — 99213 OFFICE O/P EST LOW 20 MIN: CPT | Mod: 95,,, | Performed by: PHYSICIAN ASSISTANT

## 2024-02-02 NOTE — PROGRESS NOTES
"The patient location is: Home   The chief complaint leading to consultation is: follow up for weight loss    Visit type: audiovisual    Face to Face time with patient: 10 minutes  15 minutes of total time spent on the encounter, which includes face to face time and non-face to face time preparing to see the patient (eg, review of tests), Obtaining and/or reviewing separately obtained history, Documenting clinical information in the electronic or other health record, Independently interpreting results (not separately reported) and communicating results to the patient/family/caregiver, or Care coordination (not separately reported).         Each patient to whom he or she provides medical services by telemedicine is:  (1) informed of the relationship between the physician and patient and the respective role of any other health care provider with respect to management of the patient; and (2) notified that he or she may decline to receive medical services by telemedicine and may withdraw from such care at any time.    Notes:   Subjective:      Tiffany Dobbs is a 33 y.o. female who presents for weight loss follow up. She is taking compounded tirzepatide 7.5mg SC weekly. Tolerating well without side effects. Right abdominal pain has resolved. Admits to falling off her normal diet and increased "junk food" with holidays and [employer]. Continues to skip breakfast but getting protein with both other meals. No current exercise. Motivated to get back on track. Discussed increasing vs staying at 7.5mg. She would like to stay at 7.5mg for now.    Routine Screening Labs: 7/13/2023    Lab Visit on 01/02/2024   Component Date Value Ref Range Status    Free T4 01/02/2024 0.64 (L)  0.71 - 1.51 ng/dL Final    T3, Total 01/02/2024 65  60 - 180 ng/dL Final       Past Medical History:   Diagnosis Date    Anemia     Chronic pain of left knee 06/05/2017    Graves disease     PCOS (polycystic ovarian syndrome) 04/2020     Past Surgical " History:   Procedure Laterality Date    BREAST REDUCTION      EXAMINATION UNDER ANESTHESIA  2022    Procedure: EXAM UNDER ANESTHESIA;  Surgeon: Tevin Tubbs IV, MD;  Location: [facility] OR;  Service: Orthopedics;;    RECONSTRUCTION OF ANTERIOR CRUCIATE LIGAMENT USING GRAFT Right 2022    Procedure: RECONSTRUCTION, KNEE, ACL, USING Allograft Achilles GRAFT, possible meniscus repair, possible PLC repair, knee exam under anesthesia;  Surgeon: Tevin Tubbs IV, MD;  Location: [facility] OR;  Service: Orthopedics;  Laterality: Right;  Achilles allograft with bone block(IN FREEZER); open/THAW at start of case  Black knee post/operative side, well leg marvin on nonsurgical side  Position: ACL P    TOTAL REDUCTION MAMMOPLASTY       Social History     Tobacco Use    Smoking status: Never     Passive exposure: Never    Smokeless tobacco: Never   Substance Use Topics    Alcohol use: Yes     Comment: socially, twice per month    Drug use: No     Family History   Problem Relation Age of Onset    Other Mother         cervical dysplasia    Gestational diabetes Mother     Glaucoma Mother     Heart disease Father         arrythmia, has device - ICD?    Diverticulitis Father     Other Maternal Grandmother         benign breast bx    Diabetes Maternal Grandmother     Dementia Maternal Grandmother     Arthritis Maternal Grandmother         spinal    Hydrocephalus Maternal Grandmother     Breast cancer Paternal Aunt         dx.~42    Breast cancer Other         dx.~42    Stomach cancer Other         dx. ~early- to mid-70s    Ovarian cancer Other         dx.~55    Diabetes Other     Glaucoma Maternal Aunt     Colon cancer Neg Hx     Heart attack Neg Hx      OB History    Para Term  AB Living   0         0   SAB IAB Ectopic Multiple Live Births                   Current Outpatient Medications:     atenoloL (TENORMIN) 50 MG tablet, Take 1 tablet (50 mg total) by mouth once daily., Disp: 30 tablet, Rfl: 2    ferrous  "sulfate (IRON) 325 mg (65 mg iron) Tab tablet, Take 1 tablet (325 mg total) by mouth 2 (two) times daily., Disp: 60 tablet, Rfl: 2    methIMAzole (TAPAZOLE) 5 MG Tab, Take 1 tablet (5 mg total) by mouth 3 (three) times daily., Disp: 90 tablet, Rfl: 11    tirzepatide 7.5 mg/0.5 mL PnIj, Inject 7.5 mg into the skin every 7 days., Disp: 4 Pen, Rfl: 3    Review of Systems:  General: No fever, chills.  Chest: No chest pain, shortness of breath, or palpitations.  Breast: No pain, masses, or nipple discharge.  Vulva: No pain, lesions, or itching.  Vagina: No relaxation, itching, discharge, or lesions.  Abdomen: No pain, nausea, vomiting, diarrhea, or constipation.  Urinary: No incontinence, nocturia, frequency, or dysuria.  Extremities:  No leg cramps, edema, or calf pain.  Neurologic: No headaches, dizziness, or visual changes.    Objective:     Vitals:    02/02/24 0740   Weight: 71.7 kg (158 lb)   Height: 5' 3" (1.6 m)     Body mass index is 27.99 kg/m².    PHYSICAL EXAM:  APPEARANCE: Well nourished, well developed, in no acute distress.  AFFECT: WNL, alert and oriented x 3      Assessment:      Metabolic syndrome  -     tirzepatide 7.5 mg/0.5 mL PnIj; Inject 7.5 mg into the skin every 7 days.  Dispense: 4 Pen; Refill: 3    Overweight (BMI 25.0-29.9)        Plan:   Encouraged low glycemic diet with lean protein at each meal.  Maintain hydration.  Continue compounded tirzepatide 7.5mg SC weekly- faxed to [facility] pharmacy  Reviewed benefits of strength workouts.  Follow up in 3 months    Instructed patient to call if she experiences any side effects or has any questions.    I spent a total of 15 minutes on the day of the visit.This includes face to face time and non-face to face time preparing to see the patient (eg, review of tests), obtaining and/or reviewing separately obtained history, documenting clinical information in the electronic or other health record, independently interpreting results and " communicating results to the patient/family/caregiver, or care coordinator.

## 2024-02-15 ENCOUNTER — LAB VISIT (OUTPATIENT)
Dept: LAB | Facility: HOSPITAL | Age: 34
End: 2024-02-15
Attending: RADIOLOGY
Payer: COMMERCIAL

## 2024-02-15 DIAGNOSIS — E05.00 GRAVES' DISEASE: ICD-10-CM

## 2024-02-15 LAB
T3 SERPL-MCNC: 73 NG/DL (ref 60–180)
T4 FREE SERPL-MCNC: 0.74 NG/DL (ref 0.71–1.51)
TSH SERPL DL<=0.005 MIU/L-ACNC: 6.48 UIU/ML (ref 0.4–4)

## 2024-02-15 PROCEDURE — 36415 COLL VENOUS BLD VENIPUNCTURE: CPT | Mod: PO | Performed by: STUDENT IN AN ORGANIZED HEALTH CARE EDUCATION/TRAINING PROGRAM

## 2024-02-15 PROCEDURE — 84439 ASSAY OF FREE THYROXINE: CPT | Performed by: STUDENT IN AN ORGANIZED HEALTH CARE EDUCATION/TRAINING PROGRAM

## 2024-02-15 PROCEDURE — 84480 ASSAY TRIIODOTHYRONINE (T3): CPT | Performed by: STUDENT IN AN ORGANIZED HEALTH CARE EDUCATION/TRAINING PROGRAM

## 2024-02-15 PROCEDURE — 84443 ASSAY THYROID STIM HORMONE: CPT | Performed by: STUDENT IN AN ORGANIZED HEALTH CARE EDUCATION/TRAINING PROGRAM

## 2024-02-20 DIAGNOSIS — E05.00 GRAVES' DISEASE: Primary | ICD-10-CM

## 2024-02-26 ENCOUNTER — OFFICE VISIT (OUTPATIENT)
Dept: ENDOCRINOLOGY | Facility: CLINIC | Age: 34
End: 2024-02-26
Payer: COMMERCIAL

## 2024-02-26 DIAGNOSIS — E05.00 GRAVES' DISEASE: Primary | ICD-10-CM

## 2024-02-26 DIAGNOSIS — E04.2 MULTIPLE THYROID NODULES: ICD-10-CM

## 2024-02-26 PROCEDURE — 99214 OFFICE O/P EST MOD 30 MIN: CPT | Mod: 95,,, | Performed by: STUDENT IN AN ORGANIZED HEALTH CARE EDUCATION/TRAINING PROGRAM

## 2024-02-26 PROCEDURE — G2211 COMPLEX E/M VISIT ADD ON: HCPCS | Mod: 95,,, | Performed by: STUDENT IN AN ORGANIZED HEALTH CARE EDUCATION/TRAINING PROGRAM

## 2024-02-26 RX ORDER — METHIMAZOLE 5 MG/1
5 TABLET ORAL DAILY
Qty: 30 TABLET | Refills: 11 | Status: SHIPPED | OUTPATIENT
Start: 2024-02-26 | End: 2025-02-25

## 2024-02-26 NOTE — Clinical Note
Please call to schedule labs in 4 weeks. US here at her earliest convenience (orders already in).  Return visit in 6 months

## 2024-02-26 NOTE — PROGRESS NOTES
ENDOCRINOLOGY RETURN VISIT: 02/26/2024  The patient location is: home  The chief complaint leading to consultation is: No chief complaint on file.      Visit type: audiovisual    Face to Face time with patient: 20 minutes  30 minutes of total time spent on the encounter, which includes face to face time and non-face to face time preparing to see the patient (eg, review of tests), Obtaining and/or reviewing separately obtained history, Documenting clinical information in the electronic or other health record, Independently interpreting results (not separately reported) and communicating results to the patient/family/caregiver, or Care coordination (not separately reported).    Each patient to whom he or she provides medical services by telemedicine is:  (1) informed of the relationship between the physician and patient and the respective role of any other health care provider with respect to management of the patient; and (2) notified that he or she may decline to receive medical services by telemedicine and may withdraw from such care at any time.    Subjective:      Patient ID: Tiffany Dobbs is a 33 y.o. female.    Chief Complaint:  Hyperthyroidism    History of Present Illness  Regarding hyperthyroidism:  Found on labs beginning April 2023 by her PCP. Initially started on methimazole 15 mg, started mid July/2023. Decreased to 10 mg on Jan/2024, most recently decreased to 5 mg MMI given TSH was > 6 and patient is thinking about pregnancy. Not currently having any symptoms.    LMP 1/15, she says she hasn't gotten her period this month. She is not actively trying to get pregnant but that is a possibility at this point.      Her initial symptoms included palpitations and weight loss. Those are resolved at this point in time.    Menses: regular periods usually, most recently her periods have been irregular. Advised to follow w/ OBGYN    Plans for pregnancy: wants to try in the next 6 months  Peripartum thyroid  disorder: no    Exposure to iodine/contrast: no    Eye symptoms:  Double vision: no  Changes in eye appearance: no  Periorbital edema: no  Eye pain/pressure: no  Eye grittiness/dryness: no  Light sensitivity: no    Denied neck enlargement, hoarseness, dysphagia.    FH: Graves' disease in grandmother and aunt.    Lab Results   Component Value Date    TSH 6.479 (H) 02/15/2024    TSH <0.010 (L) 07/13/2023    TSH <0.010 (L) 04/11/2023    FREET4 0.74 02/15/2024    FREET4 0.64 (L) 01/02/2024    FREET4 1.33 08/14/2023    THGABSCRN 4.1 (H) 07/13/2023     Thyroid US 7/14/23:  FINDINGS:  Right lobe of the thyroid measures 5.4 x 1.5 x 2.2 cm.  Multiple nodules, as below:  1.  0.7 x 0.2 x 0.6 cm cystic, anechoic nodule in the upper right lobe.  TI-RADS 1.  2.  2.9 x 0.4 x 0.7 cm hypoechoic, mixed cystic and solid nodule in the mid right lobe without calcification.  TI-RADS 3.  3.  0.6 x 0.3 x 0.7 cm hypoechoic, mixed cystic and solid nodule in the upper right lobe without calcification.  TI-RADS 3.  The isthmus measures 0.4 cm.  No nodules identified.  Left lobe of the thyroid measures 4.6 x 1.6 x 2.0 cm.  There is a 0.8 x 0.3 x 0.5 cm solid, hyperechoic nodule with ill-defined borders and without calcification.  TI-RADS 3.  Heterogeneous thyroid parenchyma with increased vascularity predominantly in the left lobe.  No cervical lymphadenopathy.     Impression:  Multinodular thyroid.  No nodule meets ACR criteria for follow-up or fine-needle aspiration biopsy.  Heterogeneous thyroid with increased vascularity as can be seen in acute thyroiditis.    Plan was to repeat US early 2024, hasn't done that so far.    ROS:   As above    Objective:     There were no vitals taken for this visit.  BP Readings from Last 3 Encounters:   09/07/23 110/70   09/05/23 124/78   08/16/23 132/66     Wt Readings from Last 1 Encounters:   02/02/24 0740 71.7 kg (158 lb)     There is no height or weight on file to calculate BMI.      Physical exam not  performed - virtual visit.       Lab Review:   Lab Results   Component Value Date    HGBA1C 5.3 04/11/2023     Lab Results   Component Value Date    CHOL 131 04/11/2023    HDL 51 04/11/2023    LDLCALC 64.0 04/11/2023    TRIG 80 04/11/2023    CHOLHDL 38.9 04/11/2023     Lab Results   Component Value Date     (L) 07/13/2023    K 4.0 07/13/2023     07/13/2023    CO2 19 (L) 07/13/2023    GLU 88 07/13/2023    BUN 12 07/13/2023    CREATININE 0.7 07/13/2023    CALCIUM 10.0 07/13/2023    PROT 8.2 07/13/2023    ALBUMIN 3.6 07/13/2023    BILITOT 0.5 07/13/2023    ALKPHOS 139 (H) 07/13/2023    AST 29 07/13/2023    ALT 33 07/13/2023    ANIONGAP 11 07/13/2023    ESTGFRAFRICA >60 07/25/2022    EGFRNONAA >60 07/25/2022    TSH 6.479 (H) 02/15/2024        Assessment and Plan     Graves' disease  No concern for orbitopathy. Improved symptoms on methimazole.  Discussed the chance of Graves remission (40%) after 2 years of ATD therapy - with a 50% chance of recurrence.    We discussed that hypo and hyperthyroidism can be detrimental to fetal health and strongly encouraged using contraception until TSH normalizes. Pt was advised to let us know in case she does have a pregnancy test turn out positive. We discussed that PTU would be a safer alternative for 1st trimester pregnancy. Patient opted for continuing methimazole at 5 mg daily.       Will recheck TFTs in 4 weeks  Repeat US thyroid    Multiple thyroid nodules  Multiple nodules seen on last US, TIRADS 3 at the highest. Unclear whether those were real nodules or just heterogenous thyroid texture. No indication for biopsy.  -Repeat US here    RTC 6 months    Joanne Gomez MD  [facility], 6th Floor  [address] [place], [place], [address]

## 2024-02-26 NOTE — ASSESSMENT & PLAN NOTE
Multiple nodules seen on last US, TIRADS 3 at the highest. Unclear whether those were real nodules or just heterogenous thyroid texture. No indication for biopsy.  -Repeat US here

## 2024-02-26 NOTE — PATIENT INSTRUCTIONS
Please continue methimazole at 5 mg daily.    Please let us know in case you are pregnant as your goals for TSH change during that period and you will need to switch to a different medication (PTU, as we had discussed). We would encourage using contraception until your thyroid levels are within normal limits.    We will repeat labs in 4 weeks and do a thyroid ultrasound.

## 2024-02-26 NOTE — ASSESSMENT & PLAN NOTE
No concern for orbitopathy. Improved symptoms on methimazole.  Discussed the chance of Graves remission (40%) after 2 years of ATD therapy - with a 50% chance of recurrence.    We discussed that hypo and hyperthyroidism can be detrimental to fetal health and strongly encouraged using contraception until TSH normalizes. Pt was advised to let us know in case she does have a pregnancy test turn out positive. We discussed that PTU would be a safer alternative for 1st trimester pregnancy. Patient opted for continuing methimazole at 5 mg daily.       Will recheck TFTs in 4 weeks  Repeat US thyroid

## 2024-02-28 ENCOUNTER — PATIENT MESSAGE (OUTPATIENT)
Dept: ENDOCRINOLOGY | Facility: CLINIC | Age: 34
End: 2024-02-28
Payer: COMMERCIAL

## 2024-03-13 ENCOUNTER — HOSPITAL ENCOUNTER (OUTPATIENT)
Dept: ENDOCRINOLOGY | Facility: CLINIC | Age: 34
Discharge: HOME | End: 2024-03-13
Attending: STUDENT IN AN ORGANIZED HEALTH CARE EDUCATION/TRAINING PROGRAM
Payer: COMMERCIAL

## 2024-03-13 DIAGNOSIS — E05.90 HYPERTHYROIDISM: ICD-10-CM

## 2024-03-13 PROCEDURE — 76536 US EXAM OF HEAD AND NECK: CPT | Mod: S$GLB,,, | Performed by: INTERNAL MEDICINE

## 2024-03-14 ENCOUNTER — TELEPHONE (OUTPATIENT)
Dept: ENDOCRINOLOGY | Facility: CLINIC | Age: 34
End: 2024-03-14
Payer: COMMERCIAL

## 2024-03-14 NOTE — TELEPHONE ENCOUNTER
Called and spoke with pt about cancel appt she has scheduled on 3/22. I informed pt that she will have to be rescheduled for aUGUST WHEN HER 6 MO F/U IS DUE.

## 2024-03-20 ENCOUNTER — OFFICE VISIT (OUTPATIENT)
Dept: URGENT CARE | Facility: CLINIC | Age: 34
End: 2024-03-20
Payer: OTHER MISCELLANEOUS

## 2024-03-20 VITALS
RESPIRATION RATE: 17 BRPM | DIASTOLIC BLOOD PRESSURE: 77 MMHG | WEIGHT: 165.56 LBS | OXYGEN SATURATION: 100 % | BODY MASS INDEX: 29.34 KG/M2 | HEART RATE: 71 BPM | TEMPERATURE: 97 F | HEIGHT: 63 IN | SYSTOLIC BLOOD PRESSURE: 119 MMHG

## 2024-03-20 DIAGNOSIS — Z02.6 ENCOUNTER RELATED TO WORKER'S COMPENSATION CLAIM: ICD-10-CM

## 2024-03-20 DIAGNOSIS — Z04.2: Primary | ICD-10-CM

## 2024-03-20 PROCEDURE — 80305 DRUG TEST PRSMV DIR OPT OBS: CPT | Mod: S$GLB,,, | Performed by: PHYSICIAN ASSISTANT

## 2024-03-20 PROCEDURE — 99203 OFFICE O/P NEW LOW 30 MIN: CPT | Mod: S$GLB,,, | Performed by: PHYSICIAN ASSISTANT
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
## 2024-04-02 ENCOUNTER — LAB VISIT (OUTPATIENT)
Dept: LAB | Facility: HOSPITAL | Age: 34
End: 2024-04-02
Attending: STUDENT IN AN ORGANIZED HEALTH CARE EDUCATION/TRAINING PROGRAM
Payer: COMMERCIAL

## 2024-04-02 DIAGNOSIS — E05.00 GRAVES' DISEASE: ICD-10-CM

## 2024-04-02 LAB
T3 SERPL-MCNC: 68 NG/DL (ref 60–180)
T4 FREE SERPL-MCNC: 1 NG/DL (ref 0.71–1.51)
TSH SERPL DL<=0.005 MIU/L-ACNC: 2.87 UIU/ML (ref 0.4–4)

## 2024-04-02 PROCEDURE — 84439 ASSAY OF FREE THYROXINE: CPT | Performed by: STUDENT IN AN ORGANIZED HEALTH CARE EDUCATION/TRAINING PROGRAM

## 2024-04-02 PROCEDURE — 36415 COLL VENOUS BLD VENIPUNCTURE: CPT | Mod: PO | Performed by: STUDENT IN AN ORGANIZED HEALTH CARE EDUCATION/TRAINING PROGRAM

## 2024-04-02 PROCEDURE — 84443 ASSAY THYROID STIM HORMONE: CPT | Performed by: STUDENT IN AN ORGANIZED HEALTH CARE EDUCATION/TRAINING PROGRAM

## 2024-04-02 PROCEDURE — 84480 ASSAY TRIIODOTHYRONINE (T3): CPT | Performed by: STUDENT IN AN ORGANIZED HEALTH CARE EDUCATION/TRAINING PROGRAM

## 2024-04-03 DIAGNOSIS — E05.00 GRAVES' DISEASE: Primary | ICD-10-CM

## 2024-05-07 ENCOUNTER — OFFICE VISIT (OUTPATIENT)
Dept: OBSTETRICS AND GYNECOLOGY | Facility: CLINIC | Age: 34
End: 2024-05-07
Payer: COMMERCIAL

## 2024-05-07 VITALS — WEIGHT: 160 LBS | HEIGHT: 63 IN | BODY MASS INDEX: 28.35 KG/M2

## 2024-05-07 DIAGNOSIS — E66.3 OVERWEIGHT (BMI 25.0-29.9): ICD-10-CM

## 2024-05-07 DIAGNOSIS — E88.810 METABOLIC SYNDROME: Primary | ICD-10-CM

## 2024-05-07 DIAGNOSIS — Z12.31 SCREENING MAMMOGRAM, ENCOUNTER FOR: ICD-10-CM

## 2024-05-07 DIAGNOSIS — Z91.89 INCREASED RISK OF BREAST CANCER: ICD-10-CM

## 2024-05-07 PROCEDURE — 3008F BODY MASS INDEX DOCD: CPT | Mod: CPTII,95,, | Performed by: PHYSICIAN ASSISTANT

## 2024-05-07 PROCEDURE — 1159F MED LIST DOCD IN RCRD: CPT | Mod: CPTII,95,, | Performed by: PHYSICIAN ASSISTANT

## 2024-05-07 PROCEDURE — 1160F RVW MEDS BY RX/DR IN RCRD: CPT | Mod: CPTII,95,, | Performed by: PHYSICIAN ASSISTANT

## 2024-05-07 PROCEDURE — 99213 OFFICE O/P EST LOW 20 MIN: CPT | Mod: 95,,, | Performed by: PHYSICIAN ASSISTANT

## 2024-05-07 NOTE — PROGRESS NOTES
The patient location is: car  The chief complaint leading to consultation is: Weight loss follow up    Visit type: audiovisual    Face to Face time with patient: 15 minutes  20 minutes of total time spent on the encounter, which includes face to face time and non-face to face time preparing to see the patient (eg, review of tests), Obtaining and/or reviewing separately obtained history, Documenting clinical information in the electronic or other health record, Independently interpreting results (not separately reported) and communicating results to the patient/family/caregiver, or Care coordination (not separately reported).         Each patient to whom he or she provides medical services by telemedicine is:  (1) informed of the relationship between the physician and patient and the respective role of any other health care provider with respect to management of the patient; and (2) notified that he or she may decline to receive medical services by telemedicine and may withdraw from such care at any time.    Notes:   Subjective:      Tiffany Dobbs is a 33 y.o. female who presents for weight loss follow up. She is taking compounded tirzepatide 7.5mg sc weekly. She took a short break and not taking regularly but now taking weekly. She has been working hard on diet. Avoiding sugar, alcohol and fried foods. Feels great. She is trying to add workouts where she can. Goal of 145-150lb. She would like to continue 7.5mg weekly for now.  She is at increased risk for breat cancer followed by high risk breast clinic. Missed breast MRI due to nerves. Last mammogram 5/31/2023. Questions about breast imaging.    Routine Screening Labs: 7/13/2023     Lab Visit on 04/02/2024   Component Date Value Ref Range Status    T3, Total 04/02/2024 68  60 - 180 ng/dL Final    Free T4 04/02/2024 1.00  0.71 - 1.51 ng/dL Final    TSH 04/02/2024 2.865  0.400 - 4.000 uIU/mL Final   Lab Visit on 02/15/2024   Component Date Value Ref Range Status     Free T4 02/15/2024 0.74  0.71 - 1.51 ng/dL Final    T3, Total 02/15/2024 73  60 - 180 ng/dL Final    TSH 02/15/2024 6.479 (H)  0.400 - 4.000 uIU/mL Final       Past Medical History:   Diagnosis Date    Anemia     Chronic pain of left knee 06/05/2017    Graves disease     PCOS (polycystic ovarian syndrome) 04/2020     Past Surgical History:   Procedure Laterality Date    BREAST REDUCTION  2012    EXAMINATION UNDER ANESTHESIA  08/02/2022    Procedure: EXAM UNDER ANESTHESIA;  Surgeon: Tevin Tubbs IV, MD;  Location: [facility] OR;  Service: Orthopedics;;    RECONSTRUCTION OF ANTERIOR CRUCIATE LIGAMENT USING GRAFT Right 08/02/2022    Procedure: RECONSTRUCTION, KNEE, ACL, USING Allograft Achilles GRAFT, possible meniscus repair, possible PLC repair, knee exam under anesthesia;  Surgeon: Tevin Tubbs IV, MD;  Location: [facility] OR;  Service: Orthopedics;  Laterality: Right;  Achilles allograft with bone block(IN FREEZER); open/THAW at start of case  Black knee post/operative side, well leg marvin on nonsurgical side  Position: ACL P    TOTAL REDUCTION MAMMOPLASTY       Social History     Tobacco Use    Smoking status: Never     Passive exposure: Never    Smokeless tobacco: Never   Substance Use Topics    Alcohol use: Yes     Comment: socially, twice per month    Drug use: No     Family History   Problem Relation Name Age of Onset    Other Mother Toya         cervical dysplasia    Gestational diabetes Mother Toya     Glaucoma Mother Toya     Heart disease Father          arrythmia, has device - ICD?    Diverticulitis Father      Other Maternal Grandmother Clovina         benign breast bx    Diabetes Maternal Grandmother Clovina     Dementia Maternal Grandmother Clovina     Arthritis Maternal Grandmother Clovina         spinal    Hydrocephalus Maternal Grandmother Clovina     Breast cancer Paternal Aunt Yady         dx.~42    Breast cancer Other Le         dx.~42    Stomach cancer Other          dx. ~early- to mid-70s     "Ovarian cancer Other Cristina         dx.~55    Diabetes Other Humaira     Glaucoma Maternal Aunt Heidi     Colon cancer Neg Hx      Heart attack Neg Hx       OB History    Para Term  AB Living   0         0   SAB IAB Ectopic Multiple Live Births                   Current Outpatient Medications:     atenoloL (TENORMIN) 50 MG tablet, Take 1 tablet (50 mg total) by mouth once daily., Disp: 30 tablet, Rfl: 2    ferrous sulfate (IRON) 325 mg (65 mg iron) Tab tablet, Take 1 tablet (325 mg total) by mouth 2 (two) times daily., Disp: 60 tablet, Rfl: 2    methIMAzole (TAPAZOLE) 5 MG Tab, Take 1 tablet (5 mg total) by mouth once daily., Disp: 30 tablet, Rfl: 11    tirzepatide 7.5 mg/0.5 mL PnIj, Inject 7.5 mg into the skin every 7 days., Disp: , Rfl:     Review of Systems:  General: No fever, chills.  Chest: No chest pain, shortness of breath, or palpitations.  Breast: No pain, masses, or nipple discharge.  Vulva: No pain, lesions, or itching.  Vagina: No relaxation, itching, discharge, or lesions.  Abdomen: No pain, nausea, vomiting, diarrhea, or constipation.  Urinary: No incontinence, nocturia, frequency, or dysuria.  Extremities:  No leg cramps, edema, or calf pain.  Neurologic: No headaches, dizziness, or visual changes.    Objective:     Vitals:    24 0819   Weight: 72.6 kg (160 lb)   Height: 5' 3" (1.6 m)     Body mass index is 28.34 kg/m².    PHYSICAL EXAM:  APPEARANCE: Well nourished, well developed, in no acute distress.  AFFECT: WNL, alert and oriented x 3      Assessment:      Metabolic syndrome  -     tirzepatide 7.5 mg/0.5 mL PnIj; Inject 7.5 mg into the skin every 7 days.    Overweight (BMI 25.0-29.9)    Screening mammogram, encounter for  -     Mammo Digital Screening Bilat w/ [facility]; Future; Expected date: 2024    Increased risk of breast cancer  -     Mammo Digital Screening Bilat w/ [facility]; Future; Expected date: 2024        Plan:   Continue low glycemic diet with lean protein at " each meal.  Maintain hydration.  Continue compounded tirzepatide 7.5mg SC weekly- faxed to [facility]  Adding strength workouts  Screening mammogram due 6/2024  She will defer breast MRI this year  Annual follow up with high risk breast clinic in 8/2024  Follow up in 3 months for weight loss    Instructed patient to call if she experiences any side effects or has any questions.    I spent a total of 20 minutes on the day of the visit.This includes face to face time and non-face to face time preparing to see the patient (eg, review of tests), obtaining and/or reviewing separately obtained history, documenting clinical information in the electronic or other health record, independently interpreting results and communicating results to the patient/family/caregiver, or care coordinator.

## 2024-06-03 ENCOUNTER — HOSPITAL ENCOUNTER (OUTPATIENT)
Dept: RADIOLOGY | Facility: HOSPITAL | Age: 34
Discharge: HOME | End: 2024-06-03
Attending: PHYSICIAN ASSISTANT
Payer: COMMERCIAL

## 2024-06-03 VITALS — BODY MASS INDEX: 28.35 KG/M2 | HEIGHT: 63 IN | WEIGHT: 160 LBS

## 2024-06-03 DIAGNOSIS — Z12.31 SCREENING MAMMOGRAM, ENCOUNTER FOR: ICD-10-CM

## 2024-06-03 DIAGNOSIS — Z91.89 INCREASED RISK OF BREAST CANCER: ICD-10-CM

## 2024-06-03 PROCEDURE — 77067 SCR MAMMO BI INCL CAD: CPT | Mod: 26,,, | Performed by: RADIOLOGY

## 2024-06-03 PROCEDURE — 77063 BREAST TOMOSYNTHESIS BI: CPT | Mod: 26,,, | Performed by: RADIOLOGY

## 2024-06-03 PROCEDURE — 77067 SCR MAMMO BI INCL CAD: CPT | Mod: TC

## 2024-06-11 ENCOUNTER — TELEPHONE (OUTPATIENT)
Dept: INTERNAL MEDICINE | Facility: CLINIC | Age: 34
End: 2024-06-11
Payer: COMMERCIAL

## 2024-06-11 NOTE — TELEPHONE ENCOUNTER
----- Message from Deepti Boothe sent at 6/11/2024 10:03 AM CDT -----  Type:  Sooner Appointment Request    Caller is requesting a sooner appointment.  Caller declined first available appointment listed below.  Caller will not accept being placed on the waitlist and is requesting a message be sent to doctor.  Name of Caller:pt   When is the first available appointment?  Symptoms:Annual   Would the patient rather a call back or a response via [facility]? Call   Best Call Back Number:[phone]  Additional Information:

## 2024-06-12 ENCOUNTER — LAB VISIT (OUTPATIENT)
Dept: LAB | Facility: HOSPITAL | Age: 34
End: 2024-06-12
Attending: STUDENT IN AN ORGANIZED HEALTH CARE EDUCATION/TRAINING PROGRAM
Payer: COMMERCIAL

## 2024-06-12 DIAGNOSIS — E05.90 HYPERTHYROIDISM: ICD-10-CM

## 2024-06-12 LAB
T3 SERPL-MCNC: 84 NG/DL (ref 60–180)
T4 FREE SERPL-MCNC: 1 NG/DL (ref 0.71–1.51)
TSH SERPL DL<=0.005 MIU/L-ACNC: 3.17 UIU/ML (ref 0.4–4)

## 2024-06-12 PROCEDURE — 84480 ASSAY TRIIODOTHYRONINE (T3): CPT

## 2024-06-12 PROCEDURE — 84443 ASSAY THYROID STIM HORMONE: CPT

## 2024-06-12 PROCEDURE — 84439 ASSAY OF FREE THYROXINE: CPT

## 2024-06-12 PROCEDURE — 36415 COLL VENOUS BLD VENIPUNCTURE: CPT | Mod: PO

## 2024-06-26 ENCOUNTER — TELEPHONE (OUTPATIENT)
Dept: OBSTETRICS AND GYNECOLOGY | Facility: CLINIC | Age: 34
End: 2024-06-26
Payer: COMMERCIAL

## 2024-06-26 ENCOUNTER — OFFICE VISIT (OUTPATIENT)
Dept: OBSTETRICS AND GYNECOLOGY | Facility: CLINIC | Age: 34
End: 2024-06-26
Payer: COMMERCIAL

## 2024-06-26 VITALS
HEIGHT: 63 IN | SYSTOLIC BLOOD PRESSURE: 110 MMHG | BODY MASS INDEX: 30.59 KG/M2 | WEIGHT: 172.63 LBS | DIASTOLIC BLOOD PRESSURE: 66 MMHG

## 2024-06-26 DIAGNOSIS — Z11.3 SCREEN FOR STD (SEXUALLY TRANSMITTED DISEASE): ICD-10-CM

## 2024-06-26 DIAGNOSIS — Z01.419 WELL WOMAN EXAM WITH ROUTINE GYNECOLOGICAL EXAM: Primary | ICD-10-CM

## 2024-06-26 PROCEDURE — 3008F BODY MASS INDEX DOCD: CPT | Mod: CPTII,S$GLB,, | Performed by: OBSTETRICS & GYNECOLOGY

## 2024-06-26 PROCEDURE — 3078F DIAST BP <80 MM HG: CPT | Mod: CPTII,S$GLB,, | Performed by: OBSTETRICS & GYNECOLOGY

## 2024-06-26 PROCEDURE — 1160F RVW MEDS BY RX/DR IN RCRD: CPT | Mod: CPTII,S$GLB,, | Performed by: OBSTETRICS & GYNECOLOGY

## 2024-06-26 PROCEDURE — 1159F MED LIST DOCD IN RCRD: CPT | Mod: CPTII,S$GLB,, | Performed by: OBSTETRICS & GYNECOLOGY

## 2024-06-26 PROCEDURE — 99999 PR PBB SHADOW E&M-EST. PATIENT-LVL III: CPT | Mod: PBBFAC,,, | Performed by: OBSTETRICS & GYNECOLOGY

## 2024-06-26 PROCEDURE — 3074F SYST BP LT 130 MM HG: CPT | Mod: CPTII,S$GLB,, | Performed by: OBSTETRICS & GYNECOLOGY

## 2024-06-26 PROCEDURE — 99395 PREV VISIT EST AGE 18-39: CPT | Mod: S$GLB,,, | Performed by: OBSTETRICS & GYNECOLOGY

## 2024-06-26 PROCEDURE — 99459 PELVIC EXAMINATION: CPT | Mod: S$GLB,,, | Performed by: OBSTETRICS & GYNECOLOGY

## 2024-06-26 NOTE — PROGRESS NOTES
"[facility]  Obstetrics & Gynecology    Visit Date:  6/26/2024    Chief Complaint:  Annual GYN exam    History of Present Illness:      Tiffany Dobbs is a 33 y.o. G0, new pt to me, here for a gynecologic exam.      Pt has no major complaints today.      Menses are regular, not heavy or painful.    Pt current method of family planning is natural family planning, and reports no problems with this method.      Last pap ~2022 benign.    Pt denies abnormal vaginal bleeding, vaginal discharge, dysmenorrhea, dyspareunia, pelvic pain, bloating, early satiety, unintentional weight loss, breast mass/skin changes, incontinence, GI or urinary complaints.      Otherwise, the pt is in her usual state of health.    Past History:  Gynecologic history as noted above.    Review of Systems:      GENERAL:  No fever, fatigue, excessive weight gain or loss  HEENT:  No headaches, hearing changes, visual disturbance  RESPIRATORY:  No cough, shortness of breath  CARDIOVASCULAR:  No chest pain, heart palpitations, leg swelling  BREAST:  No lump, pain, nipple discharge, skin changes  GASTROINTESTINAL:  No nausea, vomiting, constipation, diarrhea, abd pain, rectal bleeding   GENITOURINARY:  See HPI  ENDOCRINE:  No heat or cold intolerance  HEMATOLOGIC:  No easy bruisability or bleeding   LYMPHATICS:  No enlarged nodes  MUSCULOSKELETAL:  No acute joint pain or swelling  SKIN:  No rash, lesions, jaundice  NEUROLOGIC:  No dizziness, weakness, syncope  PSYCHIATRIC:  No significant mood changes, homicidal/suicidal ideations, abuse    Physical Exam:     /66   Ht 5' 3" (1.6 m)   Wt 78.3 kg (172 lb 9.9 oz)   LMP 06/06/2024   BMI 30.58 kg/m²   Pulse 60's, Resp rate 12     GENERAL:  No acute distress, well-nourished  HEENT:  Atraumatic, anicteric, moist mucus membranes. Neck supple w/o masses.  BREAST:  Old breast reduction scar.  Symmetric, nontender, no obvious masses, adenopathy, skin changes or " nipple discharge.  LUNGS:  Clear normal respiratory effort  HEART:  Regular rate and rhythm  ABDOMEN:  Soft, non-tender, non-distended, normoactive bowel sounds, no obvious organomegaly  EXT:  Symmetric w/o cramping, claudication, or edema. +2 distal pulses.  SKIN:  No rashes or bruising  PSYCH:  Mood and affect appropriate  NEURO:  Grossly intact bilaterally    GENITOURINARY:    VULVAR:  Female external genitalia w/o obvious lesions. Female hair distribution. Normal urethral meatus. No gross lymphadenopathy.    VAGINA:  Normal vaginal mucosa. Good support. No obvious lesion. No discharge.  CERVIX:  No cervical motion tenderness, discharge, or obvious lesions.   UTERUS:  Small, non-tender, normal contour  ADNEXA:  No masses, non-tender    RECTAL:  Deferred. No obvious external lesions    Chaperone present for exam.    Assessment:     33 y.o. G0:    Well woman gynecologic exam    Plan:    A gynecologic health assessment was performed with age appropriate counseling.    Cervical cancer screening - pap up to date.    STI screening - pt requested HIV testing.      Encourage healthy lifestyle modifications, monthly self breast exams, and f/u with PCP for health maintenance.    Return 1 year for gynecologic exam or sooner as needed.  All questions answered, pt voiced understanding.        Lester Cheung MD

## 2024-06-28 ENCOUNTER — OFFICE VISIT (OUTPATIENT)
Dept: INTERNAL MEDICINE | Facility: CLINIC | Age: 34
End: 2024-06-28
Payer: COMMERCIAL

## 2024-06-28 ENCOUNTER — LAB VISIT (OUTPATIENT)
Dept: LAB | Facility: HOSPITAL | Age: 34
End: 2024-06-28
Payer: COMMERCIAL

## 2024-06-28 VITALS
DIASTOLIC BLOOD PRESSURE: 76 MMHG | WEIGHT: 173.5 LBS | HEART RATE: 100 BPM | BODY MASS INDEX: 30.74 KG/M2 | OXYGEN SATURATION: 99 % | HEIGHT: 63 IN | SYSTOLIC BLOOD PRESSURE: 106 MMHG

## 2024-06-28 DIAGNOSIS — Z11.3 ROUTINE SCREENING FOR STI (SEXUALLY TRANSMITTED INFECTION): ICD-10-CM

## 2024-06-28 DIAGNOSIS — D50.0 IRON DEFICIENCY ANEMIA DUE TO CHRONIC BLOOD LOSS: ICD-10-CM

## 2024-06-28 DIAGNOSIS — E05.00 GRAVES' DISEASE: ICD-10-CM

## 2024-06-28 DIAGNOSIS — Z11.3 SCREEN FOR STD (SEXUALLY TRANSMITTED DISEASE): ICD-10-CM

## 2024-06-28 DIAGNOSIS — Z83.3 FAMILY HISTORY OF DIABETES MELLITUS: ICD-10-CM

## 2024-06-28 DIAGNOSIS — Z76.89 ENCOUNTER TO ESTABLISH CARE: Primary | ICD-10-CM

## 2024-06-28 LAB
ANION GAP SERPL CALC-SCNC: 6 MMOL/L (ref 8–16)
BASOPHILS # BLD AUTO: 0.05 K/UL (ref 0–0.2)
BASOPHILS NFR BLD: 1 % (ref 0–1.9)
BUN SERPL-MCNC: 7 MG/DL (ref 6–20)
CALCIUM SERPL-MCNC: 8.7 MG/DL (ref 8.7–10.5)
CHLORIDE SERPL-SCNC: 107 MMOL/L (ref 95–110)
CO2 SERPL-SCNC: 24 MMOL/L (ref 23–29)
CREAT SERPL-MCNC: 0.8 MG/DL (ref 0.5–1.4)
DIFFERENTIAL METHOD BLD: ABNORMAL
EOSINOPHIL # BLD AUTO: 0.3 K/UL (ref 0–0.5)
EOSINOPHIL NFR BLD: 6.5 % (ref 0–8)
ERYTHROCYTE [DISTWIDTH] IN BLOOD BY AUTOMATED COUNT: 16.1 % (ref 11.5–14.5)
EST. GFR  (NO RACE VARIABLE): >60 ML/MIN/1.73 M^2
ESTIMATED AVG GLUCOSE: 97 MG/DL (ref 68–131)
GLUCOSE SERPL-MCNC: 76 MG/DL (ref 70–110)
HBA1C MFR BLD: 5 % (ref 4–5.6)
HCT VFR BLD AUTO: 29.9 % (ref 37–48.5)
HGB BLD-MCNC: 9 G/DL (ref 12–16)
HIV 1+2 AB+HIV1 P24 AG SERPL QL IA: NORMAL
HIV 1+2 AB+HIV1 P24 AG SERPL QL IA: NORMAL
IMM GRANULOCYTES # BLD AUTO: 0.01 K/UL (ref 0–0.04)
IMM GRANULOCYTES NFR BLD AUTO: 0.2 % (ref 0–0.5)
LYMPHOCYTES # BLD AUTO: 2.1 K/UL (ref 1–4.8)
LYMPHOCYTES NFR BLD: 41.7 % (ref 18–48)
MCH RBC QN AUTO: 22.7 PG (ref 27–31)
MCHC RBC AUTO-ENTMCNC: 30.1 G/DL (ref 32–36)
MCV RBC AUTO: 75 FL (ref 82–98)
MONOCYTES # BLD AUTO: 0.4 K/UL (ref 0.3–1)
MONOCYTES NFR BLD: 7.3 % (ref 4–15)
NEUTROPHILS # BLD AUTO: 2.2 K/UL (ref 1.8–7.7)
NEUTROPHILS NFR BLD: 43.3 % (ref 38–73)
NRBC BLD-RTO: 0 /100 WBC
PLATELET # BLD AUTO: 332 K/UL (ref 150–450)
PMV BLD AUTO: 10.4 FL (ref 9.2–12.9)
POTASSIUM SERPL-SCNC: 4.1 MMOL/L (ref 3.5–5.1)
RBC # BLD AUTO: 3.97 M/UL (ref 4–5.4)
SODIUM SERPL-SCNC: 137 MMOL/L (ref 136–145)
T4 FREE SERPL-MCNC: 0.89 NG/DL (ref 0.71–1.51)
TREPONEMA PALLIDUM IGG+IGM AB [PRESENCE] IN SERUM OR PLASMA BY IMMUNOASSAY: NONREACTIVE
TSH SERPL DL<=0.005 MIU/L-ACNC: 2.21 UIU/ML (ref 0.4–4)
WBC # BLD AUTO: 4.96 K/UL (ref 3.9–12.7)

## 2024-06-28 PROCEDURE — 85025 COMPLETE CBC W/AUTO DIFF WBC: CPT

## 2024-06-28 PROCEDURE — 99999 PR PBB SHADOW E&M-EST. PATIENT-LVL IV: CPT | Mod: PBBFAC,,,

## 2024-06-28 PROCEDURE — 86593 SYPHILIS TEST NON-TREP QUANT: CPT

## 2024-06-28 PROCEDURE — 83036 HEMOGLOBIN GLYCOSYLATED A1C: CPT

## 2024-06-28 PROCEDURE — 87491 CHLMYD TRACH DNA AMP PROBE: CPT

## 2024-06-28 PROCEDURE — 80048 BASIC METABOLIC PNL TOTAL CA: CPT

## 2024-06-28 PROCEDURE — 87591 N.GONORRHOEAE DNA AMP PROB: CPT

## 2024-06-28 PROCEDURE — 84443 ASSAY THYROID STIM HORMONE: CPT | Performed by: STUDENT IN AN ORGANIZED HEALTH CARE EDUCATION/TRAINING PROGRAM

## 2024-06-28 PROCEDURE — 87389 HIV-1 AG W/HIV-1&-2 AB AG IA: CPT | Performed by: OBSTETRICS & GYNECOLOGY

## 2024-06-28 PROCEDURE — 84439 ASSAY OF FREE THYROXINE: CPT | Performed by: STUDENT IN AN ORGANIZED HEALTH CARE EDUCATION/TRAINING PROGRAM

## 2024-06-28 NOTE — PROGRESS NOTES
Internal Medicine Clinic Note  2024    Subjective   Patient Name: Tiffany Dobbs  : 1990    Chief Complaint:   Chief Complaint   Patient presents with    Annual Exam       History of Present Illness:  Ms. Tiffany Dobbs is a 33 y.o. female with a PMHx of Grave's presenting to clinic to establish care. She is new to me. She reports feeling well today, does not have any pressing issues that are concerning her but does have a few questions she would like to ask today regarding her plans for upcoming travel. We reviewed her personal medical history, surgical history, family history, and social history together today - updates were made as necessary. We reviewed her medication list - updates were made as necessary. Current medications include iron tablets, Methimazole, and tirzepatide. She has a strong family history of breast cancer at young ages, has already begun screening mammograms.    Issues addressed today:  # Wing  Says she has been out to walk in the heat of the day (around noon) and felt like she was overheating and about to pass out. Attributes her symptoms to being out in the heat without hydration and being heat sensitive. She follows with Endocrine for her Graves, most recent labs were obtained earlier this month. TSH, T4, T3 all within normal range. She takes her Methimazole everyday without missed doses, tolerating well.     # Upcoming Travel  She is anticipating traveling to [place] within the next month as well as will be traveling to multiple countries within [place] in the [place]. Inquiring about vaccinations needed prior to her travel.    # STI screening  She recently followed up with her Gynecologist for her well woman exam. She did not have STI screening performed at the time and would like to be screened today. She is asymptomatic.     HCM and screenings  - Covid    Specialists  - She follows with Endocrine for her Graves disease      Review of Systems   Constitutional:  Negative for  chills, fever and malaise/fatigue.   HENT:  Negative for congestion and sore throat.    Respiratory:  Negative for cough and shortness of breath.    Cardiovascular:  Negative for chest pain and leg swelling.   Gastrointestinal:  Negative for abdominal pain, diarrhea, nausea and vomiting.   Genitourinary:  Negative for dysuria.   Musculoskeletal:  Negative for back pain, falls and myalgias.   Neurological:  Negative for dizziness, weakness and headaches.   Psychiatric/Behavioral:  Negative for depression. The patient does not have insomnia.        PAST HISTORY:     Past Medical History:   Diagnosis Date    Anemia     Chronic pain of left knee 06/05/2017    Graves disease     PCOS (polycystic ovarian syndrome) 04/2020       Past Surgical History:   Procedure Laterality Date    BREAST REDUCTION  2012    EXAMINATION UNDER ANESTHESIA  08/02/2022    Procedure: EXAM UNDER ANESTHESIA;  Surgeon: Tevin Tubbs IV, MD;  Location: [facility] OR;  Service: Orthopedics;;    RECONSTRUCTION OF ANTERIOR CRUCIATE LIGAMENT USING GRAFT Right 08/02/2022    Procedure: RECONSTRUCTION, KNEE, ACL, USING Allograft Achilles GRAFT, possible meniscus repair, possible PLC repair, knee exam under anesthesia;  Surgeon: Tevin Tubbs IV, MD;  Location: [facility] OR;  Service: Orthopedics;  Laterality: Right;  Achilles allograft with bone block(IN FREEZER); open/THAW at start of case  Black knee post/operative side, well leg marvin on nonsurgical side  Position: ACL P    TOTAL REDUCTION MAMMOPLASTY         Family History   Problem Relation Name Age of Onset    Other Mother Toya         cervical dysplasia    Gestational diabetes Mother Toya     Glaucoma Mother Toya     Hypertension Mother Toya     Sleep apnea Mother Toya     Heart disease Father          arrythmia, has device - ICD?    Diverticulitis Father      Diabetes Father      Other Maternal Grandmother Clovina         benign breast bx    Diabetes Maternal Grandmother Clovina     Dementia Maternal  "Grandmother Clovina     Arthritis Maternal Grandmother Clovina         spinal    Hydrocephalus Maternal Grandmother Clovina     Glaucoma Maternal Aunt Heidi     Breast cancer Paternal Aunt Yady         dx.~42    Breast cancer Other Le         dx.~42    Stomach cancer Other          dx. ~early- to mid-70s    Ovarian cancer Other Cristina         dx.~55    Diabetes Other Humaira     Colon cancer Neg Hx      Heart attack Neg Hx           MEDICATIONS & ALLERGIES:       Current Outpatient Medications:     ferrous sulfate (IRON) 325 mg (65 mg iron) Tab tablet, Take 1 tablet (325 mg total) by mouth 2 (two) times daily., Disp: 60 tablet, Rfl: 2    methIMAzole (TAPAZOLE) 5 MG Tab, Take 1 tablet (5 mg total) by mouth once daily., Disp: 30 tablet, Rfl: 11    tirzepatide 7.5 mg/0.5 mL PnIj, Inject 7.5 mg into the skin every 7 days., Disp: , Rfl:     Review of patient's allergies indicates:   Allergen Reactions    Bactrim [sulfamethoxazole-trimethoprim] Itching and Rash       OBJECTIVE:     Vital Signs:  Vitals:    06/28/24 1001   BP: 106/76   Pulse: 100   SpO2: 99%   Weight: 78.7 kg (173 lb 8 oz)   Height: 5' 3" (1.6 m)       Body mass index is 30.73 kg/m².     Physical Exam  Vitals and nursing note reviewed.   Constitutional:       General: She is awake. She is not in acute distress.     Appearance: Normal appearance. She is obese. She is not ill-appearing, toxic-appearing or diaphoretic.   HENT:      Head: Normocephalic and atraumatic.   Eyes:      General: No scleral icterus.        Right eye: No discharge.         Left eye: No discharge.      Conjunctiva/sclera: Conjunctivae normal.   Cardiovascular:      Rate and Rhythm: Normal rate and regular rhythm.   Pulmonary:      Effort: Pulmonary effort is normal. No respiratory distress.   Musculoskeletal:         General: No swelling or tenderness.      Cervical back: Normal range of motion.      Right lower leg: No edema.      Left lower leg: No edema.      Comments: Muscle " strength grossly 5/5 in upper and lower extremities, sensation grossly intact. Full ROM   Skin:     General: Skin is warm and dry.      Findings: No erythema or rash.   Neurological:      Mental Status: She is alert and oriented to person, place, and time.   Psychiatric:         Mood and Affect: Mood normal.         Behavior: Behavior normal. Behavior is cooperative.       Laboratory  No results found for this or any previous visit (from the past 336 hour(s)).     Health Maintenance Due   Topic Date Due    COVID-19 Vaccine (3 - 2023-24 season) 09/01/2023       ASSESSMENT & PLAN:       Encounter to establish care  - Will obtain updated labs: CBC, BMP, Lipid panel, A1c  -     LIPID PANEL; Future; Expected date: 06/28/2024  -     HEMOGLOBIN A1C; Future; Expected date: 06/28/2024  -     BASIC METABOLIC PANEL; Future; Expected date: 06/28/2024  -     CBC W/ AUTO DIFFERENTIAL; Future; Expected date: 06/28/2024  - Encouraged striving for regular exercise 3-5x/week (goal: 30 min/day or 150 min/week)  - Discussed maintaining healthy diet, avoiding sugary and fatty foods  - Encouraged continued tobacco abstinence, alcohol in moderation  - Yearly mammogram screening already begun, completed mammogram 6/2024.    Graves' disease  - Reviewed most recent TSH, T4, T3 - 6/2024  - Will check updated labs  -     BASIC METABOLIC PANEL; Future; Expected date: 06/28/2024  -     CBC W/ AUTO DIFFERENTIAL; Future; Expected date: 06/28/2024  - Continue Methimazole per Endocrinology  - Will check annual CBC to monitor for cytopenias while on Methimazole  - Encouraged continued follow up with Endocrinology    Iron deficiency anemia due to chronic blood loss  See above    Family history of diabetes mellitus  -     LIPID PANEL; Future; Expected date: 06/28/2024  -     HEMOGLOBIN A1C; Future; Expected date: 06/28/2024    Routine screening for STI (sexually transmitted infection)  -     Treponema Pallidium Antibodies IgG, IgM; Future; Expected  date: 06/28/2024  -     HIV 1/2 Ag/Ab (4th Gen); Future; Expected date: 06/28/2024  -     Ambulatory referral/consult to Travel Clinic; Future; Expected date: 07/05/2024  -     C. trachomatis/N. gonorrhoeae by AMP DNA [facility]; Urine      Preventative Care:  - Discussed HIV, Hep C screening - Completed  - Discussed vaccines: Covid - patient to get vaccine at local pharmacy when ready  - Discussed Cancer Screenings:  - Cervical Cancer Screening (Age 21-65) - Established with Gynecology  - Breast Cancer Screening (Age 50-74) - Completing annual Mammograms, most recent 6/2024  - Colon Cancer Screening (Age 45-75) - will refer for Colonoscopy when age appropriate    RTC in 1 year or sooner if needed    Discussed with Dr. Mariza Luciano, DO  Internal Medicine PGY-2  [facility]

## 2024-06-29 LAB
C TRACH DNA SPEC QL NAA+PROBE: NOT DETECTED
N GONORRHOEA DNA SPEC QL NAA+PROBE: NOT DETECTED

## 2024-06-30 ENCOUNTER — ON-DEMAND VIRTUAL (OUTPATIENT)
Dept: URGENT CARE | Facility: CLINIC | Age: 34
End: 2024-06-30

## 2024-06-30 ENCOUNTER — ON-DEMAND VIRTUAL (OUTPATIENT)
Dept: URGENT CARE | Facility: CLINIC | Age: 34
End: 2024-06-30
Payer: COMMERCIAL

## 2024-06-30 DIAGNOSIS — H10.9 CONJUNCTIVITIS, UNSPECIFIED CONJUNCTIVITIS TYPE, UNSPECIFIED LATERALITY: Primary | ICD-10-CM

## 2024-06-30 PROCEDURE — 99213 OFFICE O/P EST LOW 20 MIN: CPT | Mod: 95,,, | Performed by: NURSE PRACTITIONER

## 2024-06-30 RX ORDER — OFLOXACIN 3 MG/ML
1 SOLUTION/ DROPS OPHTHALMIC 4 TIMES DAILY
Qty: 5 ML | Refills: 0 | Status: SHIPPED | OUTPATIENT
Start: 2024-06-30

## 2024-06-30 RX ORDER — OFLOXACIN 3 MG/ML
1 SOLUTION/ DROPS OPHTHALMIC 4 TIMES DAILY
Qty: 5 ML | Refills: 0 | Status: SHIPPED | OUTPATIENT
Start: 2024-06-30 | End: 2024-06-30

## 2024-06-30 NOTE — PROGRESS NOTES
Subjective:      Patient ID: Tiffany Dobbs is a 33 y.o. female.    Vitals:  vitals were not taken for this visit.     Chief Complaint: No chief complaint on file.      Visit Type: TELE AUDIOVISUAL    Present with the patient at the time of consultation: TELEMED PRESENT WITH PATIENT: None        Past Medical History:   Diagnosis Date    Anemia     Chronic pain of left knee 06/05/2017    Graves disease     PCOS (polycystic ovarian syndrome) 04/2020     Past Surgical History:   Procedure Laterality Date    BREAST REDUCTION  2012    EXAMINATION UNDER ANESTHESIA  08/02/2022    Procedure: EXAM UNDER ANESTHESIA;  Surgeon: Tevin Tubbs IV, MD;  Location: [facility] OR;  Service: Orthopedics;;    RECONSTRUCTION OF ANTERIOR CRUCIATE LIGAMENT USING GRAFT Right 08/02/2022    Procedure: RECONSTRUCTION, KNEE, ACL, USING Allograft Achilles GRAFT, possible meniscus repair, possible PLC repair, knee exam under anesthesia;  Surgeon: Tevin Tubbs IV, MD;  Location: [facility] OR;  Service: Orthopedics;  Laterality: Right;  Achilles allograft with bone block(IN FREEZER); open/THAW at start of case  Black knee post/operative side, well leg marvin on nonsurgical side  Position: ACL P    TOTAL REDUCTION MAMMOPLASTY       Review of patient's allergies indicates:   Allergen Reactions    Bactrim [sulfamethoxazole-trimethoprim] Itching and Rash     Current Outpatient Medications on File Prior to Visit   Medication Sig Dispense Refill    ferrous sulfate (IRON) 325 mg (65 mg iron) Tab tablet Take 1 tablet (325 mg total) by mouth 2 (two) times daily. 60 tablet 2    methIMAzole (TAPAZOLE) 5 MG Tab Take 1 tablet (5 mg total) by mouth once daily. 30 tablet 11    tirzepatide 7.5 mg/0.5 mL PnIj Inject 7.5 mg into the skin every 7 days.       No current facility-administered medications on file prior to visit.     Family History   Problem Relation Name Age of Onset    Other Mother Toya         cervical dysplasia    Gestational diabetes Mother Toya      Glaucoma Mother Toya     Hypertension Mother Toya     Sleep apnea Mother Toya     Heart disease Father          arrythmia, has device - ICD?    Diverticulitis Father      Diabetes Father      Other Maternal Grandmother Clovina         benign breast bx    Diabetes Maternal Grandmother Clovina     Dementia Maternal Grandmother Clovina     Arthritis Maternal Grandmother Clovina         spinal    Hydrocephalus Maternal Grandmother Clovina     Glaucoma Maternal Aunt Heidi     Breast cancer Paternal Aunt Yady         dx.~42    Breast cancer Other Le         dx.~42    Stomach cancer Other          dx. ~early- to mid-70s    Ovarian cancer Other Cristina         dx.~55    Diabetes Other Humaira     Colon cancer Neg Hx      Heart attack Neg Hx         Medications Ordered                [id number] - [place], [place] - [address]   [address] [facility] [address]    Telephone: [phone]   Fax: [phone]   Hours: Not open 24 hours                         E-Prescribed (1 of 1)              ofloxacin (OCUFLOX) 0.3 % ophthalmic solution    Sig: Place 1 drop into the right eye 4 (four) times daily.       Start: 6/30/24     Quantity: 5 mL Refills: 0                           Ohs Peq Odvv Intake    6/30/2024  8:40 AM CDT - Filed by Patient   What is your current physical address in the event of a medical emergency? [address], [place] [address]   Are you able to take your vital signs? No   Please attach any relevant images or files          34 yo with c/o right eye irritation for the past 4 days. She states she wears contacts and states when it started she had contact in and eyes were blurry. She states she took them out a few days later when eyes became scratchy and burning. She states positive discharge. Mild sinus congestion. Denies fever.         Constitution: Negative. Negative for fever.   HENT:  Positive for congestion.    Cardiovascular: Negative.     Eyes:  Positive for eye discharge (watery), eye redness, photophobia and eyelid swelling. Negative for vision loss, double vision and blurred vision.   Respiratory: Negative.     Gastrointestinal: Negative.    Endocrine: negative.   Genitourinary: Negative.  Negative for frequency and urgency.   Musculoskeletal: Negative.    Skin: Negative.    Allergic/Immunologic: Negative.    Neurological: Negative.    Hematologic/Lymphatic: Negative.    Psychiatric/Behavioral: Negative.          Objective:   The physical exam was conducted virtually.  LOCATION OF PATIENT home  Physical Exam   Constitutional: She is oriented to person, place, and time. She appears well-developed.   HENT:   Head: Normocephalic and atraumatic.   Ears:   Right Ear: Hearing, tympanic membrane and external ear normal.   Left Ear: Hearing, tympanic membrane and external ear normal.   Nose: Nose normal.   Mouth/Throat: Uvula is midline, oropharynx is clear and moist and mucous membranes are normal.   Eyes: EOM and lids are normal. Pupils are equal, round, and reactive to light. Right conjunctiva is injected. vision grossly intact gaze aligned appropriately   Neck: Neck supple.   Cardiovascular: Normal rate.   Pulmonary/Chest: Effort normal and breath sounds normal.   Musculoskeletal: Normal range of motion.         General: Normal range of motion.   Neurological: She is alert and oriented to person, place, and time.   Skin: Skin is warm.   Psychiatric: Her behavior is normal. Thought content normal.   Nursing note and vitals reviewed.      Assessment:     1. Conjunctivitis, unspecified conjunctivitis type, unspecified laterality        Plan:   Recommendations:    Avoid contact with eyes and perform good hand hygiene.     If you were prescribed antibiotic eye drops take as directed.     Do not wear contacts for one week. Avoid eye make-up.     Do not rub eyes. Wash hands frequently and disinfect common surfaces to avoid spread.    Warm and cool  compresses may be soothing.    Artificial tears may help lubricate and rinse the eye. You may refrigerate the drops for added comfort    For allergic conjunctivitis, use antihistamine eye drops such as Pataday or Zaditor as directed on package.       Follow up with Eye Doctor if no improvement in symptoms or for any worsening of symptoms.          Conjunctivitis, unspecified conjunctivitis type, unspecified laterality  -     ofloxacin (OCUFLOX) 0.3 % ophthalmic solution; Place 1 drop into the right eye 4 (four) times daily.  Dispense: 5 mL; Refill: 0

## 2024-07-05 ENCOUNTER — PATIENT MESSAGE (OUTPATIENT)
Dept: ENDOCRINOLOGY | Facility: HOSPITAL | Age: 34
End: 2024-07-05
Payer: COMMERCIAL

## 2024-07-05 DIAGNOSIS — E05.00 GRAVES' DISEASE: Primary | ICD-10-CM

## 2024-07-05 RX ORDER — METHIMAZOLE 5 MG/1
5 TABLET ORAL DAILY
Qty: 90 TABLET | Refills: 3 | Status: SHIPPED | OUTPATIENT
Start: 2024-07-05 | End: 2025-07-05

## 2024-07-05 NOTE — TELEPHONE ENCOUNTER
Called patient regarding latest set of labs. She is feeling well on MMI 5 mg daily. TSH wnl. Will repeat labs in 8 weeks prior to her return appt (she will have to reschedule to late Sept/early Nov).

## 2024-08-19 ENCOUNTER — TELEPHONE (OUTPATIENT)
Dept: ENDOCRINOLOGY | Facility: CLINIC | Age: 34
End: 2024-08-19
Payer: COMMERCIAL

## 2024-08-19 NOTE — TELEPHONE ENCOUNTER
Call attempted x3 to patient to see if she was going to log on for virtual visit. Phone number is currently not working, it just beeps. Called 2nd honey on file (mother?) and got no answer.

## 2024-10-08 ENCOUNTER — TELEPHONE (OUTPATIENT)
Dept: INTERNAL MEDICINE | Facility: CLINIC | Age: 34
End: 2024-10-08
Payer: OTHER MISCELLANEOUS

## 2024-10-08 NOTE — TELEPHONE ENCOUNTER
is been getting  hemoglobin at [facility]  And its been low   And she wants labs run and have  an iron infusion done here   Because its been getting low

## 2024-10-08 NOTE — TELEPHONE ENCOUNTER
"----- Message from Kelvin sent at 10/7/2024  2:16 PM CDT -----  type: Lab    Caller is requesting to schedule Labs for procedure & needs to check blood count.    Order is not listed in EPIC.  Please enter order and contact patient to schedule.    Preferred Date and Time of Labs: asap    Date of Appointment:n/a    Where would they like the lab performed? [place] or [place]    Would the patient rather a call back or a response via [facility]? call    Best Call Back Number: [phone]    Additional Information:n/a    "Do not send messages requesting lab orders prior to Physical appt on these providers: Baljit Cummings Giambrone,  Reece Dorsey and [facility]."  "

## 2024-10-09 DIAGNOSIS — D50.0 IRON DEFICIENCY ANEMIA DUE TO CHRONIC BLOOD LOSS: Primary | ICD-10-CM

## 2024-10-18 ENCOUNTER — LAB VISIT (OUTPATIENT)
Dept: LAB | Facility: HOSPITAL | Age: 34
End: 2024-10-18
Payer: MEDICAID

## 2024-10-18 DIAGNOSIS — D50.0 IRON DEFICIENCY ANEMIA DUE TO CHRONIC BLOOD LOSS: ICD-10-CM

## 2024-10-18 LAB
IRON SERPL-MCNC: 80 UG/DL (ref 30–160)
SATURATED IRON: 21 % (ref 20–50)
TOTAL IRON BINDING CAPACITY: 386 UG/DL (ref 250–450)
TRANSFERRIN SERPL-MCNC: 261 MG/DL (ref 200–375)
TRANSFERRIN SERPL-MCNC: 261 MG/DL (ref 200–375)

## 2024-10-18 PROCEDURE — 83540 ASSAY OF IRON: CPT

## 2024-10-18 PROCEDURE — 36415 COLL VENOUS BLD VENIPUNCTURE: CPT | Mod: PO

## 2024-10-22 ENCOUNTER — OFFICE VISIT (OUTPATIENT)
Dept: HEMATOLOGY/ONCOLOGY | Facility: CLINIC | Age: 34
End: 2024-10-22
Payer: MEDICAID

## 2024-10-22 DIAGNOSIS — D50.0 IRON DEFICIENCY ANEMIA DUE TO CHRONIC BLOOD LOSS: ICD-10-CM

## 2024-10-22 DIAGNOSIS — N92.0 MENORRHAGIA WITH REGULAR CYCLE: Primary | ICD-10-CM

## 2024-10-22 PROCEDURE — 3044F HG A1C LEVEL LT 7.0%: CPT | Mod: CPTII,95,, | Performed by: INTERNAL MEDICINE

## 2024-10-22 PROCEDURE — 99214 OFFICE O/P EST MOD 30 MIN: CPT | Mod: 95,,, | Performed by: INTERNAL MEDICINE

## 2024-10-22 NOTE — Clinical Note
Obtain CBC today remind me to call patient back with results.  We can check her blood work in 3 months with iron study and CBC because she has a history of iron deficiency anemia through heavy menstrual cycle.  Right now she wants to be cleared for Brazilian butt lift and tummy tucks

## 2024-10-22 NOTE — PROGRESS NOTES
The patient location is: home  The chief complaint leading to consultation is: iron def    Visit type: audiovisual    Face to Face time with patient: 10  20 minutes of total time spent on the encounter, which includes face to face time and non-face to face time preparing to see the patient (eg, review of tests), Obtaining and/or reviewing separately obtained history, Documenting clinical information in the electronic or other health record, Independently interpreting results (not separately reported) and communicating results to the patient/family/caregiver, or Care coordination (not separately reported).         Each patient to whom he or she provides medical services by telemedicine is:  (1) informed of the relationship between the physician and patient and the respective role of any other health care provider with respect to management of the patient; and (2) notified that he or she may decline to receive medical services by telemedicine and may withdraw from such care at any time.    Notes:       HPI    34 years old referred to Hematology Clinic for evaluation of iron deficiency anemia.  Patient is in preparation to get a Brazilian butt lift as well as tummy tucks.  Historically patient has heavy menstrual cycle and iron deficiency anemia.  Patient has been instructed to start on iron replacement protocol.  Most recent blood work shows normal ligation of iron however there was not any CBC completed.      Past Medical History:   Diagnosis Date    Anemia     Chronic pain of left knee 06/05/2017    Graves disease     PCOS (polycystic ovarian syndrome) 04/2020     Social History     Socioeconomic History    Marital status: Single   Tobacco Use    Smoking status: Never     Passive exposure: Never    Smokeless tobacco: Never   Substance and Sexual Activity    Alcohol use: Yes     Comment: socially, twice per month    Drug use: No    Sexual activity: Yes     Partners: Male     Birth control/protection: Condom   Social  History Narrative    Admin coordinator at [employer]     Social Drivers of Health     Financial Resource Strain: Low Risk  (2/26/2024)    Overall Financial Resource Strain (CARDIA)     Difficulty of Paying Living Expenses: Not very hard   Food Insecurity: No Food Insecurity (2/26/2024)    Hunger Vital Sign     Worried About Running Out of Food in the Last Year: Never true     Ran Out of Food in the Last Year: Never true   Transportation Needs: No Transportation Needs (2/26/2024)    PRAPARE - Transportation     Lack of Transportation (Medical): No     Lack of Transportation (Non-Medical): No   Physical Activity: Sufficiently Active (2/26/2024)    Exercise Vital Sign     Days of Exercise per Week: 3 days     Minutes of Exercise per Session: 60 min   Stress: Stress Concern Present (2/26/2024)    [employer] [employer] - Occupational Stress Questionnaire     Feeling of Stress : To some extent   Housing Stability: Low Risk  (2/26/2024)    Housing Stability Vital Sign     Unable to Pay for Housing in the Last Year: No     Number of Places Lived in the Last Year: 1     Unstable Housing in the Last Year: No         Subjective      Review of Systems   Constitutional: Negative for appetite change, fatigue and unexpected weight change.   HENT: Negative for mouth sores.   Eyes: Negative for visual disturbance.   Respiratory: Negative for cough and shortness of breath.   Cardiovascular: Negative for chest pain.   Gastrointestinal: Negative for diarrhea.   Genitourinary: Negative for frequency.   Musculoskeletal: Negative for back pain.   Skin: Negative for rash.   Neurological: Negative for headaches.   Hematological: Negative for adenopathy.   Psychiatric/Behavioral: The patient is not nervous/anxious.   All other systems reviewed and are negative.     Objective    Physical Exam     VV        Lab Results   Component Value Date    WBC 4.96 06/28/2024    HGB 9.0 (L) 06/28/2024    HCT 29.9 (L) 06/28/2024     MCV 75 (L) 06/28/2024     06/28/2024       CMP  Sodium   Date Value Ref Range Status   06/28/2024 137 136 - 145 mmol/L Final     Potassium   Date Value Ref Range Status   06/28/2024 4.1 3.5 - 5.1 mmol/L Final     Chloride   Date Value Ref Range Status   06/28/2024 107 95 - 110 mmol/L Final     CO2   Date Value Ref Range Status   06/28/2024 24 23 - 29 mmol/L Final     Glucose   Date Value Ref Range Status   06/28/2024 76 70 - 110 mg/dL Final     BUN   Date Value Ref Range Status   06/28/2024 7 6 - 20 mg/dL Final     Creatinine   Date Value Ref Range Status   06/28/2024 0.8 0.5 - 1.4 mg/dL Final     Calcium   Date Value Ref Range Status   06/28/2024 8.7 8.7 - 10.5 mg/dL Final     Total Protein   Date Value Ref Range Status   07/13/2023 8.2 6.0 - 8.4 g/dL Final     Albumin   Date Value Ref Range Status   07/13/2023 3.6 3.5 - 5.2 g/dL Final     Total Bilirubin   Date Value Ref Range Status   07/13/2023 0.5 0.1 - 1.0 mg/dL Final     Comment:     For infants and newborns, interpretation of results should be based  on gestational age, weight and in agreement with clinical  observations.    Premature Infant recommended reference ranges:  Up to 24 hours.............<8.0 mg/dL  Up to 48 hours............<12.0 mg/dL  3-5 days..................<15.0 mg/dL  6-29 days.................<15.0 mg/dL       Alkaline Phosphatase   Date Value Ref Range Status   07/13/2023 139 (H) 55 - 135 U/L Final     AST   Date Value Ref Range Status   07/13/2023 29 10 - 40 U/L Final     ALT   Date Value Ref Range Status   07/13/2023 33 10 - 44 U/L Final     Anion Gap   Date Value Ref Range Status   06/28/2024 6 (L) 8 - 16 mmol/L Final     eGFR   Date Value Ref Range Status   06/28/2024 >60.0 >60 mL/min/1.73 m^2 Final       Assessment    History of iron deficiency anemia    Heavy menstrual cycle    Pending procedure cosmetic involving Brazilian butt lift and tummy tucks     Most recent iron study shows normal iron saturation and normal storage.   We will obtain a CBC.  We will call patient back with results.    Plan    Iron deficiency anemia due to chronic blood loss  -     Ambulatory referral/consult to Hematology / Oncology

## 2024-10-29 ENCOUNTER — LAB VISIT (OUTPATIENT)
Dept: LAB | Facility: HOSPITAL | Age: 34
End: 2024-10-29
Attending: INTERNAL MEDICINE
Payer: MEDICAID

## 2024-10-29 DIAGNOSIS — N92.0 MENORRHAGIA WITH REGULAR CYCLE: ICD-10-CM

## 2024-10-29 DIAGNOSIS — D50.0 IRON DEFICIENCY ANEMIA DUE TO CHRONIC BLOOD LOSS: ICD-10-CM

## 2024-10-29 LAB
ANISOCYTOSIS BLD QL SMEAR: ABNORMAL
BASOPHILS # BLD AUTO: 0.03 K/UL (ref 0–0.2)
BASOPHILS NFR BLD: 0.4 % (ref 0–1.9)
DIFFERENTIAL METHOD BLD: ABNORMAL
EOSINOPHIL # BLD AUTO: 0.3 K/UL (ref 0–0.5)
EOSINOPHIL NFR BLD: 5.1 % (ref 0–8)
ERYTHROCYTE [DISTWIDTH] IN BLOOD BY AUTOMATED COUNT: ABNORMAL % (ref 11.5–14.5)
HCT VFR BLD AUTO: 34.1 % (ref 37–48.5)
HGB BLD-MCNC: 10.9 G/DL (ref 12–16)
HYPOCHROMIA BLD QL SMEAR: ABNORMAL
IMM GRANULOCYTES # BLD AUTO: 0.02 K/UL (ref 0–0.04)
IMM GRANULOCYTES NFR BLD AUTO: 0.3 % (ref 0–0.5)
LYMPHOCYTES # BLD AUTO: 2.8 K/UL (ref 1–4.8)
LYMPHOCYTES NFR BLD: 42.3 % (ref 18–48)
MCH RBC QN AUTO: 25.2 PG (ref 27–31)
MCHC RBC AUTO-ENTMCNC: 32 G/DL (ref 32–36)
MCV RBC AUTO: 79 FL (ref 82–98)
MONOCYTES # BLD AUTO: 0.5 K/UL (ref 0.3–1)
MONOCYTES NFR BLD: 7.4 % (ref 4–15)
NEUTROPHILS # BLD AUTO: 3 K/UL (ref 1.8–7.7)
NEUTROPHILS NFR BLD: 44.5 % (ref 38–73)
NRBC BLD-RTO: 0 /100 WBC
PLATELET # BLD AUTO: 303 K/UL (ref 150–450)
PLATELET BLD QL SMEAR: ABNORMAL
PMV BLD AUTO: 11.3 FL (ref 9.2–12.9)
POIKILOCYTOSIS BLD QL SMEAR: SLIGHT
RBC # BLD AUTO: 4.32 M/UL (ref 4–5.4)
WBC # BLD AUTO: 6.72 K/UL (ref 3.9–12.7)

## 2024-10-29 PROCEDURE — 36415 COLL VENOUS BLD VENIPUNCTURE: CPT | Mod: PO | Performed by: INTERNAL MEDICINE

## 2024-10-29 PROCEDURE — 85025 COMPLETE CBC W/AUTO DIFF WBC: CPT | Mod: PO | Performed by: INTERNAL MEDICINE

## 2024-10-31 ENCOUNTER — PATIENT MESSAGE (OUTPATIENT)
Dept: HEMATOLOGY/ONCOLOGY | Facility: CLINIC | Age: 34
End: 2024-10-31
Payer: MEDICAID

## 2024-11-04 ENCOUNTER — TELEPHONE (OUTPATIENT)
Dept: OPTOMETRY | Facility: CLINIC | Age: 34
End: 2024-11-04
Payer: MEDICAID

## 2024-11-05 DIAGNOSIS — D50.0 IRON DEFICIENCY ANEMIA DUE TO CHRONIC BLOOD LOSS: Primary | ICD-10-CM

## 2024-11-05 DIAGNOSIS — N92.0 MENORRHAGIA WITH REGULAR CYCLE: ICD-10-CM

## 2024-11-06 ENCOUNTER — PATIENT MESSAGE (OUTPATIENT)
Dept: HEMATOLOGY/ONCOLOGY | Facility: CLINIC | Age: 34
End: 2024-11-06
Payer: MEDICAID

## 2024-12-30 ENCOUNTER — LAB VISIT (OUTPATIENT)
Dept: LAB | Facility: HOSPITAL | Age: 34
End: 2024-12-30
Attending: STUDENT IN AN ORGANIZED HEALTH CARE EDUCATION/TRAINING PROGRAM
Payer: MEDICAID

## 2024-12-30 DIAGNOSIS — D50.0 IRON DEFICIENCY ANEMIA DUE TO CHRONIC BLOOD LOSS: ICD-10-CM

## 2024-12-30 DIAGNOSIS — N92.0 MENORRHAGIA WITH REGULAR CYCLE: ICD-10-CM

## 2024-12-30 LAB
BASOPHILS # BLD AUTO: 0.03 K/UL (ref 0–0.2)
BASOPHILS NFR BLD: 0.3 % (ref 0–1.9)
DIFFERENTIAL METHOD BLD: ABNORMAL
EOSINOPHIL # BLD AUTO: 0.4 K/UL (ref 0–0.5)
EOSINOPHIL NFR BLD: 3.9 % (ref 0–8)
ERYTHROCYTE [DISTWIDTH] IN BLOOD BY AUTOMATED COUNT: 16 % (ref 11.5–14.5)
FERRITIN SERPL-MCNC: 38 NG/ML (ref 20–300)
HCT VFR BLD AUTO: 38.1 % (ref 37–48.5)
HGB BLD-MCNC: 12.2 G/DL (ref 12–16)
IMM GRANULOCYTES # BLD AUTO: 0.03 K/UL (ref 0–0.04)
IMM GRANULOCYTES NFR BLD AUTO: 0.3 % (ref 0–0.5)
IRON SERPL-MCNC: 23 UG/DL (ref 30–160)
LYMPHOCYTES # BLD AUTO: 1.5 K/UL (ref 1–4.8)
LYMPHOCYTES NFR BLD: 14.7 % (ref 18–48)
MCH RBC QN AUTO: 29.4 PG (ref 27–31)
MCHC RBC AUTO-ENTMCNC: 32 G/DL (ref 32–36)
MCV RBC AUTO: 92 FL (ref 82–98)
MONOCYTES # BLD AUTO: 0.5 K/UL (ref 0.3–1)
MONOCYTES NFR BLD: 4.9 % (ref 4–15)
NEUTROPHILS # BLD AUTO: 7.6 K/UL (ref 1.8–7.7)
NEUTROPHILS NFR BLD: 75.9 % (ref 38–73)
NRBC BLD-RTO: 0 /100 WBC
PLATELET # BLD AUTO: 323 K/UL (ref 150–450)
PMV BLD AUTO: 10.2 FL (ref 9.2–12.9)
RBC # BLD AUTO: 4.15 M/UL (ref 4–5.4)
SATURATED IRON: 8 % (ref 20–50)
TOTAL IRON BINDING CAPACITY: 300 UG/DL (ref 250–450)
TRANSFERRIN SERPL-MCNC: 214 MG/DL (ref 200–375)
WBC # BLD AUTO: 10 K/UL (ref 3.9–12.7)

## 2024-12-30 PROCEDURE — 85025 COMPLETE CBC W/AUTO DIFF WBC: CPT | Mod: PO | Performed by: INTERNAL MEDICINE

## 2024-12-30 PROCEDURE — 36415 COLL VENOUS BLD VENIPUNCTURE: CPT | Mod: PO | Performed by: INTERNAL MEDICINE

## 2025-01-03 ENCOUNTER — OFFICE VISIT (OUTPATIENT)
Dept: HEMATOLOGY/ONCOLOGY | Facility: CLINIC | Age: 35
End: 2025-01-03
Payer: MEDICAID

## 2025-01-03 DIAGNOSIS — N92.0 MENORRHAGIA WITH REGULAR CYCLE: ICD-10-CM

## 2025-01-03 DIAGNOSIS — D50.0 IRON DEFICIENCY ANEMIA DUE TO CHRONIC BLOOD LOSS: Primary | ICD-10-CM

## 2025-01-03 NOTE — PROGRESS NOTES
The patient location is: home  The chief complaint leading to consultation is: iron def    Visit type: audiovisual    Face to Face time with patient: 10  20 minutes of total time spent on the encounter, which includes face to face time and non-face to face time preparing to see the patient (eg, review of tests), Obtaining and/or reviewing separately obtained history, Documenting clinical information in the electronic or other health record, Independently interpreting results (not separately reported) and communicating results to the patient/family/caregiver, or Care coordination (not separately reported).         Each patient to whom he or she provides medical services by telemedicine is:  (1) informed of the relationship between the physician and patient and the respective role of any other health care provider with respect to management of the patient; and (2) notified that he or she may decline to receive medical services by telemedicine and may withdraw from such care at any time.    Notes:       HPI    34 years old referred to [facility] for evaluation of iron deficiency anemia.  Patient is in preparation to get a Brazilian butt lift as well as tummy tucks.  Historically patient has heavy menstrual cycle and iron deficiency anemia.  Patient has been instructed to start on iron replacement protocol.  Most recent blood work shows normal ligation of iron however there was not any CBC completed.      Past Medical History:   Diagnosis Date    Anemia     Chronic pain of left knee 06/05/2017    Graves disease     PCOS (polycystic ovarian syndrome) 04/2020     Social History     Socioeconomic History    Marital status: Single   Tobacco Use    Smoking status: Never     Passive exposure: Never    Smokeless tobacco: Never   Substance and Sexual Activity    Alcohol use: Yes     Comment: socially, twice per month    Drug use: No    Sexual activity: Yes     Partners: Male     Birth control/protection: Condom   Social  History Narrative    Admin coordinator at [employer]     Social Drivers of Health     Financial Resource Strain: Low Risk  (2/26/2024)    Overall Financial Resource Strain (CARDIA)     Difficulty of Paying Living Expenses: Not very hard   Food Insecurity: No Food Insecurity (2/26/2024)    Hunger Vital Sign     Worried About Running Out of Food in the Last Year: Never true     Ran Out of Food in the Last Year: Never true   Transportation Needs: No Transportation Needs (2/26/2024)    PRAPARE - Transportation     Lack of Transportation (Medical): No     Lack of Transportation (Non-Medical): No   Physical Activity: Sufficiently Active (2/26/2024)    Exercise Vital Sign     Days of Exercise per Week: 3 days     Minutes of Exercise per Session: 60 min   Stress: Stress Concern Present (2/26/2024)    [employer] [place] [employer] - Occupational Stress Questionnaire     Feeling of Stress : To some extent   Housing Stability: Low Risk  (2/26/2024)    Housing Stability Vital Sign     Unable to Pay for Housing in the Last Year: No     Number of Places Lived in the Last Year: 1     Unstable Housing in the Last Year: No         Subjective      Review of Systems   Constitutional: Negative for appetite change, fatigue and unexpected weight change.   HENT: Negative for mouth sores.   Eyes: Negative for visual disturbance.   Respiratory: Negative for cough and shortness of breath.   Cardiovascular: Negative for chest pain.   Gastrointestinal: Negative for diarrhea.   Genitourinary: Negative for frequency.   Musculoskeletal: Negative for back pain.   Skin: Negative for rash.   Neurological: Negative for headaches.   Hematological: Negative for adenopathy.   Psychiatric/Behavioral: The patient is not nervous/anxious.   All other systems reviewed and are negative.     Objective    Physical Exam     VV        Lab Results   Component Value Date    WBC 10.00 12/30/2024    HGB 12.2 12/30/2024    HCT 38.1 12/30/2024    MCV 92  12/30/2024     12/30/2024       CMP  Sodium   Date Value Ref Range Status   06/28/2024 137 136 - 145 mmol/L Final     Potassium   Date Value Ref Range Status   06/28/2024 4.1 3.5 - 5.1 mmol/L Final     Chloride   Date Value Ref Range Status   06/28/2024 107 95 - 110 mmol/L Final     CO2   Date Value Ref Range Status   06/28/2024 24 23 - 29 mmol/L Final     Glucose   Date Value Ref Range Status   06/28/2024 76 70 - 110 mg/dL Final     BUN   Date Value Ref Range Status   06/28/2024 7 6 - 20 mg/dL Final     Creatinine   Date Value Ref Range Status   06/28/2024 0.8 0.5 - 1.4 mg/dL Final     Calcium   Date Value Ref Range Status   06/28/2024 8.7 8.7 - 10.5 mg/dL Final     Total Protein   Date Value Ref Range Status   07/13/2023 8.2 6.0 - 8.4 g/dL Final     Albumin   Date Value Ref Range Status   07/13/2023 3.6 3.5 - 5.2 g/dL Final     Total Bilirubin   Date Value Ref Range Status   07/13/2023 0.5 0.1 - 1.0 mg/dL Final     Comment:     For infants and newborns, interpretation of results should be based  on gestational age, weight and in agreement with clinical  observations.    Premature Infant recommended reference ranges:  Up to 24 hours.............<8.0 mg/dL  Up to 48 hours............<12.0 mg/dL  3-5 days..................<15.0 mg/dL  6-29 days.................<15.0 mg/dL       Alkaline Phosphatase   Date Value Ref Range Status   07/13/2023 139 (H) 55 - 135 U/L Final     AST   Date Value Ref Range Status   07/13/2023 29 10 - 40 U/L Final     ALT   Date Value Ref Range Status   07/13/2023 33 10 - 44 U/L Final     Anion Gap   Date Value Ref Range Status   06/28/2024 6 (L) 8 - 16 mmol/L Final     eGFR   Date Value Ref Range Status   06/28/2024 >60.0 >60 mL/min/1.73 m^2 Final       Assessment    History of iron deficiency anemia    Heavy menstrual cycle    Cosmetic involving Brazilian butt lift and tummy tucks - Nov 29 2024    Recheck iron study and CBC in 3 month with VV     Start oral supplement daily      Plan    There are no diagnoses linked to this encounter.          Answers submitted by the patient for this visit:  Review of Systems Questionnaire (Submitted on 12/28/2024)  appetite change : Yes  unexpected weight change: No  mouth sores: No  visual disturbance: No  cough: No  shortness of breath: No  chest pain: No  abdominal pain: No  diarrhea: Yes  frequency: No  back pain: No  rash: No  headaches: No  adenopathy: No  nervous/ anxious: No

## 2025-01-04 ENCOUNTER — ON-DEMAND VIRTUAL (OUTPATIENT)
Dept: URGENT CARE | Facility: CLINIC | Age: 35
End: 2025-01-04
Payer: MEDICAID

## 2025-01-04 DIAGNOSIS — B96.89 BACTERIAL URI: Primary | ICD-10-CM

## 2025-01-04 DIAGNOSIS — J06.9 BACTERIAL URI: Primary | ICD-10-CM

## 2025-01-04 RX ORDER — AMOXICILLIN 875 MG/1
875 TABLET, FILM COATED ORAL EVERY 12 HOURS
Qty: 14 TABLET | Refills: 0 | Status: SHIPPED | OUTPATIENT
Start: 2025-01-04 | End: 2025-01-11

## 2025-01-04 RX ORDER — BENZONATATE 100 MG/1
100 CAPSULE ORAL 3 TIMES DAILY PRN
Qty: 30 CAPSULE | Refills: 0 | Status: SHIPPED | OUTPATIENT
Start: 2025-01-04 | End: 2025-01-14

## 2025-01-04 NOTE — PROGRESS NOTES
Subjective:      Patient ID: Tiffany Dobbs is a 34 y.o. female.    Vitals:  vitals were not taken for this visit.     Chief Complaint: Cough      Visit Type: TELE AUDIOVISUAL    Present with the patient at the time of consultation: TELEMED PRESENT WITH PATIENT: None [place] .    Past Medical History:   Diagnosis Date    Anemia     Chronic pain of left knee 06/05/2017    Graves disease     PCOS (polycystic ovarian syndrome) 04/2020     Past Surgical History:   Procedure Laterality Date    BREAST REDUCTION  2012    EXAMINATION UNDER ANESTHESIA  08/02/2022    Procedure: EXAM UNDER ANESTHESIA;  Surgeon: Tevin Tubbs IV, MD;  Location: [facility] OR;  Service: Orthopedics;;    RECONSTRUCTION OF ANTERIOR CRUCIATE LIGAMENT USING GRAFT Right 08/02/2022    Procedure: RECONSTRUCTION, KNEE, ACL, USING Allograft Achilles GRAFT, possible meniscus repair, possible PLC repair, knee exam under anesthesia;  Surgeon: Tevin Tubbs IV, MD;  Location: [facility] OR;  Service: Orthopedics;  Laterality: Right;  Achilles allograft with bone block(IN FREEZER); open/THAW at start of case  Black knee post/operative side, well leg marvin on nonsurgical side  Position: ACL P    TOTAL REDUCTION MAMMOPLASTY       Review of patient's allergies indicates:   Allergen Reactions    Bactrim [sulfamethoxazole-trimethoprim] Itching and Rash     Current Outpatient Medications on File Prior to Visit   Medication Sig Dispense Refill    ferrous sulfate (IRON) 325 mg (65 mg iron) Tab tablet Take 1 tablet (325 mg total) by mouth 2 (two) times daily. 60 tablet 2    methIMAzole (TAPAZOLE) 5 MG Tab Take 1 tablet (5 mg total) by mouth once daily. 90 tablet 3    ofloxacin (OCUFLOX) 0.3 % ophthalmic solution Place 1 drop into the right eye 4 (four) times daily. 5 mL 0    tirzepatide 7.5 mg/0.5 mL PnIj Inject 7.5 mg into the skin every 7 days.       No current facility-administered medications on file prior to visit.     Family History   Problem Relation Name Age of  Onset    Other Mother Toya         cervical dysplasia    Gestational diabetes Mother Toya     Glaucoma Mother Toya     Hypertension Mother Toya     Sleep apnea Mother Toya     Heart disease Father          arrythmia, has device - ICD?    Diverticulitis Father      Diabetes Father      Other Maternal Grandmother Clovina         benign breast bx    Diabetes Maternal Grandmother Clovina     Dementia Maternal Grandmother Clovina     Arthritis Maternal Grandmother Clovina         spinal    Hydrocephalus Maternal Grandmother Clovina     Glaucoma Maternal Aunt Heidi     Breast cancer Paternal Aunt Yady         dx.~42    Breast cancer Other Le         dx.~42    Stomach cancer Other          dx. ~early- to mid-70s    Ovarian cancer Other Cristina         dx.~55    Diabetes Other Humaira     Colon cancer Neg Hx      Heart attack Neg Hx         Medications Ordered                [id number] [place] - [address] [facility] [address]   [address] [address]    Telephone: [phone]   Fax: [phone]   Hours: Not open 24 hours                         E-Prescribed (2 of 2)              amoxicillin (AMOXIL) 875 MG tablet    Sig: Take 1 tablet (875 mg total) by mouth every 12 (twelve) hours. for 7 days       Start: 1/4/25     Quantity: 14 tablet Refills: 0                         benzonatate (TESSALON) 100 MG capsule    Sig: Take 1 capsule (100 mg total) by mouth 3 (three) times daily as needed for Cough.       Start: 1/4/25     Quantity: 30 capsule Refills: 0                           Ohs Peq Odvv Intake    1/4/2025  9:54 AM CST - Filed by Patient   What is your current physical address in the event of a medical emergency? [address] [place] [address]   Are you able to take your vital signs? No   Please attach any relevant images or files    Is your employer contracted with [facility]? No         Pt presents with c/o cough x 10 days with chest  congestion and thick mucous. Reports she has been taking mucinex. Denies any fever, CP, wheezing, abdominal pain, or ha.         Constitution: Positive for fatigue. Negative for fever.   HENT:  Positive for congestion. Negative for sinus pain and sinus pressure.    Cardiovascular:  Negative for chest pain.   Respiratory:  Positive for cough and sputum production. Negative for shortness of breath and wheezing.    Neurological:  Negative for dizziness and headaches.        Objective:   The physical exam was conducted virtually.  Physical Exam   Constitutional: She is oriented to person, place, and time. She appears ill. No distress.   HENT:   Head: Normocephalic.   Ears:   Right Ear: External ear normal.   Left Ear: External ear normal.   Neck: Neck supple.   Pulmonary/Chest: Effort normal. No respiratory distress.   Neurological: She is alert and oriented to person, place, and time.   Psychiatric: Mood normal.       Assessment:     1. Bacterial URI        Plan:       Bacterial URI  -     amoxicillin (AMOXIL) 875 MG tablet; Take 1 tablet (875 mg total) by mouth every 12 (twelve) hours. for 7 days  Dispense: 14 tablet; Refill: 0  -     benzonatate (TESSALON) 100 MG capsule; Take 1 capsule (100 mg total) by mouth 3 (three) times daily as needed for Cough.  Dispense: 30 capsule; Refill: 0      We appreciate you trusting us with your medical care. We hope you feel better soon. We will be happy to take care of you for all of your future medical needs.     You must understand that you've received Virtual treatment only and that you may be released before all your medical problems are known or treated. You, the patient, will arrange for follow up care as instructed.     Follow up with your PCP or specialty clinic as directed in the next 1-2 weeks if not improved or as needed. You can call [phone] to schedule an appointment with the appropriate provider.     If your condition worsens we recommend that you receive  another evaluation in person, with your primary care provider, urgent care or at the emergency room immediately or contact your primary medical clinics after hours call service to discuss your concerns.

## 2025-01-04 NOTE — PATIENT INSTRUCTIONS
Increase fluids and rest  Take meds as directed    Tylenol or Motrin as directed for fever or body aches    Continue antihistamine (zyrtec or Claritin), Flonase and saline nasal spray    Okay to take Robitussin DM, Mucinex DM, Dayquil/Nyquil as directed    If increased Shortness of breath or difficulty breathing go to the Urgent Care or ER    If symptoms worsening or not improved f/u in Urgent Care or with PCP

## 2025-01-13 ENCOUNTER — OFFICE VISIT (OUTPATIENT)
Dept: INTERNAL MEDICINE | Facility: CLINIC | Age: 35
End: 2025-01-13
Payer: MEDICAID

## 2025-01-13 ENCOUNTER — LAB VISIT (OUTPATIENT)
Dept: LAB | Facility: HOSPITAL | Age: 35
End: 2025-01-13
Payer: MEDICAID

## 2025-01-13 VITALS
OXYGEN SATURATION: 100 % | DIASTOLIC BLOOD PRESSURE: 72 MMHG | HEIGHT: 63 IN | BODY MASS INDEX: 31.05 KG/M2 | WEIGHT: 175.25 LBS | HEART RATE: 74 BPM | SYSTOLIC BLOOD PRESSURE: 110 MMHG

## 2025-01-13 DIAGNOSIS — K52.9 CHRONIC DIARRHEA: Primary | ICD-10-CM

## 2025-01-13 DIAGNOSIS — K52.9 CHRONIC DIARRHEA: ICD-10-CM

## 2025-01-13 LAB
ANION GAP SERPL CALC-SCNC: 8 MMOL/L (ref 8–16)
BASOPHILS # BLD AUTO: 0.04 K/UL (ref 0–0.2)
BASOPHILS NFR BLD: 0.6 % (ref 0–1.9)
BUN SERPL-MCNC: 7 MG/DL (ref 6–20)
CALCIUM SERPL-MCNC: 8.9 MG/DL (ref 8.7–10.5)
CHLORIDE SERPL-SCNC: 109 MMOL/L (ref 95–110)
CO2 SERPL-SCNC: 21 MMOL/L (ref 23–29)
CREAT SERPL-MCNC: 0.7 MG/DL (ref 0.5–1.4)
DIFFERENTIAL METHOD BLD: ABNORMAL
EOSINOPHIL # BLD AUTO: 0.3 K/UL (ref 0–0.5)
EOSINOPHIL NFR BLD: 4.5 % (ref 0–8)
ERYTHROCYTE [DISTWIDTH] IN BLOOD BY AUTOMATED COUNT: 15.8 % (ref 11.5–14.5)
EST. GFR  (NO RACE VARIABLE): >60 ML/MIN/1.73 M^2
GLUCOSE SERPL-MCNC: 72 MG/DL (ref 70–110)
HCT VFR BLD AUTO: 40.5 % (ref 37–48.5)
HGB BLD-MCNC: 12.9 G/DL (ref 12–16)
IMM GRANULOCYTES # BLD AUTO: 0.02 K/UL (ref 0–0.04)
IMM GRANULOCYTES NFR BLD AUTO: 0.3 % (ref 0–0.5)
LYMPHOCYTES # BLD AUTO: 1.6 K/UL (ref 1–4.8)
LYMPHOCYTES NFR BLD: 22.3 % (ref 18–48)
MCH RBC QN AUTO: 29.7 PG (ref 27–31)
MCHC RBC AUTO-ENTMCNC: 31.9 G/DL (ref 32–36)
MCV RBC AUTO: 93 FL (ref 82–98)
MONOCYTES # BLD AUTO: 0.5 K/UL (ref 0.3–1)
MONOCYTES NFR BLD: 7.3 % (ref 4–15)
NEUTROPHILS # BLD AUTO: 4.7 K/UL (ref 1.8–7.7)
NEUTROPHILS NFR BLD: 65 % (ref 38–73)
NRBC BLD-RTO: 0 /100 WBC
PLATELET # BLD AUTO: 399 K/UL (ref 150–450)
PMV BLD AUTO: 11.5 FL (ref 9.2–12.9)
POTASSIUM SERPL-SCNC: 3.8 MMOL/L (ref 3.5–5.1)
RBC # BLD AUTO: 4.35 M/UL (ref 4–5.4)
SODIUM SERPL-SCNC: 138 MMOL/L (ref 136–145)
TSH SERPL DL<=0.005 MIU/L-ACNC: 2.47 UIU/ML (ref 0.4–4)
WBC # BLD AUTO: 7.17 K/UL (ref 3.9–12.7)

## 2025-01-13 PROCEDURE — 1159F MED LIST DOCD IN RCRD: CPT | Mod: CPTII,,,

## 2025-01-13 PROCEDURE — 3074F SYST BP LT 130 MM HG: CPT | Mod: CPTII,,,

## 2025-01-13 PROCEDURE — 1160F RVW MEDS BY RX/DR IN RCRD: CPT | Mod: CPTII,,,

## 2025-01-13 PROCEDURE — 99999 PR PBB SHADOW E&M-EST. PATIENT-LVL III: CPT | Mod: PBBFAC,,,

## 2025-01-13 PROCEDURE — 3008F BODY MASS INDEX DOCD: CPT | Mod: CPTII,,,

## 2025-01-13 PROCEDURE — 99213 OFFICE O/P EST LOW 20 MIN: CPT | Mod: PBBFAC

## 2025-01-13 PROCEDURE — 99213 OFFICE O/P EST LOW 20 MIN: CPT | Mod: S$PBB,,,

## 2025-01-13 PROCEDURE — 85025 COMPLETE CBC W/AUTO DIFF WBC: CPT

## 2025-01-13 PROCEDURE — 84443 ASSAY THYROID STIM HORMONE: CPT

## 2025-01-13 PROCEDURE — 3078F DIAST BP <80 MM HG: CPT | Mod: CPTII,,,

## 2025-01-13 PROCEDURE — 80048 BASIC METABOLIC PNL TOTAL CA: CPT

## 2025-01-13 PROCEDURE — 36415 COLL VENOUS BLD VENIPUNCTURE: CPT

## 2025-01-13 NOTE — PATIENT INSTRUCTIONS
- Try to increase your fiber intake, aiming for 25 g of fiber per day. This will help to bulk up your stools. You can purchase fiber supplements over the counter, or increase dietary intake via vegetables and grains    - We will check labs today: CBC to check your blood counts for infection, BMP to check your electrolytes as diarrhea can cause fluctuations in your electrolytes, and a TSH to ensure your thyroid levels are stable on your current dose of methimazole as overactive thyroid can contribute to diarrhea.    - We will also check various stool studies for signs of infection. If these studies return negative, we will refer you to a GI specialist for further evaluation.    - It is OK to take an anti-motility agent such as imodium for your symptoms. Imodium can be purchased over the counter. You can also continue to use Pepto bismul and a probiotic if you find these are helping

## 2025-01-13 NOTE — PROGRESS NOTES
Internal Medicine Clinic Note  2025    Subjective   Patient Name: Tiffany Dobbs  : 1990    Chief Complaint:   Chief Complaint   Patient presents with    Diarrhea       History of Present Illness:  Ms. Tiffany Dobbs is a 34 y.o. female with a PMHx of Grave's Hyperthyroidism presenting to clinic for diarrhea.     # Diarrhea  Approximately 6 weeks ago, she traveled to [place] for plastic surgery (tummy tuck and DELORIS) where she stayed for about 20 days. She states that she received antibiotics while she was in the recovery house and recovered without issue. She developed loose stools about 2 weeks after surgery, with 3-4 bowel movements or more every day and occasionally occurring at night. She reports she had associated abdominal cramping with bloating. Because she has had a similar occurrence of symptoms when she was in [place] some time ago, she attempted to take pepto bismul and a probiotic to ease her symptoms however she didn't not find that either of these helped this time around.    Since then, her symptoms have overall been improving. She reports 3-4 bowel movements of applesauce consistency every other day or every couple of days for the last 1-1.5 weeks. Her abdominal cramping has subsided, and her bloating has lessened.   She denies fever, weight loss, heartburn, abdominal pain or cramping, nausea, vomiting, constipation, hematochezia or melena, or mucoid stools    She hasn't noticed a correlation between her diarrhea and foods that she's eating. She has noticed that she needs to have a bowel movement approximately 1 hour after eating. She has not had any recent exposure to sick family members. She did not eat any new foods, unpasteurized dairy, uncooked meats/fish during her time in [place] that she can recall. She does not have a personal history of celiac, crohn's/UC, or irritable bowel syndrome. She has an aunt with Crohn's. No recent medication changes or additions. She says she was  "prescribed Levofloxacin while she was in [place] to take "just in case she developed an infection and couldn't get in to see a provider" but she has not taken any of the antibiotic.        HCM and screenings:  - Pneumococcal  - Influenza  - Covid      Review of Systems   Constitutional:  Negative for chills, fever and malaise/fatigue.   HENT:  Negative for congestion and sore throat.    Respiratory:  Negative for cough and shortness of breath.    Cardiovascular:  Negative for chest pain and leg swelling.   Gastrointestinal:  Positive for diarrhea. Negative for abdominal pain, nausea and vomiting.   Genitourinary:  Negative for dysuria.   Musculoskeletal:  Negative for back pain, falls and myalgias.   Neurological:  Negative for dizziness, weakness and headaches.   Psychiatric/Behavioral:  Negative for depression. The patient does not have insomnia.        PAST HISTORY:     Past Medical History:   Diagnosis Date    Anemia     Chronic pain of left knee 06/05/2017    Graves disease     PCOS (polycystic ovarian syndrome) 04/2020       Past Surgical History:   Procedure Laterality Date    BREAST REDUCTION  2012    EXAMINATION UNDER ANESTHESIA  08/02/2022    Procedure: EXAM UNDER ANESTHESIA;  Surgeon: Tevin Tubbs IV, MD;  Location: [facility] OR;  Service: Orthopedics;;    RECONSTRUCTION OF ANTERIOR CRUCIATE LIGAMENT USING GRAFT Right 08/02/2022    Procedure: RECONSTRUCTION, KNEE, ACL, USING Allograft Achilles GRAFT, possible meniscus repair, possible PLC repair, knee exam under anesthesia;  Surgeon: Tevin Tubbs IV, MD;  Location: [facility] OR;  Service: Orthopedics;  Laterality: Right;  Achilles allograft with bone block(IN FREEZER); open/THAW at start of case  Black knee post/operative side, well leg marvin on nonsurgical side  Position: ACL P    TOTAL REDUCTION MAMMOPLASTY         Family History   Problem Relation Name Age of Onset    Other Mother Toya         cervical dysplasia    Gestational diabetes Mother Toya     " "Glaucoma Mother Toya     Hypertension Mother Toya     Sleep apnea Mother Toya     Heart disease Father          arrythmia, has device - ICD?    Diverticulitis Father      Diabetes Father      Other Maternal Grandmother Clovina         benign breast bx    Diabetes Maternal Grandmother Clovina     Dementia Maternal Grandmother Clovina     Arthritis Maternal Grandmother Clovina         spinal    Hydrocephalus Maternal Grandmother Clovina     Glaucoma Maternal Aunt Heidi     Breast cancer Paternal Aunt Yady         dx.~42    Breast cancer Other Le         dx.~42    Stomach cancer Other          dx. ~early- to mid-70s    Ovarian cancer Other Cristina         dx.~55    Diabetes Other Humaira     Colon cancer Neg Hx      Heart attack Neg Hx           MEDICATIONS & ALLERGIES:       Current Outpatient Medications:     benzonatate (TESSALON) 100 MG capsule, Take 1 capsule (100 mg total) by mouth 3 (three) times daily as needed for Cough., Disp: 30 capsule, Rfl: 0    ferrous sulfate (IRON) 325 mg (65 mg iron) Tab tablet, Take 1 tablet (325 mg total) by mouth 2 (two) times daily., Disp: 60 tablet, Rfl: 2    methIMAzole (TAPAZOLE) 5 MG Tab, Take 1 tablet (5 mg total) by mouth once daily., Disp: 90 tablet, Rfl: 3    ofloxacin (OCUFLOX) 0.3 % ophthalmic solution, Place 1 drop into the right eye 4 (four) times daily., Disp: 5 mL, Rfl: 0    tirzepatide 7.5 mg/0.5 mL PnIj, Inject 7.5 mg into the skin every 7 days., Disp: , Rfl:     Review of patient's allergies indicates:   Allergen Reactions    Bactrim [sulfamethoxazole-trimethoprim] Itching and Rash       OBJECTIVE:     Vital Signs:  Vitals:    01/13/25 0948   BP: 110/72   Pulse: 74   SpO2: 100%   Weight: 79.5 kg (175 lb 4.3 oz)   Height: 5' 3" (1.6 m)       Body mass index is 31.05 kg/m².     Physical Exam  Vitals and nursing note reviewed.   Constitutional:       General: She is awake. She is not in acute distress.     Appearance: Normal appearance. She is obese. She is not " ill-appearing, toxic-appearing or diaphoretic.   HENT:      Head: Normocephalic and atraumatic.   Eyes:      General: No scleral icterus.        Right eye: No discharge.         Left eye: No discharge.      Conjunctiva/sclera: Conjunctivae normal.   Cardiovascular:      Rate and Rhythm: Normal rate and regular rhythm.   Pulmonary:      Effort: Pulmonary effort is normal. No respiratory distress.   Musculoskeletal:         General: No swelling or tenderness.      Cervical back: Normal range of motion.      Right lower leg: No edema.      Left lower leg: No edema.   Skin:     General: Skin is warm and dry.      Findings: No erythema or rash.   Neurological:      Mental Status: She is alert and oriented to person, place, and time.   Psychiatric:         Mood and Affect: Mood normal.         Behavior: Behavior normal. Behavior is cooperative.       Laboratory  Recent Results (from the past 2 weeks)   CBC w/ DIFF    Collection Time: 12/30/24  3:52 PM   Result Value Ref Range    WBC 10.00 3.90 - 12.70 K/uL    RBC 4.15 4.00 - 5.40 M/uL    Hemoglobin 12.2 12.0 - 16.0 g/dL    Hematocrit 38.1 37.0 - 48.5 %    MCV 92 82 - 98 fL    MCH 29.4 27.0 - 31.0 pg    MCHC 32.0 32.0 - 36.0 g/dL    RDW 16.0 (H) 11.5 - 14.5 %    Platelets 323 150 - 450 K/uL    MPV 10.2 9.2 - 12.9 fL    Immature Granulocytes 0.3 0.0 - 0.5 %    Gran # (ANC) 7.6 1.8 - 7.7 K/uL    Immature Grans (Abs) 0.03 0.00 - 0.04 K/uL    Lymph # 1.5 1.0 - 4.8 K/uL    Mono # 0.5 0.3 - 1.0 K/uL    Eos # 0.4 0.0 - 0.5 K/uL    Baso # 0.03 0.00 - 0.20 K/uL    nRBC 0 0 /100 WBC    Gran % 75.9 (H) 38.0 - 73.0 %    Lymph % 14.7 (L) 18.0 - 48.0 %    Mono % 4.9 4.0 - 15.0 %    Eosinophil % 3.9 0.0 - 8.0 %    Basophil % 0.3 0.0 - 1.9 %    Differential Method Automated    FERRITIN    Collection Time: 12/30/24  3:52 PM   Result Value Ref Range    Ferritin 38 20.0 - 300.0 ng/mL   Iron and TIBC    Collection Time: 12/30/24  3:52 PM   Result Value Ref Range    Iron 23 (L) 30 - 160 ug/dL     Transferrin 214 200 - 375 mg/dL    TIBC 300 250 - 450 ug/dL    Saturated Iron 8 (L) 20 - 50 %        Health Maintenance Due   Topic Date Due    Pneumococcal Vaccines (Age 0-49) (1 of 2 - PCV) Never done    Influenza Vaccine (1) Never done    COVID-19 Vaccine (3 - 2024-25 season) 09/01/2024       ASSESSMENT & PLAN:     34F with Grave's hyperthyroidism presenting with chronic diarrhea after traveling to [place] for plastic surgery. She received antibiotics during her hospitalization, recovered well without issue, and approximately 2 weeks later she developed loose stools with 3-4 bowel movements every day with associated abdominal cramping and bloating but without fever, weight loss, hematochezia or melena. Symptoms have slowly improved since onset, now approximately 3-4 loose stools every other day or every couple days  ?Traveler's diarrhea however duration of symptoms is suspicious  Higher suspicion for other transient gastrointestinal infection or thyroid-related cause  Lower suspicion for C Diff-related cause as antibiotics received after plastic surgery were 6 weeks ago, stool consistency does not match      Chronic diarrhea  - We discussed labs today including CBC, BMP, and TSH to rule out signs of infection, electrolyte disturbance, and thyroid hyperactivity on present dose of Methimazole. Discussed with patient that labs may be done today or delayed until Friday as scheduled for Endocrine labs  -     CBC W/ AUTO DIFFERENTIAL; Future; Expected date: 01/13/2025  -     TSH; Future; Expected date: 01/13/2025  -     BASIC METABOLIC PANEL; Future; Expected date: 01/13/2025  - We discussed various stool studies to rule out infectious etiology of chronic diarrhea  -     H. pylori antigen, stool; Future; Expected date: 01/13/2025  -     Occult blood x 1, stool; Future; Expected date: 01/13/2025  -     WBC, Stool; Future; Expected date: 01/13/2025  -     Giardia / Cryptosporidum, EIA; Future; Expected date:  01/13/2025  -     Stool Exam-Ova,Cysts,Parasites; Future; Expected date: 01/13/2025  -     Stool culture; Future; Expected date: 01/13/2025  - We discussed increasing dietary fiber intake to assist with stool bulk  - Encouraged anti-motility agent such as imodium as needed for symptoms  - We discussed GI referral in the event that stool studies return negative and/or symptoms fail to improve      Preventative Care:  - Completed HIV, Hep C screening  - Discussed vaccines: Influenza, Covid, Pneumoccocal  - Discussed Cancer Screenings:  - Cervical Cancer Screening (Age 21-65) - Up to date    RTC as needed    Discussed with Dr. Florinda Luciano, DO  Internal Medicine PGY-3  [facility]

## 2025-01-16 ENCOUNTER — LAB VISIT (OUTPATIENT)
Dept: LAB | Facility: HOSPITAL | Age: 35
End: 2025-01-16
Payer: MEDICAID

## 2025-01-16 DIAGNOSIS — K52.9 CHRONIC DIARRHEA: ICD-10-CM

## 2025-01-16 LAB
OB PNL STL: NEGATIVE
WBC #/AREA STL HPF: NORMAL /[HPF]

## 2025-01-16 PROCEDURE — 82272 OCCULT BLD FECES 1-3 TESTS: CPT

## 2025-01-16 PROCEDURE — 87177 OVA AND PARASITES SMEARS: CPT

## 2025-01-16 PROCEDURE — 87045 FECES CULTURE AEROBIC BACT: CPT

## 2025-01-16 PROCEDURE — 87328 CRYPTOSPORIDIUM AG IA: CPT

## 2025-01-16 PROCEDURE — 87427 SHIGA-LIKE TOXIN AG IA: CPT | Mod: 59

## 2025-01-16 PROCEDURE — 87449 NOS EACH ORGANISM AG IA: CPT

## 2025-01-16 PROCEDURE — 87046 STOOL CULTR AEROBIC BACT EA: CPT

## 2025-01-16 PROCEDURE — 89055 LEUKOCYTE ASSESSMENT FECAL: CPT

## 2025-01-16 PROCEDURE — 87338 HPYLORI STOOL AG IA: CPT

## 2025-01-16 NOTE — PROGRESS NOTES
I have reviewed the notes, assessments, and/or procedures performed by Dr. Luciano, I concur with her documentation of Tiffany Dobbs.  Date of Service: 1/13/2025

## 2025-01-17 LAB
CRYPTOSP AG STL QL IA: NEGATIVE
E COLI SXT1 STL QL IA: NEGATIVE
E COLI SXT2 STL QL IA: NEGATIVE
G LAMBLIA AG STL QL IA: NEGATIVE

## 2025-01-18 LAB — BACTERIA STL CULT: NORMAL

## 2025-01-19 ENCOUNTER — PATIENT MESSAGE (OUTPATIENT)
Dept: INTERNAL MEDICINE | Facility: CLINIC | Age: 35
End: 2025-01-19
Payer: MEDICAID

## 2025-01-19 DIAGNOSIS — K52.9 CHRONIC DIARRHEA: Primary | ICD-10-CM

## 2025-01-21 LAB — H PYLORI AG STL QL IA: DETECTED

## 2025-01-23 LAB — O+P STL MICRO: NORMAL

## 2025-01-24 ENCOUNTER — TELEPHONE (OUTPATIENT)
Dept: INTERNAL MEDICINE | Facility: CLINIC | Age: 35
End: 2025-01-24
Payer: MEDICAID

## 2025-01-24 RX ORDER — LANSOPRAZOLE, AMOXICILLIN, CLARITHROMYCIN 30-500-500
KIT ORAL
Qty: 28 CAPSULE | Refills: 0 | Status: SHIPPED | OUTPATIENT
Start: 2025-01-24 | End: 2025-02-07

## 2025-02-05 ENCOUNTER — PATIENT MESSAGE (OUTPATIENT)
Dept: INTERNAL MEDICINE | Facility: CLINIC | Age: 35
End: 2025-02-05
Payer: MEDICAID

## 2025-02-05 NOTE — PROGRESS NOTES
ENDOCRINOLOGY RETURN VISIT: 02/06/2025  The patient location is: [place]  The chief complaint leading to consultation is: No chief complaint on file.      Visit type: audiovisual    Face to Face time with patient: 15 minutes  20 minutes of total time spent on the encounter, which includes face to face time and non-face to face time preparing to see the patient (eg, review of tests), Obtaining and/or reviewing separately obtained history, Documenting clinical information in the electronic or other health record, Independently interpreting results (not separately reported) and communicating results to the patient/family/caregiver, or Care coordination (not separately reported).    Each patient to whom he or she provides medical services by telemedicine is:  (1) informed of the relationship between the physician and patient and the respective role of any other health care provider with respect to management of the patient; and (2) notified that he or she may decline to receive medical services by telemedicine and may withdraw from such care at any time.    Visit today included increased complexity associated with the care of the episodic problem addressed and managing the longitudinal care of the patient due to the serious and/or complex managed problem(s).      Subjective:      Patient ID: Tiffany Dobbs is a 34 y.o. female.    Chief Complaint:  Hyperthyroidism    Last visit with me in Feb 2024.  History of Present Illness    Interval history:  3 mo ago she had liposuction and BBL done in [facility]. No changes to medication regimen; did not feel worsening symptoms. Still on 5 mg daily, only feels symptoms if she skips 3-4 days.    Regarding hyperthyroidism:  Found on labs beginning April 2023 by her PCP.   Etiology is Graves' disease.  Initially started on methimazole 15 mg, started mid July/2023. Decreased to 10 mg on Jan/2024, most recently decreased to 5 mg MMI given TSH was > 6 and patient is thinking about pregnancy.  Not currently having any symptoms.    Her initial symptoms included palpitations and weight loss. Those are resolved at this point in time.    Menses: regular periods usually, most recently her periods have been irregular. Advised to follow w/ OBGYN    Plans for pregnancy: wants to try in the future, not right now  Peripartum thyroid disorder: no  Exposure to iodine/contrast: no    Eye symptoms: denies    Denied neck enlargement, hoarseness, dysphagia.    FH: Graves' disease in grandmother and aunt.    Lab Results   Component Value Date    TSH 2.473 01/13/2025    TSH 2.206 06/28/2024    TSH 3.168 06/12/2024    FREET4 0.89 06/28/2024    FREET4 1.00 06/12/2024    FREET4 1.00 04/02/2024    THGABSCRN 4.1 (H) 07/13/2023     Thyroid US 7/14/23:  Impression:     1.  Thyroid gland is normal in size with homogenous echotexture.     2.  The prior thyroid nodules are not seen on this examination suggesting they were possibly pseudo-nodules or secondary to thyroid inflammation that has resolved     RECOMMENDATIONS:  No follow-up ultrasound recommended unless there is a clinical change.    ROS:   As above    Objective:     Physical exam not performed - virtual visit.    Assessment and Plan     Graves' disease  .No concern for orbitopathy. Improved symptoms on methimazole.  Discussed the chance of Graves remission (40%) after 2 years of ATD therapy - with a 50% chance of recurrence.  She would like to try decreasing MMI dose.     Pt was advised to let us know in case she does have a pregnancy test turn out positive.     Decrease MMI to 2.5 mg daily    Will recheck TFTs in 4 weeks    Multiple thyroid nodules  Multiple nodules seen on last US, TIRADS 3 at the highest.  No indication for biopsy or follow up imaging.       RTC 6 months    Joanne Gomez MD  [facility], 6th Floor  [address] [place], [address]

## 2025-02-06 ENCOUNTER — OFFICE VISIT (OUTPATIENT)
Dept: ENDOCRINOLOGY | Facility: CLINIC | Age: 35
End: 2025-02-06
Payer: MEDICAID

## 2025-02-06 DIAGNOSIS — E04.2 MULTIPLE THYROID NODULES: ICD-10-CM

## 2025-02-06 DIAGNOSIS — E05.00 GRAVES' DISEASE: Primary | ICD-10-CM

## 2025-02-06 DIAGNOSIS — A04.8 H. PYLORI INFECTION: Primary | ICD-10-CM

## 2025-02-06 PROCEDURE — 98006 SYNCH AUDIO-VIDEO EST MOD 30: CPT | Mod: 95,,, | Performed by: STUDENT IN AN ORGANIZED HEALTH CARE EDUCATION/TRAINING PROGRAM

## 2025-02-06 PROCEDURE — G2211 COMPLEX E/M VISIT ADD ON: HCPCS | Mod: 95,,, | Performed by: STUDENT IN AN ORGANIZED HEALTH CARE EDUCATION/TRAINING PROGRAM

## 2025-02-06 RX ORDER — LANSOPRAZOLE 30 MG/1
30 CAPSULE, DELAYED RELEASE ORAL 2 TIMES DAILY
Qty: 28 CAPSULE | Refills: 0 | Status: SHIPPED | OUTPATIENT
Start: 2025-02-06 | End: 2025-02-28

## 2025-02-06 RX ORDER — AMOXICILLIN 500 MG/1
500 CAPSULE ORAL EVERY 12 HOURS
Qty: 28 CAPSULE | Refills: 0 | Status: SHIPPED | OUTPATIENT
Start: 2025-02-06 | End: 2025-02-20

## 2025-02-06 RX ORDER — METHIMAZOLE 5 MG/1
TABLET ORAL
Qty: 60 TABLET | Refills: 3 | Status: SHIPPED | OUTPATIENT
Start: 2025-02-06

## 2025-02-06 RX ORDER — CLARITHROMYCIN 500 MG/1
500 TABLET, FILM COATED ORAL EVERY 12 HOURS
Qty: 28 TABLET | Refills: 0 | Status: SHIPPED | OUTPATIENT
Start: 2025-02-06 | End: 2025-02-20

## 2025-02-06 NOTE — ASSESSMENT & PLAN NOTE
Multiple nodules seen on last US, TIRADS 3 at the highest.  No indication for biopsy or follow up imaging.

## 2025-02-06 NOTE — ASSESSMENT & PLAN NOTE
.No concern for orbitopathy. Improved symptoms on methimazole.  Discussed the chance of Graves remission (40%) after 2 years of ATD therapy - with a 50% chance of recurrence.  She would like to try decreasing MMI dose.     Pt was advised to let us know in case she does have a pregnancy test turn out positive.     Decrease MMI to 2.5 mg daily    Will recheck TFTs in 4 weeks

## 2025-02-26 NOTE — PROGRESS NOTES
I have reviewed and concur with Dr. Gomez 's history, physical, assessment, and plan.    Visit today included increased complexity associated with the care of the problems addressed and managing the longitudinal care of the patient due to the serious and/or complex managed problems     Elis Herndon MD

## 2025-02-28 ENCOUNTER — OFFICE VISIT (OUTPATIENT)
Dept: GASTROENTEROLOGY | Facility: CLINIC | Age: 35
End: 2025-02-28
Payer: MEDICAID

## 2025-02-28 ENCOUNTER — PATIENT MESSAGE (OUTPATIENT)
Facility: CLINIC | Age: 35
End: 2025-02-28
Payer: MEDICAID

## 2025-02-28 VITALS
HEART RATE: 83 BPM | BODY MASS INDEX: 32.11 KG/M2 | HEIGHT: 63 IN | DIASTOLIC BLOOD PRESSURE: 77 MMHG | OXYGEN SATURATION: 99 % | WEIGHT: 181.19 LBS | SYSTOLIC BLOOD PRESSURE: 114 MMHG

## 2025-02-28 DIAGNOSIS — K52.9 CHRONIC DIARRHEA: Primary | ICD-10-CM

## 2025-02-28 DIAGNOSIS — E05.00 GRAVES' DISEASE: ICD-10-CM

## 2025-02-28 PROCEDURE — 99999 PR PBB SHADOW E&M-EST. PATIENT-LVL IV: CPT | Mod: PBBFAC,,, | Performed by: NURSE PRACTITIONER

## 2025-02-28 PROCEDURE — 99214 OFFICE O/P EST MOD 30 MIN: CPT | Mod: PBBFAC,PN | Performed by: NURSE PRACTITIONER

## 2025-02-28 NOTE — PATIENT INSTRUCTIONS
Complete stool workup  Labs    Start benefiber once a day.     Message me one month after completing antibiotics for H.pylori and we can check for eradication.     You can also try iberogast as needed.

## 2025-02-28 NOTE — PROGRESS NOTES
GASTROENTEROLOGY CLINIC NOTE    Chief Complaint: The primary encounter diagnosis was Chronic diarrhea. A diagnosis of Graves' disease was also pertinent to this visit.  Referring provider/PCP: Tamir Luciano DO HPI  Tiffany Dobbs is a 34 y.o. female who is a new patient to me who presents to clinic for diarrhea.   Patient with hx of Graves disease. Followed by endocrinology  Recently diagnosed with H.pylori; has about 5 days left of treatment    Diarrhea    How Long:  3 months  Traveled to [place] for "[employer] post-op abx  Diarrhea began about two weeks after surgery  Had similar occurrence when in [place] over summer but symptoms resolved with taking Pepto.     Diarrhea was improving but started triple therapy for h.pylori and diarrhea has returned.    Normal Frequency of Bowel Movements: Daily to every other day   Maximum Number of Bowel Movements: 5 per day at its worse     Asymptomatic Days/Days without Bowel Movements: yes   Consistency: Loose and soft   Mucus/Oily/Fatty/Foul Smelling: Foul smelling on occasion   Complete Emptying with Bowel Movements: yes   Urgency/Post Prandial/Undigesteed Food: Urgency   Nausea/Vomiting/Fever/Weight Loss: no   Nocturnal Bowel Movements: no   Melena/Hematochezia: no   Abdominal Pain/Cramping Not currently  Cramping and bloating when diarrhea first began   Daily Fiber Supplement: no   Triggers: unknown     Treatments: Pepto    Artifical Sweeteners: no  NSAIDs: no  ETOH: no  Caffeine: no  New medications: no  Antibiotics: yes  Travel: [place] over summer    [place] 11/2024   Cholecystectomy: no    Medications:  Metformin: no  SSRIs:no  PPIs: currently taking Prevacid for h.pylori    GLP-1s: No  Anticoagulation or Antiplatelet: No    History of H.pylori: yes  H.pylori Treatment: 2025 triple therapy with amoxicillin, clarithromycin, and prevacid  Prior Upper Endoscopy: no  Prior Colonoscopy: no  Family h/o Colon Cancer: No  Maternal great-grandfather stomach  cancer  Family h/o Crohn's Disease or Ulcerative Colitis: maternal aunt Crohn's   Family h/o Celiac Sprue: No  Abdominal Surgeries: tummy tuck      Review of Systems   Constitutional:  Negative for weight loss.   HENT:  Negative for sore throat.    Eyes:  Negative for blurred vision.   Respiratory:  Negative for cough.    Cardiovascular:  Negative for chest pain.   Gastrointestinal:  Positive for diarrhea. Negative for abdominal pain, blood in stool, constipation, heartburn, melena, nausea and vomiting.   Genitourinary:  Negative for dysuria.   Musculoskeletal:  Negative for myalgias.   Skin:  Negative for rash.   Neurological:  Negative for headaches.   Endo/Heme/Allergies:  Negative for environmental allergies.   Psychiatric/Behavioral:  Negative for suicidal ideas. The patient is not nervous/anxious.        Past Medical History: has a past medical history of Anemia, Chronic pain of left knee, Graves disease, and PCOS (polycystic ovarian syndrome).    Past Surgical History: has a past surgical history that includes BREAST REDUCTION (2012); Total Reduction Mammoplasty; Reconstruction of anterior cruciate ligament using graft (Right, 08/02/2022); and Examination under anesthesia (08/02/2022).    Family History:family history includes Arthritis in her maternal grandmother; Breast cancer in her paternal aunt and another family member; Dementia in her maternal grandmother; Diabetes in her father, maternal grandmother, and another family member; Diverticulitis in her father; Gestational diabetes in her mother; Glaucoma in her maternal aunt and mother; Heart disease in her father; Hydrocephalus in her maternal grandmother; Hypertension in her mother; Other in her maternal grandmother and mother; Ovarian cancer in an other family member; Sleep apnea in her mother; Stomach cancer in an other family member.    Allergies:   Review of patient's allergies indicates:   Allergen Reactions    Bactrim  "[sulfamethoxazole-trimethoprim] Itching and Rash       Social History: reports that she has never smoked. She has never been exposed to tobacco smoke. She has never used smokeless tobacco. She reports current alcohol use. She reports that she does not use drugs.    Home medications: Medications Ordered Prior to Encounter[1]    Vital signs:  /77 (BP Location: Right arm, Patient Position: Sitting)   Pulse 83   Ht 5' 3" (1.6 m)   Wt 82.2 kg (181 lb 3.5 oz)   SpO2 99%   BMI 32.10 kg/m²     Physical Exam  Vitals reviewed.   Constitutional:       Appearance: Normal appearance.   HENT:      Head: Normocephalic.   Pulmonary:      Effort: Pulmonary effort is normal. No respiratory distress.   Abdominal:      General: There is no distension.      Palpations: Abdomen is soft.      Tenderness: There is no abdominal tenderness.   Skin:     General: Skin is warm.   Neurological:      Mental Status: She is alert and oriented to person, place, and time.   Psychiatric:         Behavior: Behavior normal.         Thought Content: Thought content normal.         Routine labs:  Lab Results   Component Value Date    WBC 7.17 01/13/2025    HGB 12.9 01/13/2025    HCT 40.5 01/13/2025    MCV 93 01/13/2025     01/13/2025     Lab Results   Component Value Date    INR 0.9 07/25/2022     Lab Results   Component Value Date    IRON 23 (L) 12/30/2024    FERRITIN 38 12/30/2024    TIBC 300 12/30/2024    FESATURATED 8 (L) 12/30/2024     Lab Results   Component Value Date     01/13/2025    K 3.8 01/13/2025     01/13/2025    CO2 21 (L) 01/13/2025    BUN 7 01/13/2025    CREATININE 0.7 01/13/2025    GLUF 81 01/04/2005     Lab Results   Component Value Date    ALBUMIN 3.6 07/13/2023    ALT 33 07/13/2023    AST 29 07/13/2023    ALKPHOS 139 (H) 07/13/2023    BILITOT 0.5 07/13/2023     Lab Results   Component Value Date    GLUCOSE 83 05/15/2006     Lab Results   Component Value Date    TSH 2.473 01/13/2025     Lab Results "   Component Value Date    CALCIUM 8.9 01/13/2025       Imaging:      I have reviewed prior labs, imaging, and notes.      Assessment:  1. Chronic diarrhea    2. Graves' disease      Diarrhea following travel to [place] for plastic surgery. Received abx post-op. Diarrhea began about two weeks after surgery.   Diarrhea began to improve but returned when she began treatment for H.pylori.   Stool workup negative for other infectious etiology.   Endocrinology managing Graves's disease; previous celiac panel negative but now with new onset of diarrhea, celiac should be ruled out.     Plan:  Orders Placed This Encounter    Clostridium difficile EIA    Calprotectin, Stool    Fecal fat, qualitative    Pancreatic elastase, fecal    Rotavirus antigen, stool    pH, stool    Tissue Transglutaminase, IgA    IgA     Complete stool workup  Lower suspicion for c.diff but given duration and recent abx will rule out  Celiac labs  Start daily fiber supplement  Trial iberogast    Stool will need to be checked for eradication of H.pylori but this cannot be done until one month after treatment.   She will reach out to us after completing treatment.     Offered trial of Bentyl but refused at this time.       Plan of care discussed with patient who is in agreement and verbalized understanding.     I have explained the planned procedures to the patient.The risks, benefits and alternatives of the procedure were also explained in detail. Patient verbalized understanding, all questions were answered. The patient agrees to proceed as planned    Follow Up: Pending workup          PARADISE Herbert,FNP-BC [facility]    (Portions of this note were dictated using voice recognition software and may contain dictation related errors in spelling/grammar/syntax not found on text review)         [1]   Current Outpatient Medications on File Prior to Visit   Medication Sig Dispense Refill    ferrous sulfate (IRON)  325 mg (65 mg iron) Tab tablet Take 1 tablet (325 mg total) by mouth 2 (two) times daily. 60 tablet 2    lansoprazole (PREVACID) 30 MG capsule Take 1 capsule (30 mg total) by mouth 2 (two) times a day. for 14 days 28 capsule 0    methIMAzole (TAPAZOLE) 5 MG Tab Take half a tablet once daily 60 tablet 3    [DISCONTINUED] tirzepatide 7.5 mg/0.5 mL PnIj Inject 7.5 mg into the skin every 7 days. (Patient not taking: Reported on 2/28/2025)       No current facility-administered medications on file prior to visit.

## 2025-03-13 ENCOUNTER — LAB VISIT (OUTPATIENT)
Dept: LAB | Facility: HOSPITAL | Age: 35
End: 2025-03-13
Payer: MEDICAID

## 2025-03-13 DIAGNOSIS — K52.9 CHRONIC DIARRHEA: ICD-10-CM

## 2025-03-13 PROCEDURE — 87449 NOS EACH ORGANISM AG IA: CPT | Performed by: NURSE PRACTITIONER

## 2025-03-13 PROCEDURE — 82705 FATS/LIPIDS FECES QUAL: CPT | Performed by: NURSE PRACTITIONER

## 2025-03-13 PROCEDURE — 87425 ROTAVIRUS AG IA: CPT | Performed by: NURSE PRACTITIONER

## 2025-03-13 PROCEDURE — 82653 EL-1 FECAL QUANTITATIVE: CPT | Performed by: NURSE PRACTITIONER

## 2025-03-13 PROCEDURE — 83993 ASSAY FOR CALPROTECTIN FECAL: CPT | Performed by: NURSE PRACTITIONER

## 2025-03-13 PROCEDURE — 83986 ASSAY PH BODY FLUID NOS: CPT | Performed by: NURSE PRACTITIONER

## 2025-03-14 ENCOUNTER — RESULTS FOLLOW-UP (OUTPATIENT)
Dept: ENDOCRINOLOGY | Facility: CLINIC | Age: 35
End: 2025-03-14

## 2025-03-14 ENCOUNTER — PATIENT MESSAGE (OUTPATIENT)
Dept: ENDOCRINOLOGY | Facility: CLINIC | Age: 35
End: 2025-03-14
Payer: MEDICAID

## 2025-03-14 DIAGNOSIS — E05.00 GRAVES' DISEASE: Primary | ICD-10-CM

## 2025-03-14 LAB
C DIFF GDH STL QL: NEGATIVE
C DIFF TOX A+B STL QL IA: NEGATIVE
CALPROTECTIN INTERPRETATION: NORMAL
CALPROTECTIN: 41
ELASTASE 1, FECAL: 182
ELASTASE INTERPRETATION: ABNORMAL
RV AG STL QL IA.RAPID: NEGATIVE

## 2025-03-15 LAB
FAT STL QL: NORMAL
NEUTRAL FAT STL QL: NORMAL
PH STL: 6 [PH] (ref 5–8.5)

## 2025-03-17 ENCOUNTER — RESULTS FOLLOW-UP (OUTPATIENT)
Dept: GASTROENTEROLOGY | Facility: CLINIC | Age: 35
End: 2025-03-17

## 2025-03-19 ENCOUNTER — PATIENT MESSAGE (OUTPATIENT)
Dept: GASTROENTEROLOGY | Facility: CLINIC | Age: 35
End: 2025-03-19
Payer: MEDICAID

## 2025-03-24 ENCOUNTER — OFFICE VISIT (OUTPATIENT)
Dept: HEMATOLOGY/ONCOLOGY | Facility: CLINIC | Age: 35
End: 2025-03-24
Payer: MEDICAID

## 2025-03-24 DIAGNOSIS — D50.0 IRON DEFICIENCY ANEMIA DUE TO CHRONIC BLOOD LOSS: Primary | ICD-10-CM

## 2025-03-24 PROCEDURE — 99499 UNLISTED E&M SERVICE: CPT | Mod: 95,,, | Performed by: INTERNAL MEDICINE

## 2025-03-24 NOTE — PROGRESS NOTES
The patient location is: home  The chief complaint leading to consultation is: iron def    Visit type: audiovisual    Face to Face time with patient: 10  20 minutes of total time spent on the encounter, which includes face to face time and non-face to face time preparing to see the patient (eg, review of tests), Obtaining and/or reviewing separately obtained history, Documenting clinical information in the electronic or other health record, Independently interpreting results (not separately reported) and communicating results to the patient/family/caregiver, or Care coordination (not separately reported).         Each patient to whom he or she provides medical services by telemedicine is:  (1) informed of the relationship between the physician and patient and the respective role of any other health care provider with respect to management of the patient; and (2) notified that he or she may decline to receive medical services by telemedicine and may withdraw from such care at any time.    Notes:       HPI    34 years old referred to [facility] for evaluation of iron deficiency anemia.  Patient is in preparation to get a Brazilian butt lift as well as tummy tucks.  Historically patient has heavy menstrual cycle and iron deficiency anemia.  Patient has been instructed to start on iron replacement protocol.  Most recent blood work shows normal ligation of iron however there was not any CBC completed.      Past Medical History:   Diagnosis Date    Anemia     Chronic pain of left knee 06/05/2017    Graves disease     PCOS (polycystic ovarian syndrome) 04/2020     Social History     Socioeconomic History    Marital status: Single   Tobacco Use    Smoking status: Never     Passive exposure: Never    Smokeless tobacco: Never   Substance and Sexual Activity    Alcohol use: Yes     Comment: socially, twice per month    Drug use: No    Sexual activity: Yes     Partners: Male     Birth control/protection: Condom   Social  History Narrative    Admin coordinator at [employer]     Social Drivers of Health     Financial Resource Strain: Low Risk  (2/26/2024)    Overall Financial Resource Strain (CARDIA)     Difficulty of Paying Living Expenses: Not very hard   Food Insecurity: No Food Insecurity (2/26/2024)    Hunger Vital Sign     Worried About Running Out of Food in the Last Year: Never true     Ran Out of Food in the Last Year: Never true   Transportation Needs: No Transportation Needs (2/26/2024)    PRAPARE - Transportation     Lack of Transportation (Medical): No     Lack of Transportation (Non-Medical): No   Physical Activity: Unknown (3/24/2025)    Exercise Vital Sign     Days of Exercise per Week: Patient declined   Stress: Stress Concern Present (2/26/2024)    [employer] - Occupational Stress Questionnaire     Feeling of Stress : To some extent   Housing Stability: Low Risk  (2/26/2024)    Housing Stability Vital Sign     Unable to Pay for Housing in the Last Year: No     Number of Places Lived in the Last Year: 1     Unstable Housing in the Last Year: No         Subjective      Review of Systems   Constitutional: Negative for appetite change, fatigue and unexpected weight change.   HENT: Negative for mouth sores.   Eyes: Negative for visual disturbance.   Respiratory: Negative for cough and shortness of breath.   Cardiovascular: Negative for chest pain.   Gastrointestinal: Negative for diarrhea.   Genitourinary: Negative for frequency.   Musculoskeletal: Negative for back pain.   Skin: Negative for rash.   Neurological: Negative for headaches.   Hematological: Negative for adenopathy.   Psychiatric/Behavioral: The patient is not nervous/anxious.   All other systems reviewed and are negative.     Objective    Physical Exam     VV        Lab Results   Component Value Date    WBC 7.29 03/13/2025    HGB 13.0 03/13/2025    HCT 40.2 03/13/2025    MCV 88 03/13/2025     03/13/2025        CMP  Sodium   Date Value Ref Range Status   01/13/2025 138 136 - 145 mmol/L Final     Potassium   Date Value Ref Range Status   01/13/2025 3.8 3.5 - 5.1 mmol/L Final     Chloride   Date Value Ref Range Status   01/13/2025 109 95 - 110 mmol/L Final     CO2   Date Value Ref Range Status   01/13/2025 21 (L) 23 - 29 mmol/L Final     Glucose   Date Value Ref Range Status   01/13/2025 72 70 - 110 mg/dL Final     BUN   Date Value Ref Range Status   01/13/2025 7 6 - 20 mg/dL Final     Creatinine   Date Value Ref Range Status   01/13/2025 0.7 0.5 - 1.4 mg/dL Final     Calcium   Date Value Ref Range Status   01/13/2025 8.9 8.7 - 10.5 mg/dL Final     Total Protein   Date Value Ref Range Status   07/13/2023 8.2 6.0 - 8.4 g/dL Final     Albumin   Date Value Ref Range Status   07/13/2023 3.6 3.5 - 5.2 g/dL Final     Total Bilirubin   Date Value Ref Range Status   07/13/2023 0.5 0.1 - 1.0 mg/dL Final     Comment:     For infants and newborns, interpretation of results should be based  on gestational age, weight and in agreement with clinical  observations.    Premature Infant recommended reference ranges:  Up to 24 hours.............<8.0 mg/dL  Up to 48 hours............<12.0 mg/dL  3-5 days..................<15.0 mg/dL  6-29 days.................<15.0 mg/dL       Alkaline Phosphatase   Date Value Ref Range Status   07/13/2023 139 (H) 55 - 135 U/L Final     AST   Date Value Ref Range Status   07/13/2023 29 10 - 40 U/L Final     ALT   Date Value Ref Range Status   07/13/2023 33 10 - 44 U/L Final     Anion Gap   Date Value Ref Range Status   01/13/2025 8 8 - 16 mmol/L Final     eGFR   Date Value Ref Range Status   01/13/2025 >60.0 >60 mL/min/1.73 m^2 Final       Assessment    History of iron deficiency anemia    Heavy menstrual cycle    Cosmetic involving Brazilian butt lift and tummy tucks - Nov 29 2024    Recheck iron study and CBC in 3 month with VV     Start oral supplement daily     Plan    There are no diagnoses linked  to this encounter.          Answers submitted by the patient for this visit:  Review of Systems Questionnaire (Submitted on 12/28/2024)  appetite change : Yes  unexpected weight change: No  mouth sores: No  visual disturbance: No  cough: No  shortness of breath: No  chest pain: No  abdominal pain: No  diarrhea: Yes  frequency: No  back pain: No  rash: No  headaches: No  adenopathy: No  nervous/ anxious: No

## 2025-03-26 ENCOUNTER — TELEPHONE (OUTPATIENT)
Dept: HEMATOLOGY/ONCOLOGY | Facility: CLINIC | Age: 35
End: 2025-03-26
Payer: MEDICAID

## 2025-03-26 NOTE — TELEPHONE ENCOUNTER
Attempted to call patient to reschedule her VV that she could not connect to, no answer. Unable to leave voicemail.

## 2025-04-02 ENCOUNTER — TELEPHONE (OUTPATIENT)
Dept: GASTROENTEROLOGY | Facility: CLINIC | Age: 35
End: 2025-04-02
Payer: MEDICAID

## 2025-04-02 DIAGNOSIS — K29.70 GASTRITIS, HELICOBACTER PYLORI: Primary | ICD-10-CM

## 2025-04-02 DIAGNOSIS — B96.81 GASTRITIS, HELICOBACTER PYLORI: Primary | ICD-10-CM

## 2025-04-02 NOTE — TELEPHONE ENCOUNTER
Spoke with pt and informed that Ariana LEWIS ordered the h.pylori stool study to check for eradication. She should stop all reflux medications including over the counter for two weeks and be off of all antibiotics one month prior to submitting sample verbal understanding

## 2025-04-29 ENCOUNTER — PATIENT MESSAGE (OUTPATIENT)
Dept: INTERNAL MEDICINE | Facility: CLINIC | Age: 35
End: 2025-04-29
Payer: MEDICAID

## 2025-04-29 DIAGNOSIS — L81.9 DISCOLORATION OF SKIN: Primary | ICD-10-CM

## 2025-06-09 ENCOUNTER — TELEPHONE (OUTPATIENT)
Dept: HEMATOLOGY/ONCOLOGY | Facility: CLINIC | Age: 35
End: 2025-06-09
Payer: MEDICAID

## 2025-06-13 ENCOUNTER — TELEPHONE (OUTPATIENT)
Dept: HEMATOLOGY/ONCOLOGY | Facility: CLINIC | Age: 35
End: 2025-06-13
Payer: MEDICAID

## 2025-06-13 ENCOUNTER — PATIENT MESSAGE (OUTPATIENT)
Dept: HEMATOLOGY/ONCOLOGY | Facility: CLINIC | Age: 35
End: 2025-06-13
Payer: MEDICAID

## 2025-06-13 NOTE — TELEPHONE ENCOUNTER
Was unable to LVM, so I sent a portal msg  To remind pt of labs and appt. Advised pt that if labs were not   Completed then appointment would have to be canceled with provider.

## 2025-06-23 ENCOUNTER — ON-DEMAND VIRTUAL (OUTPATIENT)
Dept: URGENT CARE | Facility: CLINIC | Age: 35
End: 2025-06-23
Payer: MEDICAID

## 2025-06-23 DIAGNOSIS — L03.90 CELLULITIS, UNSPECIFIED CELLULITIS SITE: Primary | ICD-10-CM

## 2025-06-23 PROCEDURE — 98005 SYNCH AUDIO-VIDEO EST LOW 20: CPT | Mod: 95,,, | Performed by: NURSE PRACTITIONER

## 2025-06-23 RX ORDER — CLINDAMYCIN HYDROCHLORIDE 300 MG/1
300 CAPSULE ORAL 3 TIMES DAILY
Qty: 30 CAPSULE | Refills: 0 | Status: SHIPPED | OUTPATIENT
Start: 2025-06-23 | End: 2025-07-03

## 2025-06-23 NOTE — PROGRESS NOTES
Subjective:      Patient ID: Tiffany Dobbs is a 34 y.o. female.    Vitals:  vitals were not taken for this visit.     Chief Complaint: Recurrent Skin Infections      Visit Type: TELE AUDIOVISUAL    Patient Location: [place], [place]    Present with the patient at the time of consultation: TELEMED PRESENT WITH PATIENT: None    Past Medical History:   Diagnosis Date    Anemia     Chronic pain of left knee 06/05/2017    Graves disease     PCOS (polycystic ovarian syndrome) 04/2020     Past Surgical History:   Procedure Laterality Date    BREAST REDUCTION  2012    EXAMINATION UNDER ANESTHESIA  08/02/2022    Procedure: EXAM UNDER ANESTHESIA;  Surgeon: Tevin Tubbs IV, MD;  Location: [facility] OR;  Service: Orthopedics;;    RECONSTRUCTION OF ANTERIOR CRUCIATE LIGAMENT USING GRAFT Right 08/02/2022    Procedure: RECONSTRUCTION, KNEE, ACL, USING Allograft Achilles GRAFT, possible meniscus repair, possible PLC repair, knee exam under anesthesia;  Surgeon: Tevin Tubbs IV, MD;  Location: [facility] OR;  Service: Orthopedics;  Laterality: Right;  Achilles allograft with bone block(IN FREEZER); open/THAW at start of case  Black knee post/operative side, well leg marvin on nonsurgical side  Position: ACL P    TOTAL REDUCTION MAMMOPLASTY       Review of patient's allergies indicates:   Allergen Reactions    Bactrim [sulfamethoxazole-trimethoprim] Itching and Rash     Medications Ordered Prior to Encounter[1]  Family History   Problem Relation Name Age of Onset    Other Mother Toya         cervical dysplasia    Gestational diabetes Mother Toya     Glaucoma Mother Toya     Hypertension Mother Toya     Sleep apnea Mother Toya     Heart disease Father          arrythmia, has device - ICD?    Diverticulitis Father      Diabetes Father      Other Maternal Grandmother Clovina         benign breast bx    Diabetes Maternal Grandmother Clovina     Dementia Maternal Grandmother Clovina     Arthritis Maternal Grandmother Clovina         spinal     Hydrocephalus Maternal Grandmother Clovina     Glaucoma Maternal Aunt Heidi     Breast cancer Paternal Aunt Yady         dx.~42    Breast cancer Other Le         dx.~42    Stomach cancer Other          dx. ~early- to mid-70s    Ovarian cancer Other Cristina         dx.~55    Diabetes Other Humaira     Colon cancer Neg Hx      Heart attack Neg Hx         Medications Ordered                [id number] - [place] - [address] AT [facility] OF [address]   [address] [place] [address]    Telephone: [phone]   Fax: [phone]   Hours: Not open 24 hours                         E-Prescribed (1 of 1)              clindamycin (CLEOCIN) 300 MG capsule    Sig: Take 1 capsule (300 mg total) by mouth 3 (three) times daily. for 10 days       Start: 6/23/25     Quantity: 30 capsule Refills: 0                           Ohs Peq Odvv Intake    6/23/2025  6:45 PM CDT - Filed by Patient   What is your current physical address in the event of a medical emergency? [address] [facility] [place] [address]   Are you able to take your vital signs? No   Please attach any relevant images or files    Is your employer contracted with [facility]? No         Patient doing virtual visit for cellulitis   Had tummy tuck 6 months ago in [place]  Developed a seroma to the abdomen after the procedure  Has had issues with a seroma since the surgery  Seeing a nurse doing massage and seroma aspiration   Thinks she has developed cellulitis to the abdomen  Has a large round red area that is tender  Had subjective fever yesterday         Constitution: Positive for activity change and fever (subjective yesterday). Negative for appetite change, chills and sweating.   HENT:  Negative for ear pain and ear discharge.    Neck: Negative for neck pain and neck stiffness.   Eyes:  Negative for eye pain, eye redness and blurred vision.   Respiratory:  Negative for cough, shortness of breath and wheezing.     Gastrointestinal:  Positive for abdominal pain (secondary to skin infection). Negative for nausea, vomiting and diarrhea.   Genitourinary:  Negative for dysuria, frequency and urgency.   Musculoskeletal:  Negative for pain.   Skin:  Positive for color change. Negative for rash.   Neurological:  Negative for dizziness, light-headedness, headaches and disorientation.   Psychiatric/Behavioral:  Negative for disorientation, confusion, agitation and nervous/anxious. The patient is not nervous/anxious.         Objective:   The physical exam was conducted virtually.  Physical Exam   Constitutional: She does not appear ill. No distress.   HENT:   Head: Normocephalic and atraumatic.   Nose: Nose normal.   Eyes: Conjunctivae are normal. Right eye exhibits no discharge. Left eye exhibits no discharge.   Pulmonary/Chest: Effort normal. No stridor. No respiratory distress.   Abdominal: Normal appearance.   Neurological: She is alert and at baseline.   Skin:         Comments: There is a large round area of redness and swelling on the left side of umbilicus    Psychiatric: Her behavior is normal. Mood and thought content normal.       Assessment:     1. Cellulitis, unspecified cellulitis site        Plan:       Cellulitis, unspecified cellulitis site  -     clindamycin (CLEOCIN) 300 MG capsule; Take 1 capsule (300 mg total) by mouth 3 (three) times daily. for 10 days  Dispense: 30 capsule; Refill: 0    Encouraged her to reach out to surgeon as she has had complications ongoing x 6 months  Will send in clindamycin to treat for cellulitis  Emergency Room if not improving with treatment                   [1]   Current Outpatient Medications on File Prior to Visit   Medication Sig Dispense Refill    ferrous sulfate (IRON) 325 mg (65 mg iron) Tab tablet Take 1 tablet (325 mg total) by mouth 2 (two) times daily. 60 tablet 2    lansoprazole (PREVACID) 30 MG capsule Take 1 capsule (30 mg total) by mouth 2 (two) times a day. for 14 days  28 capsule 0    methIMAzole (TAPAZOLE) 5 MG Tab Take half a tablet once daily 60 tablet 3     No current facility-administered medications on file prior to visit.

## 2025-06-28 ENCOUNTER — PATIENT MESSAGE (OUTPATIENT)
Dept: INTERNAL MEDICINE | Facility: CLINIC | Age: 35
End: 2025-06-28
Payer: MEDICAID

## 2025-08-08 ENCOUNTER — TELEPHONE (OUTPATIENT)
Dept: DERMATOLOGY | Facility: CLINIC | Age: 35
End: 2025-08-08
Payer: COMMERCIAL

## 2025-08-20 ENCOUNTER — PATIENT MESSAGE (OUTPATIENT)
Dept: ENDOCRINOLOGY | Facility: CLINIC | Age: 35
End: 2025-08-20
Payer: COMMERCIAL

## 2025-08-20 ENCOUNTER — LAB VISIT (OUTPATIENT)
Dept: LAB | Facility: HOSPITAL | Age: 35
End: 2025-08-20
Payer: COMMERCIAL

## 2025-08-20 ENCOUNTER — OFFICE VISIT (OUTPATIENT)
Dept: OBSTETRICS AND GYNECOLOGY | Facility: CLINIC | Age: 35
End: 2025-08-20
Payer: COMMERCIAL

## 2025-08-20 VITALS
WEIGHT: 199.94 LBS | BODY MASS INDEX: 35.43 KG/M2 | DIASTOLIC BLOOD PRESSURE: 84 MMHG | HEIGHT: 63 IN | SYSTOLIC BLOOD PRESSURE: 122 MMHG

## 2025-08-20 DIAGNOSIS — Z11.3 ROUTINE SCREENING FOR STI (SEXUALLY TRANSMITTED INFECTION): ICD-10-CM

## 2025-08-20 DIAGNOSIS — Z01.419 ROUTINE GYNECOLOGICAL EXAMINATION: Primary | ICD-10-CM

## 2025-08-20 DIAGNOSIS — N76.0 ACUTE VAGINITIS: ICD-10-CM

## 2025-08-20 DIAGNOSIS — Z01.419 ROUTINE GYNECOLOGICAL EXAMINATION: ICD-10-CM

## 2025-08-20 LAB
HBV SURFACE AG SERPL QL IA: NORMAL
HCV AB SERPL QL IA: NORMAL
HIV 1+2 AB+HIV1 P24 AG SERPL QL IA: NORMAL
T PALLIDUM IGG+IGM SER QL: NORMAL

## 2025-08-20 PROCEDURE — 1160F RVW MEDS BY RX/DR IN RCRD: CPT | Mod: CPTII,S$GLB,, | Performed by: NURSE PRACTITIONER

## 2025-08-20 PROCEDURE — 36415 COLL VENOUS BLD VENIPUNCTURE: CPT

## 2025-08-20 PROCEDURE — 3079F DIAST BP 80-89 MM HG: CPT | Mod: CPTII,S$GLB,, | Performed by: NURSE PRACTITIONER

## 2025-08-20 PROCEDURE — 87340 HEPATITIS B SURFACE AG IA: CPT

## 2025-08-20 PROCEDURE — 86593 SYPHILIS TEST NON-TREP QUANT: CPT

## 2025-08-20 PROCEDURE — 87491 CHLMYD TRACH DNA AMP PROBE: CPT | Performed by: NURSE PRACTITIONER

## 2025-08-20 PROCEDURE — 81515 NFCT DS BV&VAGINITIS DNA ALG: CPT | Performed by: NURSE PRACTITIONER

## 2025-08-20 PROCEDURE — 3008F BODY MASS INDEX DOCD: CPT | Mod: CPTII,S$GLB,, | Performed by: NURSE PRACTITIONER

## 2025-08-20 PROCEDURE — 99999 PR PBB SHADOW E&M-EST. PATIENT-LVL III: CPT | Mod: PBBFAC,,, | Performed by: NURSE PRACTITIONER

## 2025-08-20 PROCEDURE — 3074F SYST BP LT 130 MM HG: CPT | Mod: CPTII,S$GLB,, | Performed by: NURSE PRACTITIONER

## 2025-08-20 PROCEDURE — 99395 PREV VISIT EST AGE 18-39: CPT | Mod: S$GLB,,, | Performed by: NURSE PRACTITIONER

## 2025-08-20 PROCEDURE — 86803 HEPATITIS C AB TEST: CPT

## 2025-08-20 PROCEDURE — 87389 HIV-1 AG W/HIV-1&-2 AB AG IA: CPT

## 2025-08-20 PROCEDURE — 1159F MED LIST DOCD IN RCRD: CPT | Mod: CPTII,S$GLB,, | Performed by: NURSE PRACTITIONER

## 2025-08-21 ENCOUNTER — OFFICE VISIT (OUTPATIENT)
Dept: INTERNAL MEDICINE | Facility: CLINIC | Age: 35
End: 2025-08-21
Payer: COMMERCIAL

## 2025-08-21 VITALS
OXYGEN SATURATION: 100 % | WEIGHT: 198 LBS | SYSTOLIC BLOOD PRESSURE: 136 MMHG | RESPIRATION RATE: 18 BRPM | HEIGHT: 63 IN | BODY MASS INDEX: 35.08 KG/M2 | HEART RATE: 88 BPM | DIASTOLIC BLOOD PRESSURE: 73 MMHG

## 2025-08-21 DIAGNOSIS — S30.1XXS ABDOMINAL WALL SEROMA, SEQUELA: ICD-10-CM

## 2025-08-21 DIAGNOSIS — A04.8 H. PYLORI INFECTION: ICD-10-CM

## 2025-08-21 DIAGNOSIS — E66.9 OBESITY, UNSPECIFIED CLASS, UNSPECIFIED OBESITY TYPE, UNSPECIFIED WHETHER SERIOUS COMORBIDITY PRESENT: ICD-10-CM

## 2025-08-21 DIAGNOSIS — E28.2 PCOS (POLYCYSTIC OVARIAN SYNDROME): Primary | ICD-10-CM

## 2025-08-21 PROBLEM — E05.00 GRAVES' DISEASE: Chronic | Status: ACTIVE | Noted: 2023-08-14

## 2025-08-21 PROCEDURE — 3008F BODY MASS INDEX DOCD: CPT | Mod: CPTII,S$GLB,, | Performed by: FAMILY MEDICINE

## 2025-08-21 PROCEDURE — 3078F DIAST BP <80 MM HG: CPT | Mod: CPTII,S$GLB,, | Performed by: FAMILY MEDICINE

## 2025-08-21 PROCEDURE — 1160F RVW MEDS BY RX/DR IN RCRD: CPT | Mod: CPTII,S$GLB,, | Performed by: FAMILY MEDICINE

## 2025-08-21 PROCEDURE — 3044F HG A1C LEVEL LT 7.0%: CPT | Mod: CPTII,S$GLB,, | Performed by: FAMILY MEDICINE

## 2025-08-21 PROCEDURE — 99214 OFFICE O/P EST MOD 30 MIN: CPT | Mod: S$GLB,,, | Performed by: FAMILY MEDICINE

## 2025-08-21 PROCEDURE — 3075F SYST BP GE 130 - 139MM HG: CPT | Mod: CPTII,S$GLB,, | Performed by: FAMILY MEDICINE

## 2025-08-21 PROCEDURE — 1159F MED LIST DOCD IN RCRD: CPT | Mod: CPTII,S$GLB,, | Performed by: FAMILY MEDICINE

## 2025-08-21 PROCEDURE — 99999 PR PBB SHADOW E&M-EST. PATIENT-LVL IV: CPT | Mod: PBBFAC,,, | Performed by: FAMILY MEDICINE

## 2025-08-22 ENCOUNTER — LAB VISIT (OUTPATIENT)
Dept: LAB | Facility: HOSPITAL | Age: 35
End: 2025-08-22
Attending: FAMILY MEDICINE
Payer: COMMERCIAL

## 2025-08-22 DIAGNOSIS — D50.0 IRON DEFICIENCY ANEMIA DUE TO CHRONIC BLOOD LOSS: ICD-10-CM

## 2025-08-22 DIAGNOSIS — N92.0 MENORRHAGIA WITH REGULAR CYCLE: ICD-10-CM

## 2025-08-22 DIAGNOSIS — E05.00 GRAVES' DISEASE: ICD-10-CM

## 2025-08-22 DIAGNOSIS — E66.9 OBESITY, UNSPECIFIED CLASS, UNSPECIFIED OBESITY TYPE, UNSPECIFIED WHETHER SERIOUS COMORBIDITY PRESENT: ICD-10-CM

## 2025-08-22 DIAGNOSIS — E28.2 PCOS (POLYCYSTIC OVARIAN SYNDROME): ICD-10-CM

## 2025-08-22 LAB
ABSOLUTE EOSINOPHIL (OHS): 0.23 K/UL
ABSOLUTE MONOCYTE (OHS): 0.37 K/UL (ref 0.3–1)
ABSOLUTE NEUTROPHIL COUNT (OHS): 2.19 K/UL (ref 1.8–7.7)
ALBUMIN SERPL BCP-MCNC: 3.7 G/DL (ref 3.5–5.2)
ALP SERPL-CCNC: 93 UNIT/L (ref 40–150)
ALT SERPL W/O P-5'-P-CCNC: 14 UNIT/L (ref 0–55)
ANION GAP (OHS): 8 MMOL/L (ref 8–16)
AST SERPL-CCNC: 25 UNIT/L (ref 0–50)
BACTERIAL VAGINOSIS DNA (OHS): NOT DETECTED
BASOPHILS # BLD AUTO: 0.06 K/UL
BASOPHILS NFR BLD AUTO: 1.3 %
BILIRUB SERPL-MCNC: 0.6 MG/DL (ref 0.1–1)
BUN SERPL-MCNC: 6 MG/DL (ref 6–20)
C TRACH DNA SPEC QL NAA+PROBE: NOT DETECTED
CALCIUM SERPL-MCNC: 8.8 MG/DL (ref 8.7–10.5)
CANDIDA GLABRATA/KRUSEI DNA (OHS): NOT DETECTED
CANDIDA SPECIES DNA (OHS): NOT DETECTED
CHLORIDE SERPL-SCNC: 107 MMOL/L (ref 95–110)
CHOLEST SERPL-MCNC: 169 MG/DL (ref 120–199)
CHOLEST/HDLC SERPL: 3.9 {RATIO} (ref 2–5)
CO2 SERPL-SCNC: 23 MMOL/L (ref 23–29)
CREAT SERPL-MCNC: 0.7 MG/DL (ref 0.5–1.4)
CTGC SOURCE (OHS) ORD-325: NORMAL
EAG (OHS): 103 MG/DL (ref 68–131)
ERYTHROCYTE [DISTWIDTH] IN BLOOD BY AUTOMATED COUNT: 14.6 % (ref 11.5–14.5)
FERRITIN SERPL-MCNC: 7.7 NG/ML (ref 20–300)
GFR SERPLBLD CREATININE-BSD FMLA CKD-EPI: >60 ML/MIN/1.73/M2
GLUCOSE SERPL-MCNC: 85 MG/DL (ref 70–110)
HBA1C MFR BLD: 5.2 % (ref 4–5.6)
HCT VFR BLD AUTO: 36.7 % (ref 37–48.5)
HDLC SERPL-MCNC: 43 MG/DL (ref 40–75)
HDLC SERPL: 25.4 % (ref 20–50)
HGB BLD-MCNC: 11.9 GM/DL (ref 12–16)
IMM GRANULOCYTES # BLD AUTO: 0.01 K/UL (ref 0–0.04)
IMM GRANULOCYTES NFR BLD AUTO: 0.2 % (ref 0–0.5)
IRON SATN MFR SERPL: 8 % (ref 20–50)
IRON SERPL-MCNC: 34 UG/DL (ref 30–160)
LDLC SERPL CALC-MCNC: 103.6 MG/DL (ref 63–159)
LYMPHOCYTES # BLD AUTO: 1.91 K/UL (ref 1–4.8)
MCH RBC QN AUTO: 27.2 PG (ref 27–31)
MCHC RBC AUTO-ENTMCNC: 32.4 G/DL (ref 32–36)
MCV RBC AUTO: 84 FL (ref 82–98)
N GONORRHOEA DNA UR QL NAA+PROBE: NOT DETECTED
NONHDLC SERPL-MCNC: 126 MG/DL
NUCLEATED RBC (/100WBC) (OHS): 0 /100 WBC
PLATELET # BLD AUTO: 379 K/UL (ref 150–450)
PMV BLD AUTO: 10.4 FL (ref 9.2–12.9)
POTASSIUM SERPL-SCNC: 4.3 MMOL/L (ref 3.5–5.1)
PROT SERPL-MCNC: 7.5 GM/DL (ref 6–8.4)
RBC # BLD AUTO: 4.37 M/UL (ref 4–5.4)
RELATIVE EOSINOPHIL (OHS): 4.8 %
RELATIVE LYMPHOCYTE (OHS): 40 % (ref 18–48)
RELATIVE MONOCYTE (OHS): 7.8 % (ref 4–15)
RELATIVE NEUTROPHIL (OHS): 45.9 % (ref 38–73)
SODIUM SERPL-SCNC: 138 MMOL/L (ref 136–145)
T4 FREE SERPL-MCNC: 0.98 NG/DL (ref 0.71–1.51)
TIBC SERPL-MCNC: 438 UG/DL (ref 250–450)
TRANSFERRIN SERPL-MCNC: 296 MG/DL (ref 200–375)
TRICHOMONAS VAGINALIS DNA (OHS): NOT DETECTED
TRIGL SERPL-MCNC: 112 MG/DL (ref 30–150)
TSH SERPL-ACNC: 0.81 UIU/ML (ref 0.4–4)
WBC # BLD AUTO: 4.77 K/UL (ref 3.9–12.7)

## 2025-08-22 PROCEDURE — 84466 ASSAY OF TRANSFERRIN: CPT | Performed by: INTERNAL MEDICINE

## 2025-08-22 PROCEDURE — 85025 COMPLETE CBC W/AUTO DIFF WBC: CPT | Mod: PO

## 2025-08-22 PROCEDURE — 84460 ALANINE AMINO (ALT) (SGPT): CPT

## 2025-08-22 PROCEDURE — 84443 ASSAY THYROID STIM HORMONE: CPT

## 2025-08-22 PROCEDURE — 84439 ASSAY OF FREE THYROXINE: CPT

## 2025-08-22 PROCEDURE — 80061 LIPID PANEL: CPT

## 2025-08-22 PROCEDURE — 82166 ASSAY ANTI-MULLERIAN HORM: CPT

## 2025-08-22 PROCEDURE — 36415 COLL VENOUS BLD VENIPUNCTURE: CPT | Mod: PO

## 2025-08-22 PROCEDURE — 83036 HEMOGLOBIN GLYCOSYLATED A1C: CPT

## 2025-08-22 PROCEDURE — 82728 ASSAY OF FERRITIN: CPT | Performed by: INTERNAL MEDICINE

## 2025-08-25 ENCOUNTER — PATIENT MESSAGE (OUTPATIENT)
Dept: OBSTETRICS AND GYNECOLOGY | Facility: CLINIC | Age: 35
End: 2025-08-25
Payer: COMMERCIAL

## 2025-08-26 ENCOUNTER — TELEPHONE (OUTPATIENT)
Dept: RADIOLOGY | Facility: HOSPITAL | Age: 35
End: 2025-08-26
Payer: COMMERCIAL

## 2025-08-26 LAB — MIS SERPL IA-MCNC: 3.8 NG/ML (ref 0.15–7.5)

## (undated) DEVICE — COVER BACK TABLE 72X21

## (undated) DEVICE — GOWN POLY REINF BRTH SLV XL

## (undated) DEVICE — DRAPE THREE-QTR REINF 53X77IN

## (undated) DEVICE — DRAPE U SPLIT SHEET 54X76IN

## (undated) DEVICE — GOWN POLY REINF X-LONG 2XL

## (undated) DEVICE — DRESSING XEROFORM FOIL PK 1X8

## (undated) DEVICE — BLADE SURG CARBON STEEL SZ11

## (undated) DEVICE — TOURNIQUET SB QC DP 34X4IN

## (undated) DEVICE — SOL IRR NACL .9% 3000ML

## (undated) DEVICE — NDL 22GA X1 1/2 REG BEVEL

## (undated) DEVICE — GOWN SURGICAL X-LARGE

## (undated) DEVICE — DRESSING XEROFORM 1X8IN

## (undated) DEVICE — Device

## (undated) DEVICE — PADDING WYTEX UNDRCST 6INX4YD

## (undated) DEVICE — DRESSING AQUACEL SACRAL 9 X 9

## (undated) DEVICE — PAD ABD 8X10 STERILE

## (undated) DEVICE — APPLICATOR CHLORAPREP ORN 26ML

## (undated) DEVICE — BRACE KNEE T SCOPE PREMIER

## (undated) DEVICE — ELECTRODE REM PLYHSV RETURN 9

## (undated) DEVICE — SEE MEDLINE ITEM 157128

## (undated) DEVICE — PACK BASIC

## (undated) DEVICE — MAT SUCTION PUDDLEVAC ORANGE

## (undated) DEVICE — COVER OVERHEAD SURG LT BLUE

## (undated) DEVICE — DRAPE SURG W/TWL 17 5/8X23

## (undated) DEVICE — RETRIEVER SUTURE HEWSON DISP

## (undated) DEVICE — DRAPE ARTHSCP T ORTHOMAX POUCH

## (undated) DEVICE — TUBING SUC UNIV W/CONN 12FT

## (undated) DEVICE — KIT ACL DISP W/O SAW BLADE

## (undated) DEVICE — BANDAGE MATRIX HK LOOP 6IN 5YD

## (undated) DEVICE — MANIFOLD 4 PORT

## (undated) DEVICE — SEE L#120831

## (undated) DEVICE — BANDAGE ESMARK ELASTIC ST 6X9

## (undated) DEVICE — SUT BLU BR 2 TAPERD NDL 1/2

## (undated) DEVICE — GOWN POLY REINF BRTH SLV LG

## (undated) DEVICE — SPONGE LAP 18X18 PREWASHED

## (undated) DEVICE — REAMER LOW PROFILE 8MM

## (undated) DEVICE — SUT VICRYL 1 CT-1 27 UNDIE

## (undated) DEVICE — SUT FIBERWIRE LOOP TIGER 2

## (undated) DEVICE — GAUZE SPONGE 4X4 12PLY

## (undated) DEVICE — GOWN AERO CHROME W/ TOWEL XL

## (undated) DEVICE — TUBE SET INFLOW/OUTFLOW

## (undated) DEVICE — TRAY MINOR GEN SURG

## (undated) DEVICE — SUT FIBERWIRE LOOP STRAIGHT

## (undated) DEVICE — NDL HYPO REG 25G X 1 1/2

## (undated) DEVICE — ALCOHOL 70% ISOP W/GREEN 16OZ

## (undated) DEVICE — SUT #2 TI-CRON HGS-21 30IN

## (undated) DEVICE — SYR 10CC LUER LOCK

## (undated) DEVICE — CLOSURE SKIN STERI STRIP 1/2X4

## (undated) DEVICE — REAMER LOW PROFILE 10 MM

## (undated) DEVICE — BNDG COFLEX FOAM LF2 ST 6X5YD

## (undated) DEVICE — GLOVE BIOGEL PIMICRO INDIC 8.5

## (undated) DEVICE — REAMER LOW PROFILE 6.5MM STRL

## (undated) DEVICE — SUPPORT ULNA NERVE PROTECTOR

## (undated) DEVICE — SUT ETHILON 3-0 PS2 18 BLK

## (undated) DEVICE — SEE MEDLINE ITEM 156955

## (undated) DEVICE — SEE MEDLINE ITEM 152572

## (undated) DEVICE — SUT VICRYL 3-0 27 SH

## (undated) DEVICE — SEE MEDLINE ITEM 152622

## (undated) DEVICE — SEE MEDLINE ITEM 157216

## (undated) DEVICE — DRAPE C-ARM/MOBILE XRAY 44X80

## (undated) DEVICE — BLADE SURG #15 CARBON STEEL

## (undated) DEVICE — PAD ABDOMINAL 5X9 STERILE

## (undated) DEVICE — PACK UNIVERSAL SPLIT II

## (undated) DEVICE — SUT FIBERTAPE 1.3MM TAILS

## (undated) DEVICE — PAD CAST SPECIALIST STRL 6

## (undated) DEVICE — SUT VICRYL CTD 2-0 GI 27 SH

## (undated) DEVICE — SEE MEDLINE ITEM 146292

## (undated) DEVICE — BANDAGE ELAS SOFTWRAP ST 6X5YD

## (undated) DEVICE — BLADE LONG 31.0MM X 9.0MM

## (undated) DEVICE — NDL SPINAL 18GX3.5 SPINOCAN

## (undated) DEVICE — BANDAGE ACE DOUBLE STER 6IN

## (undated) DEVICE — TOWEL OR DISP STRL BLUE 4/PK

## (undated) DEVICE — GLOVE BIOGEL SKINSENSE PI 8.5

## (undated) DEVICE — DRAPE STERI INSTRUMENT 1018

## (undated) DEVICE — SUT MCRYL PLUS 4-0 PS2 27IN

## (undated) DEVICE — DERMABOND SKIN ADHESIVE PROPEN

## (undated) DEVICE — SKINMARKER & RULER REGULAR X-F

## (undated) DEVICE — REAMER LOW PROFILE 7MM

## (undated) DEVICE — TAPE MEDIPORE 4IN X 2YDS